# Patient Record
Sex: FEMALE | Race: WHITE | Employment: OTHER | URBAN - NONMETROPOLITAN AREA
[De-identification: names, ages, dates, MRNs, and addresses within clinical notes are randomized per-mention and may not be internally consistent; named-entity substitution may affect disease eponyms.]

---

## 2017-04-18 ENCOUNTER — OFFICE VISIT (OUTPATIENT)
Dept: CARDIOLOGY | Age: 72
End: 2017-04-18
Payer: MEDICARE

## 2017-04-18 VITALS — HEART RATE: 76 BPM | SYSTOLIC BLOOD PRESSURE: 134 MMHG | DIASTOLIC BLOOD PRESSURE: 68 MMHG

## 2017-04-18 DIAGNOSIS — I25.10 CORONARY ARTERY DISEASE INVOLVING NATIVE CORONARY ARTERY OF NATIVE HEART WITHOUT ANGINA PECTORIS: Primary | ICD-10-CM

## 2017-04-18 DIAGNOSIS — Z95.5 H/O RIGHT CORONARY ARTERY STENT PLACEMENT: ICD-10-CM

## 2017-04-18 DIAGNOSIS — E78.2 MIXED HYPERLIPIDEMIA: ICD-10-CM

## 2017-04-18 DIAGNOSIS — I10 ESSENTIAL HYPERTENSION: ICD-10-CM

## 2017-04-18 PROCEDURE — 93000 ELECTROCARDIOGRAM COMPLETE: CPT | Performed by: NURSE PRACTITIONER

## 2017-04-18 PROCEDURE — 1090F PRES/ABSN URINE INCON ASSESS: CPT | Performed by: NURSE PRACTITIONER

## 2017-04-18 PROCEDURE — G8598 ASA/ANTIPLAT THER USED: HCPCS | Performed by: NURSE PRACTITIONER

## 2017-04-18 PROCEDURE — 99213 OFFICE O/P EST LOW 20 MIN: CPT | Performed by: NURSE PRACTITIONER

## 2017-04-18 PROCEDURE — 4040F PNEUMOC VAC/ADMIN/RCVD: CPT | Performed by: NURSE PRACTITIONER

## 2017-04-18 PROCEDURE — G8400 PT W/DXA NO RESULTS DOC: HCPCS | Performed by: NURSE PRACTITIONER

## 2017-04-18 PROCEDURE — 1036F TOBACCO NON-USER: CPT | Performed by: NURSE PRACTITIONER

## 2017-04-18 PROCEDURE — G8427 DOCREV CUR MEDS BY ELIG CLIN: HCPCS | Performed by: NURSE PRACTITIONER

## 2017-04-18 PROCEDURE — 3017F COLORECTAL CA SCREEN DOC REV: CPT | Performed by: NURSE PRACTITIONER

## 2017-04-18 PROCEDURE — G8417 CALC BMI ABV UP PARAM F/U: HCPCS | Performed by: NURSE PRACTITIONER

## 2017-04-18 PROCEDURE — 3014F SCREEN MAMMO DOC REV: CPT | Performed by: NURSE PRACTITIONER

## 2017-04-18 PROCEDURE — 1123F ACP DISCUSS/DSCN MKR DOCD: CPT | Performed by: NURSE PRACTITIONER

## 2017-05-09 ENCOUNTER — OFFICE VISIT (OUTPATIENT)
Dept: VASCULAR SURGERY | Age: 72
End: 2017-05-09
Payer: MEDICARE

## 2017-05-09 VITALS
TEMPERATURE: 98.6 F | OXYGEN SATURATION: 93 % | DIASTOLIC BLOOD PRESSURE: 78 MMHG | SYSTOLIC BLOOD PRESSURE: 132 MMHG | RESPIRATION RATE: 18 BRPM | HEART RATE: 83 BPM

## 2017-05-09 DIAGNOSIS — M79.605 PAIN IN BOTH LOWER EXTREMITIES: Primary | ICD-10-CM

## 2017-05-09 DIAGNOSIS — I87.2 CHRONIC VENOUS INSUFFICIENCY: ICD-10-CM

## 2017-05-09 DIAGNOSIS — M79.604 PAIN IN BOTH LOWER EXTREMITIES: Primary | ICD-10-CM

## 2017-05-09 DIAGNOSIS — M79.89 LEG SWELLING: ICD-10-CM

## 2017-05-09 DIAGNOSIS — I65.23 BILATERAL CAROTID ARTERY STENOSIS: ICD-10-CM

## 2017-05-09 PROCEDURE — G8598 ASA/ANTIPLAT THER USED: HCPCS | Performed by: PHYSICIAN ASSISTANT

## 2017-05-09 PROCEDURE — 3017F COLORECTAL CA SCREEN DOC REV: CPT | Performed by: PHYSICIAN ASSISTANT

## 2017-05-09 PROCEDURE — G8400 PT W/DXA NO RESULTS DOC: HCPCS | Performed by: PHYSICIAN ASSISTANT

## 2017-05-09 PROCEDURE — 1123F ACP DISCUSS/DSCN MKR DOCD: CPT | Performed by: PHYSICIAN ASSISTANT

## 2017-05-09 PROCEDURE — 1036F TOBACCO NON-USER: CPT | Performed by: PHYSICIAN ASSISTANT

## 2017-05-09 PROCEDURE — G8427 DOCREV CUR MEDS BY ELIG CLIN: HCPCS | Performed by: PHYSICIAN ASSISTANT

## 2017-05-09 PROCEDURE — G8421 BMI NOT CALCULATED: HCPCS | Performed by: PHYSICIAN ASSISTANT

## 2017-05-09 PROCEDURE — 4040F PNEUMOC VAC/ADMIN/RCVD: CPT | Performed by: PHYSICIAN ASSISTANT

## 2017-05-09 PROCEDURE — 99213 OFFICE O/P EST LOW 20 MIN: CPT | Performed by: PHYSICIAN ASSISTANT

## 2017-05-09 PROCEDURE — 1090F PRES/ABSN URINE INCON ASSESS: CPT | Performed by: PHYSICIAN ASSISTANT

## 2017-05-09 PROCEDURE — 3014F SCREEN MAMMO DOC REV: CPT | Performed by: PHYSICIAN ASSISTANT

## 2017-05-11 DIAGNOSIS — M79.604 PAIN IN BOTH LOWER EXTREMITIES: Primary | ICD-10-CM

## 2017-05-11 DIAGNOSIS — M79.605 PAIN IN BOTH LOWER EXTREMITIES: Primary | ICD-10-CM

## 2017-05-11 DIAGNOSIS — M79.89 LEG SWELLING: ICD-10-CM

## 2017-05-12 ENCOUNTER — HOSPITAL ENCOUNTER (OUTPATIENT)
Dept: VASCULAR LAB | Age: 72
Discharge: HOME | End: 2017-05-12
Payer: MEDICARE

## 2017-05-12 DIAGNOSIS — M79.605 PAIN IN BOTH LOWER EXTREMITIES: ICD-10-CM

## 2017-05-12 DIAGNOSIS — M79.604 PAIN IN BOTH LOWER EXTREMITIES: ICD-10-CM

## 2017-05-12 DIAGNOSIS — M79.89 LEG SWELLING: ICD-10-CM

## 2017-05-12 PROCEDURE — 93970 EXTREMITY STUDY: CPT

## 2017-05-16 ENCOUNTER — TELEPHONE (OUTPATIENT)
Dept: VASCULAR SURGERY | Age: 72
End: 2017-05-16

## 2017-05-18 ENCOUNTER — TELEPHONE (OUTPATIENT)
Dept: VASCULAR SURGERY | Age: 72
End: 2017-05-18

## 2017-07-24 ENCOUNTER — OFFICE VISIT (OUTPATIENT)
Dept: UROLOGY | Age: 72
End: 2017-07-24
Payer: MEDICARE

## 2017-07-24 VITALS
HEART RATE: 103 BPM | WEIGHT: 194 LBS | SYSTOLIC BLOOD PRESSURE: 130 MMHG | TEMPERATURE: 97.9 F | DIASTOLIC BLOOD PRESSURE: 70 MMHG | HEIGHT: 68 IN | OXYGEN SATURATION: 96 % | BODY MASS INDEX: 29.4 KG/M2

## 2017-07-24 DIAGNOSIS — N32.81 OVERACTIVE BLADDER: Primary | ICD-10-CM

## 2017-07-24 DIAGNOSIS — R35.0 FREQUENCY OF URINATION: ICD-10-CM

## 2017-07-24 LAB
BILIRUBIN, POC: NORMAL
BLOOD URINE, POC: NORMAL
CLARITY, POC: CLEAR
COLOR, POC: YELLOW
GLUCOSE URINE, POC: NORMAL
KETONES, POC: NORMAL
LEUKOCYTE EST, POC: NORMAL
NITRITE, POC: NORMAL
PH, POC: 8
PROTEIN, POC: NORMAL
SPECIFIC GRAVITY, POC: 1.01
UROBILINOGEN, POC: 0.2

## 2017-07-24 PROCEDURE — G8417 CALC BMI ABV UP PARAM F/U: HCPCS | Performed by: UROLOGY

## 2017-07-24 PROCEDURE — 3017F COLORECTAL CA SCREEN DOC REV: CPT | Performed by: UROLOGY

## 2017-07-24 PROCEDURE — 3014F SCREEN MAMMO DOC REV: CPT | Performed by: UROLOGY

## 2017-07-24 PROCEDURE — 81002 URINALYSIS NONAUTO W/O SCOPE: CPT | Performed by: UROLOGY

## 2017-07-24 PROCEDURE — G8598 ASA/ANTIPLAT THER USED: HCPCS | Performed by: UROLOGY

## 2017-07-24 PROCEDURE — 1123F ACP DISCUSS/DSCN MKR DOCD: CPT | Performed by: UROLOGY

## 2017-07-24 PROCEDURE — 1090F PRES/ABSN URINE INCON ASSESS: CPT | Performed by: UROLOGY

## 2017-07-24 PROCEDURE — 4040F PNEUMOC VAC/ADMIN/RCVD: CPT | Performed by: UROLOGY

## 2017-07-24 PROCEDURE — 1036F TOBACCO NON-USER: CPT | Performed by: UROLOGY

## 2017-07-24 PROCEDURE — G8427 DOCREV CUR MEDS BY ELIG CLIN: HCPCS | Performed by: UROLOGY

## 2017-07-24 PROCEDURE — 99214 OFFICE O/P EST MOD 30 MIN: CPT | Performed by: UROLOGY

## 2017-07-24 PROCEDURE — G8400 PT W/DXA NO RESULTS DOC: HCPCS | Performed by: UROLOGY

## 2017-07-24 RX ORDER — OLMESARTAN MEDOXOMIL AND HYDROCHLOROTHIAZIDE 40/25 40; 25 MG/1; MG/1
1 TABLET ORAL
COMMUNITY
End: 2017-07-24 | Stop reason: SDUPTHER

## 2017-07-24 ASSESSMENT — ENCOUNTER SYMPTOMS
DOUBLE VISION: 0
BLURRED VISION: 0
HEARTBURN: 0
NAUSEA: 0
SORE THROAT: 0
SHORTNESS OF BREATH: 0
WHEEZING: 0

## 2017-08-24 ENCOUNTER — OFFICE VISIT (OUTPATIENT)
Dept: UROLOGY | Age: 72
End: 2017-08-24
Payer: MEDICARE

## 2017-08-24 VITALS
WEIGHT: 193 LBS | HEART RATE: 85 BPM | SYSTOLIC BLOOD PRESSURE: 138 MMHG | DIASTOLIC BLOOD PRESSURE: 76 MMHG | BODY MASS INDEX: 30.29 KG/M2 | HEIGHT: 67 IN | TEMPERATURE: 97.6 F | OXYGEN SATURATION: 98 %

## 2017-08-24 DIAGNOSIS — N32.81 OVERACTIVE BLADDER: Primary | ICD-10-CM

## 2017-08-24 LAB
BILIRUBIN, POC: NORMAL
BLOOD URINE, POC: NORMAL
CLARITY, POC: CLEAR
COLOR, POC: YELLOW
GLUCOSE URINE, POC: NORMAL
KETONES, POC: NORMAL
LEUKOCYTE EST, POC: NORMAL
NITRITE, POC: NORMAL
PH, POC: 7
PROTEIN, POC: NORMAL
SPECIFIC GRAVITY, POC: 1.01
UROBILINOGEN, POC: 0.2

## 2017-08-24 PROCEDURE — 1123F ACP DISCUSS/DSCN MKR DOCD: CPT | Performed by: PHYSICIAN ASSISTANT

## 2017-08-24 PROCEDURE — 81002 URINALYSIS NONAUTO W/O SCOPE: CPT | Performed by: PHYSICIAN ASSISTANT

## 2017-08-24 PROCEDURE — 3014F SCREEN MAMMO DOC REV: CPT | Performed by: PHYSICIAN ASSISTANT

## 2017-08-24 PROCEDURE — 51798 US URINE CAPACITY MEASURE: CPT | Performed by: PHYSICIAN ASSISTANT

## 2017-08-24 PROCEDURE — G8427 DOCREV CUR MEDS BY ELIG CLIN: HCPCS | Performed by: PHYSICIAN ASSISTANT

## 2017-08-24 PROCEDURE — G8400 PT W/DXA NO RESULTS DOC: HCPCS | Performed by: PHYSICIAN ASSISTANT

## 2017-08-24 PROCEDURE — 1036F TOBACCO NON-USER: CPT | Performed by: PHYSICIAN ASSISTANT

## 2017-08-24 PROCEDURE — 99213 OFFICE O/P EST LOW 20 MIN: CPT | Performed by: PHYSICIAN ASSISTANT

## 2017-08-24 PROCEDURE — 3017F COLORECTAL CA SCREEN DOC REV: CPT | Performed by: PHYSICIAN ASSISTANT

## 2017-08-24 PROCEDURE — 1090F PRES/ABSN URINE INCON ASSESS: CPT | Performed by: PHYSICIAN ASSISTANT

## 2017-08-24 PROCEDURE — G8598 ASA/ANTIPLAT THER USED: HCPCS | Performed by: PHYSICIAN ASSISTANT

## 2017-08-24 PROCEDURE — 4040F PNEUMOC VAC/ADMIN/RCVD: CPT | Performed by: PHYSICIAN ASSISTANT

## 2017-08-24 PROCEDURE — G8417 CALC BMI ABV UP PARAM F/U: HCPCS | Performed by: PHYSICIAN ASSISTANT

## 2017-08-24 ASSESSMENT — ENCOUNTER SYMPTOMS
SHORTNESS OF BREATH: 0
EYE DISCHARGE: 0
WHEEZING: 0
NAUSEA: 0
EYE REDNESS: 0
HEARTBURN: 0

## 2017-08-31 ENCOUNTER — OFFICE VISIT (OUTPATIENT)
Dept: UROLOGY | Age: 72
End: 2017-08-31
Payer: MEDICARE

## 2017-08-31 DIAGNOSIS — N32.81 OVERACTIVE BLADDER: Primary | ICD-10-CM

## 2017-08-31 PROCEDURE — 1036F TOBACCO NON-USER: CPT | Performed by: PHYSICIAN ASSISTANT

## 2017-08-31 PROCEDURE — 64566 NEUROELTRD STIM POST TIBIAL: CPT | Performed by: PHYSICIAN ASSISTANT

## 2017-08-31 ASSESSMENT — ENCOUNTER SYMPTOMS
WHEEZING: 0
SHORTNESS OF BREATH: 0
EYE REDNESS: 0
HEARTBURN: 0
EYE DISCHARGE: 0
NAUSEA: 0

## 2017-09-07 ENCOUNTER — OFFICE VISIT (OUTPATIENT)
Dept: UROLOGY | Age: 72
End: 2017-09-07
Payer: MEDICARE

## 2017-09-07 DIAGNOSIS — N32.81 OVERACTIVE BLADDER: Primary | ICD-10-CM

## 2017-09-07 PROCEDURE — 64566 NEUROELTRD STIM POST TIBIAL: CPT | Performed by: PHYSICIAN ASSISTANT

## 2017-09-07 PROCEDURE — 1036F TOBACCO NON-USER: CPT | Performed by: PHYSICIAN ASSISTANT

## 2017-09-07 ASSESSMENT — ENCOUNTER SYMPTOMS
EYE DISCHARGE: 0
HEARTBURN: 0
SHORTNESS OF BREATH: 0
NAUSEA: 0
EYE REDNESS: 0
WHEEZING: 0

## 2017-09-14 ENCOUNTER — OFFICE VISIT (OUTPATIENT)
Dept: UROLOGY | Age: 72
End: 2017-09-14
Payer: MEDICARE

## 2017-09-14 DIAGNOSIS — N32.81 OVERACTIVE BLADDER: Primary | ICD-10-CM

## 2017-09-14 PROCEDURE — 1036F TOBACCO NON-USER: CPT | Performed by: PHYSICIAN ASSISTANT

## 2017-09-14 PROCEDURE — 64566 NEUROELTRD STIM POST TIBIAL: CPT | Performed by: PHYSICIAN ASSISTANT

## 2017-09-14 ASSESSMENT — ENCOUNTER SYMPTOMS
EYE REDNESS: 0
SHORTNESS OF BREATH: 0
WHEEZING: 0
NAUSEA: 0
EYE DISCHARGE: 0
HEARTBURN: 0

## 2017-09-21 ENCOUNTER — OFFICE VISIT (OUTPATIENT)
Dept: UROLOGY | Age: 72
End: 2017-09-21
Payer: MEDICARE

## 2017-09-21 DIAGNOSIS — N32.81 OVERACTIVE BLADDER: Primary | ICD-10-CM

## 2017-09-21 PROCEDURE — 1036F TOBACCO NON-USER: CPT | Performed by: PHYSICIAN ASSISTANT

## 2017-09-21 PROCEDURE — 64566 NEUROELTRD STIM POST TIBIAL: CPT | Performed by: PHYSICIAN ASSISTANT

## 2017-09-21 ASSESSMENT — ENCOUNTER SYMPTOMS
SHORTNESS OF BREATH: 0
WHEEZING: 0
HEARTBURN: 0
EYE DISCHARGE: 0
EYE REDNESS: 0
NAUSEA: 0

## 2017-09-29 ENCOUNTER — OFFICE VISIT (OUTPATIENT)
Dept: UROLOGY | Age: 72
End: 2017-09-29
Payer: MEDICARE

## 2017-09-29 DIAGNOSIS — N32.81 OVERACTIVE BLADDER: Primary | ICD-10-CM

## 2017-09-29 PROCEDURE — 64566 NEUROELTRD STIM POST TIBIAL: CPT | Performed by: PHYSICIAN ASSISTANT

## 2017-09-29 PROCEDURE — 1036F TOBACCO NON-USER: CPT | Performed by: PHYSICIAN ASSISTANT

## 2017-09-29 ASSESSMENT — ENCOUNTER SYMPTOMS
NAUSEA: 0
EYE REDNESS: 0
HEARTBURN: 0
EYE DISCHARGE: 0
WHEEZING: 0
SHORTNESS OF BREATH: 0

## 2017-10-04 ENCOUNTER — OFFICE VISIT (OUTPATIENT)
Dept: UROLOGY | Age: 72
End: 2017-10-04
Payer: MEDICARE

## 2017-10-04 DIAGNOSIS — N32.81 OVERACTIVE BLADDER: Primary | ICD-10-CM

## 2017-10-04 PROCEDURE — 64566 NEUROELTRD STIM POST TIBIAL: CPT | Performed by: PHYSICIAN ASSISTANT

## 2017-10-04 PROCEDURE — 1036F TOBACCO NON-USER: CPT | Performed by: PHYSICIAN ASSISTANT

## 2017-10-04 ASSESSMENT — ENCOUNTER SYMPTOMS
WHEEZING: 0
SHORTNESS OF BREATH: 0
EYE REDNESS: 0
HEARTBURN: 0
NAUSEA: 0
EYE DISCHARGE: 0

## 2017-10-04 NOTE — PROGRESS NOTES
SECTION  1970    x 1    CHOLECYSTECTOMY  2012    [place]    COLONOSCOPY  1-    Dr LUJAN    COLONOSCOPY  05/06/2014    Dr. Max Wilburn  2012    Dr Tisha Bailon    partial    KNEE SURGERY  2011    left knee surgery    PTCA      stent    TUBAL LIGATION      UPPER GASTROINTESTINAL ENDOSCOPY  2009?  UPPER GASTROINTESTINAL ENDOSCOPY  1-    Dr LUJAN    UPPER GASTROINTESTINAL ENDOSCOPY  05/06/2014    Dr. Xu Winkler   01- [facility]    Left Leg Ablation    VARICOSE VEIN SURGERY  3-  [facility]    right leg ablation       Current Outpatient Prescriptions   Medication Sig Dispense Refill    Mirabegron ER 50 MG TB24 Take 50 mg by mouth daily 30 tablet 0    metoprolol (TOPROL-XL) 200 MG XL tablet Take 200 mg by mouth daily      ranitidine (ZANTAC 150 MAXIMUM STRENGTH) 150 MG tablet Take 150 mg by mouth nightly      BENICAR HCT 40-25 MG per tablet Take 1 tablet by mouth daily       calcium carbonate 600 MG TABS tablet Take 1 tablet by mouth 2 times daily as needed       Vitamin D (CHOLECALCIFEROL) 1000 UNITS CAPS capsule Take 3,000 Units by mouth daily       clopidogrel (PLAVIX) 75 MG tablet Take 75 mg by mouth daily.  allopurinol (ZYLOPRIM) 300 MG tablet Take 300 mg by mouth daily.  aspirin 81 MG EC tablet Take 81 mg by mouth daily.  loratadine (CLARITIN) 10 MG tablet Take 10 mg by mouth daily.  Multiple Vitamin (MULTIVITAMIN PO) Take 1 tablet by mouth daily        No current facility-administered medications for this visit. Allergies   Allergen Reactions    Reclast [Zoledronic Acid] Other (See Comments)     Patient has a intolerance to this medication .  It makes her feel \"out of it\"     Celexa [Citalopram Hydrobromide] Nausea Only     Nausea and headache    Demerol Nausea Only    Dye [Barium-Containing Compounds] Nausea Only    Hydrocodone-Acetaminophen Other (See Comments)     Shaking, attacks nervous system    Lasix [Furosemide] Nausea Only    Neurontin [Gabapentin] Nausea Only     Dizziness      Nitroglycerin Other (See Comments)     Passed out    Plaquenil [Hydroxychloroquine Sulfate] Other (See Comments)     nervousness    Cozaar [Losartan Potassium] Palpitations    Esomeprazole Magnesium Nausea And Vomiting    Lisinopril Other (See Comments)     Muscle cramps in legs    Prilosec [Omeprazole] Nausea And Vomiting       Social History     Social History    Marital status:      Spouse name: N/A    Number of children: N/A    Years of education: N/A     Social History Main Topics    Smoking status: Never Smoker    Smokeless tobacco: Never Used    Alcohol use No    Drug use: No    Sexual activity: Not Asked     Other Topics Concern    None     Social History Narrative       Family History   Problem Relation Age of Onset    Diabetes Mother     Heart Disease Mother     Heart Disease Father     Diabetes Father     High Blood Pressure Father     Esophageal Cancer Father     Colon Polyps Father     Crohn's Disease Brother     Colon Cancer Neg Hx     Liver Cancer Neg Hx     Liver Disease Neg Hx     Rectal Cancer Neg Hx     Stomach Cancer Neg Hx        REVIEW OF SYSTEMS:  Review of Systems   Constitutional: Negative for chills and fever. HENT: Negative for hearing loss. Eyes: Negative for discharge and redness. Respiratory: Negative for shortness of breath and wheezing. Cardiovascular: Negative for leg swelling and PND. Gastrointestinal: Negative for heartburn and nausea. Genitourinary: Positive for frequency and urgency. Negative for dysuria, flank pain and hematuria. Musculoskeletal: Negative for myalgias and neck pain. Skin: Negative for itching and rash. Neurological: Negative for seizures, loss of consciousness and headaches.    Endo/Heme/Allergies: Negative for environmental allergies and polydipsia. Psychiatric/Behavioral: Negative for depression and suicidal ideas. PHYSICAL EXAM:  There were no vitals taken for this visit. Physical Exam   Constitutional: No distress. HENT:   Mouth/Throat: No oropharyngeal exudate. Eyes: Left eye exhibits no discharge. No scleral icterus. Neck: No JVD present. No tracheal deviation present. No thyromegaly present. Cardiovascular: Exam reveals no gallop and no friction rub. Pulmonary/Chest: No stridor. She has no rales. Abdominal: She exhibits no distension and no mass. There is no rebound and no guarding. Musculoskeletal: She exhibits no edema. Neurological: No cranial nerve deficit. She exhibits normal muscle tone. Coordination normal.   Skin: She is not diaphoretic. No pallor. 1. Patient is seated with the left treatment leg elevated. 2. 34 gauge fine needle electrode is inserted into lower inner aspect of the leg. Slightly cephalad to the medial malleolus. 3. Surface electrode pad is placed over the medial aspect of the calcaneus on the same leg. 4.Needle electrode is connected to the external pulse generator. 5.The pulse generator is turned on and the millivolts used are 17. 6.Patient is treated for 30 minutes. 7.The needle and pad are removed. 1. Overactive bladder    - [place] POST TIBIAL NEUROSTIMULATION,PERC NEEDLE ELECTRODE  Patient states this past week she is getting up less at night she was getting up 5-7 times now 4-5 times at night and her urgency has decreased. Orders Placed This Encounter   Procedures    [place] POST TIBIAL NEUROSTIMULATION,PERC NEEDLE ELECTRODE     Uroplasty # 6     No orders of the defined types were placed in this encounter. Plan:  Follow up in 2 week for next treatment. Patient will follow up in 1 week for next treatment.

## 2017-10-24 ENCOUNTER — OFFICE VISIT (OUTPATIENT)
Dept: CARDIOLOGY | Age: 72
End: 2017-10-24
Payer: MEDICARE

## 2017-10-24 VITALS
SYSTOLIC BLOOD PRESSURE: 102 MMHG | HEIGHT: 67 IN | BODY MASS INDEX: 29.19 KG/M2 | WEIGHT: 186 LBS | HEART RATE: 72 BPM | DIASTOLIC BLOOD PRESSURE: 68 MMHG

## 2017-10-24 DIAGNOSIS — I25.10 CORONARY ARTERY DISEASE INVOLVING NATIVE CORONARY ARTERY OF NATIVE HEART WITHOUT ANGINA PECTORIS: ICD-10-CM

## 2017-10-24 DIAGNOSIS — I10 ESSENTIAL HYPERTENSION: Primary | ICD-10-CM

## 2017-10-24 DIAGNOSIS — E78.2 MIXED HYPERLIPIDEMIA: ICD-10-CM

## 2017-10-24 PROCEDURE — 99213 OFFICE O/P EST LOW 20 MIN: CPT | Performed by: INTERNAL MEDICINE

## 2017-10-24 PROCEDURE — G8484 FLU IMMUNIZE NO ADMIN: HCPCS | Performed by: INTERNAL MEDICINE

## 2017-10-24 PROCEDURE — 3017F COLORECTAL CA SCREEN DOC REV: CPT | Performed by: INTERNAL MEDICINE

## 2017-10-24 PROCEDURE — 3014F SCREEN MAMMO DOC REV: CPT | Performed by: INTERNAL MEDICINE

## 2017-10-24 PROCEDURE — G8417 CALC BMI ABV UP PARAM F/U: HCPCS | Performed by: INTERNAL MEDICINE

## 2017-10-24 PROCEDURE — 1036F TOBACCO NON-USER: CPT | Performed by: INTERNAL MEDICINE

## 2017-10-24 PROCEDURE — G8427 DOCREV CUR MEDS BY ELIG CLIN: HCPCS | Performed by: INTERNAL MEDICINE

## 2017-10-24 PROCEDURE — 1123F ACP DISCUSS/DSCN MKR DOCD: CPT | Performed by: INTERNAL MEDICINE

## 2017-10-24 PROCEDURE — 1090F PRES/ABSN URINE INCON ASSESS: CPT | Performed by: INTERNAL MEDICINE

## 2017-10-24 PROCEDURE — 4040F PNEUMOC VAC/ADMIN/RCVD: CPT | Performed by: INTERNAL MEDICINE

## 2017-10-24 PROCEDURE — G8400 PT W/DXA NO RESULTS DOC: HCPCS | Performed by: INTERNAL MEDICINE

## 2017-10-24 PROCEDURE — G8598 ASA/ANTIPLAT THER USED: HCPCS | Performed by: INTERNAL MEDICINE

## 2017-10-24 NOTE — PROGRESS NOTES
[facility] Patient Office Visit    [facility] [place]  [id number]  Phone: [phone]  Fax: [phone]        10/24/2017    Chief Complaint / Reason for the Visit   Follow up of:  CAD and HTN and Hyperlipidemia      Specialty Problems        Cardiology Problems    CAD (coronary artery disease)        Hypertension        Carotid artery stenosis        Chronic venous insufficiency        Varicose veins        Spider veins        Internal hemorrhoids without complication        Bleeding internal hemorrhoids        Syncope        Internal hemorrhoids        Coronary artery disease involving native coronary artery of native heart without angina pectoris              Current Status Today According to the patient:  \"I\"m doing okay\"    Subjective:  Ms. Merwyn Nageotte is generally feeling stable. Ms. Merwyn Nageotte has the following cardiac complaints / symptoms today:    1. CAD, Is stable on current medications    2. HTN, Is stable on current medications    3.  Hyperlipidemia, .jdts Merwyn Nageotte is a 74 y.o. female with the following history as recorded in [facility]:    Patient Active Problem List    Diagnosis Date Noted    Overactive bladder 07/24/2017    Coronary artery disease involving native coronary artery of native heart without angina pectoris 04/18/2017    Mixed hyperlipidemia 04/18/2017    H/O right coronary artery stent placement 49/07/2543    Periumbilical abdominal pain 07/21/2016    Irritable bowel syndrome with diarrhea 06/10/2016    Internal hemorrhoids 06/10/2016    Syncope 09/09/2015    Precordial pain 09/08/2015    CAMPOVERDE (dyspnea on exertion) 09/08/2015    Palpitations 09/08/2015    Bleeding internal hemorrhoids 07/15/2014    Heartburn 05/20/2014    Internal hemorrhoids without complication 63/98/2750    Spider veins 03/04/2013    Carotid artery stenosis 11/08/2012    Leg pain 11/08/2012    Leg swelling Irregular heart beat     Itching     Muscle cramp     Neck pain     Osteoarthritis     Osteoporosis     Palpitations     RA (rheumatoid arthritis) (HCC)     Rash     Rectal bleeding     SOB (shortness of breath)     Sore throat     Tympanic membrane perforation     Varicose veins 2012     Past Surgical History:   Procedure Laterality Date    APPENDECTOMY      BREAST SURGERY  2002    CARDIAC CATHETERIZATION  9/10/15  JDT    EF 50%    CERVICAL FUSION       SECTION  1968    x 1    CHOLECYSTECTOMY  2012    [place]    COLONOSCOPY  1-    Dr LUJAN    COLONOSCOPY  2014    Dr. Candi Carlisle      Dr Serena Haile    partial    KNEE SURGERY      left knee surgery    PTCA      stent    TUBAL LIGATION      UPPER GASTROINTESTINAL ENDOSCOPY  ?     UPPER GASTROINTESTINAL ENDOSCOPY  1-    Dr LUJAN    UPPER GASTROINTESTINAL ENDOSCOPY  2014    Dr. Anny Smallwood   2013 [facility]    Left Leg Ablation    VARICOSE VEIN SURGERY  3-  [facility]    right leg ablation     Family History   Problem Relation Age of Onset    Diabetes Mother     Heart Disease Mother     Heart Disease Father     Diabetes Father     High Blood Pressure Father     Esophageal Cancer Father     Colon Polyps Father     Crohn's Disease Brother     Colon Cancer Neg Hx     Liver Cancer Neg Hx     Liver Disease Neg Hx     Rectal Cancer Neg Hx     Stomach Cancer Neg Hx      Social History   Substance Use Topics    Smoking status: Never Smoker    Smokeless tobacco: Never Used    Alcohol use No          Review of Systems:    General:      Complaint / Symptom Yes / No / Description if Yes       Fatigue No   Weight gain No   Insomnia No       Respiratory:        Complaint / Symptom Yes / No / Description if Yes       Cough No   Horseness No

## 2017-10-27 ENCOUNTER — OFFICE VISIT (OUTPATIENT)
Dept: UROLOGY | Age: 72
End: 2017-10-27
Payer: MEDICARE

## 2017-10-27 VITALS
TEMPERATURE: 97.8 F | OXYGEN SATURATION: 94 % | WEIGHT: 186 LBS | BODY MASS INDEX: 29.19 KG/M2 | HEART RATE: 68 BPM | HEIGHT: 67 IN

## 2017-10-27 DIAGNOSIS — N32.81 OVERACTIVE BLADDER: Primary | ICD-10-CM

## 2017-10-27 PROCEDURE — 64566 NEUROELTRD STIM POST TIBIAL: CPT | Performed by: PHYSICIAN ASSISTANT

## 2017-10-27 ASSESSMENT — ENCOUNTER SYMPTOMS
WHEEZING: 0
EYE DISCHARGE: 0
SHORTNESS OF BREATH: 0
NAUSEA: 0
HEARTBURN: 0
EYE REDNESS: 0

## 2017-10-27 NOTE — PROGRESS NOTES
1970    x 1    CHOLECYSTECTOMY  2012    [place]    COLONOSCOPY  1-    Dr LUJAN    COLONOSCOPY  05/06/2014    Dr. Huerta Fails  2012    Dr Keo Kirk    partial    KNEE SURGERY  2011    left knee surgery    PTCA      stent    TUBAL LIGATION      UPPER GASTROINTESTINAL ENDOSCOPY  2009?  UPPER GASTROINTESTINAL ENDOSCOPY  1-    Dr LUJAN    UPPER GASTROINTESTINAL ENDOSCOPY  05/06/2014    Dr. Quita Chin   01- [facility]    Left Leg Ablation    VARICOSE VEIN SURGERY  3-  [facility]    right leg ablation       Current Outpatient Prescriptions   Medication Sig Dispense Refill    Mirabegron ER 50 MG TB24 Take 50 mg by mouth daily 30 tablet 0    metoprolol (TOPROL-XL) 200 MG XL tablet Take 200 mg by mouth daily      ranitidine (ZANTAC 150 MAXIMUM STRENGTH) 150 MG tablet Take 150 mg by mouth nightly      BENICAR HCT 40-25 MG per tablet Take 1 tablet by mouth daily       calcium carbonate 600 MG TABS tablet Take 1 tablet by mouth 2 times daily as needed       Vitamin D (CHOLECALCIFEROL) 1000 UNITS CAPS capsule Take 3,000 Units by mouth daily       clopidogrel (PLAVIX) 75 MG tablet Take 75 mg by mouth daily.  allopurinol (ZYLOPRIM) 300 MG tablet Take 300 mg by mouth daily.  aspirin 81 MG EC tablet Take 81 mg by mouth daily.  loratadine (CLARITIN) 10 MG tablet Take 10 mg by mouth daily.  Multiple Vitamin (MULTIVITAMIN PO) Take 1 tablet by mouth daily        No current facility-administered medications for this visit. Allergies   Allergen Reactions    Reclast [Zoledronic Acid] Other (See Comments)     Patient has a intolerance to this medication .  It makes her feel \"out of it\"     Celexa [Citalopram Hydrobromide] Nausea Only     Nausea and headache    Demerol Nausea Only    Dye [Barium-Containing Compounds] Nausea environmental allergies and polydipsia. Psychiatric/Behavioral: Negative for depression and suicidal ideas. PHYSICAL EXAM:  Pulse 68   Temp 97.8 °F (36.6 °C) (Temporal)   Ht 5' 7\" (1.702 m)   Wt 186 lb (84.4 kg)   SpO2 94%   BMI 29.13 kg/m²   Physical Exam   Constitutional: No distress. HENT:   Mouth/Throat: No oropharyngeal exudate. Eyes: Left eye exhibits no discharge. No scleral icterus. Neck: No JVD present. No tracheal deviation present. No thyromegaly present. Cardiovascular: Exam reveals no gallop and no friction rub. Pulmonary/Chest: No stridor. She has no rales. Abdominal: She exhibits no distension and no mass. There is no rebound and no guarding. Musculoskeletal: She exhibits no edema. Neurological: No cranial nerve deficit. She exhibits normal muscle tone. Coordination normal.   Skin: She is not diaphoretic. No pallor. 1. Patient is seated with the left treatment leg elevated. 2. 34 gauge fine needle electrode is inserted into lower inner aspect of the leg. Slightly cephalad to the medial malleolus. 3. Surface electrode pad is placed over the medial aspect of the calcaneus on the same leg. 4.Needle electrode is connected to the external pulse generator. 5.The pulse generator is turned on and the millivolts used are 5. 6.Patient is treated for 30 minutes. 7.The needle and pad are removed. 1. Overactive bladder  Still no appreciable change in symptoms patient still willing to do the complete 12 week course treatment and reevaluate at the end.  - [place] POST TIBIAL [address]  Patient states this past week she is getting up less at night she was getting up 5-7 times now 4-5 times at night and her urgency has decreased. No orders of the defined types were placed in this encounter. No orders of the defined types were placed in this encounter. Plan:    Patient will follow up in 1 week for next treatment.

## 2017-11-03 ENCOUNTER — OFFICE VISIT (OUTPATIENT)
Dept: UROLOGY | Age: 72
End: 2017-11-03
Payer: MEDICARE

## 2017-11-03 DIAGNOSIS — R35.0 FREQUENCY OF URINATION: ICD-10-CM

## 2017-11-03 DIAGNOSIS — N32.81 OVERACTIVE BLADDER: Primary | ICD-10-CM

## 2017-11-03 PROCEDURE — 64566 NEUROELTRD STIM POST TIBIAL: CPT | Performed by: PHYSICIAN ASSISTANT

## 2017-11-03 PROCEDURE — 99999 PR OFFICE/OUTPT VISIT,PROCEDURE ONLY: CPT | Performed by: PHYSICIAN ASSISTANT

## 2017-11-03 ASSESSMENT — ENCOUNTER SYMPTOMS
HEARTBURN: 0
SHORTNESS OF BREATH: 0
EYE DISCHARGE: 0
NAUSEA: 0
EYE REDNESS: 0
WHEEZING: 0

## 2017-11-03 NOTE — PROGRESS NOTES
Eben Johnson is a 74 y.o. female who presents today   Chief Complaint   Patient presents with    Follow-up     I'm here for my uroplasty      [facility]AB here for uroplasty Treatment:  Patient with chief complaint of long-standing 5+ years of worsening frequency urgency and nocturia is here for her 8th neuromodulation treatment she has failed on multiple overactive bladder medications. I explained the process in detail I explained I will be inserting a 34-gauge needle under her lower medial leg adjacent to the posterior tibial nerve. I then explained I attached electrode and attached that to a small power pack I then adjust the millivolts and socially she feels sensation but not limited some comfortable. I say few patients will experience some discomfort and may have some slight bleeding after the needle is removed. After discussion of the procedure she will use me the verbal consent to continue.     Past Medical History:   Diagnosis Date    Acid reflux     Anxiety     Arthritis     Back pain     CAD (coronary artery disease)     CAD (coronary artery disease)     Carotid artery occlusion     Chronic venous insufficiency 2012    Cough     Fibromyalgia     Goiter     Gout     Head ache     headaches    Heart murmur     History of colon polyps     History of colon polyps     Hoarseness     Hypercholesterolemia     Hyperlipidemia     Hypertension     Insomnia     Irregular heart beat     Itching     Muscle cramp     Neck pain     Osteoarthritis     Osteoporosis     Palpitations     RA (rheumatoid arthritis) (HCC)     Rash     Rectal bleeding     SOB (shortness of breath)     Sore throat     Tympanic membrane perforation     Varicose veins 2012       Past Surgical History:   Procedure Laterality Date    APPENDECTOMY      BREAST SURGERY  2002    CARDIAC CATHETERIZATION  9/10/15  JDT    EF 50%    CERVICAL FUSION       SECTION  1968    x 1    CHOLECYSTECTOMY  2012 Psychiatric/Behavioral: Negative for depression and suicidal ideas. PHYSICAL EXAM:  There were no vitals taken for this visit. Physical Exam   Constitutional: No distress. HENT:   Mouth/Throat: No oropharyngeal exudate. Eyes: Left eye exhibits no discharge. No scleral icterus. Neck: No JVD present. No tracheal deviation present. No thyromegaly present. Cardiovascular: Exam reveals no gallop and no friction rub. Pulmonary/Chest: No stridor. She has no rales. Abdominal: She exhibits no distension and no mass. There is no rebound and no guarding. Musculoskeletal: She exhibits no edema. Neurological: No cranial nerve deficit. She exhibits normal muscle tone. Coordination normal.   Skin: She is not diaphoretic. No pallor. 1. Patient is seated with the left treatment leg elevated. 2. 34 gauge fine needle electrode is inserted into lower inner aspect of the leg. Slightly cephalad to the medial malleolus. 3. Surface electrode pad is placed over the medial aspect of the calcaneus on the same leg. 4.Needle electrode is connected to the external pulse generator. 5.The pulse generator is turned on and the millivolts used are 5. 6.Patient is treated for 30 minutes. 7.The needle and pad are removed. 1. Overactive bladder  Has now started to see improvement and urgency and frequency. - [place] POST TIBIAL NEUROSTIMULATION,PERC NEEDLE ELECTRODE  Patient states this past week she is getting up less at night she was getting up 5-7 times now 4-5 times at night and her urgency has decreased. No orders of the defined types were placed in this encounter. No orders of the defined types were placed in this encounter. Plan:    Patient will follow up in 1 week for next treatment.

## 2017-11-10 ENCOUNTER — OFFICE VISIT (OUTPATIENT)
Dept: UROLOGY | Age: 72
End: 2017-11-10
Payer: MEDICARE

## 2017-11-10 DIAGNOSIS — N32.81 OVERACTIVE BLADDER: Primary | ICD-10-CM

## 2017-11-10 DIAGNOSIS — R35.0 FREQUENCY OF URINATION: ICD-10-CM

## 2017-11-10 PROCEDURE — 64566 NEUROELTRD STIM POST TIBIAL: CPT | Performed by: PHYSICIAN ASSISTANT

## 2017-11-10 PROCEDURE — 99999 PR OFFICE/OUTPT VISIT,PROCEDURE ONLY: CPT | Performed by: PHYSICIAN ASSISTANT

## 2017-11-10 ASSESSMENT — ENCOUNTER SYMPTOMS
SHORTNESS OF BREATH: 0
WHEEZING: 0
EYE DISCHARGE: 0
EYE REDNESS: 0
NAUSEA: 0
HEARTBURN: 0

## 2017-11-10 NOTE — PROGRESS NOTES
Monik Espinal is a 74 y.o. female who presents today   Chief Complaint   Patient presents with    Follow-up     im here today for my uroplasty treatment # 9  for overactive bladder. [facility]AB here for uroplasty Treatment:  Patient with chief complaint of long-standing 5+ years of worsening frequency urgency and nocturia is here for her 9th neuromodulation treatment she has failed on multiple overactive bladder medications. I explained the process in detail I explained I will be inserting a 34-gauge needle under her lower medial leg adjacent to the posterior tibial nerve. I then explained I attached electrode and attached that to a small power pack I then adjust the millivolts and socially she feels sensation but not limited some comfortable. I say few patients will experience some discomfort and may have some slight bleeding after the needle is removed. After discussion of the procedure she will use me the verbal consent to continue.     Past Medical History:   Diagnosis Date    Acid reflux     Anxiety     Arthritis     Back pain     CAD (coronary artery disease)     CAD (coronary artery disease)     Carotid artery occlusion     Chronic venous insufficiency 2012    Cough     Fibromyalgia     Goiter     Gout     Head ache     headaches    Heart murmur     History of colon polyps     History of colon polyps     Hoarseness     Hypercholesterolemia     Hyperlipidemia     Hypertension     Insomnia     Irregular heart beat     Itching     Muscle cramp     Neck pain     Osteoarthritis     Osteoporosis     Palpitations     RA (rheumatoid arthritis) (HCC)     Rash     Rectal bleeding     SOB (shortness of breath)     Sore throat     Tympanic membrane perforation     Varicose veins 2012       Past Surgical History:   Procedure Laterality Date    APPENDECTOMY      BREAST SURGERY  2002    CARDIAC CATHETERIZATION  9/10/15  JDT    EF 50%    CERVICAL FUSION       SECTION  1968    x 1    CHOLECYSTECTOMY  2012    [place]    COLONOSCOPY  1-    Dr LUJAN    COLONOSCOPY  05/06/2014    Dr. Celestino Coe  2012    Dr Gonzalo Fenton    partial    KNEE SURGERY  2011    left knee surgery    PTCA      stent    TUBAL LIGATION      UPPER GASTROINTESTINAL ENDOSCOPY  2009?  UPPER GASTROINTESTINAL ENDOSCOPY  1-    Dr LUJAN    UPPER GASTROINTESTINAL ENDOSCOPY  05/06/2014    Dr. Mikey Granado   01- [facility]    Left Leg Ablation    VARICOSE VEIN SURGERY  3-  [facility]    right leg ablation       Current Outpatient Prescriptions   Medication Sig Dispense Refill    Mirabegron ER 50 MG TB24 Take 50 mg by mouth daily 30 tablet 0    metoprolol (TOPROL-XL) 200 MG XL tablet Take 200 mg by mouth daily      ranitidine (ZANTAC 150 MAXIMUM STRENGTH) 150 MG tablet Take 150 mg by mouth nightly      BENICAR HCT 40-25 MG per tablet Take 1 tablet by mouth daily       calcium carbonate 600 MG TABS tablet Take 1 tablet by mouth 2 times daily as needed       Vitamin D (CHOLECALCIFEROL) 1000 UNITS CAPS capsule Take 3,000 Units by mouth daily       clopidogrel (PLAVIX) 75 MG tablet Take 75 mg by mouth daily.  allopurinol (ZYLOPRIM) 300 MG tablet Take 300 mg by mouth daily.  aspirin 81 MG EC tablet Take 81 mg by mouth daily.  loratadine (CLARITIN) 10 MG tablet Take 10 mg by mouth daily.  Multiple Vitamin (MULTIVITAMIN PO) Take 1 tablet by mouth daily        No current facility-administered medications for this visit. Allergies   Allergen Reactions    Reclast [Zoledronic Acid] Other (See Comments)     Patient has a intolerance to this medication .  It makes her feel \"out of it\"     Celexa [Citalopram Hydrobromide] Nausea Only     Nausea and headache    Demerol Nausea Only    Dye [Barium-Containing Compounds] Nausea Only    Hydrocodone-Acetaminophen Other (See Comments)     Shaking, attacks nervous system    Lasix [Furosemide] Nausea Only    Neurontin [Gabapentin] Nausea Only     Dizziness      Nitroglycerin Other (See Comments)     Passed out    Plaquenil [Hydroxychloroquine Sulfate] Other (See Comments)     nervousness    Cozaar [Losartan Potassium] Palpitations    Esomeprazole Magnesium Nausea And Vomiting    Lisinopril Other (See Comments)     Muscle cramps in legs    Prilosec [Omeprazole] Nausea And Vomiting       Social History     Social History    Marital status:      Spouse name: N/A    Number of children: N/A    Years of education: N/A     Social History Main Topics    Smoking status: Never Smoker    Smokeless tobacco: Never Used    Alcohol use No    Drug use: No    Sexual activity: Not Asked     Other Topics Concern    None     Social History Narrative    None       Family History   Problem Relation Age of Onset    Diabetes Mother     Heart Disease Mother     Heart Disease Father     Diabetes Father     High Blood Pressure Father     Esophageal Cancer Father     Colon Polyps Father     Crohn's Disease Brother     Colon Cancer Neg Hx     Liver Cancer Neg Hx     Liver Disease Neg Hx     Rectal Cancer Neg Hx     Stomach Cancer Neg Hx        REVIEW OF SYSTEMS:  Review of Systems   Constitutional: Negative for chills and fever. HENT: Negative for hearing loss. Eyes: Negative for discharge and redness. Respiratory: Negative for shortness of breath and wheezing. Cardiovascular: Negative for leg swelling and PND. Gastrointestinal: Negative for heartburn and nausea. Genitourinary: Positive for frequency and urgency. Negative for dysuria, flank pain and hematuria. Musculoskeletal: Negative for myalgias and neck pain. Skin: Negative for itching and rash. Neurological: Negative for seizures, loss of consciousness and headaches.    Endo/Heme/Allergies: Negative for

## 2017-11-28 ENCOUNTER — OFFICE VISIT (OUTPATIENT)
Dept: UROLOGY | Age: 72
End: 2017-11-28
Payer: MEDICARE

## 2017-11-28 DIAGNOSIS — N32.81 OVERACTIVE BLADDER: Primary | ICD-10-CM

## 2017-11-28 PROCEDURE — 99999 PR POST TIBIAL NEUROSTIMULATION,PERC NEEDLE ELECTRODE: CPT | Performed by: PHYSICIAN ASSISTANT

## 2017-11-28 PROCEDURE — 64566 NEUROELTRD STIM POST TIBIAL: CPT | Performed by: PHYSICIAN ASSISTANT

## 2017-11-28 PROCEDURE — 99999 PR OFFICE/OUTPT VISIT,PROCEDURE ONLY: CPT | Performed by: PHYSICIAN ASSISTANT

## 2017-11-28 ASSESSMENT — ENCOUNTER SYMPTOMS
SHORTNESS OF BREATH: 0
HEARTBURN: 0
EYE DISCHARGE: 0
WHEEZING: 0
EYE REDNESS: 0
NAUSEA: 0

## 2017-11-28 NOTE — PROGRESS NOTES
x 1    CHOLECYSTECTOMY  2012    [place]    COLONOSCOPY  1-    Dr LUJAN    COLONOSCOPY  05/06/2014    Dr. Romero November 2012    Dr Kasey Fonseca    partial    KNEE SURGERY  2011    left knee surgery    PTCA      stent    TUBAL LIGATION      UPPER GASTROINTESTINAL ENDOSCOPY  2009?  UPPER GASTROINTESTINAL ENDOSCOPY  1-    Dr LUJAN    UPPER GASTROINTESTINAL ENDOSCOPY  05/06/2014    Dr. Yu Bishop   01- [facility]    Left Leg Ablation    VARICOSE VEIN SURGERY  3-  [facility]    right leg ablation       Current Outpatient Prescriptions   Medication Sig Dispense Refill    Mirabegron ER 50 MG TB24 Take 50 mg by mouth daily 30 tablet 0    metoprolol (TOPROL-XL) 200 MG XL tablet Take 200 mg by mouth daily      ranitidine (ZANTAC 150 MAXIMUM STRENGTH) 150 MG tablet Take 150 mg by mouth nightly      BENICAR HCT 40-25 MG per tablet Take 1 tablet by mouth daily       calcium carbonate 600 MG TABS tablet Take 1 tablet by mouth 2 times daily as needed       Vitamin D (CHOLECALCIFEROL) 1000 UNITS CAPS capsule Take 3,000 Units by mouth daily       clopidogrel (PLAVIX) 75 MG tablet Take 75 mg by mouth daily.  allopurinol (ZYLOPRIM) 300 MG tablet Take 300 mg by mouth daily.  aspirin 81 MG EC tablet Take 81 mg by mouth daily.  loratadine (CLARITIN) 10 MG tablet Take 10 mg by mouth daily.  Multiple Vitamin (MULTIVITAMIN PO) Take 1 tablet by mouth daily        No current facility-administered medications for this visit. Allergies   Allergen Reactions    Reclast [Zoledronic Acid] Other (See Comments)     Patient has a intolerance to this medication .  It makes her feel \"out of it\"     Celexa [Citalopram Hydrobromide] Nausea Only     Nausea and headache    Demerol Nausea Only    Dye [Barium-Containing Compounds] Nausea Only    Hydrocodone-Acetaminophen Other (See Comments)     Shaking, attacks nervous system    Lasix [Furosemide] Nausea Only    Neurontin [Gabapentin] Nausea Only     Dizziness      Nitroglycerin Other (See Comments)     Passed out    Plaquenil [Hydroxychloroquine Sulfate] Other (See Comments)     nervousness    Cozaar [Losartan Potassium] Palpitations    Esomeprazole Magnesium Nausea And Vomiting    Lisinopril Other (See Comments)     Muscle cramps in legs    Prilosec [Omeprazole] Nausea And Vomiting       Social History     Social History    Marital status:      Spouse name: N/A    Number of children: N/A    Years of education: N/A     Social History Main Topics    Smoking status: Never Smoker    Smokeless tobacco: Never Used    Alcohol use No    Drug use: No    Sexual activity: Not Asked     Other Topics Concern    None     Social History Narrative    None       Family History   Problem Relation Age of Onset    Diabetes Mother     Heart Disease Mother     Heart Disease Father     Diabetes Father     High Blood Pressure Father     Esophageal Cancer Father     Colon Polyps Father     Crohn's Disease Brother     Colon Cancer Neg Hx     Liver Cancer Neg Hx     Liver Disease Neg Hx     Rectal Cancer Neg Hx     Stomach Cancer Neg Hx        REVIEW OF SYSTEMS:  Review of Systems   Constitutional: Negative for chills and fever. HENT: Negative for hearing loss. Eyes: Negative for discharge and redness. Respiratory: Negative for shortness of breath and wheezing. Cardiovascular: Negative for leg swelling and PND. Gastrointestinal: Negative for heartburn and nausea. Genitourinary: Positive for frequency and urgency. Negative for dysuria, flank pain and hematuria. Musculoskeletal: Negative for myalgias and neck pain. Skin: Negative for itching and rash. Neurological: Negative for seizures, loss of consciousness and headaches.    Endo/Heme/Allergies: Negative for environmental allergies and polydipsia. Psychiatric/Behavioral: Negative for depression and suicidal ideas. PHYSICAL EXAM:  There were no vitals taken for this visit. Physical Exam   Constitutional: No distress. HENT:   Mouth/Throat: No oropharyngeal exudate. Eyes: Left eye exhibits no discharge. No scleral icterus. Neck: No JVD present. No tracheal deviation present. No thyromegaly present. Cardiovascular: Exam reveals no gallop and no friction rub. Pulmonary/Chest: No stridor. She has no rales. Abdominal: She exhibits no distension and no mass. There is no rebound and no guarding. Musculoskeletal: She exhibits no edema. Neurological: No cranial nerve deficit. She exhibits normal muscle tone. Coordination normal.   Skin: She is not diaphoretic. No pallor. 1. Patient is seated with the left treatment leg elevated. 2. 34 gauge fine needle electrode is inserted into lower inner aspect of the leg. Slightly cephalad to the medial malleolus. 3. Surface electrode pad is placed over the medial aspect of the calcaneus on the same leg. 4.Needle electrode is connected to the external pulse generator. 5.The pulse generator is turned on and the millivolts used are 6. 6.Patient is treated for 30 minutes. 7.The needle and pad are removed. 1. Overactive bladder  Has now started to see improvement and urgency and frequency. - [place] POST TIBIAL NEUROSTIMULATION,PERC NEEDLE ELECTRODE  Patient states this past week she is getting up less at night she was getting up 5-7 times now 4-5 times at night and her urgency has decreased. Orders Placed This Encounter   Procedures    [place] POST TIBIAL NEUROSTIMULATION,PERC NEEDLE ELECTRODE     # 10     No orders of the defined types were placed in this encounter.     Plan:    Patient will follow up in 1 week for next treatment # 11

## 2017-12-05 ENCOUNTER — OFFICE VISIT (OUTPATIENT)
Dept: UROLOGY | Age: 72
End: 2017-12-05
Payer: MEDICARE

## 2017-12-05 DIAGNOSIS — R35.0 FREQUENCY OF URINATION: ICD-10-CM

## 2017-12-05 DIAGNOSIS — N32.81 OVERACTIVE BLADDER: Primary | ICD-10-CM

## 2017-12-05 PROCEDURE — 64566 NEUROELTRD STIM POST TIBIAL: CPT | Performed by: PHYSICIAN ASSISTANT

## 2017-12-05 PROCEDURE — 99999 PR POST TIBIAL NEUROSTIMULATION,PERC NEEDLE ELECTRODE: CPT | Performed by: PHYSICIAN ASSISTANT

## 2017-12-05 PROCEDURE — 99999 PR OFFICE/OUTPT VISIT,PROCEDURE ONLY: CPT | Performed by: PHYSICIAN ASSISTANT

## 2017-12-05 ASSESSMENT — ENCOUNTER SYMPTOMS
NAUSEA: 0
EYE REDNESS: 0
EYE DISCHARGE: 0
SHORTNESS OF BREATH: 0
WHEEZING: 0
HEARTBURN: 0

## 2017-12-05 NOTE — PROGRESS NOTES
 [place]    COLONOSCOPY  1-    Dr LUJAN    COLONOSCOPY  05/06/2014    Dr. Inés Diaz  2012    Dr Ephraim Gaming    partial    KNEE SURGERY  2011    left knee surgery    PTCA      stent    TUBAL LIGATION      UPPER GASTROINTESTINAL ENDOSCOPY  2009?  UPPER GASTROINTESTINAL ENDOSCOPY  1-    Dr LUJAN    UPPER GASTROINTESTINAL ENDOSCOPY  05/06/2014    Dr. Augie Moncada   01- [facility]    Left Leg Ablation    VARICOSE VEIN SURGERY  3-  [facility]    right leg ablation       Current Outpatient Prescriptions   Medication Sig Dispense Refill    Mirabegron ER 50 MG TB24 Take 50 mg by mouth daily 30 tablet 0    metoprolol (TOPROL-XL) 200 MG XL tablet Take 200 mg by mouth daily      ranitidine (ZANTAC 150 MAXIMUM STRENGTH) 150 MG tablet Take 150 mg by mouth nightly      BENICAR HCT 40-25 MG per tablet Take 1 tablet by mouth daily       calcium carbonate 600 MG TABS tablet Take 1 tablet by mouth 2 times daily as needed       Vitamin D (CHOLECALCIFEROL) 1000 UNITS CAPS capsule Take 3,000 Units by mouth daily       clopidogrel (PLAVIX) 75 MG tablet Take 75 mg by mouth daily.  allopurinol (ZYLOPRIM) 300 MG tablet Take 300 mg by mouth daily.  aspirin 81 MG EC tablet Take 81 mg by mouth daily.  loratadine (CLARITIN) 10 MG tablet Take 10 mg by mouth daily.  Multiple Vitamin (MULTIVITAMIN PO) Take 1 tablet by mouth daily        No current facility-administered medications for this visit. Allergies   Allergen Reactions    Reclast [Zoledronic Acid] Other (See Comments)     Patient has a intolerance to this medication .  It makes her feel \"out of it\"     Celexa [Citalopram Hydrobromide] Nausea Only     Nausea and headache    Demerol Nausea Only    Dye [Barium-Containing Compounds] Nausea Only    Hydrocodone-Acetaminophen Other (See Comments)     Shaking, attacks nervous system    Lasix [Furosemide] Nausea Only    Neurontin [Gabapentin] Nausea Only     Dizziness      Nitroglycerin Other (See Comments)     Passed out    Plaquenil [Hydroxychloroquine Sulfate] Other (See Comments)     nervousness    Cozaar [Losartan Potassium] Palpitations    Esomeprazole Magnesium Nausea And Vomiting    Lisinopril Other (See Comments)     Muscle cramps in legs    Prilosec [Omeprazole] Nausea And Vomiting       Social History     Social History    Marital status:      Spouse name: N/A    Number of children: N/A    Years of education: N/A     Social History Main Topics    Smoking status: Never Smoker    Smokeless tobacco: Never Used    Alcohol use No    Drug use: No    Sexual activity: Not Asked     Other Topics Concern    None     Social History Narrative    None       Family History   Problem Relation Age of Onset    Diabetes Mother     Heart Disease Mother     Heart Disease Father     Diabetes Father     High Blood Pressure Father     Esophageal Cancer Father     Colon Polyps Father     Crohn's Disease Brother     Colon Cancer Neg Hx     Liver Cancer Neg Hx     Liver Disease Neg Hx     Rectal Cancer Neg Hx     Stomach Cancer Neg Hx        REVIEW OF SYSTEMS:  Review of Systems   Constitutional: Negative for chills and fever. HENT: Negative for hearing loss. Eyes: Negative for discharge and redness. Respiratory: Negative for shortness of breath and wheezing. Cardiovascular: Negative for leg swelling and PND. Gastrointestinal: Negative for heartburn and nausea. Genitourinary: Positive for frequency and urgency. Negative for dysuria, flank pain and hematuria. Musculoskeletal: Negative for myalgias and neck pain. Skin: Negative for itching and rash. Neurological: Negative for seizures, loss of consciousness and headaches. Endo/Heme/Allergies: Negative for environmental allergies and polydipsia. Psychiatric/Behavioral: Negative for depression and suicidal ideas. PHYSICAL EXAM:  There were no vitals taken for this visit. Physical Exam   Constitutional: No distress. HENT:   Mouth/Throat: No oropharyngeal exudate. Eyes: Left eye exhibits no discharge. No scleral icterus. Neck: No JVD present. No tracheal deviation present. No thyromegaly present. Cardiovascular: Exam reveals no gallop and no friction rub. Pulmonary/Chest: No stridor. She has no rales. Abdominal: She exhibits no distension and no mass. There is no rebound and no guarding. Musculoskeletal: She exhibits no edema. Neurological: No cranial nerve deficit. She exhibits normal muscle tone. Coordination normal.   Skin: She is not diaphoretic. No pallor. 1. Patient is seated with the left treatment leg elevated. 2. 34 gauge fine needle electrode is inserted into lower inner aspect of the leg. Slightly cephalad to the medial malleolus. 3. Surface electrode pad is placed over the medial aspect of the calcaneus on the same leg. 4.Needle electrode is connected to the external pulse generator. 5.The pulse generator is turned on and the millivolts used are 10. 6.Patient is treated for 30 minutes. 7.The needle and pad are removed. 1. Overactive bladder  Has now started to see improvement and urgency and frequency. - [place] POST TIBIAL NEUROSTIMULATION,PERC NEEDLE ELECTRODE  Patient states this past week she is getting up less at night and has less urgency than previous. No orders of the defined types were placed in this encounter. No orders of the defined types were placed in this encounter.     Plan:    Patient will follow up in 1 week for next treatment # 12

## 2017-12-12 ENCOUNTER — OFFICE VISIT (OUTPATIENT)
Dept: UROLOGY | Age: 72
End: 2017-12-12
Payer: MEDICARE

## 2017-12-12 VITALS — TEMPERATURE: 96.4 F

## 2017-12-12 DIAGNOSIS — N32.81 OVERACTIVE BLADDER: Primary | ICD-10-CM

## 2017-12-12 PROCEDURE — 64566 NEUROELTRD STIM POST TIBIAL: CPT | Performed by: PHYSICIAN ASSISTANT

## 2017-12-12 PROCEDURE — 99999 PR OFFICE/OUTPT VISIT,PROCEDURE ONLY: CPT | Performed by: PHYSICIAN ASSISTANT

## 2017-12-12 RX ORDER — TOBRAMYCIN 3 MG/ML
1 SOLUTION/ DROPS OPHTHALMIC EVERY 4 HOURS
Qty: 1 BOTTLE | Refills: 0 | Status: SHIPPED | OUTPATIENT
Start: 2017-12-12 | End: 2017-12-19

## 2017-12-12 ASSESSMENT — ENCOUNTER SYMPTOMS
EYE DISCHARGE: 0
WHEEZING: 0
SHORTNESS OF BREATH: 0
HEARTBURN: 0
NAUSEA: 0
EYE REDNESS: 0

## 2018-01-04 ENCOUNTER — OFFICE VISIT (OUTPATIENT)
Dept: VASCULAR SURGERY | Age: 73
End: 2018-01-04
Payer: MEDICARE

## 2018-01-04 ENCOUNTER — HOSPITAL ENCOUNTER (OUTPATIENT)
Dept: VASCULAR LAB | Age: 73
Discharge: HOME | End: 2018-01-04
Payer: MEDICARE

## 2018-01-04 VITALS
SYSTOLIC BLOOD PRESSURE: 135 MMHG | RESPIRATION RATE: 18 BRPM | HEART RATE: 86 BPM | DIASTOLIC BLOOD PRESSURE: 80 MMHG | TEMPERATURE: 96.9 F

## 2018-01-04 DIAGNOSIS — I65.23 BILATERAL CAROTID ARTERY STENOSIS: ICD-10-CM

## 2018-01-04 DIAGNOSIS — I65.23 BILATERAL CAROTID ARTERY STENOSIS: Primary | ICD-10-CM

## 2018-01-04 PROCEDURE — 1123F ACP DISCUSS/DSCN MKR DOCD: CPT | Performed by: NURSE PRACTITIONER

## 2018-01-04 PROCEDURE — 3014F SCREEN MAMMO DOC REV: CPT | Performed by: NURSE PRACTITIONER

## 2018-01-04 PROCEDURE — G8598 ASA/ANTIPLAT THER USED: HCPCS | Performed by: NURSE PRACTITIONER

## 2018-01-04 PROCEDURE — 3017F COLORECTAL CA SCREEN DOC REV: CPT | Performed by: NURSE PRACTITIONER

## 2018-01-04 PROCEDURE — 99212 OFFICE O/P EST SF 10 MIN: CPT | Performed by: NURSE PRACTITIONER

## 2018-01-04 PROCEDURE — G8427 DOCREV CUR MEDS BY ELIG CLIN: HCPCS | Performed by: NURSE PRACTITIONER

## 2018-01-04 PROCEDURE — 4040F PNEUMOC VAC/ADMIN/RCVD: CPT | Performed by: NURSE PRACTITIONER

## 2018-01-04 PROCEDURE — G8484 FLU IMMUNIZE NO ADMIN: HCPCS | Performed by: NURSE PRACTITIONER

## 2018-01-04 PROCEDURE — 93880 EXTRACRANIAL BILAT STUDY: CPT

## 2018-01-04 PROCEDURE — 1036F TOBACCO NON-USER: CPT | Performed by: NURSE PRACTITIONER

## 2018-01-04 PROCEDURE — 1090F PRES/ABSN URINE INCON ASSESS: CPT | Performed by: NURSE PRACTITIONER

## 2018-01-04 PROCEDURE — G8400 PT W/DXA NO RESULTS DOC: HCPCS | Performed by: NURSE PRACTITIONER

## 2018-01-04 PROCEDURE — G8417 CALC BMI ABV UP PARAM F/U: HCPCS | Performed by: NURSE PRACTITIONER

## 2018-01-05 NOTE — PROGRESS NOTES
Hydrocodone-acetaminophen; Lasix [furosemide]; Neurontin [gabapentin]; Nitroglycerin; Plaquenil [hydroxychloroquine sulfate]; Cozaar [losartan potassium]; Esomeprazole magnesium; Lisinopril; and Prilosec [omeprazole]  Past Medical History:   Diagnosis Date    Acid reflux     Anxiety     Arthritis     Back pain     CAD (coronary artery disease)     CAD (coronary artery disease)     Carotid artery occlusion     Chronic venous insufficiency 2012    Cough     Fibromyalgia     Goiter     Gout     Head ache     headaches    Heart murmur     History of colon polyps     History of colon polyps     Hoarseness     Hypercholesterolemia     Hyperlipidemia     Hypertension     Insomnia     Irregular heart beat     Itching     Muscle cramp     Neck pain     Osteoarthritis     Osteoporosis     Palpitations     RA (rheumatoid arthritis) (HCC)     Rash     Rectal bleeding     SOB (shortness of breath)     Sore throat     Tympanic membrane perforation     Varicose veins 2012     Past Surgical History:   Procedure Laterality Date    APPENDECTOMY      BREAST SURGERY      CARDIAC CATHETERIZATION  9/10/15  JDT    EF 50%    CERVICAL FUSION  1999     SECTION  1968    x 1    CHOLECYSTECTOMY  2012    [place]    COLONOSCOPY  1-    Dr LUJAN    COLONOSCOPY  2014    Dr. Trini Freeman      Dr Carlos Castillo    [facility]      left knee surgery    PTCA      stent    TUBAL LIGATION      UPPER GASTROINTESTINAL ENDOSCOPY  ?     UPPER GASTROINTESTINAL ENDOSCOPY  1-    Dr LUJAN    UPPER GASTROINTESTINAL ENDOSCOPY  2014    Dr. Baker [place]   2013 [facility]    Left Leg Ablation    VARICOSE VEIN SURGERY  3-  [facility]    right leg ablation     Family History   Problem Relation Age of Onset    normal.  Left external ear canal appears normal.  Septum appears midline. Eyes  conjunctiva normal.  EOMS normal.  No exudate. No icterus. Neck- ROM appears normal, no tracheal deviation. Cardiovascular  Regular rate and rhythm. Heart sounds are normal.  No murmur, rub, or gallop. Carotid pulses are 2+ to palpation bilaterally without bruit. Pulmonary  effort appears normal.  No respiratory distress. Lungs - Breath sounds normal. No wheezes or rales. Extremities - Radial and brachial pulses are 2+ to palpation bilaterally. Neurologic  alert and oriented X 3. Physiologic. Tongue midline. Face symmetric. Skin  warm, dry, and intact. No rash, erythema, or pallor. Psychiatric  mood, affect, and behavior appear normal.  Judgment and thought processes appear normal.    Risk factors for atherosclerosis of all vascular beds have been reviewed with the patient including:  Family history, tobacco abuse in all forms, elevated cholesterol, hyperlipidemia, and diabetes. Doppler results:    Right CCA/ICA <50% stenotic  Left CCA/ICA 50-69% stenotic  Right vertebral artery flow is antegrade  Left vertebral artery flow is antegrade  Individual velocities reviewed: Yes. Test results were reviewed with the patient. Disease process is stable      Options have been discussed with the patient including continued medical management. Patient has opted to proceed with continued medical management. Assessment    1. Bilateral carotid artery stenosis          Plan    Start/Continue ASA EC 81 mg daily  plavix 75 mg po daily  Strongly encourage start/continue statin therapy  Recommend no smoking  Follow up in  6  Month(s) with carotid u/s  Patient instructed to call or proceed to the emergency room with any symptoms of lateralizing weakness, loss of vision in one eye, or episodes slurred speech.

## 2018-01-19 ENCOUNTER — OFFICE VISIT (OUTPATIENT)
Dept: UROLOGY | Age: 73
End: 2018-01-19
Payer: MEDICARE

## 2018-01-19 VITALS — TEMPERATURE: 96.8 F

## 2018-01-19 DIAGNOSIS — N32.81 OVERACTIVE BLADDER: Primary | ICD-10-CM

## 2018-01-19 PROCEDURE — 99999 PR OFFICE/OUTPT VISIT,PROCEDURE ONLY: CPT | Performed by: PHYSICIAN ASSISTANT

## 2018-01-19 PROCEDURE — 64566 NEUROELTRD STIM POST TIBIAL: CPT | Performed by: PHYSICIAN ASSISTANT

## 2018-01-19 RX ORDER — OSELTAMIVIR PHOSPHATE 75 MG/1
CAPSULE ORAL
COMMUNITY
Start: 2017-12-27 | End: 2018-02-05 | Stop reason: ALTCHOICE

## 2018-01-19 RX ORDER — CEFDINIR 300 MG/1
300 CAPSULE ORAL
COMMUNITY
Start: 2018-01-11 | End: 2018-02-26 | Stop reason: ALTCHOICE

## 2018-01-19 ASSESSMENT — ENCOUNTER SYMPTOMS
HEARTBURN: 0
EYE REDNESS: 0
SHORTNESS OF BREATH: 0
EYE DISCHARGE: 0
NAUSEA: 0
WHEEZING: 0

## 2018-01-19 NOTE — PROGRESS NOTES
CHOLECYSTECTOMY  2012    [place]    COLONOSCOPY  1-    Dr LUJAN    COLONOSCOPY  05/06/2014    Dr. Way Heading  2012    Dr Ballard [place]    partial    KNEE SURGERY  2011    left knee surgery    PTCA      stent    TUBAL LIGATION      UPPER GASTROINTESTINAL ENDOSCOPY  2009?  UPPER GASTROINTESTINAL ENDOSCOPY  1-    Dr LUJAN    UPPER GASTROINTESTINAL ENDOSCOPY  05/06/2014    Dr. Teresa Srinivasan   01- [facility]    Left Leg Ablation    VARICOSE VEIN SURGERY  3-  [facility]    right leg ablation       Current Outpatient Prescriptions   Medication Sig Dispense Refill    cefdinir (OMNICEF) 300 MG capsule       oseltamivir (TAMIFLU) 75 MG capsule       Mirabegron ER 50 MG TB24 Take 50 mg by mouth daily 30 tablet 0    metoprolol (TOPROL-XL) 200 MG XL tablet Take 200 mg by mouth daily      ranitidine (ZANTAC 150 MAXIMUM STRENGTH) 150 MG tablet Take 150 mg by mouth nightly      BENICAR HCT 40-25 MG per tablet Take 1 tablet by mouth daily       calcium carbonate 600 MG TABS tablet Take 1 tablet by mouth 2 times daily as needed       Vitamin D (CHOLECALCIFEROL) 1000 UNITS CAPS capsule Take 3,000 Units by mouth daily       clopidogrel (PLAVIX) 75 MG tablet Take 75 mg by mouth daily.  allopurinol (ZYLOPRIM) 300 MG tablet Take 300 mg by mouth daily.  aspirin 81 MG EC tablet Take 81 mg by mouth daily.  loratadine (CLARITIN) 10 MG tablet Take 10 mg by mouth daily.  Multiple Vitamin (MULTIVITAMIN PO) Take 1 tablet by mouth daily        No current facility-administered medications for this visit. Allergies   Allergen Reactions    Reclast [Zoledronic Acid] Other (See Comments)     Patient has a intolerance to this medication .  It makes her feel \"out of it\"     Celexa [Citalopram Hydrobromide] Nausea Only     Nausea and headache   

## 2018-01-29 ENCOUNTER — TRANSCRIBE ORDERS (OUTPATIENT)
Dept: ADMINISTRATIVE | Facility: HOSPITAL | Age: 73
End: 2018-01-29

## 2018-01-29 DIAGNOSIS — Z12.31 ENCOUNTER FOR SCREENING MAMMOGRAM FOR MALIGNANT NEOPLASM OF BREAST: ICD-10-CM

## 2018-01-29 DIAGNOSIS — N95.9 POST MENOPAUSAL PROBLEMS: Primary | ICD-10-CM

## 2018-01-30 ENCOUNTER — HOSPITAL ENCOUNTER (OUTPATIENT)
Dept: MAMMOGRAPHY | Facility: HOSPITAL | Age: 73
Discharge: HOME | End: 2018-01-30
Admitting: PHYSICIAN ASSISTANT

## 2018-01-30 DIAGNOSIS — Z12.31 ENCOUNTER FOR SCREENING MAMMOGRAM FOR MALIGNANT NEOPLASM OF BREAST: ICD-10-CM

## 2018-01-30 PROCEDURE — 77063 BREAST TOMOSYNTHESIS BI: CPT

## 2018-01-30 PROCEDURE — 77067 SCR MAMMO BI INCL CAD: CPT

## 2018-02-05 ENCOUNTER — OFFICE VISIT (OUTPATIENT)
Dept: CARDIOLOGY | Age: 73
End: 2018-02-05
Payer: MEDICARE

## 2018-02-05 VITALS
DIASTOLIC BLOOD PRESSURE: 80 MMHG | HEART RATE: 83 BPM | WEIGHT: 187 LBS | HEIGHT: 67 IN | SYSTOLIC BLOOD PRESSURE: 140 MMHG | BODY MASS INDEX: 29.35 KG/M2

## 2018-02-05 DIAGNOSIS — I25.10 CORONARY ARTERY DISEASE INVOLVING NATIVE CORONARY ARTERY OF NATIVE HEART WITHOUT ANGINA PECTORIS: ICD-10-CM

## 2018-02-05 DIAGNOSIS — R00.2 PALPITATIONS: Primary | ICD-10-CM

## 2018-02-05 PROCEDURE — 99213 OFFICE O/P EST LOW 20 MIN: CPT | Performed by: CLINICAL NURSE SPECIALIST

## 2018-02-05 PROCEDURE — 3014F SCREEN MAMMO DOC REV: CPT | Performed by: CLINICAL NURSE SPECIALIST

## 2018-02-05 PROCEDURE — G8484 FLU IMMUNIZE NO ADMIN: HCPCS | Performed by: CLINICAL NURSE SPECIALIST

## 2018-02-05 PROCEDURE — 93000 ELECTROCARDIOGRAM COMPLETE: CPT | Performed by: CLINICAL NURSE SPECIALIST

## 2018-02-05 PROCEDURE — G8598 ASA/ANTIPLAT THER USED: HCPCS | Performed by: CLINICAL NURSE SPECIALIST

## 2018-02-05 PROCEDURE — G8427 DOCREV CUR MEDS BY ELIG CLIN: HCPCS | Performed by: CLINICAL NURSE SPECIALIST

## 2018-02-05 PROCEDURE — G8417 CALC BMI ABV UP PARAM F/U: HCPCS | Performed by: CLINICAL NURSE SPECIALIST

## 2018-02-05 PROCEDURE — 4040F PNEUMOC VAC/ADMIN/RCVD: CPT | Performed by: CLINICAL NURSE SPECIALIST

## 2018-02-05 PROCEDURE — 1090F PRES/ABSN URINE INCON ASSESS: CPT | Performed by: CLINICAL NURSE SPECIALIST

## 2018-02-05 PROCEDURE — G8400 PT W/DXA NO RESULTS DOC: HCPCS | Performed by: CLINICAL NURSE SPECIALIST

## 2018-02-05 PROCEDURE — 3017F COLORECTAL CA SCREEN DOC REV: CPT | Performed by: CLINICAL NURSE SPECIALIST

## 2018-02-05 PROCEDURE — 1123F ACP DISCUSS/DSCN MKR DOCD: CPT | Performed by: CLINICAL NURSE SPECIALIST

## 2018-02-05 PROCEDURE — 1036F TOBACCO NON-USER: CPT | Performed by: CLINICAL NURSE SPECIALIST

## 2018-02-05 ASSESSMENT — ENCOUNTER SYMPTOMS
SHORTNESS OF BREATH: 1
COUGH: 0
BLURRED VISION: 0
VOMITING: 0
NAUSEA: 0
HEARTBURN: 0
ORTHOPNEA: 0

## 2018-02-05 NOTE — PATIENT INSTRUCTIONS
Followup With ULISES 3 weeks  Zio Patch heart monitor- one week    Call with any questions or concerns  Follow up with Magdi Wall MD for non cardiac problems  Report any new problems  Cardiovascular Fitness-Exercise as tolerated. Strive for 15 minutes of exercise most days of the week. Cardiac / Healthy Diet  Continue current medications as directed  Continue plan of treatment  It is always recommended that you bring your medications bottles with you to each visit - this is for your safety! Patient Education        Palpitations: Care Instructions  Your Care Instructions    Heart palpitations are the uncomfortable sensation that your heart is beating fast or irregularly. You might feel pounding or fluttering in your chest. It might feel like your heart is skipping a beat. Although palpitations may be caused by a heart problem, they also occur because of stress, fatigue, or use of alcohol, caffeine, or nicotine. Many medicines, including diet pills, antihistamines, decongestants, and some herbal products, can cause heart palpitations. Nearly everyone has palpitations from time to time. Depending on your symptoms, your doctor may need to do more tests to try to find the cause of your palpitations. Follow-up care is a key part of your treatment and safety. Be sure to make and go to all appointments, and call your doctor if you are having problems. It's also a good idea to know your test results and keep a list of the medicines you take. How can you care for yourself at home? · Avoid caffeine, nicotine, and excess alcohol. · Do not take illegal drugs, such as methamphetamines and cocaine. · Do not take weight loss or diet medicines unless you talk with your doctor first.  · Get plenty of sleep. · Do not overeat. · If you have palpitations again, take deep breaths and try to relax. This may slow a racing heart.   · If you start to feel lightheaded, lie down to avoid injuries that might result if you continue to have heart palpitations. Where can you learn more? Go to [email or web address] and sign in to your [employer] account. Enter R508 in the [employer] box to learn more about \"Palpitations: Care Instructions. \"     If you do not have an account, please click on the \"Sign Up Now\" link. Current as of: September 21, 2016  Content Version: 11.5  © 4067-4497 [employer]. Care instructions adapted under license by [facility] [place] ([facility]). If you have questions about a medical condition or this instruction, always ask your healthcare professional. [address] any warranty or liability for your use of this information.

## 2018-02-05 NOTE — PROGRESS NOTES
[facility], [address], [address]  [address]  Phone: [phone]  Fax: [phone]    OFFICE VISIT:  2018    Randall Melo - : 1945    Reason For Visit:  Karthik Garcia is a 74 y.o. female who is here for complaints of palpitations    HPI  Patient is here today with complaints of palpitations that began on Friday. They're intermittent and can last up to 15 minutes. She denies any syncope. She does feel weak and lightheaded when these occur associated chest discomfort and dyspnea. She currently takes metoprolol 200 mg daily. She closely monitors her caffeine intake     Neo Eagle MD is PCP.   Randall Melo has the following history as recorded in [facility]:    Patient Active Problem List    Diagnosis Date Noted    Overactive bladder 2017    Coronary artery disease involving native coronary artery of native heart without angina pectoris 2017    Mixed hyperlipidemia 2017    H/O right coronary artery stent placement     Periumbilical abdominal pain 2016    Irritable bowel syndrome with diarrhea 06/10/2016    Internal hemorrhoids 06/10/2016    Syncope 2015    Precordial pain 2015    CAMPOVERDE (dyspnea on exertion) 2015    Palpitations 2015    Bleeding internal hemorrhoids 07/15/2014    Heartburn 2014    Internal hemorrhoids without complication     Spider veins 2013    Carotid artery stenosis 2012    Leg pain 2012    Leg swelling 2012    Chronic venous insufficiency 2012    Varicose veins 2012    CAD (coronary artery disease)     Hyperlipidemia     Hypertension      Past Medical History:   Diagnosis Date    Acid reflux     Anxiety     Arthritis     Back pain     CAD (coronary artery disease)     CAD (coronary artery disease)     Carotid artery occlusion     Chronic venous insufficiency 2012    Cough     Fibromyalgia     depression. The patient is not nervous/anxious. Objective  Vital Signs - BP (!) 140/80 (Site: Right Arm, Position: Sitting, Cuff Size: Large Adult)   Pulse 83   Ht 5' 7\" (1.702 m)   Wt 187 lb (84.8 kg)   BMI 29.29 kg/m²   Physical Exam   Constitutional: She is oriented to person, place, and time. She appears well-developed and well-nourished. No distress. HENT:   Head: Normocephalic and atraumatic. Eyes: Pupils are equal, round, and reactive to light. Right eye exhibits no discharge. Left eye exhibits no discharge. Neck: No JVD present. No tracheal deviation present. Cardiovascular: Normal rate, regular rhythm, normal heart sounds and intact distal pulses. Exam reveals no gallop and no friction rub. No murmur heard. No carotid bruit   Pulmonary/Chest: Effort normal and breath sounds normal. No respiratory distress. She has no wheezes. She has no rales. Abdominal: Soft. There is no tenderness. Musculoskeletal: She exhibits no edema. Normal gait and station   Neurological: She is alert and oriented to person, place, and time. No cranial nerve deficit. Skin: Skin is warm and dry. No rash noted. Psychiatric: She has a normal mood and affect. Her behavior is normal. Judgment normal.   Nursing note and vitals reviewed. Assessment:    1. Palpitations  Cardiac event monitor   2. Coronary artery disease involving native coronary artery of native heart without angina pectoris  EKG 12 lead     Patient is taking medications as prescribed  EKG shows normal sinus rhythm at rate 83  Cardiac cath 2015mild nonocclusive disease    Palpitationspatient is having more frequent palpitations beginning on Friday associated chest discomfort, dizziness, and lightheadedness. There has been no syncope. She limits her caffeine intake. She is currently on metoprolol 200 mg daily. Plan will be a one-week Zio patch heart monitor    CADstable.     Plan    Followup With APRN 3 weeks  Zio Patch heart monitor-

## 2018-02-19 ENCOUNTER — OFFICE VISIT (OUTPATIENT)
Dept: UROLOGY | Age: 73
End: 2018-02-19
Payer: MEDICARE

## 2018-02-19 VITALS — TEMPERATURE: 96.3 F

## 2018-02-19 DIAGNOSIS — N32.81 OVERACTIVE BLADDER: Primary | ICD-10-CM

## 2018-02-19 PROCEDURE — 99999 PR OFFICE/OUTPT VISIT,PROCEDURE ONLY: CPT | Performed by: PHYSICIAN ASSISTANT

## 2018-02-19 PROCEDURE — 64566 NEUROELTRD STIM POST TIBIAL: CPT | Performed by: PHYSICIAN ASSISTANT

## 2018-02-19 ASSESSMENT — ENCOUNTER SYMPTOMS
EYE DISCHARGE: 0
EYE REDNESS: 0
HEARTBURN: 0
SHORTNESS OF BREATH: 0
NAUSEA: 0
WHEEZING: 0

## 2018-02-19 NOTE — PROGRESS NOTES
José Pereira is a 74 y.o. female who presents today   Chief Complaint   Patient presents with    Follow-up     im here today for my uroplasty  overactive bladder      OAB here for uroplasty Treatment:  Patient with chief complaint of long-standing 5+ years of worsening frequency urgency and nocturia is here for her Monthly maintenance neuromodulation treatment she has failed on multiple overactive bladder medications. I explained the process in detail I explained I will be inserting a 34-gauge needle under her lower medial leg adjacent to the posterior tibial nerve. I then explained I attached electrode and attached that to a [employer] power pack I then adjust the millivolts and socially she feels sensation but not limited some comfortable. I say few patients will experience some discomfort and may have some slight bleeding after the needle is removed. After discussion of the procedure she will use me the verbal consent to continue.     Past Medical History:   Diagnosis Date    Acid reflux     Anxiety     Arthritis     Back pain     CAD (coronary artery disease)     CAD (coronary artery disease)     Carotid artery occlusion     Chronic venous insufficiency 2012    Cough     Fibromyalgia     Goiter     Gout     Head ache     headaches    Heart murmur     History of colon polyps     History of colon polyps     Hoarseness     Hypercholesterolemia     Hyperlipidemia     Hypertension     Insomnia     Irregular heart beat     Itching     Muscle cramp     Neck pain     Osteoarthritis     Osteoporosis     Palpitations     RA (rheumatoid arthritis) (HCC)     Rash     Rectal bleeding     SOB (shortness of breath)     Sore throat     Tympanic membrane perforation     Varicose veins 2012       Past Surgical History:   Procedure Laterality Date    APPENDECTOMY      BREAST SURGERY  2002    CARDIAC CATHETERIZATION  9/10/15  JDT    EF 50%    CERVICAL FUSION       SECTION 1968    x 1    CHOLECYSTECTOMY  2012    [place]    COLONOSCOPY  1-    Dr LUJAN    COLONOSCOPY  05/06/2014    Dr. Ilene Ferreira  2012    Dr Tanja Teague    partial    KNEE SURGERY  2011    left knee surgery    PTCA      stent    TUBAL LIGATION      UPPER GASTROINTESTINAL ENDOSCOPY  2009?  UPPER GASTROINTESTINAL ENDOSCOPY  1-    Dr LUJAN    UPPER GASTROINTESTINAL ENDOSCOPY  05/06/2014    Dr. Elisha Fuentes   01- [facility]    Left Leg Ablation    VARICOSE VEIN SURGERY  3-  [facility]    right leg ablation       Current Outpatient Prescriptions   Medication Sig Dispense Refill    cefdinir (OMNICEF) 300 MG capsule       metoprolol (TOPROL-XL) 200 MG XL tablet Take 200 mg by mouth daily      BENICAR HCT 40-25 MG per tablet Take 1 tablet by mouth daily       calcium carbonate 600 MG TABS tablet Take 1 tablet by mouth 2 times daily as needed       Vitamin D (CHOLECALCIFEROL) 1000 UNITS CAPS capsule Take 3,000 Units by mouth daily       clopidogrel (PLAVIX) 75 MG tablet Take 75 mg by mouth daily.  allopurinol (ZYLOPRIM) 300 MG tablet Take 300 mg by mouth daily.  aspirin 81 MG EC tablet Take 81 mg by mouth daily.  loratadine (CLARITIN) 10 MG tablet Take 10 mg by mouth daily.  Multiple Vitamin (MULTIVITAMIN PO) Take 1 tablet by mouth daily        No current facility-administered medications for this visit. Allergies   Allergen Reactions    Reclast [Zoledronic Acid] Other (See Comments)     Patient has a intolerance to this medication .  It makes her feel \"out of it\"     Celexa [Citalopram Hydrobromide] Nausea Only     Nausea and headache    Demerol Nausea Only    Dye [Barium-Containing Compounds] Nausea Only    Hydrocodone-Acetaminophen Other (See Comments)     Shaking, attacks nervous system    Lasix [Furosemide] Nausea Only    Neurontin [Gabapentin] Nausea Only     Dizziness      Nitroglycerin Other (See Comments)     Passed out    Plaquenil [Hydroxychloroquine Sulfate] Other (See Comments)     nervousness    Cozaar [Losartan Potassium] Palpitations    Esomeprazole Magnesium Nausea And Vomiting    Lisinopril Other (See Comments)     Muscle cramps in legs    Prilosec [Omeprazole] Nausea And Vomiting       Social History     Social History    Marital status:      Spouse name: N/A    Number of children: N/A    Years of education: N/A     Social History Main Topics    Smoking status: Never Smoker    Smokeless tobacco: Never Used    Alcohol use No    Drug use: No    Sexual activity: Not Asked     Other Topics Concern    None     Social History Narrative    None       Family History   Problem Relation Age of Onset    Diabetes Mother     Heart Disease Mother     Heart Disease Father     Diabetes Father     High Blood Pressure Father     Esophageal Cancer Father     Colon Polyps Father     Crohn's Disease Brother     Colon Cancer Neg Hx     Liver Cancer Neg Hx     Liver Disease Neg Hx     Rectal Cancer Neg Hx     Stomach Cancer Neg Hx        REVIEW OF SYSTEMS:  Review of Systems   Constitutional: Negative for chills and fever. HENT: Negative for hearing loss. Eyes: Negative for discharge and redness. Respiratory: Negative for shortness of breath and wheezing. Cardiovascular: Negative for leg swelling and PND. Gastrointestinal: Negative for heartburn and nausea. Genitourinary: Positive for frequency and urgency. Negative for dysuria, flank pain and hematuria. Musculoskeletal: Negative for myalgias and neck pain. Skin: Negative for itching and rash. Neurological: Negative for seizures, loss of consciousness and headaches. Endo/Heme/Allergies: Negative for environmental allergies and polydipsia. Psychiatric/Behavioral: Negative for depression and suicidal ideas.

## 2018-02-21 DIAGNOSIS — R00.2 PALPITATIONS: ICD-10-CM

## 2018-02-26 ENCOUNTER — OFFICE VISIT (OUTPATIENT)
Dept: CARDIOLOGY | Age: 73
End: 2018-02-26
Payer: MEDICARE

## 2018-02-26 VITALS
WEIGHT: 189 LBS | SYSTOLIC BLOOD PRESSURE: 148 MMHG | BODY MASS INDEX: 29.66 KG/M2 | HEART RATE: 88 BPM | HEIGHT: 67 IN | DIASTOLIC BLOOD PRESSURE: 68 MMHG

## 2018-02-26 DIAGNOSIS — I25.10 CORONARY ARTERY DISEASE INVOLVING NATIVE CORONARY ARTERY OF NATIVE HEART WITHOUT ANGINA PECTORIS: ICD-10-CM

## 2018-02-26 DIAGNOSIS — I10 ESSENTIAL HYPERTENSION: ICD-10-CM

## 2018-02-26 DIAGNOSIS — R00.2 PALPITATIONS: Primary | ICD-10-CM

## 2018-02-26 PROCEDURE — 99213 OFFICE O/P EST LOW 20 MIN: CPT | Performed by: CLINICAL NURSE SPECIALIST

## 2018-02-26 PROCEDURE — G8598 ASA/ANTIPLAT THER USED: HCPCS | Performed by: CLINICAL NURSE SPECIALIST

## 2018-02-26 PROCEDURE — 1090F PRES/ABSN URINE INCON ASSESS: CPT | Performed by: CLINICAL NURSE SPECIALIST

## 2018-02-26 PROCEDURE — 3017F COLORECTAL CA SCREEN DOC REV: CPT | Performed by: CLINICAL NURSE SPECIALIST

## 2018-02-26 PROCEDURE — G8484 FLU IMMUNIZE NO ADMIN: HCPCS | Performed by: CLINICAL NURSE SPECIALIST

## 2018-02-26 PROCEDURE — 4040F PNEUMOC VAC/ADMIN/RCVD: CPT | Performed by: CLINICAL NURSE SPECIALIST

## 2018-02-26 PROCEDURE — G8400 PT W/DXA NO RESULTS DOC: HCPCS | Performed by: CLINICAL NURSE SPECIALIST

## 2018-02-26 PROCEDURE — G8427 DOCREV CUR MEDS BY ELIG CLIN: HCPCS | Performed by: CLINICAL NURSE SPECIALIST

## 2018-02-26 PROCEDURE — 3014F SCREEN MAMMO DOC REV: CPT | Performed by: CLINICAL NURSE SPECIALIST

## 2018-02-26 PROCEDURE — G8417 CALC BMI ABV UP PARAM F/U: HCPCS | Performed by: CLINICAL NURSE SPECIALIST

## 2018-02-26 PROCEDURE — 1036F TOBACCO NON-USER: CPT | Performed by: CLINICAL NURSE SPECIALIST

## 2018-02-26 PROCEDURE — 1123F ACP DISCUSS/DSCN MKR DOCD: CPT | Performed by: CLINICAL NURSE SPECIALIST

## 2018-02-26 ASSESSMENT — ENCOUNTER SYMPTOMS
VOMITING: 0
HEARTBURN: 1
SHORTNESS OF BREATH: 0
ORTHOPNEA: 0
COUGH: 0
BLURRED VISION: 0
NAUSEA: 0

## 2018-02-26 NOTE — PROGRESS NOTES
[facility], [address] [place], [place] [id number]  Phone: [phone]  Fax: [phone]    OFFICE VISIT:  2018    Colette Mathew - : 1945    Reason For Visit:  Alonzo is a 74 y.o. female who is here for follow-up for palpitations    HPI   Patient is here for follow-up for palpitations. She wore a heart monitor for one week and is here to discuss results. Patient states at the time I saw her she was having some severe issues with GERD which have now subsided. She is feeling better and she is no longer experiencing any symptoms    Marybeth Yen MD is PCP.   Colette Mathew has the following history as recorded in [facility]:    Patient Active Problem List    Diagnosis Date Noted    Overactive bladder 2017    Mixed hyperlipidemia 2017    H/O right coronary artery stent placement     Periumbilical abdominal pain 2016    Irritable bowel syndrome with diarrhea 06/10/2016    Internal hemorrhoids 06/10/2016    Syncope 2015    Precordial pain 2015    CAMPOVERDE (dyspnea on exertion) 2015    Palpitations 2015    Bleeding internal hemorrhoids 07/15/2014    Heartburn 2014    Internal hemorrhoids without complication     Spider veins 2013    Carotid artery stenosis 2012    Leg pain 2012    Leg swelling 2012    Chronic venous insufficiency 2012    Varicose veins 2012    CAD (coronary artery disease)     Hyperlipidemia     Hypertension      Past Medical History:   Diagnosis Date    Acid reflux     Anxiety     Arthritis     Back pain     CAD (coronary artery disease)     CAD (coronary artery disease)     Carotid artery occlusion     Chronic venous insufficiency 2012    Cough     Fibromyalgia     Goiter     Gout     Head ache     headaches    Heart murmur     History of colon polyps     History of colon polyps     Hoarseness     well-nourished. HENT:   Head: Normocephalic and atraumatic. Eyes: Pupils are equal, round, and reactive to light. Right eye exhibits no discharge. Left eye exhibits no discharge. Neck: No JVD present. No tracheal deviation present. Cardiovascular: Normal rate, regular rhythm, normal heart sounds and intact distal pulses. Exam reveals no gallop and no friction rub. No murmur heard. No carotid bruit   Pulmonary/Chest: Effort normal and breath sounds normal. No respiratory distress. She has no wheezes. She has no rales. Abdominal: Soft. There is no tenderness. Musculoskeletal: She exhibits no edema. Normal gait and station   Neurological: She is alert and oriented to person, place, and time. No cranial nerve deficit. Skin: Skin is warm and dry. No rash noted. Psychiatric: She has a normal mood and affect. Her behavior is normal. Judgment normal.   Nursing note and vitals reviewed. Assessment:    1. Palpitations     2. Coronary artery disease involving native coronary artery of native heart without angina pectoris     3. Essential hypertension       Patient is taking medications as prescribed    Reviewed Zio Patch heart monitor results with patient which shows predominantly normal sinus rhythm rate . She had 4 brief runs of SVT, longest lasting 8 beats. Palpitations seem to have resolved now that her GERD has improved. Continue present treatment    Stable cardiovascular status. No evidence of overt heart failure, angina or dysrhythmia. Plan    Followup With Dr. Ruffin Matters in Oct  Call with any questions or concerns  Follow up with Jenni Waite MD for non cardiac problems  Report any new problems  Cardiovascular Fitness-Exercise as tolerated. Strive for 15 minutes of exercise most days of the week.     Cardiac / Healthy Diet  Continue current medications as directed  Continue plan of treatment  It is always recommended that you bring your medications bottles with you to each visit -

## 2018-07-09 ENCOUNTER — TRANSCRIBE ORDERS (OUTPATIENT)
Dept: LAB | Facility: HOSPITAL | Age: 73
End: 2018-07-09

## 2018-07-09 ENCOUNTER — HOSPITAL ENCOUNTER (OUTPATIENT)
Dept: VASCULAR LAB | Age: 73
Discharge: HOME | End: 2018-07-09
Payer: MEDICARE

## 2018-07-09 ENCOUNTER — HOSPITAL ENCOUNTER (OUTPATIENT)
Dept: ULTRASOUND IMAGING | Facility: HOSPITAL | Age: 73
Discharge: HOME | End: 2018-07-09
Admitting: PHYSICIAN ASSISTANT

## 2018-07-09 ENCOUNTER — OFFICE VISIT (OUTPATIENT)
Dept: VASCULAR SURGERY | Age: 73
End: 2018-07-09
Payer: MEDICARE

## 2018-07-09 VITALS
OXYGEN SATURATION: 96 % | RESPIRATION RATE: 18 BRPM | SYSTOLIC BLOOD PRESSURE: 130 MMHG | DIASTOLIC BLOOD PRESSURE: 62 MMHG | HEART RATE: 70 BPM

## 2018-07-09 DIAGNOSIS — E04.9 NON-TOXIC GOITER: Primary | ICD-10-CM

## 2018-07-09 DIAGNOSIS — I65.23 BILATERAL CAROTID ARTERY STENOSIS: Primary | ICD-10-CM

## 2018-07-09 DIAGNOSIS — I65.23 BILATERAL CAROTID ARTERY STENOSIS: ICD-10-CM

## 2018-07-09 DIAGNOSIS — E04.9 NON-TOXIC GOITER: ICD-10-CM

## 2018-07-09 PROCEDURE — G8598 ASA/ANTIPLAT THER USED: HCPCS | Performed by: NURSE PRACTITIONER

## 2018-07-09 PROCEDURE — 93880 EXTRACRANIAL BILAT STUDY: CPT

## 2018-07-09 PROCEDURE — G8427 DOCREV CUR MEDS BY ELIG CLIN: HCPCS | Performed by: NURSE PRACTITIONER

## 2018-07-09 PROCEDURE — G8417 CALC BMI ABV UP PARAM F/U: HCPCS | Performed by: NURSE PRACTITIONER

## 2018-07-09 PROCEDURE — 1036F TOBACCO NON-USER: CPT | Performed by: NURSE PRACTITIONER

## 2018-07-09 PROCEDURE — 3017F COLORECTAL CA SCREEN DOC REV: CPT | Performed by: NURSE PRACTITIONER

## 2018-07-09 PROCEDURE — 76536 US EXAM OF HEAD AND NECK: CPT

## 2018-07-09 PROCEDURE — 4040F PNEUMOC VAC/ADMIN/RCVD: CPT | Performed by: NURSE PRACTITIONER

## 2018-07-09 PROCEDURE — 99212 OFFICE O/P EST SF 10 MIN: CPT | Performed by: NURSE PRACTITIONER

## 2018-07-09 PROCEDURE — 1123F ACP DISCUSS/DSCN MKR DOCD: CPT | Performed by: NURSE PRACTITIONER

## 2018-07-09 PROCEDURE — 1090F PRES/ABSN URINE INCON ASSESS: CPT | Performed by: NURSE PRACTITIONER

## 2018-07-09 PROCEDURE — G8400 PT W/DXA NO RESULTS DOC: HCPCS | Performed by: NURSE PRACTITIONER

## 2018-07-09 NOTE — PROGRESS NOTES
Patient Care Team:  Evelin Vargas MD as PCP - General (Internal Medicine)  Evelin Vargas MD as PCP - S Attributed Provider  Cedric Vazquez MD as Consulting Physician (Cardiology)  ULISES Cespedes (Family Nurse Practitioner)  Obed Thompson DO as Consulting Physician (Gastroenterology)      Subjective    She presents for follow-up of carotid artery stenosis. She has prior history of carotid artery stenosis for 1 - 5 years. Her current treatment includes ASA EC daily. She has no history of CVA. She reports no TIA's, episodes of amaurosis fugax and episodes of lateralizing weakness.     Travon Rocha is a 74 y.o. female with the [facility]:  Patient Active Problem List    Diagnosis Date Noted    Overactive bladder 07/24/2017    Mixed hyperlipidemia 04/18/2017    H/O right coronary artery stent placement 04/18/2017     Stent placed in 2009 by Dr. Maria Guadalupe Lane at [facility] abdominal pain 07/21/2016    Irritable bowel syndrome with diarrhea 06/10/2016    Internal hemorrhoids 06/10/2016    Syncope 09/09/2015    Precordial pain 09/08/2015    CAMPOVERDE (dyspnea on exertion) 09/08/2015    Palpitations 09/08/2015    Bleeding internal hemorrhoids 07/15/2014    Heartburn 05/20/2014    Internal hemorrhoids without complication 43/66/1201    Spider veins 03/04/2013    Carotid artery stenosis 11/08/2012    Leg pain 11/08/2012    Leg swelling 11/08/2012    Chronic venous insufficiency 11/08/2012    Varicose veins 11/08/2012    CAD (coronary artery disease)      2012  PTCA /stent  Progressive angina; AUC indication 54, AUC score 7, [facility] 2012;[id number]  9/10/15  Cath  Mild CAD, normal LVFX        Hyperlipidemia     Hypertension      Current Outpatient Prescriptions   Medication Sig Dispense Refill    vitamin D (CHOLECALCIFEROL) 1000 UNIT TABS tablet Take 1,000 Units by mouth Take 3 tablets daily      metoprolol (TOPROL-XL) 200 MG XL tablet Take 200 mg by mouth daily      BENICAR HCT 40-25 MG per tablet Take 1 tablet by mouth daily       calcium carbonate 600 MG TABS tablet Take 1 tablet by mouth 2 times daily as needed       clopidogrel (PLAVIX) 75 MG tablet Take 75 mg by mouth daily.  allopurinol (ZYLOPRIM) 300 MG tablet Take 300 mg by mouth daily.  aspirin 81 MG EC tablet Take 81 mg by mouth daily.  loratadine (CLARITIN) 10 MG tablet Take 10 mg by mouth daily.  Multiple Vitamin (MULTIVITAMIN PO) Take 1 tablet by mouth daily        No current facility-administered medications for this visit. Current Outpatient Prescriptions on File Prior to Visit   Medication Sig Dispense Refill    vitamin D (CHOLECALCIFEROL) 1000 UNIT TABS tablet Take 1,000 Units by mouth Take 3 tablets daily      metoprolol (TOPROL-XL) 200 MG XL tablet Take 200 mg by mouth daily      BENICAR HCT 40-25 MG per tablet Take 1 tablet by mouth daily       calcium carbonate 600 MG TABS tablet Take 1 tablet by mouth 2 times daily as needed       clopidogrel (PLAVIX) 75 MG tablet Take 75 mg by mouth daily.  allopurinol (ZYLOPRIM) 300 MG tablet Take 300 mg by mouth daily.  aspirin 81 MG EC tablet Take 81 mg by mouth daily.  loratadine (CLARITIN) 10 MG tablet Take 10 mg by mouth daily.  Multiple Vitamin (MULTIVITAMIN PO) Take 1 tablet by mouth daily        No current facility-administered medications on file prior to visit. Allergies: Reclast [zoledronic acid]; Celexa [citalopram hydrobromide]; Demerol; Dye [barium-containing compounds]; Hydrocodone-acetaminophen; Lasix [furosemide]; Neurontin [gabapentin]; Nitroglycerin; Plaquenil [hydroxychloroquine sulfate]; Cozaar [losartan potassium]; Esomeprazole magnesium;  Lisinopril; and Prilosec [omeprazole]  Past Medical History:   Diagnosis Date    Acid reflux     Anxiety     Arthritis     Back pain     CAD (coronary artery disease)     CAD (coronary artery disease)     Carotid artery

## 2018-07-12 ENCOUNTER — TRANSCRIBE ORDERS (OUTPATIENT)
Dept: ADMINISTRATIVE | Facility: HOSPITAL | Age: 73
End: 2018-07-12

## 2018-07-12 DIAGNOSIS — I65.23 BILATERAL CAROTID ARTERY OCCLUSION: ICD-10-CM

## 2018-07-12 DIAGNOSIS — R51.9 NONINTRACTABLE HEADACHE, UNSPECIFIED CHRONICITY PATTERN, UNSPECIFIED HEADACHE TYPE: ICD-10-CM

## 2018-07-12 DIAGNOSIS — E07.9 THYROID MASS: ICD-10-CM

## 2018-07-12 DIAGNOSIS — R27.0 ATAXIA: Primary | ICD-10-CM

## 2018-07-16 ENCOUNTER — HOSPITAL ENCOUNTER (OUTPATIENT)
Dept: CT IMAGING | Facility: HOSPITAL | Age: 73
Discharge: HOME | End: 2018-07-16

## 2018-07-16 ENCOUNTER — HOSPITAL ENCOUNTER (OUTPATIENT)
Dept: MRI IMAGING | Facility: HOSPITAL | Age: 73
Discharge: HOME | End: 2018-07-16

## 2018-07-16 ENCOUNTER — HOSPITAL ENCOUNTER (OUTPATIENT)
Dept: CT IMAGING | Facility: HOSPITAL | Age: 73
Discharge: HOME | End: 2018-07-16
Admitting: PHYSICIAN ASSISTANT

## 2018-07-16 DIAGNOSIS — R51.9 NONINTRACTABLE HEADACHE, UNSPECIFIED CHRONICITY PATTERN, UNSPECIFIED HEADACHE TYPE: ICD-10-CM

## 2018-07-16 DIAGNOSIS — R27.0 ATAXIA: ICD-10-CM

## 2018-07-16 DIAGNOSIS — I65.23 BILATERAL CAROTID ARTERY OCCLUSION: ICD-10-CM

## 2018-07-16 LAB — CREAT BLDA-MCNC: 0.9 MG/DL (ref 0.6–1.3)

## 2018-07-16 PROCEDURE — 0 GADOBENATE DIMEGLUMINE 529 MG/ML SOLUTION: Performed by: PHYSICIAN ASSISTANT

## 2018-07-16 PROCEDURE — 70498 CT ANGIOGRAPHY NECK: CPT

## 2018-07-16 PROCEDURE — 70553 MRI BRAIN STEM W/O & W/DYE: CPT

## 2018-07-16 PROCEDURE — 82565 ASSAY OF CREATININE: CPT

## 2018-07-16 PROCEDURE — 70496 CT ANGIOGRAPHY HEAD: CPT

## 2018-07-16 PROCEDURE — A9577 INJ MULTIHANCE: HCPCS | Performed by: PHYSICIAN ASSISTANT

## 2018-07-16 PROCEDURE — 0 IOPAMIDOL PER 1 ML: Performed by: NURSE PRACTITIONER

## 2018-07-16 RX ADMIN — IOPAMIDOL 150 ML: 755 INJECTION, SOLUTION INTRAVENOUS at 12:15

## 2018-07-16 RX ADMIN — GADOBENATE DIMEGLUMINE 10 ML: 529 INJECTION, SOLUTION INTRAVENOUS at 13:15

## 2018-07-17 ENCOUNTER — HOSPITAL ENCOUNTER (OUTPATIENT)
Dept: ULTRASOUND IMAGING | Facility: HOSPITAL | Age: 73
Discharge: HOME | End: 2018-07-17
Admitting: PHYSICIAN ASSISTANT

## 2018-07-17 VITALS — OXYGEN SATURATION: 100 % | SYSTOLIC BLOOD PRESSURE: 112 MMHG | DIASTOLIC BLOOD PRESSURE: 47 MMHG | HEART RATE: 64 BPM

## 2018-07-17 DIAGNOSIS — E07.9 THYROID MASS: ICD-10-CM

## 2018-07-17 PROCEDURE — 88334 PATH CONSLTJ SURG CYTO XM EA: CPT | Performed by: PHYSICIAN ASSISTANT

## 2018-07-17 PROCEDURE — 88162 CYTOPATH SMEAR OTHER SOURCE: CPT | Performed by: PHYSICIAN ASSISTANT

## 2018-07-17 PROCEDURE — 88177 CYTP FNA EVAL EA ADDL: CPT | Performed by: PHYSICIAN ASSISTANT

## 2018-07-17 PROCEDURE — 76942 ECHO GUIDE FOR BIOPSY: CPT

## 2018-07-17 PROCEDURE — 88173 CYTOPATH EVAL FNA REPORT: CPT | Performed by: PHYSICIAN ASSISTANT

## 2018-07-17 PROCEDURE — 88172 CYTP DX EVAL FNA 1ST EA SITE: CPT | Performed by: PHYSICIAN ASSISTANT

## 2018-07-18 LAB
CYTO UR: NORMAL
LAB AP CASE REPORT: NORMAL
Lab: NORMAL
PATH REPORT.FINAL DX SPEC: NORMAL
PATH REPORT.GROSS SPEC: NORMAL

## 2018-07-23 ENCOUNTER — APPOINTMENT (OUTPATIENT)
Dept: GENERAL RADIOLOGY | Age: 73
DRG: 313 | End: 2018-07-23
Payer: MEDICARE

## 2018-07-23 ENCOUNTER — HOSPITAL ENCOUNTER (INPATIENT)
Age: 73
LOS: 1 days | Discharge: HOME | DRG: 313 | End: 2018-07-24
Attending: EMERGENCY MEDICINE | Admitting: INTERNAL MEDICINE
Payer: MEDICARE

## 2018-07-23 DIAGNOSIS — R07.9 ACUTE CHEST PAIN: ICD-10-CM

## 2018-07-23 DIAGNOSIS — R55 SYNCOPE AND COLLAPSE: Primary | ICD-10-CM

## 2018-07-23 LAB
ALBUMIN SERPL-MCNC: 4.4 G/DL (ref 3.5–5.2)
ALP BLD-CCNC: 65 U/L (ref 35–104)
ALT SERPL-CCNC: 21 U/L (ref 5–33)
ANION GAP SERPL CALCULATED.3IONS-SCNC: 14 MMOL/L (ref 7–19)
APTT: 25.9 SEC (ref 26–36.2)
AST SERPL-CCNC: 19 U/L (ref 5–32)
BASOPHILS ABSOLUTE: 0 K/UL (ref 0–0.2)
BASOPHILS RELATIVE PERCENT: 0.5 % (ref 0–1)
BILIRUB SERPL-MCNC: <0.2 MG/DL (ref 0.2–1.2)
BUN BLDV-MCNC: 18 MG/DL (ref 8–23)
CALCIUM SERPL-MCNC: 9.8 MG/DL (ref 8.8–10.2)
CHLORIDE BLD-SCNC: 92 MMOL/L (ref 98–111)
CO2: 27 MMOL/L (ref 22–29)
CREAT SERPL-MCNC: 0.6 MG/DL (ref 0.5–0.9)
EOSINOPHILS ABSOLUTE: 0.2 K/UL (ref 0–0.6)
EOSINOPHILS RELATIVE PERCENT: 2.2 % (ref 0–5)
GFR NON-AFRICAN AMERICAN: >60
GLUCOSE BLD-MCNC: 102 MG/DL (ref 74–109)
HCT VFR BLD CALC: 40.1 % (ref 37–47)
HEMOGLOBIN: 13.7 G/DL (ref 12–16)
INR BLD: 0.9 (ref 0.88–1.18)
LYMPHOCYTES ABSOLUTE: 3.3 K/UL (ref 1.1–4.5)
LYMPHOCYTES RELATIVE PERCENT: 39.3 % (ref 20–40)
MCH RBC QN AUTO: 31.9 PG (ref 27–31)
MCHC RBC AUTO-ENTMCNC: 34.2 G/DL (ref 33–37)
MCV RBC AUTO: 93.5 FL (ref 81–99)
MONOCYTES ABSOLUTE: 0.6 K/UL (ref 0–0.9)
MONOCYTES RELATIVE PERCENT: 6.9 % (ref 0–10)
NEUTROPHILS ABSOLUTE: 4.2 K/UL (ref 1.5–7.5)
NEUTROPHILS RELATIVE PERCENT: 50.4 % (ref 50–65)
PDW BLD-RTO: 13.2 % (ref 11.5–14.5)
PLATELET # BLD: 235 K/UL (ref 130–400)
PMV BLD AUTO: 8.1 FL (ref 9.4–12.3)
POTASSIUM SERPL-SCNC: 4.1 MMOL/L (ref 3.5–5)
PROTHROMBIN TIME: 12.1 SEC (ref 12–14.6)
RBC # BLD: 4.29 M/UL (ref 4.2–5.4)
SODIUM BLD-SCNC: 133 MMOL/L (ref 136–145)
TOTAL PROTEIN: 6.9 G/DL (ref 6.6–8.7)
TROPONIN: <0.01 NG/ML (ref 0–0.03)
TSH SERPL DL<=0.05 MIU/L-ACNC: 1.62 UIU/ML (ref 0.27–4.2)
WBC # BLD: 8.4 K/UL (ref 4.8–10.8)

## 2018-07-23 PROCEDURE — 93005 ELECTROCARDIOGRAM TRACING: CPT

## 2018-07-23 PROCEDURE — G0378 HOSPITAL OBSERVATION PER HR: HCPCS

## 2018-07-23 PROCEDURE — 99285 EMERGENCY DEPT VISIT HI MDM: CPT | Performed by: EMERGENCY MEDICINE

## 2018-07-23 PROCEDURE — 84443 ASSAY THYROID STIM HORMONE: CPT

## 2018-07-23 PROCEDURE — 36415 COLL VENOUS BLD VENIPUNCTURE: CPT

## 2018-07-23 PROCEDURE — 99223 1ST HOSP IP/OBS HIGH 75: CPT | Performed by: INTERNAL MEDICINE

## 2018-07-23 PROCEDURE — 84484 ASSAY OF TROPONIN QUANT: CPT

## 2018-07-23 PROCEDURE — 80053 COMPREHEN METABOLIC PANEL: CPT

## 2018-07-23 PROCEDURE — 85730 THROMBOPLASTIN TIME PARTIAL: CPT

## 2018-07-23 PROCEDURE — 85025 COMPLETE CBC W/AUTO DIFF WBC: CPT

## 2018-07-23 PROCEDURE — 85610 PROTHROMBIN TIME: CPT

## 2018-07-23 PROCEDURE — 71045 X-RAY EXAM CHEST 1 VIEW: CPT

## 2018-07-23 PROCEDURE — 99285 EMERGENCY DEPT VISIT HI MDM: CPT

## 2018-07-23 RX ORDER — MULTIVITAMIN WITH FOLIC ACID 400 MCG
1 TABLET ORAL DAILY
Status: DISCONTINUED | OUTPATIENT
Start: 2018-07-24 | End: 2018-07-24 | Stop reason: HOSPADM

## 2018-07-23 RX ORDER — CLOPIDOGREL BISULFATE 75 MG/1
75 TABLET ORAL DAILY
Status: DISCONTINUED | OUTPATIENT
Start: 2018-07-24 | End: 2018-07-24 | Stop reason: HOSPADM

## 2018-07-23 RX ORDER — ASPIRIN 81 MG/1
81 TABLET, CHEWABLE ORAL DAILY
Status: DISCONTINUED | OUTPATIENT
Start: 2018-07-24 | End: 2018-07-23 | Stop reason: SDUPTHER

## 2018-07-23 RX ORDER — SODIUM CHLORIDE 0.9 % (FLUSH) 0.9 %
10 SYRINGE (ML) INJECTION EVERY 12 HOURS SCHEDULED
Status: DISCONTINUED | OUTPATIENT
Start: 2018-07-23 | End: 2018-07-24 | Stop reason: HOSPADM

## 2018-07-23 RX ORDER — METOPROLOL SUCCINATE 50 MG/1
200 TABLET, EXTENDED RELEASE ORAL DAILY
Status: DISCONTINUED | OUTPATIENT
Start: 2018-07-24 | End: 2018-07-24

## 2018-07-23 RX ORDER — SODIUM CHLORIDE 0.9 % (FLUSH) 0.9 %
10 SYRINGE (ML) INJECTION PRN
Status: DISCONTINUED | OUTPATIENT
Start: 2018-07-23 | End: 2018-07-24 | Stop reason: HOSPADM

## 2018-07-23 RX ORDER — CALCIUM CARBONATE 200(500)MG
600 TABLET,CHEWABLE ORAL 2 TIMES DAILY PRN
Status: DISCONTINUED | OUTPATIENT
Start: 2018-07-23 | End: 2018-07-24 | Stop reason: HOSPADM

## 2018-07-23 RX ORDER — ALLOPURINOL 300 MG/1
300 TABLET ORAL DAILY
Status: DISCONTINUED | OUTPATIENT
Start: 2018-07-24 | End: 2018-07-24 | Stop reason: HOSPADM

## 2018-07-23 RX ORDER — ONDANSETRON 2 MG/ML
4 INJECTION INTRAMUSCULAR; INTRAVENOUS EVERY 6 HOURS PRN
Status: DISCONTINUED | OUTPATIENT
Start: 2018-07-23 | End: 2018-07-24 | Stop reason: HOSPADM

## 2018-07-23 RX ORDER — LORATADINE 10 MG/1
10 TABLET ORAL DAILY
Status: DISCONTINUED | OUTPATIENT
Start: 2018-07-24 | End: 2018-07-24 | Stop reason: CLARIF

## 2018-07-23 RX ORDER — OLMESARTAN MEDOXOMIL AND HYDROCHLOROTHIAZIDE 40/25 40; 25 MG/1; MG/1
0.5 TABLET ORAL 2 TIMES DAILY
Status: DISCONTINUED | OUTPATIENT
Start: 2018-07-24 | End: 2018-07-24 | Stop reason: HOSPADM

## 2018-07-23 RX ORDER — SODIUM CHLORIDE 9 MG/ML
INJECTION, SOLUTION INTRAVENOUS CONTINUOUS
Status: DISCONTINUED | OUTPATIENT
Start: 2018-07-23 | End: 2018-07-24 | Stop reason: HOSPADM

## 2018-07-23 RX ORDER — ASPIRIN 81 MG/1
81 TABLET ORAL DAILY
Status: DISCONTINUED | OUTPATIENT
Start: 2018-07-24 | End: 2018-07-24 | Stop reason: HOSPADM

## 2018-07-23 ASSESSMENT — PAIN SCALES - GENERAL
PAINLEVEL_OUTOF10: 1
PAINLEVEL_OUTOF10: 7

## 2018-07-23 ASSESSMENT — ENCOUNTER SYMPTOMS
NAUSEA: 0
DIARRHEA: 0
BACK PAIN: 0
RHINORRHEA: 0
SHORTNESS OF BREATH: 1
COUGH: 0
VOMITING: 0
SORE THROAT: 0
ABDOMINAL PAIN: 0

## 2018-07-23 NOTE — ED PROVIDER NOTES
[facility]  eMERGENCY dEPARTMENT eNCOUnter      Pt Name: Nunu Ramirez  MRN: [id number]  [place] 1945  Date of evaluation: 7/23/2018  Provider: Adam Frey MD    [address] [place] [address]       Chief Complaint   Patient presents with    Chest Pain     Pt states chest pain intermittently for 3 months. She states she blacks out at random. Dr. Danuta Clifton cardiologist.   [facility] Loss of Consciousness     Patient has 50-60% occlusion to carotid artery, recent test done. HISTORY OF PRESENT ILLNESS   (Location/Symptom, Timing/Onset, Context/Setting, Quality, Duration, Modifying Factors, Severity)  Note limiting factors. Nunu Ramirez is a 74 y.o. female who presents to the emergency department For syncopal event and chest discomfort. The patient states that she has felt somewhat fatigued. Patient also reports for the last 2 weeks she had a biopsy of her thyroid due to having a nodule on left lobe. Patient states this past Saturday she had been out watering her plants whenever she felt a tingling sensation starting in her legs and she got very lightheaded and diaphoretic. She came inside that her blood pressure was 205 over 80s and a heart rate in the 105. Today she was sitting in the car became ill and had a syncopal event that lasted less than 1 minute at approximately 1 PM.  She does describe some chest discomfort after these episodes. Her  reports this evening she came walking into the room and was complaining of feeling somewhat lightheaded she said that the chair and then had a another syncopal event lasting less than 1 minute. She reports a history of prior atrial fibrillation. She has seen Dr. Danuta Clifton in the past she has a prior cardiac stent placed by Dr. Denice Jefferson. Upon further questioning she does admit to some exertional dyspnea as well as chest discomfort. She is still somewhat with syncopal events for many years dating back continue in the [de-identified] best I can tell.   She is followed by  Colon Polyps Father     Crohn's Disease Brother     Colon Cancer Neg Hx     Liver Cancer Neg Hx     Liver Disease Neg Hx     Rectal Cancer Neg Hx     Stomach Cancer Neg Hx           SOCIAL HISTORY       Social History     Social History    Marital status:      Spouse name: N/A    Number of children: N/A    Years of education: N/A     Social History Main Topics    Smoking status: Never Smoker    Smokeless tobacco: Never Used    Alcohol use No    Drug use: No    Sexual activity: Not on file     Other Topics Concern    Not on file     Social History Narrative    No narrative on file       SCREENINGS             PHYSICAL EXAM    (up to 7 for level 4, 8 or more for level 5)     ED Triage Vitals   BP Temp Temp src Pulse Resp SpO2 Height Weight   -- -- -- 07/23/18 1829 07/23/18 1829 07/23/18 1829 07/23/18 1822 07/23/18 1822      100 16 97 % 5' 7\" (1.702 m) 185 lb (83.9 kg)       Physical Exam   Constitutional: She is oriented to person, place, and time. She appears well-developed and well-nourished. No distress. HENT:   Head: Normocephalic and atraumatic. Right Ear: External ear normal.   Left Ear: External ear normal.   Eyes: Conjunctivae and EOM are normal. Pupils are equal, round, and reactive to light. Neck: Normal range of motion. No tracheal deviation present. Cardiovascular: Normal rate, regular rhythm, normal heart sounds and intact distal pulses. No murmur heard. Pulmonary/Chest: Breath sounds normal. No respiratory distress. She has no wheezes. She has no rales. Abdominal: Soft. There is no tenderness. Musculoskeletal: Normal range of motion. She exhibits no edema. Neurological: She is alert and oriented to person, place, and time. No cranial nerve deficit or sensory deficit. She exhibits normal muscle tone. Coordination normal. GCS eye subscore is 4. GCS verbal subscore is 5. GCS motor subscore is 6. Speech clear, no aphasia   Skin: Skin is warm and dry.    Vitals

## 2018-07-24 VITALS
BODY MASS INDEX: 29.98 KG/M2 | HEIGHT: 67 IN | OXYGEN SATURATION: 97 % | HEART RATE: 76 BPM | SYSTOLIC BLOOD PRESSURE: 131 MMHG | RESPIRATION RATE: 18 BRPM | TEMPERATURE: 97.8 F | WEIGHT: 191 LBS | DIASTOLIC BLOOD PRESSURE: 66 MMHG

## 2018-07-24 PROBLEM — R55 SYNCOPE AND COLLAPSE: Status: ACTIVE | Noted: 2018-07-24

## 2018-07-24 LAB
BASOPHILS ABSOLUTE: 0 K/UL (ref 0–0.2)
BASOPHILS RELATIVE PERCENT: 0.4 % (ref 0–1)
CHOLESTEROL, TOTAL: 294 MG/DL (ref 160–199)
CHOLESTEROL, TOTAL: 296 MG/DL (ref 160–199)
EOSINOPHILS ABSOLUTE: 0.1 K/UL (ref 0–0.6)
EOSINOPHILS RELATIVE PERCENT: 1.8 % (ref 0–5)
HCT VFR BLD CALC: 39.5 % (ref 37–47)
HDLC SERPL-MCNC: 46 MG/DL (ref 65–121)
HDLC SERPL-MCNC: 49 MG/DL (ref 65–121)
HEMOGLOBIN: 13 G/DL (ref 12–16)
INR BLD: 1.01 (ref 0.88–1.18)
LDL CHOLESTEROL CALCULATED: 189 MG/DL
LDL CHOLESTEROL CALCULATED: 196 MG/DL
LV EF: 63 %
LVEF MODALITY: NORMAL
LYMPHOCYTES ABSOLUTE: 3.3 K/UL (ref 1.1–4.5)
LYMPHOCYTES RELATIVE PERCENT: 47.7 % (ref 20–40)
MCH RBC QN AUTO: 31.1 PG (ref 27–31)
MCHC RBC AUTO-ENTMCNC: 32.9 G/DL (ref 33–37)
MCV RBC AUTO: 94.5 FL (ref 81–99)
MONOCYTES ABSOLUTE: 0.5 K/UL (ref 0–0.9)
MONOCYTES RELATIVE PERCENT: 7.4 % (ref 0–10)
NEUTROPHILS ABSOLUTE: 2.9 K/UL (ref 1.5–7.5)
NEUTROPHILS RELATIVE PERCENT: 42 % (ref 50–65)
PDW BLD-RTO: 13.2 % (ref 11.5–14.5)
PLATELET # BLD: 222 K/UL (ref 130–400)
PMV BLD AUTO: 8.2 FL (ref 9.4–12.3)
PROTHROMBIN TIME: 13.2 SEC (ref 12–14.6)
RBC # BLD: 4.18 M/UL (ref 4.2–5.4)
TRIGL SERPL-MCNC: 259 MG/DL (ref 0–149)
TRIGL SERPL-MCNC: 291 MG/DL (ref 0–149)
TROPONIN: <0.01 NG/ML (ref 0–0.03)
WBC # BLD: 6.9 K/UL (ref 4.8–10.8)

## 2018-07-24 PROCEDURE — 2580000003 HC RX 258: Performed by: INTERNAL MEDICINE

## 2018-07-24 PROCEDURE — 6370000000 HC RX 637 (ALT 250 FOR IP): Performed by: INTERNAL MEDICINE

## 2018-07-24 PROCEDURE — 36415 COLL VENOUS BLD VENIPUNCTURE: CPT

## 2018-07-24 PROCEDURE — 2140000000 HC CCU INTERMEDIATE R&B

## 2018-07-24 PROCEDURE — 85610 PROTHROMBIN TIME: CPT

## 2018-07-24 PROCEDURE — 80061 LIPID PANEL: CPT

## 2018-07-24 PROCEDURE — 84484 ASSAY OF TROPONIN QUANT: CPT

## 2018-07-24 PROCEDURE — 85025 COMPLETE CBC W/AUTO DIFF WBC: CPT

## 2018-07-24 PROCEDURE — 93306 TTE W/DOPPLER COMPLETE: CPT

## 2018-07-24 PROCEDURE — 99238 HOSP IP/OBS DSCHRG MGMT 30/<: CPT | Performed by: INTERNAL MEDICINE

## 2018-07-24 RX ORDER — IBUPROFEN 400 MG/1
400 TABLET ORAL EVERY 6 HOURS PRN
Status: DISCONTINUED | OUTPATIENT
Start: 2018-07-24 | End: 2018-07-24 | Stop reason: HOSPADM

## 2018-07-24 RX ORDER — CETIRIZINE HYDROCHLORIDE 10 MG/1
10 TABLET ORAL DAILY
Status: DISCONTINUED | OUTPATIENT
Start: 2018-07-24 | End: 2018-07-24 | Stop reason: HOSPADM

## 2018-07-24 RX ORDER — METOPROLOL SUCCINATE 50 MG/1
50 TABLET, EXTENDED RELEASE ORAL 2 TIMES DAILY
Qty: 30 TABLET | Refills: 3 | Status: SHIPPED | OUTPATIENT
Start: 2018-07-24 | End: 2018-08-03 | Stop reason: SDUPTHER

## 2018-07-24 RX ORDER — PHENOL 1.4 %
1 AEROSOL, SPRAY (ML) MUCOUS MEMBRANE 2 TIMES DAILY
COMMUNITY
End: 2021-01-27

## 2018-07-24 RX ORDER — ATORVASTATIN CALCIUM 80 MG/1
80 TABLET, FILM COATED ORAL NIGHTLY
Status: DISCONTINUED | OUTPATIENT
Start: 2018-07-24 | End: 2018-07-24 | Stop reason: HOSPADM

## 2018-07-24 RX ORDER — METOPROLOL SUCCINATE 50 MG/1
100 TABLET, EXTENDED RELEASE ORAL 2 TIMES DAILY
Status: DISCONTINUED | OUTPATIENT
Start: 2018-07-24 | End: 2018-07-24

## 2018-07-24 RX ORDER — METOPROLOL SUCCINATE 50 MG/1
50 TABLET, EXTENDED RELEASE ORAL 2 TIMES DAILY
Status: DISCONTINUED | OUTPATIENT
Start: 2018-07-24 | End: 2018-07-24 | Stop reason: HOSPADM

## 2018-07-24 RX ADMIN — Medication 10 ML: at 00:31

## 2018-07-24 RX ADMIN — Medication 10 ML: at 09:13

## 2018-07-24 RX ADMIN — THERA TABS 1 TABLET: TAB at 09:12

## 2018-07-24 RX ADMIN — CETIRIZINE HYDROCHLORIDE 10 MG: 10 TABLET ORAL at 09:20

## 2018-07-24 RX ADMIN — ASPIRIN 81 MG: 81 TABLET, COATED ORAL at 09:12

## 2018-07-24 RX ADMIN — SODIUM CHLORIDE: 9 INJECTION, SOLUTION INTRAVENOUS at 09:12

## 2018-07-24 RX ADMIN — OLMESARTAN MEDOXOMIL AND HYDROCHLOROTHIAZIDE 40/25 0.5 TABLET: 40; 25 TABLET ORAL at 09:12

## 2018-07-24 RX ADMIN — IBUPROFEN 400 MG: 400 TABLET ORAL at 17:02

## 2018-07-24 RX ADMIN — METOPROLOL SUCCINATE 100 MG: 50 TABLET, EXTENDED RELEASE ORAL at 09:12

## 2018-07-24 RX ADMIN — METOPROLOL SUCCINATE 100 MG: 50 TABLET, EXTENDED RELEASE ORAL at 00:45

## 2018-07-24 RX ADMIN — VITAMIN D, TAB 1000IU (100/BT) 1000 UNITS: 25 TAB at 09:13

## 2018-07-24 ASSESSMENT — PAIN SCALES - GENERAL
PAINLEVEL_OUTOF10: 8
PAINLEVEL_OUTOF10: 0

## 2018-07-24 NOTE — PLAN OF CARE
Problem: Falls - Risk of:  Goal: Will remain free from falls  Will remain free from falls   Outcome: Ongoing      Problem: Cardiac:  Goal: Ability to maintain vital signs within normal range will improve  Ability to maintain vital signs within normal range will improve  Outcome: Ongoing    Goal: Cardiovascular alteration will improve  Cardiovascular alteration will improve  Outcome: Ongoing      Problem: Pain:  Goal: Pain level will decrease  Pain level will decrease  Outcome: Ongoing

## 2018-07-24 NOTE — DISCHARGE SUMMARY
or shortness of air at rest.  With these symptoms and signs, Cardiology was asked to assist in treatment  When being examined this evening, she has had no symptoms of exertional chest discomfort, unusual or change in shortness of air, presyncope or syncope      Hospital Course:     After admission, there was no electrocardiographic or enzymatic evidence of myocardial necrosis     An echocardiogram was obtained which showed:       1. Mildly dilated left atrium   2. Normal LV systolic function (estimated EF, 60-65%)   3. Grade 1 diastolic dysfunction (impaired relaxation)   4. Mild tricuspid regurgitation; estimated RVSP of at least 22 mmHg    Telemetry monitoring showed no arrhythmia. She had no further chest discomfort. The rest of the patient's hospitalization was uneventful. She was discharged home on medical therapy with outpatient follow up. Consults:     None      Significant Diagnostic Studies:    1. echocardiogram, 07/24/18     Significant Therapeutic Endeavors:      1. none      Activity: activity as directed per discharge instructions. Diet:  Cardiac diet    Labs:  For convenience and continuity at follow-up the following most recent labs are provided:    CBC:    Lab Results   Component Value Date    WBC 6.9 07/24/2018    HGB 13.0 07/24/2018    HCT 39.5 07/24/2018     07/24/2018       Renal:    Lab Results   Component Value Date     07/23/2018    K 4.1 07/23/2018    CL 92 07/23/2018    CO2 27 07/23/2018    BUN 18 07/23/2018    CREATININE 0.6 07/23/2018    CALCIUM 9.8 07/23/2018         Discharge Medications:     Current Discharge Medication List           Details   metoprolol succinate (TOPROL XL) 50 MG extended release tablet Take 1 tablet by mouth 2 times daily  Qty: 30 tablet, Refills: 3              Details   calcium carbonate 600 MG TABS tablet Take 1 tablet by mouth 2 times daily      Evolocumab (REPATHA) 140 MG/ML SOSY Inject 140 mg into the skin every 14 days Patient

## 2018-07-24 NOTE — H&P
Carotid artery stenosis        Chronic venous insufficiency        Varicose veins        Spider veins        Internal hemorrhoids without complication        Bleeding internal hemorrhoids        Syncope        Internal hemorrhoids        Syncope and collapse                PRIOR CARDIAC PROBLEM LIST  (IF APPLICABLE):    1374  PTCA /stent  Progressive angina; AUC indication 54, AUC score 7, [facility] ;[id number]  9/10/15  Cath  Mild CAD, normal LVFX          Past Medical History:    Past Medical History:   Diagnosis Date    Acid reflux     Anxiety     Arthritis     Back pain     CAD (coronary artery disease)     CAD (coronary artery disease)     Carotid artery occlusion     Chronic venous insufficiency 2012    Cough     Fibromyalgia     Goiter     Gout     Head ache     headaches    Heart murmur     History of colon polyps     History of colon polyps     Hoarseness     Hypercholesterolemia     Hyperlipidemia     Hypertension     Insomnia     Irregular heart beat     Itching     Muscle cramp     Neck pain     Osteoarthritis     Osteoporosis     Palpitations     RA (rheumatoid arthritis) (HCC)     Rash     Rectal bleeding     SOB (shortness of breath)     Sore throat     Tympanic membrane perforation     Varicose veins 2012         Past Surgical History:    Past Surgical History:   Procedure Laterality Date    APPENDECTOMY      BREAST SURGERY  2002    CARDIAC CATHETERIZATION  9/10/15  JDT    EF 50%    CERVICAL FUSION  1999     SECTION  1968    x 1    CHOLECYSTECTOMY  2012    [place]    COLONOSCOPY  1-    Dr LUJAN    COLONOSCOPY  2014    Dr. Jordon Ortiz      Dr Pardo Angry    partial   [place]      left knee surgery    PTCA      stent    TUBAL LIGATION      UPPER GASTROINTESTINAL ENDOSCOPY  ?    

## 2018-07-26 LAB
EKG P AXIS: 53 DEGREES
EKG P-R INTERVAL: 186 MS
EKG Q-T INTERVAL: 320 MS
EKG QRS DURATION: 88 MS
EKG QTC CALCULATION (BAZETT): 391 MS
EKG T AXIS: 57 DEGREES

## 2018-08-03 RX ORDER — METOPROLOL SUCCINATE 50 MG/1
50 TABLET, EXTENDED RELEASE ORAL 2 TIMES DAILY
Qty: 180 TABLET | Refills: 3 | Status: SHIPPED | OUTPATIENT
Start: 2018-08-03 | End: 2019-07-10 | Stop reason: SDUPTHER

## 2018-08-07 ENCOUNTER — HOSPITAL ENCOUNTER (OUTPATIENT)
Dept: NON INVASIVE DIAGNOSTICS | Age: 73
Discharge: HOME | End: 2018-08-07
Payer: MEDICARE

## 2018-08-07 PROCEDURE — 93229 REMOTE 30 DAY ECG TECH SUPP: CPT

## 2018-08-24 ENCOUNTER — HOSPITAL ENCOUNTER (OUTPATIENT)
Dept: BONE DENSITY | Facility: HOSPITAL | Age: 73
Discharge: HOME | End: 2018-08-24
Admitting: PHYSICIAN ASSISTANT

## 2018-08-24 DIAGNOSIS — N95.9 POST MENOPAUSAL PROBLEMS: ICD-10-CM

## 2018-08-24 PROCEDURE — 77080 DXA BONE DENSITY AXIAL: CPT

## 2018-10-29 ENCOUNTER — OFFICE VISIT (OUTPATIENT)
Dept: CARDIOLOGY | Age: 73
End: 2018-10-29
Payer: MEDICARE

## 2018-10-29 VITALS
HEART RATE: 78 BPM | SYSTOLIC BLOOD PRESSURE: 130 MMHG | HEIGHT: 67 IN | BODY MASS INDEX: 30.76 KG/M2 | DIASTOLIC BLOOD PRESSURE: 78 MMHG | WEIGHT: 196 LBS

## 2018-10-29 DIAGNOSIS — I10 ESSENTIAL HYPERTENSION: ICD-10-CM

## 2018-10-29 DIAGNOSIS — I25.10 CORONARY ARTERY DISEASE INVOLVING NATIVE CORONARY ARTERY OF NATIVE HEART WITHOUT ANGINA PECTORIS: Primary | ICD-10-CM

## 2018-10-29 DIAGNOSIS — R00.2 PALPITATIONS: ICD-10-CM

## 2018-10-29 DIAGNOSIS — E78.2 MIXED HYPERLIPIDEMIA: ICD-10-CM

## 2018-10-29 PROCEDURE — 1123F ACP DISCUSS/DSCN MKR DOCD: CPT | Performed by: CLINICAL NURSE SPECIALIST

## 2018-10-29 PROCEDURE — G8400 PT W/DXA NO RESULTS DOC: HCPCS | Performed by: CLINICAL NURSE SPECIALIST

## 2018-10-29 PROCEDURE — 1036F TOBACCO NON-USER: CPT | Performed by: CLINICAL NURSE SPECIALIST

## 2018-10-29 PROCEDURE — G8427 DOCREV CUR MEDS BY ELIG CLIN: HCPCS | Performed by: CLINICAL NURSE SPECIALIST

## 2018-10-29 PROCEDURE — 3017F COLORECTAL CA SCREEN DOC REV: CPT | Performed by: CLINICAL NURSE SPECIALIST

## 2018-10-29 PROCEDURE — G8598 ASA/ANTIPLAT THER USED: HCPCS | Performed by: CLINICAL NURSE SPECIALIST

## 2018-10-29 PROCEDURE — 1090F PRES/ABSN URINE INCON ASSESS: CPT | Performed by: CLINICAL NURSE SPECIALIST

## 2018-10-29 PROCEDURE — 1101F PT FALLS ASSESS-DOCD LE1/YR: CPT | Performed by: CLINICAL NURSE SPECIALIST

## 2018-10-29 PROCEDURE — 99213 OFFICE O/P EST LOW 20 MIN: CPT | Performed by: CLINICAL NURSE SPECIALIST

## 2018-10-29 PROCEDURE — G8484 FLU IMMUNIZE NO ADMIN: HCPCS | Performed by: CLINICAL NURSE SPECIALIST

## 2018-10-29 PROCEDURE — G8417 CALC BMI ABV UP PARAM F/U: HCPCS | Performed by: CLINICAL NURSE SPECIALIST

## 2018-10-29 PROCEDURE — 4040F PNEUMOC VAC/ADMIN/RCVD: CPT | Performed by: CLINICAL NURSE SPECIALIST

## 2018-10-29 ASSESSMENT — ENCOUNTER SYMPTOMS
HEARTBURN: 0
VOMITING: 0
NAUSEA: 0
SHORTNESS OF BREATH: 0
COUGH: 0

## 2018-10-29 NOTE — PROGRESS NOTES
[facility], [address] [address]  Phone: [phone]  Fax: [phone]    OFFICE VISIT:  10/29/2018    Jayesh Le - : 1945    Reason For Visit:  Jose Rafael Littlejohn is a 74 y.o. female who is here for follow-up for CAD    HPI   Patient is here for follow-up for CAD. A heart cath in  showed mild, nonocclusive CAD and she previously had angioplasty and stent in . She was hospitalized for chest pain and palpitations in July. She states her metoprolol was adjusted and she has felt better ever since. She states she was having some \"black out\" episodes that happened while she was sitting not necessarily associated with palpitations. She states her PCP ordered another Zio patch heart montior in Aug, but she never received results. Again she states her symptoms have resolved since the change and metoprolol. She denies any chest pain or unusual dyspnea. Heidi Plascencia MD is PCP.   Jayesh Le [facility] the following [facility]:    Patient Active Problem List    Diagnosis Date Noted    Syncope and collapse 2018    Acute chest pain 2018    Chest pain 2018    Overactive bladder 2017    Mixed hyperlipidemia 2017    H/O right coronary artery stent placement     Periumbilical abdominal pain 2016    Irritable bowel syndrome with diarrhea 06/10/2016    Internal hemorrhoids 06/10/2016    Syncope 2015    Precordial pain 2015    CAMPOVERDE (dyspnea on exertion) 2015    Palpitations 2015    Bleeding internal hemorrhoids 07/15/2014    Heartburn 2014    Internal hemorrhoids without complication     Spider veins 2013    Carotid artery stenosis 2012    Leg pain 2012    Leg swelling 2012    Chronic venous insufficiency 2012    Varicose veins 2012    CAD (coronary artery disease)     Hyperlipidemia    

## 2018-10-29 NOTE — PATIENT INSTRUCTIONS
Followup With Dr. Keith Members regular exercise    Call with any questions or concerns  Follow up with Roni Dallas MD for non cardiac problems  Report any new problems  Cardiovascular Fitness-Exercise as tolerated. Strive for 15 minutes of exercise most days of the week. Cardiac / Healthy Diet  Continue current medications as directed  Continue plan of treatment  It is always recommended that you bring your medications bottles with you to each visit - this is for your safety! Patient Education        Learning About Coronary Artery Disease (CAD)  What is coronary artery disease? Coronary artery disease (CAD) occurs when plaque builds up in the arteries that bring oxygen-rich blood to your heart. Plaque is a fatty substance made of cholesterol, calcium, and other substances in the blood. This process is called hardening of the arteries, or atherosclerosis. What happens when you have coronary artery disease? · Plaque may narrow the coronary arteries. Narrowed arteries cause poor blood flow. This can lead to angina symptoms such as chest pain or discomfort. If blood flow is completely blocked, you could have a heart attack. · You can slow CAD and reduce the risk of future problems by making changes in your lifestyle. These include quitting smoking and eating heart-healthy foods. · Treatments for CAD, along with changes in your lifestyle, can help you live a longer and healthier life. How can you prevent coronary artery disease? · Do not smoke. It may be the best thing you can do to prevent heart disease. If you need help quitting, talk to your doctor about stop-smoking programs and medicines. These can increase your chances of quitting for good. · Be active. Get at least 30 minutes of exercise on most days of the week. Walking is a good choice. You also may want to do other activities, such as running, swimming, cycling, or playing tennis or team sports. · Eat heart-healthy foods.  Eat more

## 2019-02-18 ENCOUNTER — APPOINTMENT (OUTPATIENT)
Dept: GENERAL RADIOLOGY | Age: 74
End: 2019-02-18
Payer: MEDICARE

## 2019-02-18 ENCOUNTER — HOSPITAL ENCOUNTER (EMERGENCY)
Age: 74
Discharge: HOME | End: 2019-02-18
Attending: FAMILY MEDICINE
Payer: MEDICARE

## 2019-02-18 ENCOUNTER — TRANSCRIBE ORDERS (OUTPATIENT)
Dept: ADMINISTRATIVE | Facility: HOSPITAL | Age: 74
End: 2019-02-18

## 2019-02-18 VITALS
WEIGHT: 180 LBS | HEART RATE: 80 BPM | DIASTOLIC BLOOD PRESSURE: 58 MMHG | RESPIRATION RATE: 16 BRPM | OXYGEN SATURATION: 95 % | SYSTOLIC BLOOD PRESSURE: 116 MMHG | BODY MASS INDEX: 28.25 KG/M2 | TEMPERATURE: 97.9 F | HEIGHT: 67 IN

## 2019-02-18 DIAGNOSIS — Z12.39 BREAST SCREENING: Primary | ICD-10-CM

## 2019-02-18 DIAGNOSIS — K21.9 GASTROESOPHAGEAL REFLUX DISEASE WITHOUT ESOPHAGITIS: Primary | ICD-10-CM

## 2019-02-18 LAB
ALBUMIN SERPL-MCNC: 4.3 G/DL (ref 3.5–5.2)
ALP BLD-CCNC: 56 U/L (ref 35–104)
ALT SERPL-CCNC: 18 U/L (ref 5–33)
ANION GAP SERPL CALCULATED.3IONS-SCNC: 11 MMOL/L (ref 7–19)
AST SERPL-CCNC: 14 U/L (ref 5–32)
BASOPHILS ABSOLUTE: 0 K/UL (ref 0–0.2)
BASOPHILS RELATIVE PERCENT: 0.3 % (ref 0–1)
BILIRUB SERPL-MCNC: <0.2 MG/DL (ref 0.2–1.2)
BUN BLDV-MCNC: 16 MG/DL (ref 8–23)
CALCIUM SERPL-MCNC: 9.8 MG/DL (ref 8.8–10.2)
CHLORIDE BLD-SCNC: 96 MMOL/L (ref 98–111)
CO2: 27 MMOL/L (ref 22–29)
CREAT SERPL-MCNC: 0.7 MG/DL (ref 0.5–0.9)
EKG P AXIS: 64 DEGREES
EKG P-R INTERVAL: 190 MS
EKG Q-T INTERVAL: 348 MS
EKG QRS DURATION: 90 MS
EKG QTC CALCULATION (BAZETT): 397 MS
EKG T AXIS: 61 DEGREES
EOSINOPHILS ABSOLUTE: 0.1 K/UL (ref 0–0.6)
EOSINOPHILS RELATIVE PERCENT: 2 % (ref 0–5)
GFR NON-AFRICAN AMERICAN: >60
GLUCOSE BLD-MCNC: 114 MG/DL (ref 74–109)
HCT VFR BLD CALC: 39.3 % (ref 37–47)
HEMOGLOBIN: 13.5 G/DL (ref 12–16)
LYMPHOCYTES ABSOLUTE: 1.5 K/UL (ref 1.1–4.5)
LYMPHOCYTES RELATIVE PERCENT: 21.9 % (ref 20–40)
MCH RBC QN AUTO: 31.5 PG (ref 27–31)
MCHC RBC AUTO-ENTMCNC: 34.4 G/DL (ref 33–37)
MCV RBC AUTO: 91.8 FL (ref 81–99)
MONOCYTES ABSOLUTE: 0.6 K/UL (ref 0–0.9)
MONOCYTES RELATIVE PERCENT: 8.6 % (ref 0–10)
NEUTROPHILS ABSOLUTE: 4.4 K/UL (ref 1.5–7.5)
NEUTROPHILS RELATIVE PERCENT: 66.9 % (ref 50–65)
PDW BLD-RTO: 13.6 % (ref 11.5–14.5)
PLATELET # BLD: 213 K/UL (ref 130–400)
PMV BLD AUTO: 8.3 FL (ref 9.4–12.3)
POTASSIUM SERPL-SCNC: 3.6 MMOL/L (ref 3.5–5)
RBC # BLD: 4.28 M/UL (ref 4.2–5.4)
SODIUM BLD-SCNC: 134 MMOL/L (ref 136–145)
TOTAL PROTEIN: 6.7 G/DL (ref 6.6–8.7)
TROPONIN: <0.01 NG/ML (ref 0–0.03)
WBC # BLD: 6.6 K/UL (ref 4.8–10.8)

## 2019-02-18 PROCEDURE — 99284 EMERGENCY DEPT VISIT MOD MDM: CPT | Performed by: FAMILY MEDICINE

## 2019-02-18 PROCEDURE — 99285 EMERGENCY DEPT VISIT HI MDM: CPT

## 2019-02-18 PROCEDURE — 93005 ELECTROCARDIOGRAM TRACING: CPT

## 2019-02-18 PROCEDURE — 6370000000 HC RX 637 (ALT 250 FOR IP): Performed by: FAMILY MEDICINE

## 2019-02-18 PROCEDURE — 74022 RADEX COMPL AQT ABD SERIES: CPT

## 2019-02-18 PROCEDURE — 84484 ASSAY OF TROPONIN QUANT: CPT

## 2019-02-18 PROCEDURE — 85025 COMPLETE CBC W/AUTO DIFF WBC: CPT

## 2019-02-18 PROCEDURE — 36415 COLL VENOUS BLD VENIPUNCTURE: CPT

## 2019-02-18 PROCEDURE — 80053 COMPREHEN METABOLIC PANEL: CPT

## 2019-02-18 RX ORDER — SUCRALFATE 1 G/1
1 TABLET ORAL 4 TIMES DAILY
Qty: 120 TABLET | Refills: 3 | Status: SHIPPED | OUTPATIENT
Start: 2019-02-18 | End: 2019-08-25 | Stop reason: ALTCHOICE

## 2019-02-18 RX ORDER — SODIUM CHLORIDE 9 MG/ML
INJECTION, SOLUTION INTRAVENOUS CONTINUOUS
Status: DISCONTINUED | OUTPATIENT
Start: 2019-02-18 | End: 2019-02-18 | Stop reason: HOSPADM

## 2019-02-18 RX ORDER — ONDANSETRON 4 MG/1
4 TABLET, ORALLY DISINTEGRATING ORAL EVERY 8 HOURS PRN
Qty: 15 TABLET | Refills: 0 | Status: SHIPPED | OUTPATIENT
Start: 2019-02-18 | End: 2021-03-19

## 2019-02-18 RX ORDER — ONDANSETRON 4 MG/1
4 TABLET, ORALLY DISINTEGRATING ORAL ONCE
Status: DISCONTINUED | OUTPATIENT
Start: 2019-02-18 | End: 2019-02-18 | Stop reason: HOSPADM

## 2019-02-18 RX ADMIN — LIDOCAINE HYDROCHLORIDE: 20 SOLUTION ORAL; TOPICAL at 11:10

## 2019-02-18 ASSESSMENT — ENCOUNTER SYMPTOMS
WHEEZING: 0
ABDOMINAL PAIN: 1
COLOR CHANGE: 0
RHINORRHEA: 0
NAUSEA: 0
CONSTIPATION: 0
COUGH: 0
VOMITING: 0
SINUS PAIN: 0
BACK PAIN: 0
DIARRHEA: 0
SHORTNESS OF BREATH: 0
CHEST TIGHTNESS: 0

## 2019-02-25 ENCOUNTER — HOSPITAL ENCOUNTER (OUTPATIENT)
Dept: MAMMOGRAPHY | Facility: HOSPITAL | Age: 74
Discharge: HOME | End: 2019-02-25
Admitting: PHYSICIAN ASSISTANT

## 2019-02-25 DIAGNOSIS — Z12.39 BREAST SCREENING: ICD-10-CM

## 2019-02-25 PROCEDURE — 77067 SCR MAMMO BI INCL CAD: CPT

## 2019-02-25 PROCEDURE — 77063 BREAST TOMOSYNTHESIS BI: CPT

## 2019-03-07 ENCOUNTER — TRANSCRIBE ORDERS (OUTPATIENT)
Dept: ADMINISTRATIVE | Facility: HOSPITAL | Age: 74
End: 2019-03-07

## 2019-03-07 ENCOUNTER — APPOINTMENT (OUTPATIENT)
Dept: LAB | Facility: HOSPITAL | Age: 74
End: 2019-03-07

## 2019-03-07 DIAGNOSIS — K21.9 GASTROESOPHAGEAL REFLUX DISEASE, ESOPHAGITIS PRESENCE NOT SPECIFIED: Primary | ICD-10-CM

## 2019-03-07 LAB
CLOSURE TME COLL+EPINEP BLD: NORMAL SECONDS (ref 84–176)
Lab: NORMAL
PLT THERAPY DRUG: NORMAL

## 2019-03-07 PROCEDURE — 85576 BLOOD PLATELET AGGREGATION: CPT | Performed by: INTERNAL MEDICINE

## 2019-03-07 PROCEDURE — 36415 COLL VENOUS BLD VENIPUNCTURE: CPT | Performed by: INTERNAL MEDICINE

## 2019-03-27 ENCOUNTER — APPOINTMENT (OUTPATIENT)
Dept: LAB | Facility: HOSPITAL | Age: 74
End: 2019-03-27

## 2019-03-27 ENCOUNTER — TRANSCRIBE ORDERS (OUTPATIENT)
Dept: ADMINISTRATIVE | Facility: HOSPITAL | Age: 74
End: 2019-03-27

## 2019-03-27 DIAGNOSIS — K21.9 GASTRO-ESOPHAGEAL REFLUX DISEASE WITHOUT ESOPHAGITIS: Primary | ICD-10-CM

## 2019-03-27 LAB
HCT VFR BLD AUTO: 39.1 % (ref 37–47)
PA ADP PRP-ACNC: 174 PRU
PLATELET # BLD AUTO: 220 10*3/MM3 (ref 130–400)

## 2019-03-27 PROCEDURE — 36415 COLL VENOUS BLD VENIPUNCTURE: CPT | Performed by: INTERNAL MEDICINE

## 2019-03-27 PROCEDURE — 85576 BLOOD PLATELET AGGREGATION: CPT | Performed by: INTERNAL MEDICINE

## 2019-04-14 ENCOUNTER — APPOINTMENT (OUTPATIENT)
Dept: CT IMAGING | Age: 74
End: 2019-04-14
Payer: MEDICARE

## 2019-04-14 ENCOUNTER — HOSPITAL ENCOUNTER (EMERGENCY)
Age: 74
Discharge: HOME | End: 2019-04-14
Payer: MEDICARE

## 2019-04-14 VITALS
SYSTOLIC BLOOD PRESSURE: 139 MMHG | OXYGEN SATURATION: 98 % | BODY MASS INDEX: 28.25 KG/M2 | TEMPERATURE: 97.8 F | DIASTOLIC BLOOD PRESSURE: 71 MMHG | HEIGHT: 67 IN | HEART RATE: 84 BPM | RESPIRATION RATE: 14 BRPM | WEIGHT: 180 LBS

## 2019-04-14 DIAGNOSIS — N30.91 CYSTITIS WITH HEMATURIA: Primary | ICD-10-CM

## 2019-04-14 LAB
ALBUMIN SERPL-MCNC: 4.2 G/DL (ref 3.5–5.2)
ALP BLD-CCNC: 61 U/L (ref 35–104)
ALT SERPL-CCNC: 22 U/L (ref 5–33)
ANION GAP SERPL CALCULATED.3IONS-SCNC: 12 MMOL/L (ref 7–19)
AST SERPL-CCNC: 18 U/L (ref 5–32)
BACTERIA: ABNORMAL /HPF
BASOPHILS ABSOLUTE: 0 K/UL (ref 0–0.2)
BASOPHILS RELATIVE PERCENT: 0.4 % (ref 0–1)
BILIRUB SERPL-MCNC: <0.2 MG/DL (ref 0.2–1.2)
BILIRUBIN URINE: NEGATIVE
BLOOD, URINE: ABNORMAL
BUN BLDV-MCNC: 24 MG/DL (ref 8–23)
CALCIUM SERPL-MCNC: 9.9 MG/DL (ref 8.8–10.2)
CHLORIDE BLD-SCNC: 101 MMOL/L (ref 98–111)
CLARITY: ABNORMAL
CO2: 28 MMOL/L (ref 22–29)
COLOR: ABNORMAL
CREAT SERPL-MCNC: 0.8 MG/DL (ref 0.5–0.9)
EOSINOPHILS ABSOLUTE: 0.2 K/UL (ref 0–0.6)
EOSINOPHILS RELATIVE PERCENT: 2.1 % (ref 0–5)
GFR NON-AFRICAN AMERICAN: >60
GLUCOSE BLD-MCNC: 112 MG/DL (ref 74–109)
GLUCOSE URINE: NEGATIVE MG/DL
HCT VFR BLD CALC: 41.5 % (ref 37–47)
HEMOGLOBIN: 13.6 G/DL (ref 12–16)
KETONES, URINE: NEGATIVE MG/DL
LEUKOCYTE ESTERASE, URINE: ABNORMAL
LYMPHOCYTES ABSOLUTE: 2.3 K/UL (ref 1.1–4.5)
LYMPHOCYTES RELATIVE PERCENT: 21.1 % (ref 20–40)
MCH RBC QN AUTO: 32.5 PG (ref 27–31)
MCHC RBC AUTO-ENTMCNC: 32.8 G/DL (ref 33–37)
MCV RBC AUTO: 99.3 FL (ref 81–99)
MONOCYTES ABSOLUTE: 0.7 K/UL (ref 0–0.9)
MONOCYTES RELATIVE PERCENT: 6.1 % (ref 0–10)
NEUTROPHILS ABSOLUTE: 7.5 K/UL (ref 1.5–7.5)
NEUTROPHILS RELATIVE PERCENT: 70 % (ref 50–65)
NITRITE, URINE: POSITIVE
PDW BLD-RTO: 13.8 % (ref 11.5–14.5)
PH UA: 6 (ref 5–8)
PLATELET # BLD: 210 K/UL (ref 130–400)
PMV BLD AUTO: 9 FL (ref 9.4–12.3)
POTASSIUM SERPL-SCNC: 3.6 MMOL/L (ref 3.5–5)
PROTEIN UA: 100 MG/DL
RBC # BLD: 4.18 M/UL (ref 4.2–5.4)
RBC UA: ABNORMAL /HPF (ref 0–2)
SODIUM BLD-SCNC: 141 MMOL/L (ref 136–145)
SPECIFIC GRAVITY UA: 1.02 (ref 1–1.03)
TOTAL PROTEIN: 7 G/DL (ref 6.6–8.7)
URINE REFLEX TO CULTURE: YES
UROBILINOGEN, URINE: 0.2 E.U./DL
WBC # BLD: 10.7 K/UL (ref 4.8–10.8)
WBC UA: ABNORMAL /HPF (ref 0–5)

## 2019-04-14 PROCEDURE — 2580000003 HC RX 258: Performed by: PHYSICIAN ASSISTANT

## 2019-04-14 PROCEDURE — 81001 URINALYSIS AUTO W/SCOPE: CPT

## 2019-04-14 PROCEDURE — 87186 SC STD MICRODIL/AGAR DIL: CPT

## 2019-04-14 PROCEDURE — 99284 EMERGENCY DEPT VISIT MOD MDM: CPT | Performed by: PHYSICIAN ASSISTANT

## 2019-04-14 PROCEDURE — 87086 URINE CULTURE/COLONY COUNT: CPT

## 2019-04-14 PROCEDURE — 80053 COMPREHEN METABOLIC PANEL: CPT

## 2019-04-14 PROCEDURE — 74176 CT ABD & PELVIS W/O CONTRAST: CPT

## 2019-04-14 PROCEDURE — 99283 EMERGENCY DEPT VISIT LOW MDM: CPT

## 2019-04-14 PROCEDURE — 85025 COMPLETE CBC W/AUTO DIFF WBC: CPT

## 2019-04-14 PROCEDURE — 87077 CULTURE AEROBIC IDENTIFY: CPT

## 2019-04-14 PROCEDURE — 36415 COLL VENOUS BLD VENIPUNCTURE: CPT

## 2019-04-14 RX ORDER — PANTOPRAZOLE SODIUM 40 MG/1
40 TABLET, DELAYED RELEASE ORAL PRN
COMMUNITY
End: 2021-01-27

## 2019-04-14 RX ORDER — 0.9 % SODIUM CHLORIDE 0.9 %
500 INTRAVENOUS SOLUTION INTRAVENOUS ONCE
Status: COMPLETED | OUTPATIENT
Start: 2019-04-14 | End: 2019-04-14

## 2019-04-14 RX ORDER — CEPHALEXIN 500 MG/1
500 CAPSULE ORAL 2 TIMES DAILY
Qty: 14 CAPSULE | Refills: 0 | Status: SHIPPED | OUTPATIENT
Start: 2019-04-14 | End: 2019-04-21

## 2019-04-14 RX ADMIN — SODIUM CHLORIDE 500 ML: 9 INJECTION, SOLUTION INTRAVENOUS at 07:14

## 2019-04-14 ASSESSMENT — ENCOUNTER SYMPTOMS
EYE DISCHARGE: 0
ABDOMINAL PAIN: 0
ABDOMINAL DISTENTION: 0
STRIDOR: 0
SHORTNESS OF BREATH: 0
SORE THROAT: 0
APNEA: 0
COLOR CHANGE: 0
NAUSEA: 0
RHINORRHEA: 0
COUGH: 0
EYE PAIN: 0
WHEEZING: 0
PHOTOPHOBIA: 0
BACK PAIN: 0

## 2019-04-14 ASSESSMENT — PAIN DESCRIPTION - LOCATION: LOCATION: ABDOMEN

## 2019-04-14 ASSESSMENT — PAIN DESCRIPTION - ORIENTATION: ORIENTATION: LOWER

## 2019-04-14 ASSESSMENT — PAIN SCALES - GENERAL: PAINLEVEL_OUTOF10: 6

## 2019-04-14 ASSESSMENT — PAIN DESCRIPTION - DESCRIPTORS: DESCRIPTORS: PRESSURE

## 2019-04-14 NOTE — ED NOTES
At bedside with [facility] for vaginal exam     Ren Mercedes RN  04/14/19 [id number]

## 2019-04-14 NOTE — ED PROVIDER NOTES
[facility] [place] - [facility] EMERGENCY DEPT  eMERGENCYdEPARTMENT eNCOUnter      Pt Name: Radha Marie  MRN: [id number]  [place] 1945  Date of evaluation: 4/14/2019  Provider:CORINA Burton    CHIEF COMPLAINT       Chief Complaint   Patient presents with    Hematuria         HISTORY OF PRESENT ILLNESS  (Location/Symptom, Timing/Onset, Context/Setting, Quality, Duration, Modifying Factors, Severity.)   Radha Marie is a 74 y.o. female who presents to the emergency department with complaints of acute onset of hematuria that is gross. She denies burning at first but it has started she states this is common symptom with UTI she has had in past. Denies abdominal pain. No jury and acute onset. Followed by Phyllis Marcus had stint placed in 2015 normal sinus rhythm today. She is still on plavix. Denies abnormal stool activity. She has scheduled egd and colonoscopy. [facility]    Nursing Notes were reviewed and I agree. REVIEW OF SYSTEMS    (2-9 systems for level 4, 10 or more for level 5)     Review of Systems   Constitutional: Negative for activity change, appetite change, chills and fever. HENT: Negative for congestion, postnasal drip, rhinorrhea and sore throat. Eyes: Negative for photophobia, pain, discharge and visual disturbance. Respiratory: Negative for apnea, cough, shortness of breath, wheezing and stridor. Cardiovascular: Negative for chest pain, palpitations and leg swelling. Gastrointestinal: Negative for abdominal distention, abdominal pain and nausea. Genitourinary: Positive for dysuria and hematuria. Negative for vaginal bleeding and vaginal discharge. Musculoskeletal: Negative for arthralgias, back pain, joint swelling, neck pain and neck stiffness. Skin: Negative for color change and rash. Neurological: Negative for dizziness, syncope, facial asymmetry and headaches. Hematological: Negative for adenopathy. Does not bruise/bleed easily.    Psychiatric/Behavioral: Negative for agitation, behavioral problems and confusion. Except as noted above the remainder of the review of systems was reviewed and negative. PAST MEDICAL HISTORY     Past Medical History:   Diagnosis Date    Acid reflux     Anxiety     Arthritis     Back pain     CAD (coronary artery disease)     CAD (coronary artery disease)     Carotid artery occlusion     Chronic venous insufficiency 2012    Cough     Fibromyalgia     Goiter     Gout     Head ache     headaches    Heart murmur     History of colon polyps     History of colon polyps     Hoarseness     Hypercholesterolemia     Hyperlipidemia     Hypertension     Insomnia     Irregular heart beat     Itching     Muscle cramp     Neck pain     Osteoarthritis     Osteoporosis     Palpitations     RA (rheumatoid arthritis) (HCC)     Rash     Rectal bleeding     SOB (shortness of breath)     Sore throat     Tympanic membrane perforation     Varicose veins 2012         SURGICAL HISTORY       Past Surgical History:   Procedure Laterality Date    APPENDECTOMY      BREAST SURGERY      CARDIAC CATHETERIZATION  9/10/15  JDT    EF 50%    CERVICAL FUSION       SECTION  1968    x 1    CHOLECYSTECTOMY  2012    [place]    COLONOSCOPY  1-    Dr LUJAN    COLONOSCOPY  2014    Dr. Maty Sims      Dr Lauren Andrews    [facility]  2011    left knee surgery    PTCA      stent    TUBAL LIGATION      UPPER GASTROINTESTINAL ENDOSCOPY  ?     UPPER GASTROINTESTINAL ENDOSCOPY  1-    Dr LUJAN    UPPER GASTROINTESTINAL ENDOSCOPY  2014    Dr. Dmitriy Molina   2013 [facility]    Left Leg Ablation    VARICOSE VEIN SURGERY  3-  [facility]    right leg ablation         CURRENT MEDICATIONS       Discharge Medication List as of 2019  9:19 AM      CONTINUE these medications which have NOT CHANGED    Details   Fexofenadine HCl (ALLEGRA ALLERGY PO) Take by mouthHistorical Med      pantoprazole (PROTONIX) 40 MG tablet Take 40 mg by mouth dailyHistorical Med      metoprolol succinate (TOPROL XL) 50 MG extended release tablet Take 1 tablet by mouth 2 times daily, Disp-180 tablet, R-3Normal      calcium carbonate 600 MG TABS tablet Take 1 tablet by mouth 2 times dailyHistorical Med      vitamin D (CHOLECALCIFEROL) 1000 UNIT TABS tablet Take 1,000 Units by mouth Take 3 tablets dailyHistorical Med      BENICAR HCT 40-25 MG per tablet Take 0.5 tablets by mouth 2 times daily Historical Med      clopidogrel (PLAVIX) 75 MG tablet Take 75 mg by mouth daily. allopurinol (ZYLOPRIM) 300 MG tablet Take 300 mg by mouth daily. aspirin 81 MG EC tablet Take 81 mg by mouth daily. Multiple Vitamin (MULTIVITAMIN PO) Take 1 tablet by mouth daily       ondansetron (ZOFRAN ODT) 4 MG disintegrating tablet Take 1 tablet by mouth every 8 hours as needed for Nausea or Vomiting, Disp-15 tablet, R-0Print      sucralfate (CARAFATE) 1 GM tablet Take 1 tablet by mouth 4 times daily, Disp-120 tablet, R-3Print      Evolocumab (REPATHA) 140 MG/ML SOSY Inject 140 mg into the skin every 14 days Patient has not startedHistorical Med      loratadine (CLARITIN) 10 MG tablet Take 10 mg by mouth daily. ALLERGIES     Reclast [zoledronic acid]; Celexa [citalopram hydrobromide]; Demerol; Dye [barium-containing compounds]; Dye [iodides]; Hydrocodone-acetaminophen; Lasix [furosemide]; Neurontin [gabapentin]; Nitroglycerin; Plaquenil [hydroxychloroquine sulfate]; Cozaar [losartan potassium]; Esomeprazole magnesium;  Lisinopril; and Prilosec [omeprazole]    FAMILY HISTORY       Family History   Problem Relation Age of Onset    Diabetes Mother     Heart Disease Mother     Heart Disease Father     Diabetes Father     High Blood Pressure Father     Esophageal Cancer Father     Colon Polyps Father     Crohn's Disease Brother     Colon Cancer Neg Hx     Liver Cancer Neg Hx     Liver Disease Neg Hx     Rectal Cancer Neg Hx     Stomach Cancer Neg Hx           SOCIAL HISTORY       Social History     Socioeconomic History    Marital status:      Spouse name: None    Number of children: None    Years of education: None    Highest education level: None   Occupational History    None   Social Needs    Financial resource strain: None    Food insecurity:     Worry: None     Inability: None    Transportation needs:     Medical: None     Non-medical: None   Tobacco Use    Smoking status: Never Smoker    Smokeless tobacco: Never Used   Substance and Sexual Activity    Alcohol use: No    Drug use: No    Sexual activity: None   Lifestyle    Physical activity:     Days per week: None     Minutes per session: None    Stress: None   Relationships    Social connections:     Talks on phone: None     Gets together: None     Attends Mandaen service: None     Active member of club or organization: None     Attends meetings of clubs or organizations: None     Relationship status: None    Intimate partner violence:     Fear of current or ex partner: None     Emotionally abused: None     Physically abused: None     Forced sexual activity: None   Other Topics Concern    None   Social History Narrative    None       SCREENINGS    [place] Coma Scale  Eye Opening: Spontaneous  Best Verbal Response: Oriented  Best Motor Response: Obeys commands  [place] Coma Scale Score: 15      PHYSICAL EXAM    (up to 7 forlevel 4, 8 or more for level 5)     ED Triage Vitals   BP Temp Temp Source Pulse Resp SpO2 Height Weight   04/14/19 0631 04/14/19 0631 04/14/19 0925 04/14/19 0631 04/14/19 0631 04/14/19 0631 04/14/19 0631 04/14/19 0631   (!) 156/74 97.6 °F (36.4 °C) Oral 86 20 95 % 5' 7\" (1.702 m) 180 lb (81.6 kg)       Physical Exam   Constitutional: She is oriented to person, place, and time.  She appears well-developed and well-nourished. No distress. HENT:   Head: Normocephalic and atraumatic. Right Ear: External ear normal.   Left Ear: External ear normal.   Nose: Nose normal.   Mouth/Throat: Oropharynx is clear and moist. No oropharyngeal exudate. Eyes: Pupils are equal, round, and reactive to light. Conjunctivae and EOM are normal. Right eye exhibits no discharge. Left eye exhibits no discharge. Neck: Normal range of motion. Neck supple. No thyromegaly present. Cardiovascular: Normal rate, regular rhythm, normal heart sounds and intact distal pulses. Exam reveals no friction rub. No murmur heard. Pulmonary/Chest: Effort normal and breath sounds normal. No stridor. No respiratory distress. She has no wheezes. Abdominal: Soft. Bowel sounds are normal. She exhibits no distension. There is no tenderness. Genitourinary: Vagina normal. No vaginal discharge found. Musculoskeletal: Normal range of motion. She exhibits no edema. Neurological: She is alert and oriented to person, place, and time. She displays normal reflexes. No cranial nerve deficit or sensory deficit. She exhibits normal muscle tone. Coordination normal.   Skin: Skin is warm and dry. Capillary refill takes less than 2 seconds. No rash noted. She is not diaphoretic. Psychiatric: She has a normal mood and affect. Her behavior is normal. Judgment and thought content normal.   Nursing note and vitals reviewed. DIAGNOSTIC RESULTS     RADIOLOGY:   Non-plain film images such as CT, Ultrasound and MRI are read by the radiologist. Plain radiographic images are visualized and preliminarilyinterpreted by No att. providers found with the below findings:    Interpretation per the Radiologist below, if available at the time of this note:    CT ABDOMEN PELVIS WO CONTRAST Additional Contrast? None   Final Result   1. Moderate wall thickening of the bladder with associated fat   stranding is most concerning for cystitis.    2.  Otherwise, no acute abdominal or pelvic abnormalities. Signed by Dr Maik Zavala on 4/14/2019 8:06 AM          LABS:  Labs Reviewed   URINE RT REFLEX TO CULTURE - Abnormal; Notable for the following components:       Result Value    Color, UA RED (*)     Clarity, UA TURBID (*)     Blood, Urine LARGE (*)     Protein,  (*)     Nitrite, Urine POSITIVE (*)     Leukocyte Esterase, Urine LARGE (*)     All other components within normal limits   MICROSCOPIC URINALYSIS - Abnormal; Notable for the following components:    WBC, UA TNTC (*)     RBC, UA TNTC (*)     All other components within normal limits   CBC WITH AUTO DIFFERENTIAL - Abnormal; Notable for the following components:    RBC 4.18 (*)     MCV 99.3 (*)     MCH 32.5 (*)     MCHC 32.8 (*)     MPV 9.0 (*)     Neutrophils % 70.0 (*)     All other components within normal limits   COMPREHENSIVE METABOLIC PANEL - Abnormal; Notable for the following components:    Glucose 112 (*)     BUN 24 (*)     All other components within normal limits   URINE CULTURE       All other labs were within normal range or notreturned as of this dictation. RE-ASSESSMENT        EMERGENCY DEPARTMENT COURSE and DIFFERENTIAL DIAGNOSIS/MDM:   Vitals:    Vitals:    04/14/19 0631 04/14/19 0732 04/14/19 0925   BP: (!) 156/74 (!) 157/70 139/71   Pulse: 86  84   Resp: 20  14   Temp: 97.6 °F (36.4 °C)  97.8 °F (36.6 °C)   TempSrc:   Oral   SpO2: 95%  98%   Weight: 180 lb (81.6 kg)     Height: 5' 7\" (1.702 m)         MDM  Number of Diagnoses or Management Options  Cystitis with hematuria:   Diagnosis management comments: Plan to hold the Plavix it's been far out now on Dr. Xiomy Reyes agrees she doesn't need to take it any longer. I spoke with him on the phone. We will cover her UTI and she will follow up. Plan for discharge. PROCEDURES:    Procedures      FINAL IMPRESSION      1.  Cystitis with hematuria          DISPOSITION/PLAN   DISPOSITION Decision To Discharge 04/14/2019 09:18:44

## 2019-04-16 LAB
ORGANISM: ABNORMAL
URINE CULTURE, ROUTINE: ABNORMAL
URINE CULTURE, ROUTINE: ABNORMAL

## 2019-04-17 ENCOUNTER — TELEPHONE (OUTPATIENT)
Dept: CARDIOLOGY | Age: 74
End: 2019-04-17

## 2019-04-17 NOTE — TELEPHONE ENCOUNTER
left msg on pt phone in regards to provider being out of office. If pt calls back pt can be scheduled with a nurse practitioner for a different day.

## 2019-04-18 ENCOUNTER — TELEPHONE (OUTPATIENT)
Dept: CARDIOLOGY | Age: 74
End: 2019-04-18

## 2019-04-18 NOTE — TELEPHONE ENCOUNTER
Pt  called back stating he remembered who stopped the plavix. Pt  states when they were in the ER  Ashlee Fee told them he had spoke with Dr. Alexandra Wynn and Dr. Alexandra Wynn stated pt could stop plavix.  is worried now that pt can't ASA that she should be on something.

## 2019-04-18 NOTE — TELEPHONE ENCOUNTER
I think we need to give her UTI/hematuria time to resolve and we can possibly restart Plavix in the future since she is allergic to aspirin. Have  her make an appointment in about a month to discuss and we can see how she is doing at that time.

## 2019-04-18 NOTE — TELEPHONE ENCOUNTER
Pt  called stating someone from here called and stopped pt Plavix and he wants to know why. I don't see where anyone here stopped the Plavix. Pt did just have sx and wondering if pt was to stop Plavix due to that and the restart after surgery pt can't find anything on any of this. Pt had UTI and was ER 4-14-19 Pls Advise. Would you like pt to be taking Plavix or should pt hold it. Pt has been found to be allergic to ASA within last week. Just KATE.

## 2019-04-22 NOTE — TELEPHONE ENCOUNTER
Spoke with patients  and advised him that Izaiah Maria would like for pt to follow up in one month. He voiced understanding and appt was scheduled.

## 2019-05-07 ENCOUNTER — HOSPITAL ENCOUNTER (OUTPATIENT)
Dept: GENERAL RADIOLOGY | Facility: HOSPITAL | Age: 74
Discharge: HOME | End: 2019-05-07
Admitting: ALLERGY & IMMUNOLOGY

## 2019-05-07 ENCOUNTER — TRANSCRIBE ORDERS (OUTPATIENT)
Dept: ADMINISTRATIVE | Facility: HOSPITAL | Age: 74
End: 2019-05-07

## 2019-05-07 DIAGNOSIS — R94.2 ABNORMAL FINDING ON PULMONARY FUNCTION TESTING: ICD-10-CM

## 2019-05-07 DIAGNOSIS — R05.9 COUGH: Primary | ICD-10-CM

## 2019-05-07 DIAGNOSIS — R05.9 COUGH: ICD-10-CM

## 2019-05-07 PROCEDURE — 71046 X-RAY EXAM CHEST 2 VIEWS: CPT

## 2019-05-11 ENCOUNTER — OFFICE VISIT (OUTPATIENT)
Dept: URGENT CARE | Age: 74
End: 2019-05-11
Payer: MEDICARE

## 2019-05-11 VITALS
DIASTOLIC BLOOD PRESSURE: 70 MMHG | SYSTOLIC BLOOD PRESSURE: 118 MMHG | BODY MASS INDEX: 29.6 KG/M2 | OXYGEN SATURATION: 98 % | RESPIRATION RATE: 18 BRPM | WEIGHT: 189 LBS | HEART RATE: 80 BPM | TEMPERATURE: 98.6 F

## 2019-05-11 DIAGNOSIS — R35.0 URINARY FREQUENCY: Primary | ICD-10-CM

## 2019-05-11 DIAGNOSIS — N30.01 ACUTE CYSTITIS WITH HEMATURIA: ICD-10-CM

## 2019-05-11 LAB
APPEARANCE FLUID: ABNORMAL
BILIRUBIN, POC: NEGATIVE
BLOOD URINE, POC: ABNORMAL
CLARITY, POC: ABNORMAL
COLOR, POC: ABNORMAL
GLUCOSE URINE, POC: NEGATIVE
KETONES, POC: NEGATIVE
LEUKOCYTE EST, POC: ABNORMAL
NITRITE, POC: NEGATIVE
PH, POC: 7
PROTEIN, POC: ABNORMAL
SPECIFIC GRAVITY, POC: 1.01
UROBILINOGEN, POC: ABNORMAL

## 2019-05-11 PROCEDURE — G8598 ASA/ANTIPLAT THER USED: HCPCS | Performed by: NURSE PRACTITIONER

## 2019-05-11 PROCEDURE — 1036F TOBACCO NON-USER: CPT | Performed by: NURSE PRACTITIONER

## 2019-05-11 PROCEDURE — G8400 PT W/DXA NO RESULTS DOC: HCPCS | Performed by: NURSE PRACTITIONER

## 2019-05-11 PROCEDURE — 1123F ACP DISCUSS/DSCN MKR DOCD: CPT | Performed by: NURSE PRACTITIONER

## 2019-05-11 PROCEDURE — 3017F COLORECTAL CA SCREEN DOC REV: CPT | Performed by: NURSE PRACTITIONER

## 2019-05-11 PROCEDURE — 81002 URINALYSIS NONAUTO W/O SCOPE: CPT | Performed by: NURSE PRACTITIONER

## 2019-05-11 PROCEDURE — 99213 OFFICE O/P EST LOW 20 MIN: CPT | Performed by: NURSE PRACTITIONER

## 2019-05-11 PROCEDURE — G8417 CALC BMI ABV UP PARAM F/U: HCPCS | Performed by: NURSE PRACTITIONER

## 2019-05-11 PROCEDURE — 4040F PNEUMOC VAC/ADMIN/RCVD: CPT | Performed by: NURSE PRACTITIONER

## 2019-05-11 PROCEDURE — 1090F PRES/ABSN URINE INCON ASSESS: CPT | Performed by: NURSE PRACTITIONER

## 2019-05-11 PROCEDURE — G8428 CUR MEDS NOT DOCUMENT: HCPCS | Performed by: NURSE PRACTITIONER

## 2019-05-11 RX ORDER — FLUOROMETHOLONE 0.1 %
1 SUSPENSION, DROPS(FINAL DOSAGE FORM)(ML) OPHTHALMIC (EYE) 2 TIMES DAILY
Status: ON HOLD | COMMUNITY
End: 2020-09-10 | Stop reason: ALTCHOICE

## 2019-05-11 RX ORDER — CEPHALEXIN 500 MG/1
500 CAPSULE ORAL 4 TIMES DAILY
Qty: 40 CAPSULE | Refills: 0 | Status: SHIPPED | OUTPATIENT
Start: 2019-05-11 | End: 2019-06-25

## 2019-05-11 RX ORDER — AZELASTINE 1 MG/ML
1 SPRAY, METERED NASAL 2 TIMES DAILY
Status: ON HOLD | COMMUNITY
End: 2020-09-10 | Stop reason: ALTCHOICE

## 2019-05-11 ASSESSMENT — ENCOUNTER SYMPTOMS
COLOR CHANGE: 0
COUGH: 0
NAUSEA: 0
VOMITING: 0
RHINORRHEA: 0
PHOTOPHOBIA: 0
VOICE CHANGE: 0
SHORTNESS OF BREATH: 0
BACK PAIN: 0

## 2019-05-11 NOTE — PROGRESS NOTES
[address] [facility] E CARE  [address] [facility] [place] [address]  Dept: [phone]  Loc: [phone]    Malena Siddiqui is a 74 y.o. female who presents today for her medical conditions/complaints as noted below. Malena Siddiqui is c/o of Urinary Frequency and Hematuria        HPI:     HPI   Chief Complaint   Patient presents with    Urinary Frequency    Hematuria     Patient presents today for evaluation of urinary frequency and hematuria. She states her symptoms began at 3:30 this morning. She had a UTI 1 month ago and was seen in ED; she states symptoms resolved but have now returned. She denies fever. She has bilateral lower back pain. No history of nephrolithiasis.     Past Medical History:   Diagnosis Date    Acid reflux     Anxiety     Arthritis     Back pain     CAD (coronary artery disease)     CAD (coronary artery disease)     Carotid artery occlusion     Chronic venous insufficiency 2012    Cough     Fibromyalgia     Goiter     Gout     Head ache     headaches    Heart murmur     History of colon polyps     History of colon polyps     Hoarseness     Hypercholesterolemia     Hyperlipidemia     Hypertension     Insomnia     Irregular heart beat     Itching     Muscle cramp     Neck pain     Osteoarthritis     Osteoporosis     Palpitations     RA (rheumatoid arthritis) (HCC)     Rash     Rectal bleeding     SOB (shortness of breath)     Sore throat     Tympanic membrane perforation     Varicose veins 2012      Past Surgical History:   Procedure Laterality Date    APPENDECTOMY      BREAST SURGERY  2002    CARDIAC CATHETERIZATION  9/10/15  JDT    EF 50%    CERVICAL FUSION       SECTION  1968    x 1    CHOLECYSTECTOMY  2012    COLONOSCOPY      [place]    COLONOSCOPY  1-    Dr LUJAN    COLONOSCOPY  2014    Dr. Dawn Simon      Dr Samson Ashford   [facility] REMOVAL      DILATION AND CURETTAGE OF UTERUS      HYSTERECTOMY  1976    partial    KNEE SURGERY  2011    left knee surgery    PTCA      stent    TUBAL LIGATION      UPPER GASTROINTESTINAL ENDOSCOPY  2009?     UPPER GASTROINTESTINAL ENDOSCOPY  1-    Dr LUJAN    UPPER GASTROINTESTINAL ENDOSCOPY  05/06/2014    Dr. Florence Zendejas   01- [facility]    Left Leg Ablation    VARICOSE VEIN SURGERY  3-  [facility]    right leg ablation       Vitals 5/11/2019 4/14/2019 4/14/2019 4/14/2019 4/14/2019 3/59/2421   SYSTOLIC 335 516 357 - 319 219   DIASTOLIC 70 71 70 - 74 58   Pulse 80 84 - - 86 80   Temp 98.6 97.8 - - 97.6 -   Resp 18 14 - - 20 16   SpO2 98 98 - - 95 95   Weight 189 lb - - - 180 lb -   Height - - - - 5' 7\" -   BMI (wt*703/ht~2) - - - - 28.19 kg/m2 -   Pain Level - - - 6 - -   Some recent data might be hidden       Family History   Problem Relation Age of Onset    Diabetes Mother     Heart Disease Mother     Heart Disease Father     Diabetes Father     High Blood Pressure Father     Esophageal Cancer Father     Colon Polyps Father     Crohn's Disease Brother     Colon Cancer Neg Hx     Liver Cancer Neg Hx     Liver Disease Neg Hx     Rectal Cancer Neg Hx     Stomach Cancer Neg Hx        Social History     Tobacco Use    Smoking status: Never Smoker    Smokeless tobacco: Never Used   Substance Use Topics    Alcohol use: No      Current Outpatient Medications   Medication Sig Dispense Refill    azelastine (ASTELIN) 0.1 % nasal spray 1 spray by Nasal route 2 times daily Use in each nostril as directed      fluorometholone (FML) 0.1 % ophthalmic suspension 1 drop 2 times daily      cephALEXin (KEFLEX) 500 MG capsule Take 1 capsule by mouth 4 times daily 40 capsule 0    Fexofenadine HCl (ALLEGRA ALLERGY PO) Take by mouth      pantoprazole (PROTONIX) 40 MG tablet Take 40 mg by mouth daily      ondansetron (ZOFRAN ODT) 4 MG disintegrating tablet Take 1 tablet by mouth Health Maintenance   Topic Date Due    DTaP/Tdap/Td vaccine (1 - Tdap) 05/25/1962    DEXA (modify frequency per FRAX score)  05/25/2008    Shingles Vaccine (1 of 2) 03/16/2012    Pneumococcal 65+ years Vaccine (2 of 2 - PPSV23) 04/10/2017    Colon cancer screen colonoscopy  05/06/2019    Flu vaccine (Season Ended) 09/01/2019    Potassium monitoring  04/14/2020    Creatinine monitoring  04/14/2020    Lipid screen  07/24/2023       Subjective:      Review of Systems   Constitutional: Negative for chills and fever. HENT: Negative for ear pain, hearing loss, rhinorrhea and voice change. Eyes: Negative for photophobia and visual disturbance. Respiratory: Negative for cough and shortness of breath. Cardiovascular: Negative for chest pain and palpitations. Gastrointestinal: Negative for nausea and vomiting. Endocrine: Negative. Negative for cold intolerance and heat intolerance. Genitourinary: Positive for frequency and hematuria. Negative for difficulty urinating and flank pain. Musculoskeletal: Negative for back pain and neck pain. Skin: Negative for color change and rash. Allergic/Immunologic: Negative for environmental allergies and food allergies. Neurological: Negative for dizziness, speech difficulty and headaches. Hematological: Does not bruise/bleed easily. Psychiatric/Behavioral: Negative for sleep disturbance and suicidal ideas. Objective:     Physical Exam   Constitutional: She is oriented to person, place, and time. She appears well-developed and well-nourished. HENT:   Head: Atraumatic. Right Ear: External ear normal.   Left Ear: External ear normal.   Nose: Nose normal.   Mouth/Throat: Oropharynx is clear and moist.   Eyes: Pupils are equal, round, and reactive to light. Conjunctivae are normal.   Neck: Normal range of motion. Neck supple. Cardiovascular: Normal rate, regular rhythm, S1 normal, S2 normal and normal heart sounds.    Pulmonary/Chest: Effort normal and breath sounds normal.   Abdominal: Soft. Bowel sounds are normal.   Musculoskeletal: Normal range of motion. Neurological: She is alert and oriented to person, place, and time. Skin: Skin is warm and dry. Psychiatric: She has a normal mood and affect. Her behavior is normal.   Nursing note and vitals reviewed. /70   Pulse 80   Temp 98.6 °F (37 °C)   Resp 18   Wt 189 lb (85.7 kg)   SpO2 98%   BMI 29.60 kg/m²     Assessment:       Diagnosis Orders   1. Urinary frequency  Urine Culture    POCT Urinalysis no Micro   2. Acute cystitis with hematuria       Results for orders placed or performed in visit on 05/11/19   POCT Urinalysis no Micro   Result Value Ref Range    Color, UA red     Clarity, UA turbid     Glucose, UA POC negative     Bilirubin, UA negative     Ketones, UA negative     Spec Grav, UA 1.015     Blood, UA POC large (A)     pH, UA 7.0     Protein, UA  mg/dl (A)     Urobilinogen, UA 0.2 e.u./dl     Leukocytes, UA moderate (A)     Nitrite, UA negative     Appearance, Fluid  Clear, Slightly Cloudy       Plan:     1. Take antibiotics as directed. 2. Follow-up with urology. 3. Return if symptoms worsen or fail to improve. Patient given educational materials -see patient instructions. Discussed use, benefit, and side effects of prescribed medications. All patient questions answered. Pt voiced understanding. Reviewed health maintenance. Instructed to continue currentmedications, diet and exercise. Patient agreed with treatment plan. Follow up as directed. MEDICATIONS:  Orders Placed This Encounter   Medications    cephALEXin (KEFLEX) 500 MG capsule     Sig: Take 1 capsule by mouth 4 times daily     Dispense:  40 capsule     Refill:  0         ORDERS:  Orders Placed This Encounter   Procedures    Urine Culture    POCT Urinalysis no Micro       Follow-up:  No follow-ups on file. PATIENT INSTRUCTIONS:  Patient Instructions     1.  Take antibiotics as directed. 2. Follow-up with urology. 3. Return if symptoms worsen or fail to improve. Patient Education        Urinary Tract Infection in Women: Care Instructions  Your Care Instructions    A urinary tract infection, or UTI, is a general term for an infection anywhere between the kidneys and the urethra (where urine comes out). Most UTIs are bladder infections. They often cause pain or burning when you urinate. UTIs are caused by bacteria and can be cured with antibiotics. Be sure to complete your treatment so that the infection goes away. Follow-up care is a key part of your treatment and safety. Be sure to make and go to all appointments, and call your doctor if you are having problems. It's also a good idea to know your test results and keep a list of the medicines you take. How can you care for yourself at home? · Take your antibiotics as directed. Do not stop taking them just because you feel better. You need to take the full course of antibiotics. · Drink extra water and other fluids for the next day or two. This may help wash out the bacteria that are causing the infection. (If you have kidney, heart, or liver disease and have to limit fluids, talk with your doctor before you increase your fluid intake.)  · Avoid drinks that are carbonated or have caffeine. They can irritate the bladder. · Urinate often. Try to empty your bladder each time. · To relieve pain, take a hot bath or lay a heating pad set on low over your lower belly or genital area. Never go to sleep with a heating pad in place. To prevent UTIs  · Drink plenty of water each day. This helps you urinate often, which clears bacteria from your system. (If you have kidney, heart, or liver disease and have to limit fluids, talk with your doctor before you increase your fluid intake.)  · Urinate when you need to. · Urinate right after you have sex. · Change sanitary pads often.   · Avoid douches, bubble baths, feminine hygiene sprays, and

## 2019-05-11 NOTE — PATIENT INSTRUCTIONS
1. Take antibiotics as directed. 2. Follow-up with urology. 3. Return if symptoms worsen or fail to improve. Patient Education        Urinary Tract Infection in Women: Care Instructions  Your Care Instructions    A urinary tract infection, or UTI, is a general term for an infection anywhere between the kidneys and the urethra (where urine comes out). Most UTIs are bladder infections. They often cause pain or burning when you urinate. UTIs are caused by bacteria and can be cured with antibiotics. Be sure to complete your treatment so that the infection goes away. Follow-up care is a key part of your treatment and safety. Be sure to make and go to all appointments, and call your doctor if you are having problems. It's also a good idea to know your test results and keep a list of the medicines you take. How can you care for yourself at home? · Take your antibiotics as directed. Do not stop taking them just because you feel better. You need to take the full course of antibiotics. · Drink extra water and other fluids for the next day or two. This may help wash out the bacteria that are causing the infection. (If you have kidney, heart, or liver disease and have to limit fluids, talk with your doctor before you increase your fluid intake.)  · Avoid drinks that are carbonated or have caffeine. They can irritate the bladder. · Urinate often. Try to empty your bladder each time. · To relieve pain, take a hot bath or lay a heating pad set on low over your lower belly or genital area. Never go to sleep with a heating pad in place. To prevent UTIs  · Drink plenty of water each day. This helps you urinate often, which clears bacteria from your system. (If you have kidney, heart, or liver disease and have to limit fluids, talk with your doctor before you increase your fluid intake.)  · Urinate when you need to. · Urinate right after you have sex. · Change sanitary pads often.   · Avoid douches, bubble baths, feminine hygiene sprays, and other feminine hygiene products that have deodorants. · After going to the bathroom, wipe from front to back. When should you call for help? Call your doctor now or seek immediate medical care if:    · Symptoms such as fever, chills, nausea, or vomiting get worse or appear for the first time.     · You have new pain in your back just below your rib cage. This is called flank pain.     · There is new blood or pus in your urine.     · You have any problems with your antibiotic medicine.    Watch closely for changes in your health, and be sure to contact your doctor if:    · You are not getting better after taking an antibiotic for 2 days.     · Your symptoms go away but then come back. Where can you learn more? Go to https://[email or web address] LabspeAmity.[email or web address]. org and sign in to your [employer] account. Enter X738 in the Peak 10 box to learn more about \"Urinary Tract Infection in Women: Care Instructions. \"     If you do not have an account, please click on the \"Sign Up Now\" link. Current as of: December 19, 2018  Content Version: 12.0  © 3751-8163 [employer], Inc[employer]. Care instructions adapted under license by [facility] ([facility]). If you have questions about a medical condition or this instruction, always ask your healthcare professional. [address] any warranty or liability for your use of this information.

## 2019-05-13 LAB — URINE CULTURE, ROUTINE: NORMAL

## 2019-05-16 ENCOUNTER — OFFICE VISIT (OUTPATIENT)
Dept: CARDIOLOGY | Age: 74
End: 2019-05-16
Payer: MEDICARE

## 2019-05-16 VITALS
HEIGHT: 67 IN | BODY MASS INDEX: 29.66 KG/M2 | SYSTOLIC BLOOD PRESSURE: 116 MMHG | DIASTOLIC BLOOD PRESSURE: 70 MMHG | WEIGHT: 189 LBS | HEART RATE: 64 BPM

## 2019-05-16 DIAGNOSIS — I25.10 CORONARY ARTERY DISEASE INVOLVING NATIVE CORONARY ARTERY OF NATIVE HEART WITHOUT ANGINA PECTORIS: Primary | ICD-10-CM

## 2019-05-16 DIAGNOSIS — E78.2 MIXED HYPERLIPIDEMIA: ICD-10-CM

## 2019-05-16 DIAGNOSIS — I10 ESSENTIAL HYPERTENSION: ICD-10-CM

## 2019-05-16 DIAGNOSIS — N30.01 ACUTE CYSTITIS WITH HEMATURIA: ICD-10-CM

## 2019-05-16 PROBLEM — R31.9 URINARY TRACT INFECTION WITH HEMATURIA: Status: ACTIVE | Noted: 2019-05-16

## 2019-05-16 PROBLEM — N39.0 URINARY TRACT INFECTION WITH HEMATURIA: Status: ACTIVE | Noted: 2019-05-16

## 2019-05-16 PROCEDURE — G8400 PT W/DXA NO RESULTS DOC: HCPCS | Performed by: CLINICAL NURSE SPECIALIST

## 2019-05-16 PROCEDURE — G8427 DOCREV CUR MEDS BY ELIG CLIN: HCPCS | Performed by: CLINICAL NURSE SPECIALIST

## 2019-05-16 PROCEDURE — 99213 OFFICE O/P EST LOW 20 MIN: CPT | Performed by: CLINICAL NURSE SPECIALIST

## 2019-05-16 PROCEDURE — 4040F PNEUMOC VAC/ADMIN/RCVD: CPT | Performed by: CLINICAL NURSE SPECIALIST

## 2019-05-16 PROCEDURE — 1036F TOBACCO NON-USER: CPT | Performed by: CLINICAL NURSE SPECIALIST

## 2019-05-16 PROCEDURE — 1090F PRES/ABSN URINE INCON ASSESS: CPT | Performed by: CLINICAL NURSE SPECIALIST

## 2019-05-16 PROCEDURE — 3017F COLORECTAL CA SCREEN DOC REV: CPT | Performed by: CLINICAL NURSE SPECIALIST

## 2019-05-16 PROCEDURE — G8417 CALC BMI ABV UP PARAM F/U: HCPCS | Performed by: CLINICAL NURSE SPECIALIST

## 2019-05-16 PROCEDURE — 1123F ACP DISCUSS/DSCN MKR DOCD: CPT | Performed by: CLINICAL NURSE SPECIALIST

## 2019-05-16 PROCEDURE — G8598 ASA/ANTIPLAT THER USED: HCPCS | Performed by: CLINICAL NURSE SPECIALIST

## 2019-05-16 ASSESSMENT — ENCOUNTER SYMPTOMS
NAUSEA: 0
HEARTBURN: 0
SHORTNESS OF BREATH: 0
BLURRED VISION: 0
ORTHOPNEA: 0
COUGH: 0
VOMITING: 0

## 2019-05-16 NOTE — PATIENT INSTRUCTIONS
Followup With Dr. Chelsea Benson Aspirin 81mg daily- enteric coated  May stop Plavix  Restart your Repatha    Call with any questions or concerns  Follow up with Rochelle De Anda MD for non cardiac problems  Report any new problems  Cardiovascular Fitness-Exercise as tolerated. Strive for 15 minutes of exercise most days of the week. Cardiac / Healthy Diet  Continue current medications as directed  Continue plan of treatment  It is always recommended that you bring your medications bottles with you to each visit - this is for your safety!

## 2019-05-16 NOTE — PROGRESS NOTES
[facility], [address] [place] [address], [phone]  Phone: [phone]  Fax: [phone]    OFFICE VISIT:  2019    Emma Ortiz - : 1945    Reason For Visit:  Pam Black is a 74 y.o. female who is here for follow-up for CAD    HPI   Patient is here for follow-up for CAD. A heart cath in  showed mild, nonocclusive CAD and she previously had angioplasty and stent in . She has recently had problems with UTI and hematuria and was in the ER last month. She was told to stop her Plavix temporarily. She also has issues with significant GERD and was told to stop her aspirin temporarily. Her gastroenterologist has told her she may restart the aspirin, but it must be enteric-coated and she should take it with food. . She denies any chest pain, unusual dyspnea, orthopnea, PND, palpitations. She has mild lower extremity edema which is chronic and unchanged      Tyrell Pinzon MD is PCP.   Emma Ortiz has the following history as recorded in [facility]:    Patient Active Problem List    Diagnosis Date Noted    Urinary tract infection with hematuria 2019    Syncope and collapse 2018    Acute chest pain 2018    Chest pain 2018    Overactive bladder 2017    Mixed hyperlipidemia 2017    H/O right coronary artery stent placement     Periumbilical abdominal pain 2016    Irritable bowel syndrome with diarrhea 06/10/2016    Internal hemorrhoids 06/10/2016    Precordial pain 2015    CAMPOVERDE (dyspnea on exertion) 2015    Palpitations 2015    Bleeding internal hemorrhoids 07/15/2014    Heartburn 2014    Internal hemorrhoids without complication     Spider veins 2013    Carotid artery stenosis 2012    Leg pain 2012    Leg swelling 2012    Chronic venous insufficiency 2012    Varicose veins 2012    CAD (coronary artery disease)     Hyperlipidemia     Hypertension      Past Medical History:   Diagnosis Date    Acid reflux     Anxiety     Arthritis     Back pain     CAD (coronary artery disease)     CAD (coronary artery disease)     Carotid artery occlusion     Chronic venous insufficiency 2012    Cough     Fibromyalgia     Goiter     Gout     Head ache     headaches    Heart murmur     History of colon polyps     History of colon polyps     Hoarseness     Hypercholesterolemia     Hyperlipidemia     Hypertension     Insomnia     Irregular heart beat     Itching     Muscle cramp     Neck pain     Osteoarthritis     Osteoporosis     Palpitations     RA (rheumatoid arthritis) (HCC)     Rash     Rectal bleeding     SOB (shortness of breath)     Sore throat     Tympanic membrane perforation     Varicose veins 2012     Past Surgical History:   Procedure Laterality Date    APPENDECTOMY      BREAST SURGERY  2002    CARDIAC CATHETERIZATION  9/10/15  JDT    EF 50%    CERVICAL FUSION       SECTION  1968    x 1    CHOLECYSTECTOMY  2012    [place]    COLONOSCOPY  1-    Dr LUJAN    COLONOSCOPY  2014    Dr. Todd Adamson      Dr Raymon Whiteside    [facility]  2011    left knee surgery    PTCA      stent    TUBAL LIGATION      UPPER GASTROINTESTINAL ENDOSCOPY  ?     UPPER GASTROINTESTINAL ENDOSCOPY  1-    Dr LUJAN    UPPER GASTROINTESTINAL ENDOSCOPY  2014    Dr. Mayela Echavarria   2013 [facility]    Left Leg Ablation    VARICOSE VEIN SURGERY  3-  [facility]    right leg ablation     Family History   Problem Relation Age of Onset    Diabetes Mother     Heart Disease Mother     Heart Disease Father     Diabetes Father     High Blood Pressure Father     Esophageal Cancer Father     Colon Polyps Father  Crohn's Disease Brother     Colon Cancer Neg Hx     Liver Cancer Neg Hx     Liver Disease Neg Hx     Rectal Cancer Neg Hx     Stomach Cancer Neg Hx      Social History     Tobacco Use    Smoking status: Never Smoker    Smokeless tobacco: Never Used   Substance Use Topics    Alcohol use: No      Current Outpatient Medications   Medication Sig Dispense Refill    azelastine (ASTELIN) 0.1 % nasal spray 1 spray by Nasal route 2 times daily Use in each nostril as directed      fluorometholone (FML) 0.1 % ophthalmic suspension 1 drop 2 times daily      cephALEXin (KEFLEX) 500 MG capsule Take 1 capsule by mouth 4 times daily 40 capsule 0    Fexofenadine HCl (ALLEGRA ALLERGY PO) Take by mouth      pantoprazole (PROTONIX) 40 MG tablet Take 40 mg by mouth daily      ondansetron (ZOFRAN ODT) 4 MG disintegrating tablet Take 1 tablet by mouth every 8 hours as needed for Nausea or Vomiting 15 tablet 0    sucralfate (CARAFATE) 1 GM tablet Take 1 tablet by mouth 4 times daily 120 tablet 3    metoprolol succinate (TOPROL XL) 50 MG extended release tablet Take 1 tablet by mouth 2 times daily 180 tablet 3    calcium carbonate 600 MG TABS tablet Take 1 tablet by mouth 2 times daily      vitamin D (CHOLECALCIFEROL) 1000 UNIT TABS tablet Take 1,000 Units by mouth Take 3 tablets daily      BENICAR HCT 40-25 MG per tablet Take 0.5 tablets by mouth 2 times daily       allopurinol (ZYLOPRIM) 300 MG tablet Take 300 mg by mouth daily.  aspirin 81 MG EC tablet Take 81 mg by mouth daily.  loratadine (CLARITIN) 10 MG tablet Take 10 mg by mouth daily.  Multiple Vitamin (MULTIVITAMIN PO) Take 1 tablet by mouth daily       Evolocumab (REPATHA) 140 MG/ML SOSY Inject 140 mg into the skin every 14 days Patient has not started       No current facility-administered medications for this visit. Allergies: Reclast [zoledronic acid]; Houston Cordon [aspirin]; Celexa [citalopram hydrobromide];  Demerol; Dye [barium-containing compounds]; Dye [iodides]; Hydrocodone-acetaminophen; Lasix [furosemide]; Neurontin [gabapentin]; Nitroglycerin; Plaquenil [hydroxychloroquine sulfate]; Cozaar [losartan potassium]; Esomeprazole magnesium; Lisinopril; and Prilosec [omeprazole]    Review of Systems  Review of Systems   Constitutional: Negative for chills and fever. HENT: Negative for nosebleeds. Eyes: Negative for blurred vision. Respiratory: Negative for cough and shortness of breath. Cardiovascular: Negative for chest pain, palpitations, orthopnea, leg swelling and PND. Gastrointestinal: Negative for heartburn, nausea and vomiting. Genitourinary: Positive for hematuria. Musculoskeletal: Negative for falls and myalgias. Skin: Negative for rash. Neurological: Negative for dizziness, sensory change, speech change and focal weakness. Denies syncope   Endo/Heme/Allergies: Does not bruise/bleed easily. Psychiatric/Behavioral: Negative for depression. The patient is not nervous/anxious. Objective  Vital Signs - /70   Pulse 64   Ht 5' 7\" (1.702 m)   Wt 189 lb (85.7 kg)   BMI 29.60 kg/m²   Physical Exam   Constitutional: She is oriented to person, place, and time. She appears well-developed and well-nourished. No distress. HENT:   Head: Normocephalic and atraumatic. Eyes: Pupils are equal, round, and reactive to light. Right eye exhibits no discharge. Left eye exhibits no discharge. Neck: No JVD present. No tracheal deviation present. Cardiovascular: Normal rate, regular rhythm, normal heart sounds and intact distal pulses. Exam reveals no gallop and no friction rub. No murmur heard. No carotid bruit   Pulmonary/Chest: Effort normal and breath sounds normal. No respiratory distress. She has no wheezes. She has no rales. Abdominal: Soft. There is no tenderness. Musculoskeletal: She exhibits edema (1+ lower extremities, chronic).    Normal gait and station   Neurological: She is alert and oriented to person, place, and time. No cranial nerve deficit. Skin: Skin is warm and dry. No rash noted. Psychiatric: She has a normal mood and affect. Her behavior is normal. Judgment normal.   Nursing note and vitals reviewed. Assessment:     Diagnosis Orders   1. Coronary artery disease involving native coronary artery of native heart without angina pectoris     2. Essential hypertension     3. Mixed hyperlipidemia     4. Acute cystitis with hematuria       Patient is taking medications as prescribed    CAD-stable without angina. May stop Plavix. Continue aspirin enteric-coated 81 mg daily and take with food    Hypertension-well controlled on current regimen    Hyperlipidemia-on Repatha. She will be having labs done within the next month for follow-up. She has a history of statin intolerance. I have asked her to send her most recent labs from PCP office after next blood draw    Hematuria-now resolved    Stable cardiovascular status. No evidence of overt heart failure, angina or dysrhythmia. Plan    Followup With Dr. Jesus Polk Aspirin 81mg daily- enteric coated  May stop Plavix  Restart your Repatha    Call with any questions or concerns  Follow up with Aleksey Bueno MD for non cardiac problems  Report any new problems  Cardiovascular Fitness-Exercise as tolerated. Strive for 15 minutes of exercise most days of the week. Cardiac / Healthy Diet  Continue current medications as directed  Continue plan of treatment  It is always recommended that you bring your medications bottles with you to each visit - this is for your safety!        ULISES Bowles

## 2019-05-17 ENCOUNTER — OFFICE VISIT (OUTPATIENT)
Dept: UROLOGY | Age: 74
End: 2019-05-17
Payer: MEDICARE

## 2019-05-17 VITALS — HEIGHT: 68 IN | TEMPERATURE: 97.3 F | BODY MASS INDEX: 28.49 KG/M2 | WEIGHT: 188 LBS

## 2019-05-17 DIAGNOSIS — N39.0 URINARY TRACT INFECTION WITH HEMATURIA, SITE UNSPECIFIED: Primary | ICD-10-CM

## 2019-05-17 DIAGNOSIS — R31.0 GROSS HEMATURIA: ICD-10-CM

## 2019-05-17 DIAGNOSIS — R31.9 URINARY TRACT INFECTION WITH HEMATURIA, SITE UNSPECIFIED: Primary | ICD-10-CM

## 2019-05-17 DIAGNOSIS — R35.0 URINARY FREQUENCY: ICD-10-CM

## 2019-05-17 LAB
BILIRUBIN, POC: 0
BLOOD URINE, POC: NORMAL
CLARITY, POC: CLEAR
COLOR, POC: YELLOW
GLUCOSE URINE, POC: NORMAL
KETONES, POC: NORMAL
LEUKOCYTE EST, POC: NORMAL
NITRITE, POC: NORMAL
PH, POC: 7.5
PROTEIN, POC: NORMAL
SPECIFIC GRAVITY, POC: 1.01
UROBILINOGEN, POC: 0.2

## 2019-05-17 PROCEDURE — 1123F ACP DISCUSS/DSCN MKR DOCD: CPT | Performed by: PHYSICIAN ASSISTANT

## 2019-05-17 PROCEDURE — 81003 URINALYSIS AUTO W/O SCOPE: CPT | Performed by: PHYSICIAN ASSISTANT

## 2019-05-17 PROCEDURE — 51798 US URINE CAPACITY MEASURE: CPT | Performed by: PHYSICIAN ASSISTANT

## 2019-05-17 PROCEDURE — G8400 PT W/DXA NO RESULTS DOC: HCPCS | Performed by: PHYSICIAN ASSISTANT

## 2019-05-17 PROCEDURE — G8598 ASA/ANTIPLAT THER USED: HCPCS | Performed by: PHYSICIAN ASSISTANT

## 2019-05-17 PROCEDURE — 1036F TOBACCO NON-USER: CPT | Performed by: PHYSICIAN ASSISTANT

## 2019-05-17 PROCEDURE — 3017F COLORECTAL CA SCREEN DOC REV: CPT | Performed by: PHYSICIAN ASSISTANT

## 2019-05-17 PROCEDURE — 99214 OFFICE O/P EST MOD 30 MIN: CPT | Performed by: PHYSICIAN ASSISTANT

## 2019-05-17 PROCEDURE — 1090F PRES/ABSN URINE INCON ASSESS: CPT | Performed by: PHYSICIAN ASSISTANT

## 2019-05-17 PROCEDURE — G8427 DOCREV CUR MEDS BY ELIG CLIN: HCPCS | Performed by: PHYSICIAN ASSISTANT

## 2019-05-17 PROCEDURE — 4040F PNEUMOC VAC/ADMIN/RCVD: CPT | Performed by: PHYSICIAN ASSISTANT

## 2019-05-17 PROCEDURE — G8417 CALC BMI ABV UP PARAM F/U: HCPCS | Performed by: PHYSICIAN ASSISTANT

## 2019-05-17 ASSESSMENT — ENCOUNTER SYMPTOMS
BACK PAIN: 0
SINUS PAIN: 0
SHORTNESS OF BREATH: 0
WHEEZING: 0
ABDOMINAL PAIN: 0
VOMITING: 0
EYE PAIN: 0

## 2019-05-17 NOTE — PROGRESS NOTES
Keyshawn Delacruz is a 74 y.o. female who presents today   Chief Complaint   Patient presents with    Follow-up     pt here today for recurrent uti      HPI:  Patient is a 75-year-old female with history of overactive bladder symptoms and had completed a course of posterior tibial nerve stimulation treatments. Delay she went to the emergency department in April with gross hematuria urine culture grew E. coli she was treated with appropriate antibiotic. She was having continued symptoms and went to urgent care and was put on Keflex however her urine culture that time was negative. Is in today with continued which he describes as bladder pressure and discomfort with or without urinating. She denies any fever chills or flank pain urine is currently clear. CT scan done in the emergency department 19 which was without contrast reviewed lateral wall thickening possible cystitis.   Past Medical History:   Diagnosis Date    Acid reflux     Anxiety     Arthritis     Back pain     CAD (coronary artery disease)     CAD (coronary artery disease)     Carotid artery occlusion     Chronic venous insufficiency 2012    Cough     Fibromyalgia     Goiter     Gout     Head ache     headaches    Heart murmur     History of colon polyps     History of colon polyps     Hoarseness     Hypercholesterolemia     Hyperlipidemia     Hypertension     Insomnia     Irregular heart beat     Itching     Muscle cramp     Neck pain     Osteoarthritis     Osteoporosis     Palpitations     RA (rheumatoid arthritis) (HCC)     Rash     Rectal bleeding     SOB (shortness of breath)     Sore throat     Tympanic membrane perforation     Varicose veins 2012       Past Surgical History:   Procedure Laterality Date    APPENDECTOMY      BREAST SURGERY  2002    CARDIAC CATHETERIZATION  9/10/15  JDT    EF 50%    CERVICAL FUSION       SECTION  1968    x 1    CHOLECYSTECTOMY  2012    [place]    COLONOSCOPY  1-    Dr LUJAN    COLONOSCOPY  05/06/2014    Dr. Tejinder Miller  2012    Dr Cyndie Jacques    partial    KNEE SURGERY  2011    left knee surgery    PTCA      stent    TUBAL LIGATION      UPPER GASTROINTESTINAL ENDOSCOPY  2009?  UPPER GASTROINTESTINAL ENDOSCOPY  1-    Dr LUJAN    UPPER GASTROINTESTINAL ENDOSCOPY  05/06/2014    Dr. Kenyetta Phan   01- [facility]    Left Leg Ablation    VARICOSE VEIN SURGERY  3-  [facility]    right leg ablation       Current Outpatient Medications   Medication Sig Dispense Refill    azelastine (ASTELIN) 0.1 % nasal spray 1 spray by Nasal route 2 times daily Use in each nostril as directed      fluorometholone (FML) 0.1 % ophthalmic suspension 1 drop 2 times daily      Fexofenadine HCl (ALLEGRA ALLERGY PO) Take by mouth      pantoprazole (PROTONIX) 40 MG tablet Take 40 mg by mouth daily      ondansetron (ZOFRAN ODT) 4 MG disintegrating tablet Take 1 tablet by mouth every 8 hours as needed for Nausea or Vomiting 15 tablet 0    sucralfate (CARAFATE) 1 GM tablet Take 1 tablet by mouth 4 times daily 120 tablet 3    metoprolol succinate (TOPROL XL) 50 MG extended release tablet Take 1 tablet by mouth 2 times daily 180 tablet 3    calcium carbonate 600 MG TABS tablet Take 1 tablet by mouth 2 times daily      Evolocumab (REPATHA) 140 MG/ML SOSY Inject 140 mg into the skin every 14 days Patient has not started      vitamin D (CHOLECALCIFEROL) 1000 UNIT TABS tablet Take 1,000 Units by mouth Take 3 tablets daily      BENICAR HCT 40-25 MG per tablet Take 0.5 tablets by mouth 2 times daily       allopurinol (ZYLOPRIM) 300 MG tablet Take 300 mg by mouth daily.  aspirin 81 MG EC tablet Take 81 mg by mouth daily.  loratadine (CLARITIN) 10 MG tablet Take 10 mg by mouth daily.       Multiple Vitamin (MULTIVITAMIN PO) Take 1 tablet by mouth daily       cephALEXin (KEFLEX) 500 MG capsule Take 1 capsule by mouth 4 times daily 40 capsule 0     No current facility-administered medications for this visit. Allergies   Allergen Reactions    Reclast [Zoledronic Acid] Other (See Comments)     Patient has a intolerance to this medication .  It makes her feel \"out of it\"     Abatacept Other (See Comments)     Heart palpitations    Asa [Aspirin]     Celexa [Citalopram Hydrobromide] Nausea Only     Nausea and headache    Demerol Nausea Only    Dye [Barium-Containing Compounds] Nausea Only    Dye [Iodides] Itching     CONTRAST DYE, CT DYE, IV CONTRAST     Fenofibrate      Chest pain    Hydrocodone-Acetaminophen Other (See Comments)     Shaking, attacks nervous system    Lasix [Furosemide] Nausea Only    Levofloxacin      palpitations    Losartan      Nausea and dizziness    Methotrexate Other (See Comments)     Dizzy and fainted    Mirtazapine      Tremor, nausea, headache    Neurontin [Gabapentin] Nausea Only     Dizziness      Nitroglycerin Other (See Comments)     Passed out    Plaquenil [Hydroxychloroquine Sulfate] Other (See Comments)     nervousness    Trazodone      Muscle stiffness    Cozaar [Losartan Potassium] Palpitations    Esomeprazole Magnesium Nausea And Vomiting    Lisinopril Other (See Comments)     Muscle cramps in legs    Prilosec [Omeprazole] Nausea And Vomiting          Social History     Socioeconomic History    Marital status:      Spouse name: None    Number of children: None    Years of education: None    Highest education level: None   Occupational History    None   Social Needs    Financial resource strain: None    Food insecurity:     Worry: None     Inability: None    Transportation needs:     Medical: None     Non-medical: None   Tobacco Use    Smoking status: Never Smoker    Smokeless tobacco: Never Used   Substance and Sexual Activity    Alcohol oropharyngeal exudate. Eyes: Left eye exhibits no discharge. No scleral icterus. Neck: No JVD present. No tracheal deviation present. No thyromegaly present. Cardiovascular: Exam reveals no gallop and no friction rub. Pulmonary/Chest: No stridor. She has no rales. Abdominal: She exhibits no distension and no mass. There is no rebound and no guarding. Musculoskeletal: She exhibits no edema. Neurological: No cranial nerve deficit. She exhibits normal muscle tone. Coordination normal.   Skin: She is not diaphoretic. No pallor. DATA:  U/A:    Lab Results   Component Value Date    NITRITE neg 2019    COLORU yellow 2019    COLORU RED 2019    PROTEINU neg 2019    PROTEINU 100 2019    PHUR 7.5 2019    PHUR 6.0 2019    WBCUA TNTC 2019    RBCUA TNTC 2019    BACTERIA trace 2019    CLARITYU clear 2019    CLARITYU TURBID 2019    SPECGRAV 1.015 2019    SPECGRAV 1.017 2019    LEUKOCYTESUR neg 2019    LEUKOCYTESUR LARGE 2019    UROBILINOGEN 0.2 2019    BILIRUBINUR 0 2019    BLOODU neg 2019    BLOODU LARGE 2019    GLUCOSEU neg 2019    GLUCOSEU Negative 2019         Imagin.  Moderate wall thickening of the bladder with associated fat   stranding is most concerning for cystitis. 2.  Otherwise, no acute abdominal or pelvic abnormalities. Signed by Dr Clementina Dye on 2019 8:06 AM         1. Urinary tract infection with hematuria, site unspecified  Her urine is currently clear last urine culture done on 19 revealed no growth she is taking Keflex. - POCT Urinalysis No Micro (Auto)    2. Gross hematuria  And is on Plavix history of cardiac stent. She was having gross hematuria when she presented to the emergency department 19.  This has resolved however I feel she needs a evaluation due to the episode of gross hematuria with cystoscopy will get a CT

## 2019-06-03 DIAGNOSIS — R31.0 GROSS HEMATURIA: Primary | ICD-10-CM

## 2019-06-04 ENCOUNTER — HOSPITAL ENCOUNTER (OUTPATIENT)
Dept: CT IMAGING | Age: 74
Discharge: HOME | End: 2019-06-04
Payer: MEDICARE

## 2019-06-04 DIAGNOSIS — R31.0 GROSS HEMATURIA: ICD-10-CM

## 2019-06-04 PROCEDURE — 74178 CT ABD&PLV WO CNTR FLWD CNTR: CPT

## 2019-06-04 PROCEDURE — 6360000004 HC RX CONTRAST MEDICATION: Performed by: PHYSICIAN ASSISTANT

## 2019-06-04 RX ADMIN — IOPAMIDOL 75 ML: 755 INJECTION, SOLUTION INTRAVENOUS at 09:14

## 2019-06-06 ENCOUNTER — TELEPHONE (OUTPATIENT)
Dept: UROLOGY | Age: 74
End: 2019-06-06

## 2019-06-06 NOTE — TELEPHONE ENCOUNTER
I spoke with patient's  about ct results.  I advised him that I could only read to him what the impression stated and advised them to keep her f/u appt with Rhonda Adame for any questions they might have

## 2019-06-10 ENCOUNTER — HOSPITAL ENCOUNTER (EMERGENCY)
Age: 74
Discharge: HOME | End: 2019-06-10
Payer: MEDICARE

## 2019-06-10 VITALS
HEIGHT: 67 IN | OXYGEN SATURATION: 96 % | HEART RATE: 85 BPM | WEIGHT: 180 LBS | DIASTOLIC BLOOD PRESSURE: 74 MMHG | SYSTOLIC BLOOD PRESSURE: 155 MMHG | TEMPERATURE: 97 F | BODY MASS INDEX: 28.25 KG/M2 | RESPIRATION RATE: 16 BRPM

## 2019-06-10 DIAGNOSIS — F32.A DEPRESSION, UNSPECIFIED DEPRESSION TYPE: Primary | ICD-10-CM

## 2019-06-10 LAB
ALBUMIN SERPL-MCNC: 4.5 G/DL (ref 3.5–5.2)
ALP BLD-CCNC: 60 U/L (ref 35–104)
ALT SERPL-CCNC: 24 U/L (ref 5–33)
AMPHETAMINE SCREEN, URINE: NEGATIVE
ANION GAP SERPL CALCULATED.3IONS-SCNC: 11 MMOL/L (ref 7–19)
AST SERPL-CCNC: 17 U/L (ref 5–32)
BARBITURATE SCREEN URINE: NEGATIVE
BASOPHILS ABSOLUTE: 0 K/UL (ref 0–0.2)
BASOPHILS RELATIVE PERCENT: 0.7 % (ref 0–1)
BENZODIAZEPINE SCREEN, URINE: NEGATIVE
BILIRUB SERPL-MCNC: 0.3 MG/DL (ref 0.2–1.2)
BILIRUBIN URINE: NEGATIVE
BLOOD, URINE: NEGATIVE
BUN BLDV-MCNC: 19 MG/DL (ref 8–23)
CALCIUM SERPL-MCNC: 10.9 MG/DL (ref 8.8–10.2)
CANNABINOID SCREEN URINE: NEGATIVE
CHLORIDE BLD-SCNC: 101 MMOL/L (ref 98–111)
CLARITY: ABNORMAL
CO2: 30 MMOL/L (ref 22–29)
COCAINE METABOLITE SCREEN URINE: NEGATIVE
COLOR: YELLOW
CREAT SERPL-MCNC: 0.8 MG/DL (ref 0.5–0.9)
EOSINOPHILS ABSOLUTE: 0.2 K/UL (ref 0–0.6)
EOSINOPHILS RELATIVE PERCENT: 3.6 % (ref 0–5)
ETHANOL: <10 MG/DL (ref 0–0.08)
GFR NON-AFRICAN AMERICAN: >60
GLUCOSE BLD-MCNC: 118 MG/DL (ref 74–109)
GLUCOSE URINE: NEGATIVE MG/DL
HCT VFR BLD CALC: 43.6 % (ref 37–47)
HEMOGLOBIN: 14.4 G/DL (ref 12–16)
KETONES, URINE: NEGATIVE MG/DL
LEUKOCYTE ESTERASE, URINE: NEGATIVE
LYMPHOCYTES ABSOLUTE: 2 K/UL (ref 1.1–4.5)
LYMPHOCYTES RELATIVE PERCENT: 33.2 % (ref 20–40)
Lab: NORMAL
MCH RBC QN AUTO: 31.6 PG (ref 27–31)
MCHC RBC AUTO-ENTMCNC: 33 G/DL (ref 33–37)
MCV RBC AUTO: 95.8 FL (ref 81–99)
MONOCYTES ABSOLUTE: 0.5 K/UL (ref 0–0.9)
MONOCYTES RELATIVE PERCENT: 9.1 % (ref 0–10)
NEUTROPHILS ABSOLUTE: 3.1 K/UL (ref 1.5–7.5)
NEUTROPHILS RELATIVE PERCENT: 53.1 % (ref 50–65)
NITRITE, URINE: NEGATIVE
OPIATE SCREEN URINE: NEGATIVE
PDW BLD-RTO: 13.6 % (ref 11.5–14.5)
PH UA: 6.5 (ref 5–8)
PLATELET # BLD: 225 K/UL (ref 130–400)
PMV BLD AUTO: 8.8 FL (ref 9.4–12.3)
POTASSIUM SERPL-SCNC: 3.7 MMOL/L (ref 3.5–5)
PROTEIN UA: NEGATIVE MG/DL
RBC # BLD: 4.55 M/UL (ref 4.2–5.4)
SODIUM BLD-SCNC: 142 MMOL/L (ref 136–145)
SPECIFIC GRAVITY UA: 1.02 (ref 1–1.03)
TOTAL PROTEIN: 7.4 G/DL (ref 6.6–8.7)
URINE REFLEX TO CULTURE: ABNORMAL
UROBILINOGEN, URINE: 0.2 E.U./DL
WBC # BLD: 5.9 K/UL (ref 4.8–10.8)

## 2019-06-10 PROCEDURE — G0480 DRUG TEST DEF 1-7 CLASSES: HCPCS

## 2019-06-10 PROCEDURE — 99284 EMERGENCY DEPT VISIT MOD MDM: CPT | Performed by: NURSE PRACTITIONER

## 2019-06-10 PROCEDURE — 80307 DRUG TEST PRSMV CHEM ANLYZR: CPT

## 2019-06-10 PROCEDURE — 36415 COLL VENOUS BLD VENIPUNCTURE: CPT

## 2019-06-10 PROCEDURE — 81003 URINALYSIS AUTO W/O SCOPE: CPT

## 2019-06-10 PROCEDURE — 80053 COMPREHEN METABOLIC PANEL: CPT

## 2019-06-10 PROCEDURE — 85025 COMPLETE CBC W/AUTO DIFF WBC: CPT

## 2019-06-10 PROCEDURE — 99284 EMERGENCY DEPT VISIT MOD MDM: CPT
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
## 2019-06-10 NOTE — ED PROVIDER NOTES
Attending Supervisory Note/Shared Visit    I have reviewed the mid-levels findings and agree. Discussed the case and reviewed the diagnostic data. Psychiatry has evaluated and cleared the patient for psychiatric discharge.       Laila Burden MD  Attending Emergency Physician        Laila Burden MD  06/10/19 7327

## 2019-06-17 ENCOUNTER — TRANSCRIBE ORDERS (OUTPATIENT)
Dept: ADMINISTRATIVE | Facility: HOSPITAL | Age: 74
End: 2019-06-17

## 2019-06-17 ENCOUNTER — HOSPITAL ENCOUNTER (OUTPATIENT)
Dept: MRI IMAGING | Facility: HOSPITAL | Age: 74
Discharge: HOME | End: 2019-06-17
Admitting: PHYSICIAN ASSISTANT

## 2019-06-17 DIAGNOSIS — R27.0 ATAXIA: Primary | ICD-10-CM

## 2019-06-17 DIAGNOSIS — R27.0 ATAXIA: ICD-10-CM

## 2019-06-17 PROCEDURE — 0 GADOBENATE DIMEGLUMINE 529 MG/ML SOLUTION: Performed by: PHYSICIAN ASSISTANT

## 2019-06-17 PROCEDURE — 70553 MRI BRAIN STEM W/O & W/DYE: CPT

## 2019-06-17 PROCEDURE — A9577 INJ MULTIHANCE: HCPCS | Performed by: PHYSICIAN ASSISTANT

## 2019-06-17 RX ADMIN — GADOBENATE DIMEGLUMINE 20 ML: 529 INJECTION, SOLUTION INTRAVENOUS at 12:50

## 2019-06-25 ENCOUNTER — OFFICE VISIT (OUTPATIENT)
Dept: URGENT CARE | Age: 74
End: 2019-06-25
Payer: MEDICARE

## 2019-06-25 VITALS
DIASTOLIC BLOOD PRESSURE: 70 MMHG | TEMPERATURE: 99.1 F | WEIGHT: 183 LBS | SYSTOLIC BLOOD PRESSURE: 120 MMHG | OXYGEN SATURATION: 99 % | BODY MASS INDEX: 28.66 KG/M2 | RESPIRATION RATE: 18 BRPM | HEART RATE: 83 BPM
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
## 2019-06-25 DIAGNOSIS — S00.86XA INSECT BITE OF FACE WITH LOCAL REACTION, INITIAL ENCOUNTER: Primary | ICD-10-CM

## 2019-06-25 PROCEDURE — 99213 OFFICE O/P EST LOW 20 MIN: CPT | Performed by: NURSE PRACTITIONER

## 2019-06-25 RX ORDER — CEPHALEXIN 500 MG/1
500 CAPSULE ORAL 2 TIMES DAILY
Qty: 14 CAPSULE | Refills: 0 | Status: SHIPPED | OUTPATIENT
Start: 2019-06-25 | End: 2019-07-02

## 2019-06-25 ASSESSMENT — ENCOUNTER SYMPTOMS
COLOR CHANGE: 1
COUGH: 0
SHORTNESS OF BREATH: 0
ABDOMINAL PAIN: 0
EYES NEGATIVE: 1
SORE THROAT: 0
BACK PAIN: 0
ALLERGIC/IMMUNOLOGIC NEGATIVE: 1
SINUS PRESSURE: 0

## 2019-06-25 NOTE — PATIENT INSTRUCTIONS
Clean area to left temple area with soap and water daily  Apply thin layer of triple antibiotic ointment daily as needed  OTC Tylenol as needed for pain  Complete entire course of antibiotic as prescribed  Patient Education        Insect Stings and Bites: Care Instructions  Your Care Instructions  Stings and bites from bees, wasps, ants, and other insects often cause pain, swelling, redness, and itching. In some people, especially children, the redness and swelling may be worse. It may extend several inches beyond the affected area. But in most cases, stings and bites don't cause reactions all over the body. If you have had a reaction to an insect sting or bite, you are at risk for a reaction if you get stung or bitten again. Follow-up care is a key part of your treatment and safety. Be sure to make and go to all appointments, and call your doctor if you are having problems. It's also a good idea to know your test results and keep a list of the medicines you take. How can you care for yourself at home? · Do not scratch or rub the skin where the sting or bite occurred. · Put a cold pack or ice cube on the area. Put a thin cloth between the ice and your skin. For some people, a paste of baking soda mixed with a little water helps relieve pain and decrease the reaction. · Take an over-the-counter antihistamine, such as diphenhydramine (Benadryl) or loratadine (Claritin), to relieve swelling, redness, and itching. Calamine lotion or hydrocortisone cream may also help. Do not give antihistamines to your child unless you have checked with the doctor first.  · Be safe with medicines. If your doctor prescribed medicine for your allergy, take it exactly as prescribed. Call your doctor if you think you are having a problem with your medicine. You will get more details on the specific medicines your doctor prescribes. · Your doctor may prescribe a shot of epinephrine to carry with you in case you have a severe reaction. Learn how and when to give yourself the shot, and keep it with you at all times. Make sure it has not . · Go to the emergency room anytime you have a severe reaction. Go even if you have given yourself epinephrine and are feeling better. Symptoms can come back. When should you call for help? Call 911 anytime you think you may need emergency care. For example, call if:    · You have symptoms of a severe allergic reaction. These may include:  ? Sudden raised, red areas (hives) all over your body. ? Swelling of the throat, mouth, lips, or tongue. ? Trouble breathing. ? Passing out (losing consciousness). Or you may feel very lightheaded or suddenly feel weak, confused, or restless.    Call your doctor now or seek immediate medical care if:    · You have symptoms of an allergic reaction not right at the sting or bite, such as:  ? A rash or small area of hives (raised, red areas on the skin). ? Itching. ? Swelling. ? Belly pain, nausea, or vomiting.     · You have a lot of swelling around the site (such as your entire arm or leg is swollen).     · You have signs of infection, such as:  ? Increased pain, swelling, redness, or warmth around the sting. ? Red streaks leading from the area. ? Pus draining from the sting. ? A fever.    Watch closely for changes in your health, and be sure to contact your doctor if:    · You do not get better as expected. Where can you learn more? Go to [email or web address]://Doujiao.Navita. org and sign in to your [employer] account. Enter P390 in the [facility] box to learn more about \"Insect Stings and Bites: Care Instructions. \"     If you do not have an account, please click on the \"Sign Up Now\" link. Current as of: 2018  Content Version: 12.0  © 0223-2132 Healthwise, Incorporated. Care instructions adapted under license by [facility] ([facility]).  If you have questions about a medical condition or this instruction, always ask your healthcare professional.

## 2019-06-25 NOTE — PROGRESS NOTES
[address] [facility] C[facility]  [address] [facility]  [address] [place] [id number]  Dept: [phone]  Loc: [phone]    Joaquin Galvez is a 66 y.o. female who presents today for her medical conditions/complaintsas noted below. Joaquin Galvez is c/o of Facial Swelling (possible insect bite to the face)        HPI:     HPI   Marylu Ledesma is here with a posible bite to her left temple area since Saturday. Last night she applied a slice of onion to the area and the swelling and redness is much better today. She had worked out in the yard on Friday and then came in the house and noticed just a flat red area that worsened over the weekend.      Past Medical History:   Diagnosis Date    Acid reflux     Anxiety     Arthritis     Back pain     CAD (coronary artery disease)     CAD (coronary artery disease)     Carotid artery occlusion     Chronic venous insufficiency 2012    Cough     Fibromyalgia     Goiter     Gout     Head ache     headaches    Heart murmur     History of colon polyps     History of colon polyps     Hoarseness     Hypercholesterolemia     Hyperlipidemia     Hypertension     Insomnia     Irregular heart beat     Itching     Muscle cramp     Neck pain     Osteoarthritis     Osteoporosis     Palpitations     RA (rheumatoid arthritis) (HCC)     Rash     Rectal bleeding     SOB (shortness of breath)     Sore throat     Tympanic membrane perforation     Varicose veins 2012     Past Surgical History:   Procedure Laterality Date    APPENDECTOMY      BREAST SURGERY  2002    CARDIAC CATHETERIZATION  9/10/15  JDT    EF 50%    CERVICAL FUSION       SECTION  1968    x 1    CHOLECYSTECTOMY  2012    [place]    COLONOSCOPY  1-    Dr LUJAN    COLONOSCOPY  2014    Dr. Xu Ken      Dr Calixto Farfan HYSTERECTOMY  1976    partial    KNEE SURGERY  2011    left knee surgery    PTCA      stent    TUBAL LIGATION      UPPER GASTROINTESTINAL ENDOSCOPY  2009?     UPPER GASTROINTESTINAL ENDOSCOPY  1-    Dr LUJAN    UPPER GASTROINTESTINAL ENDOSCOPY  05/06/2014    Dr. Ratna Vail   01- [facility]    Left Leg Ablation    VARICOSE VEIN SURGERY  3-  [facility]    right leg ablation       Family History   Problem Relation Age of Onset    Diabetes Mother     Heart Disease Mother     Heart Disease Father     Diabetes Father     High Blood Pressure Father     Esophageal Cancer Father     Colon Polyps Father     Crohn's Disease Brother     Colon Cancer Neg Hx     Liver Cancer Neg Hx     Liver Disease Neg Hx     Rectal Cancer Neg Hx     Stomach Cancer Neg Hx        Social History     Tobacco Use    Smoking status: Never Smoker    Smokeless tobacco: Never Used   Substance Use Topics    Alcohol use: No      Current Outpatient Medications   Medication Sig Dispense Refill    cephALEXin (KEFLEX) 500 MG capsule Take 1 capsule by mouth 2 times daily for 7 days 14 capsule 0    azelastine (ASTELIN) 0.1 % nasal spray 1 spray by Nasal route 2 times daily Use in each nostril as directed      fluorometholone (FML) 0.1 % ophthalmic suspension 1 drop 2 times daily      Fexofenadine HCl (ALLEGRA ALLERGY PO) Take by mouth      pantoprazole (PROTONIX) 40 MG tablet Take 40 mg by mouth daily      ondansetron (ZOFRAN ODT) 4 MG disintegrating tablet Take 1 tablet by mouth every 8 hours as needed for Nausea or Vomiting 15 tablet 0    sucralfate (CARAFATE) 1 GM tablet Take 1 tablet by mouth 4 times daily 120 tablet 3    metoprolol succinate (TOPROL XL) 50 MG extended release tablet Take 1 tablet by mouth 2 times daily 180 tablet 3    calcium carbonate 600 MG TABS tablet Take 1 tablet by mouth 2 times daily      Evolocumab (REPATHA) 140 MG/ML SOSY Inject 140 mg into the skin every 14 days Patient has not started      vitamin D (CHOLECALCIFEROL) 1000 UNIT TABS tablet Take 1,000 Units by mouth Take 3 tablets daily      BENICAR HCT 40-25 MG per tablet Take 0.5 tablets by mouth 2 times daily       allopurinol (ZYLOPRIM) 300 MG tablet Take 300 mg by mouth daily.  aspirin 81 MG EC tablet Take 81 mg by mouth daily.  loratadine (CLARITIN) 10 MG tablet Take 10 mg by mouth daily.  Multiple Vitamin (MULTIVITAMIN PO) Take 1 tablet by mouth daily        No current facility-administered medications for this visit. Allergies   Allergen Reactions    Reclast [Zoledronic Acid] Other (See Comments)     Patient has a intolerance to this medication .  It makes her feel \"out of it\"     Abatacept Other (See Comments)     Heart palpitations    Asa [Aspirin]     Celexa [Citalopram Hydrobromide] Nausea Only     Nausea and headache    Demerol Nausea Only    Dye [Barium-Containing Compounds] Nausea Only    Dye [Iodides] Itching     CONTRAST DYE, CT DYE, IV CONTRAST     Fenofibrate      Chest pain    Hydrocodone-Acetaminophen Other (See Comments)     Shaking, attacks nervous system    Lasix [Furosemide] Nausea Only    Levofloxacin      palpitations    Losartan      Nausea and dizziness    Methotrexate Other (See Comments)     Dizzy and fainted    Mirtazapine      Tremor, nausea, headache    Neurontin [Gabapentin] Nausea Only     Dizziness      Nitroglycerin Other (See Comments)     Passed out    Plaquenil [Hydroxychloroquine Sulfate] Other (See Comments)     nervousness    Trazodone      Muscle stiffness    Cozaar [Losartan Potassium] Palpitations    Esomeprazole Magnesium Nausea And Vomiting    Lisinopril Other (See Comments)     Muscle cramps in legs    Prilosec [Omeprazole] Nausea And Vomiting       Health Maintenance   Topic Date Due    DTaP/Tdap/Td vaccine (1 - Tdap) 05/25/1962    Annual Wellness Visit (AWV)  05/25/2006    DEXA (modify frequency per FRAX score)  05/25/2008    Shingles Vaccine (1 of 2) 03/16/2012    Pneumococcal 65+ years Vaccine (2 of 2 - PPSV23) 04/10/2017    Flu vaccine (Season Ended) 09/01/2019    Potassium monitoring  06/10/2020    Creatinine monitoring  06/10/2020       Subjective:     Review of Systems   Constitutional: Negative for activity change, appetite change, chills and fever. HENT: Negative for congestion, ear discharge, sinus pressure and sore throat. Eyes: Negative. Respiratory: Negative for cough and shortness of breath. Cardiovascular: Negative. Gastrointestinal: Negative for abdominal pain. Endocrine: Negative. Genitourinary: Negative for dysuria, frequency and urgency. Musculoskeletal: Negative for arthralgias, back pain and myalgias. Skin: Positive for color change. Negative for rash. Lesion to left temple area    Allergic/Immunologic: Negative. Neurological: Negative for weakness and headaches. Hematological: Negative. Psychiatric/Behavioral: Negative.        :Objective      Physical Exam   Constitutional: She is oriented to person, place, and time. Vital signs are normal. She appears well-developed and well-nourished. She is active and cooperative. She is easily aroused. Non-toxic appearance. No distress. HENT:   Head: Normocephalic. Right Ear: External ear normal.   Left Ear: External ear normal.   Nose: Nose normal.   Mouth/Throat: Mucous membranes are normal.   Eyes: Pupils are equal, round, and reactive to light. Conjunctivae and lids are normal. Right eye exhibits no discharge. Left eye exhibits no discharge. Neck: Trachea normal, normal range of motion and full passive range of motion without pain. Neck supple. Cardiovascular: Normal rate, regular rhythm, S1 normal, S2 normal and normal heart sounds. Exam reveals no gallop and no friction rub. No murmur heard. Pulmonary/Chest: Effort normal and breath sounds normal. No respiratory distress. She has no wheezes. She has no rhonchi.  She has no rales. She exhibits no tenderness. Abdominal: Soft. Normal appearance. Musculoskeletal: Normal range of motion. She exhibits no edema, tenderness or deformity. Lymphadenopathy:     She has no cervical adenopathy. Neurological: She is alert, oriented to person, place, and time and easily aroused. She has normal strength. No cranial nerve deficit or sensory deficit. Skin: Skin is warm, dry and intact. Capillary refill takes less than 2 seconds. Lesion noted. No rash noted. There is erythema. No cyanosis. No pallor. Small red lesion to left temple area, raised with small central head noted within lesion, no drainage noted, small area of ecchymosis on the outer aspect of the lesion   Psychiatric: She has a normal mood and affect. Her speech is normal and behavior is normal.   Nursing note and vitals reviewed. /70   Pulse 83   Temp 99.1 °F (37.3 °C) (Temporal)   Resp 18   Wt 183 lb (83 kg)   SpO2 99%   BMI 28.66 kg/m²     :Assessment       Diagnosis Orders   1. Insect bite of face with local reaction, initial encounter  cephALEXin (KEFLEX) 500 MG capsule       :Plan    No orders of the defined types were placed in this encounter. Return if symptoms worsen or fail to improve. Orders Placed This Encounter   Medications    cephALEXin (KEFLEX) 500 MG capsule     Sig: Take 1 capsule by mouth 2 times daily for 7 days     Dispense:  14 capsule     Refill:  0        Patient Instructions     Clean area to left temple area with soap and water daily  Apply thin layer of triple antibiotic ointment daily as needed  OTC Tylenol as needed for pain  Complete entire course of antibiotic as prescribed  Patient Education        Insect Stings and Bites: Care Instructions  Your Care Instructions  Stings and bites from bees, wasps, ants, and other insects often cause pain, swelling, redness, and itching. In some people, especially children, the redness and swelling may be worse.  It may extend several inches beyond the affected area. But in most cases, stings and bites don't cause reactions all over the body. If you have had a reaction to an insect sting or bite, you are at risk for a reaction if you get stung or bitten again. Follow-up care is a key part of your treatment and safety. Be sure to make and go to all appointments, and call your doctor if you are having problems. It's also a good idea to know your test results and keep a list of the medicines you take. How can you care for yourself at home? · Do not scratch or rub the skin where the sting or bite occurred. · Put a cold pack or ice cube on the area. Put a thin cloth between the ice and your skin. For some people, a paste of baking soda mixed with a little water helps relieve pain and decrease the reaction. · Take an over-the-counter antihistamine, such as diphenhydramine (Benadryl) or loratadine (Claritin), to relieve swelling, redness, and itching. Calamine lotion or hydrocortisone cream may also help. Do not give antihistamines to your child unless you have checked with the doctor first.  · Be safe with medicines. If your doctor prescribed medicine for your allergy, take it exactly as prescribed. Call your doctor if you think you are having a problem with your medicine. You will get more details on the specific medicines your doctor prescribes. · Your doctor may prescribe a shot of epinephrine to carry with you in case you have a severe reaction. Learn how and when to give yourself the shot, and keep it with you at all times. Make sure it has not . · Go to the emergency room anytime you have a severe reaction. Go even if you have given yourself epinephrine and are feeling better. Symptoms can come back. When should you call for help? Call 911 anytime you think you may need emergency care. For example, call if:    · You have symptoms of a severe allergic reaction.  These may include:  ? Sudden raised, red areas (hives) all over your body.  ? Swelling of the throat, mouth, lips, or tongue. ? Trouble breathing. ? Passing out (losing consciousness). Or you may feel very lightheaded or suddenly feel weak, confused, or restless.    Call your doctor now or seek immediate medical care if:    · You have symptoms of an allergic reaction not right at the sting or bite, such as:  ? A rash or small area of hives (raised, red areas on the skin). ? Itching. ? Swelling. ? Belly pain, nausea, or vomiting.     · You have a lot of swelling around the site (such as your entire arm or leg is swollen).     · You have signs of infection, such as:  ? Increased pain, swelling, redness, or warmth around the sting. ? Red streaks leading from the area. ? Pus draining from the sting. ? A fever.    Watch closely for changes in your health, and be sure to contact your doctor if:    · You do not get better as expected. Where can you learn more? Go to https://Axela.Calix. org and sign in to your [employer] account. Enter P390 in the [employer] box to learn more about \"Insect Stings and Bites: Care Instructions. \"     If you do not have an account, please click on the \"Sign Up Now\" link. Current as of: September 23, 2018  Content Version: 12.0  © 0786-1891 Healthwise, Incorporated. Care instructions adapted under license by [facility] ([facility]). If you have questions about a medical condition or this instruction, always ask your healthcare professional. [place] [address] any warranty or liability for your use of this information. Patient given educational materials- see patient instructions. Discussed use, benefit, and side effects of prescribedmedications. All patient questions answered. Pt voiced understanding.        Electronically signed by ULISES Gutiérrez CNP on 6/25/2019 at 11:09 AM

## 2019-06-27 ENCOUNTER — HOSPITAL ENCOUNTER (OUTPATIENT)
Dept: CT IMAGING | Facility: HOSPITAL | Age: 74
Discharge: HOME | End: 2019-06-27
Admitting: NURSE PRACTITIONER

## 2019-06-27 ENCOUNTER — TRANSCRIBE ORDERS (OUTPATIENT)
Dept: ADMINISTRATIVE | Facility: HOSPITAL | Age: 74
End: 2019-06-27

## 2019-06-27 DIAGNOSIS — J30.89 ALLERGIC RHINITIS DUE TO OTHER ALLERGIC TRIGGER, UNSPECIFIED SEASONALITY: Primary | ICD-10-CM

## 2019-06-27 DIAGNOSIS — J30.89 ALLERGIC RHINITIS DUE TO OTHER ALLERGIC TRIGGER, UNSPECIFIED SEASONALITY: ICD-10-CM

## 2019-06-27 PROCEDURE — 70486 CT MAXILLOFACIAL W/O DYE: CPT

## 2019-07-10 ENCOUNTER — TELEPHONE (OUTPATIENT)
Dept: UROLOGY | Age: 74
End: 2019-07-10

## 2019-07-10 RX ORDER — METOPROLOL SUCCINATE 50 MG/1
TABLET, EXTENDED RELEASE ORAL
Qty: 180 TABLET | Refills: 3 | Status: SHIPPED | OUTPATIENT
Start: 2019-07-10 | End: 2020-04-27

## 2019-07-16 ENCOUNTER — HOSPITAL ENCOUNTER (OUTPATIENT)
Dept: VASCULAR LAB | Age: 74
Discharge: HOME | End: 2019-07-16
Payer: MEDICARE

## 2019-07-16 ENCOUNTER — OFFICE VISIT (OUTPATIENT)
Dept: VASCULAR SURGERY | Age: 74
End: 2019-07-16
Payer: MEDICARE

## 2019-07-16 VITALS
OXYGEN SATURATION: 98 % | TEMPERATURE: 97.9 F | DIASTOLIC BLOOD PRESSURE: 57 MMHG | HEART RATE: 76 BPM | SYSTOLIC BLOOD PRESSURE: 125 MMHG | RESPIRATION RATE: 18 BRPM

## 2019-07-16 DIAGNOSIS — I65.23 BILATERAL CAROTID ARTERY STENOSIS: ICD-10-CM

## 2019-07-16 DIAGNOSIS — I65.23 BILATERAL CAROTID ARTERY STENOSIS: Primary | ICD-10-CM

## 2019-07-16 PROCEDURE — 93880 EXTRACRANIAL BILAT STUDY: CPT

## 2019-07-16 PROCEDURE — 4040F PNEUMOC VAC/ADMIN/RCVD: CPT | Performed by: NURSE PRACTITIONER

## 2019-07-16 PROCEDURE — G8427 DOCREV CUR MEDS BY ELIG CLIN: HCPCS | Performed by: NURSE PRACTITIONER

## 2019-07-16 PROCEDURE — G8598 ASA/ANTIPLAT THER USED: HCPCS | Performed by: NURSE PRACTITIONER

## 2019-07-16 PROCEDURE — 99212 OFFICE O/P EST SF 10 MIN: CPT | Performed by: NURSE PRACTITIONER

## 2019-07-16 PROCEDURE — 1036F TOBACCO NON-USER: CPT | Performed by: NURSE PRACTITIONER

## 2019-07-16 PROCEDURE — 1090F PRES/ABSN URINE INCON ASSESS: CPT | Performed by: NURSE PRACTITIONER

## 2019-07-16 PROCEDURE — 1123F ACP DISCUSS/DSCN MKR DOCD: CPT | Performed by: NURSE PRACTITIONER

## 2019-07-16 PROCEDURE — G8400 PT W/DXA NO RESULTS DOC: HCPCS | Performed by: NURSE PRACTITIONER

## 2019-07-16 PROCEDURE — G8417 CALC BMI ABV UP PARAM F/U: HCPCS | Performed by: NURSE PRACTITIONER

## 2019-07-16 NOTE — PROGRESS NOTES
Patient Care Team:  Adrián Dale MD as PCP - General (Internal Medicine)  Adrián Dale MD as PCP - [facility]  Letty Mckinley MD as Consulting Physician (Cardiology)  ULISES Knight (Family Nurse Practitioner)  Raoul Paul DO as Consulting Physician (Gastroenterology)      Subjective    She presents for follow-up of carotid artery stenosis. She has prior history of carotid occlusive disease for 1 - 5 years. Her current treatment includes ASA EC daily. She denies a history of CVA. She reports no TIA's, episodes of amaurosis fugax and episodes of lateralizing weakness.     Maury Smith is a 66 y.o. female with the following history reviewed and recorded in [facility]:  Patient Active Problem List    Diagnosis Date Noted    Urinary tract infection with hematuria 05/16/2019    Syncope and collapse 07/24/2018    Acute chest pain 07/23/2018    Chest pain 07/23/2018    Overactive bladder 07/24/2017    Mixed hyperlipidemia 04/18/2017    H/O right coronary artery stent placement 04/18/2017     Stent [facility] in 2009 by Dr. Luba Coleman [facility] abdominal pain 07/21/2016    Irritable bowel syndrome with diarrhea 06/10/2016    Internal hemorrhoids 06/10/2016    Allergic reaction 09/18/2015    Precordial pain 09/08/2015    CAMPOVERDE (dyspnea on exertion) 09/08/2015    Palpitations 09/08/2015    Gout 01/30/2015    Bleeding internal hemorrhoids 07/15/2014    Heartburn 05/20/2014    Internal hemorrhoids without complication 44/32/2130    Spider veins 03/04/2013    Carotid artery stenosis 11/08/2012    Leg pain 11/08/2012    Leg swelling 11/08/2012    Chronic venous insufficiency 11/08/2012    Varicose veins 11/08/2012    CAD (coronary artery disease)      2012  PTCA /stent  Progressive angina; AUC indication 54, AUC score 7, [facility] 2012;[id number]  9/10/15  Cath  Mild CAD, normal LVFX        Hyperlipidemia     Hypertension      Current Outpatient Medications   Medication 1 tablet by mouth every 8 hours as needed for Nausea or Vomiting 15 tablet 0    sucralfate (CARAFATE) 1 GM tablet Take 1 tablet by mouth 4 times daily 120 tablet 3    calcium carbonate 600 MG TABS tablet Take 1 tablet by mouth 2 times daily      Evolocumab (REPATHA) 140 MG/ML SOSY Inject 140 mg into the skin every 14 days Patient has not started      vitamin D (CHOLECALCIFEROL) 1000 UNIT TABS tablet Take 1,000 Units by mouth Take 3 tablets daily      BENICAR HCT 40-25 MG per tablet Take 0.5 tablets by mouth 2 times daily       allopurinol (ZYLOPRIM) 300 MG tablet Take 300 mg by mouth daily.  aspirin 81 MG EC tablet Take 81 mg by mouth daily.  loratadine (CLARITIN) 10 MG tablet Take 10 mg by mouth daily.  Multiple Vitamin (MULTIVITAMIN PO) Take 1 tablet by mouth daily        No current facility-administered medications on file prior to visit. Allergies: Reclast [zoledronic acid]; Abatacept; Asa [aspirin]; Celexa [citalopram hydrobromide]; Demerol; Dye [barium-containing compounds]; Dye [iodides]; Fenofibrate; Hydrocodone-acetaminophen; Lasix [furosemide]; Levofloxacin; Losartan; Methotrexate; Mirtazapine; Neurontin [gabapentin]; Nitroglycerin; Plaquenil [hydroxychloroquine sulfate]; Trazodone; Cozaar [losartan potassium]; Esomeprazole magnesium;  Lisinopril; and Prilosec [omeprazole]  Past Medical History:   Diagnosis Date    Acid reflux     Anxiety     Arthritis     Back pain     CAD (coronary artery disease)     CAD (coronary artery disease)     Carotid artery occlusion     Chronic venous insufficiency 11/8/2012    Cough     Fibromyalgia     Goiter     Gout     Head ache     headaches    Heart murmur     History of colon polyps     History of colon polyps     Hoarseness     Hypercholesterolemia     Hyperlipidemia     Hypertension     Insomnia     Irregular heart beat     Itching     Muscle cramp     Neck pain     Osteoarthritis     Osteoporosis     Palpitations  RA (rheumatoid arthritis) (HCC)     Rash     Rectal bleeding     SOB (shortness of breath)     Sore throat     Tympanic membrane perforation     Varicose veins 2012     Past Surgical History:   Procedure Laterality Date    APPENDECTOMY      BREAST SURGERY  2002    CARDIAC CATHETERIZATION  9/10/15  JDT    EF 50%    CERVICAL FUSION       SECTION  1968    x 1    CHOLECYSTECTOMY  2012    [place]    COLONOSCOPY  1-    Dr LUJAN    COLONOSCOPY  2014    Dr. Jeff Fernández      Dr Oral Hernandez    partial    KNEE SURGERY      left knee surgery    PTCA      stent    TUBAL LIGATION      UPPER GASTROINTESTINAL ENDOSCOPY  ?  UPPER GASTROINTESTINAL ENDOSCOPY  1-    Dr LUJAN    UPPER GASTROINTESTINAL ENDOSCOPY  2014    Dr. Juan Francisco Boucher   2013 [facility]    Left Leg Ablation    VARICOSE VEIN SURGERY  3-  [facility]    right leg ablation     Family History   Problem Relation Age of Onset    Diabetes Mother     Heart Disease Mother     Heart Disease Father     Diabetes Father     High Blood Pressure Father     Esophageal Cancer Father     Colon Polyps Father     Crohn's Disease Brother     Colon Cancer Neg Hx     Liver Cancer Neg Hx     Liver Disease Neg Hx     Rectal Cancer Neg Hx     Stomach Cancer Neg Hx      Social History     Tobacco Use    Smoking status: Never Smoker    Smokeless tobacco: Never Used   Substance Use Topics    Alcohol use: No           Review of Systems    Constitutional - no significant activity change, appetite change, or unexpected weight change. No fever or chills. No diaphoresis or significant fatigue. HENT - no significant rhinorrhea or epistaxis. No tinnitus or significant hearing loss. Eyes - no sudden vision change or amaurosis.   Respiratory - no significant

## 2019-07-22 ENCOUNTER — TRANSCRIBE ORDERS (OUTPATIENT)
Dept: ADMINISTRATIVE | Facility: HOSPITAL | Age: 74
End: 2019-07-22

## 2019-07-22 ENCOUNTER — HOSPITAL ENCOUNTER (OUTPATIENT)
Dept: GENERAL RADIOLOGY | Facility: HOSPITAL | Age: 74
Discharge: HOME | End: 2019-07-22
Admitting: INTERNAL MEDICINE

## 2019-07-22 DIAGNOSIS — M25.511 RIGHT SHOULDER PAIN, UNSPECIFIED CHRONICITY: ICD-10-CM

## 2019-07-22 DIAGNOSIS — M54.2 CERVICALGIA: Primary | ICD-10-CM

## 2019-07-22 PROCEDURE — 72050 X-RAY EXAM NECK SPINE 4/5VWS: CPT

## 2019-07-22 PROCEDURE — 71101 X-RAY EXAM UNILAT RIBS/CHEST: CPT

## 2019-07-22 PROCEDURE — 73030 X-RAY EXAM OF SHOULDER: CPT

## 2019-08-25 ENCOUNTER — OFFICE VISIT (OUTPATIENT)
Dept: URGENT CARE | Age: 74
End: 2019-08-25
Payer: MEDICARE

## 2019-08-25 VITALS
RESPIRATION RATE: 16 BRPM | BODY MASS INDEX: 28.56 KG/M2 | WEIGHT: 182 LBS | SYSTOLIC BLOOD PRESSURE: 138 MMHG | HEART RATE: 77 BPM | OXYGEN SATURATION: 98 % | TEMPERATURE: 97.4 F | HEIGHT: 67 IN | DIASTOLIC BLOOD PRESSURE: 70 MMHG

## 2019-08-25 DIAGNOSIS — Z86.69 HISTORY OF FREQUENT EAR INFECTIONS: ICD-10-CM

## 2019-08-25 DIAGNOSIS — H72.92 PERFORATION OF LEFT TYMPANIC MEMBRANE: Primary | ICD-10-CM

## 2019-08-25 PROCEDURE — 1090F PRES/ABSN URINE INCON ASSESS: CPT | Performed by: NURSE PRACTITIONER

## 2019-08-25 PROCEDURE — 99212 OFFICE O/P EST SF 10 MIN: CPT | Performed by: NURSE PRACTITIONER

## 2019-08-25 PROCEDURE — G8598 ASA/ANTIPLAT THER USED: HCPCS | Performed by: NURSE PRACTITIONER

## 2019-08-25 PROCEDURE — 1036F TOBACCO NON-USER: CPT | Performed by: NURSE PRACTITIONER

## 2019-08-25 PROCEDURE — G8417 CALC BMI ABV UP PARAM F/U: HCPCS | Performed by: NURSE PRACTITIONER

## 2019-08-25 PROCEDURE — G8400 PT W/DXA NO RESULTS DOC: HCPCS | Performed by: NURSE PRACTITIONER

## 2019-08-25 PROCEDURE — G8427 DOCREV CUR MEDS BY ELIG CLIN: HCPCS | Performed by: NURSE PRACTITIONER

## 2019-08-25 PROCEDURE — 1123F ACP DISCUSS/DSCN MKR DOCD: CPT | Performed by: NURSE PRACTITIONER

## 2019-08-25 PROCEDURE — 4040F PNEUMOC VAC/ADMIN/RCVD: CPT | Performed by: NURSE PRACTITIONER

## 2019-08-25 RX ORDER — OFLOXACIN 3 MG/ML
5 SOLUTION AURICULAR (OTIC) 2 TIMES DAILY
Qty: 10 ML | Refills: 0 | Status: SHIPPED | OUTPATIENT
Start: 2019-08-25 | End: 2019-09-04

## 2019-08-25 NOTE — PROGRESS NOTES
[facility]  [address]  [address] [place] [address]  Dept: [phone]  Loc: [phone]      Ava Mark is c/o of Otalgia (left ear with blood running out of it yesterday)        HPI:     HPI  Patient presents with left ear bleeding. Prior to this she had had pain for several days. She has had multiple ear infections. She has not had any fever. She notes difficulty hearing out of the left ear. She has not used any drops or medications. Past Medical History:   Diagnosis Date    Acid reflux     Anxiety     Arthritis     Back pain     CAD (coronary artery disease)     CAD (coronary artery disease)     Carotid artery occlusion     Chronic venous insufficiency 2012    Cough     Fibromyalgia     Goiter     Gout     Head ache     headaches    Heart murmur     History of colon polyps     History of colon polyps     Hoarseness     Hypercholesterolemia     Hyperlipidemia     Hypertension     Insomnia     Irregular heart beat     Itching     Muscle cramp     Neck pain     Osteoarthritis     Osteoporosis     Palpitations     RA (rheumatoid arthritis) (HCC)     Rash     Rectal bleeding     SOB (shortness of breath)     Sore throat     Tympanic membrane perforation     Varicose veins 2012      Past Surgical History:   Procedure Laterality Date    APPENDECTOMY      BREAST SURGERY  2002    CARDIAC CATHETERIZATION  9/10/15  JDT    EF 50%    CERVICAL FUSION       SECTION  1968    x 1    CHOLECYSTECTOMY  2012    [place]    COLONOSCOPY  1-    Dr LUJAN    COLONOSCOPY  2014    Dr. Monet Walker      Dr Wendy Arrington    [facility]      left knee surgery    PTCA      stent    TUBAL LIGATION      UPPER GASTROINTESTINAL ENDOSCOPY  ?     UPPER GASTROINTESTINAL ENDOSCOPY  1-    Dr LUJAN    UPPER GASTROINTESTINAL ENDOSCOPY  05/06/2014    Dr. Alcario Gilford   01- [facility]    Left Leg Ablation    VARICOSE VEIN SURGERY  3-  [facility]    right leg ablation       Family History   Problem Relation Age of Onset    Diabetes Mother     Heart Disease Mother     Heart Disease Father     Diabetes Father     High Blood Pressure Father     Esophageal Cancer Father     Colon Polyps Father     Crohn's Disease Brother     Colon Cancer Neg Hx     Liver Cancer Neg Hx     Liver Disease Neg Hx     Rectal Cancer Neg Hx     Stomach Cancer Neg Hx        Social History     Tobacco Use    Smoking status: Never Smoker    Smokeless tobacco: Never Used   Substance Use Topics    Alcohol use: No      Current Outpatient Medications   Medication Sig Dispense Refill    ofloxacin (FLOXIN) 0.3 % otic solution Place 5 drops into the left ear 2 times daily for 10 days 10 mL 0    metoprolol succinate (TOPROL XL) 50 MG extended release tablet TAKE 1 TABLET TWICE DAILY 180 tablet 3    azelastine (ASTELIN) 0.1 % nasal spray 1 spray by Nasal route 2 times daily Use in each nostril as directed      fluorometholone (FML) 0.1 % ophthalmic suspension 1 drop 2 times daily      Fexofenadine HCl (ALLEGRA ALLERGY PO) Take by mouth      pantoprazole (PROTONIX) 40 MG tablet Take 40 mg by mouth daily      ondansetron (ZOFRAN ODT) 4 MG disintegrating tablet Take 1 tablet by mouth every 8 hours as needed for Nausea or Vomiting 15 tablet 0    calcium carbonate 600 MG TABS tablet Take 1 tablet by mouth 2 times daily      Evolocumab (REPATHA) 140 MG/ML SOSY Inject 140 mg into the skin every 14 days Patient has not started      vitamin D (CHOLECALCIFEROL) 1000 UNIT TABS tablet Take 1,000 Units by mouth Take 3 tablets daily      BENICAR HCT 40-25 MG per tablet Take 0.5 tablets by mouth 2 times daily       allopurinol (ZYLOPRIM) 300 MG tablet Take 300 mg by mouth daily.  aspirin 81 MG EC tablet Take 81 mg by mouth daily.  loratadine (CLARITIN) 10 MG tablet Take 10 mg by mouth daily.  Multiple Vitamin (MULTIVITAMIN PO) Take 1 tablet by mouth daily        No current facility-administered medications for this visit. Allergies   Allergen Reactions    Reclast [Zoledronic Acid] Other (See Comments)     Patient has a intolerance to this medication .  It makes her feel \"out of it\"     Abatacept Other (See Comments)     Heart palpitations    Asa [Aspirin]     Celexa [Citalopram Hydrobromide] Nausea Only     Nausea and headache    Demerol Nausea Only    Dye [Barium-Containing Compounds] Nausea Only    Dye [Iodides] Itching     CONTRAST DYE, CT DYE, IV CONTRAST     Fenofibrate      Chest pain    Hydrocodone-Acetaminophen Other (See Comments)     Shaking, attacks nervous system    Lasix [Furosemide] Nausea Only    Levofloxacin      palpitations    Losartan      Nausea and dizziness    Methotrexate Other (See Comments)     Dizzy and fainted    Mirtazapine      Tremor, nausea, headache    Neurontin [Gabapentin] Nausea Only     Dizziness      Nitroglycerin Other (See Comments)     Passed out    Plaquenil [Hydroxychloroquine Sulfate] Other (See Comments)     nervousness    Trazodone      Muscle stiffness    Cozaar [Losartan Potassium] Palpitations    Esomeprazole Magnesium Nausea And Vomiting    Lisinopril Other (See Comments)     Muscle cramps in legs    Prilosec [Omeprazole] Nausea And Vomiting       Health Maintenance   Topic Date Due    DTaP/Tdap/Td vaccine (1 - Tdap) 05/25/1962    Annual Wellness Visit (AWV)  05/25/2006    DEXA (modify frequency per FRAX score)  05/25/2008    Shingles Vaccine (1 of 2) 03/16/2012    Pneumococcal 65+ years Vaccine (2 of 2 - PPSV23) 04/10/2017    Flu vaccine (1) 09/01/2019    Potassium monitoring  06/10/2020    Creatinine monitoring  06/10/2020       Subjective:      Review of Systems Constitutional: Negative for chills and fever. HENT: Positive for ear discharge and ear pain. All other systems reviewed and are negative. Objective:     Physical Exam   Constitutional: She is oriented to person, place, and time. She appears well-developed and well-nourished. No distress. HENT:   Head: Normocephalic and atraumatic. Right Ear: Tympanic membrane is scarred. Left Ear: Tympanic membrane is scarred, perforated and erythematous. Ears:    Eyes: Pupils are equal, round, and reactive to light. Conjunctivae are normal.   Neck: Normal range of motion. Neck supple. Cardiovascular: Normal rate. Pulmonary/Chest: Effort normal. No respiratory distress. Abdominal: Soft. Musculoskeletal: Normal range of motion. Neurological: She is alert and oriented to person, place, and time. Skin: Skin is warm and dry. Psychiatric: She has a normal mood and affect. Her behavior is normal. Judgment and thought content normal.   Vitals reviewed. /70   Pulse 77   Temp 97.4 °F (36.3 °C)   Resp 16   Ht 5' 7\" (1.702 m)   Wt 182 lb (82.6 kg)   SpO2 98%   BMI 28.51 kg/m²   No results found for this visit on 08/25/19. Assessment/ Plan       Diagnosis Orders   1. Perforation of left tympanic membrane  Nelly Pringle MD, Otolaryngology, [facility]) 0.3 % otic solution   2. History of frequent ear infections  Nelly Pringle MD, Otolaryngology, [place]   Patient did request follow up with ENT, I have made referral.        Patient given educational materials - see patient instructions. Discussed use, benefit, and side effects of prescribed medications. All patient questions answered. Pt voiced understanding. Patient agreedwith treatment plan. Follow up as needed    Patient Instructions   1. Tylenol for pian  2. Use drops as directed  3.  Follow up with Dr. Ciera Quintanilla        Electronically signed by ULISES Vidales on 8/25/2019 at 2:31 PM

## 2019-08-26 ENCOUNTER — PROCEDURE VISIT (OUTPATIENT)
Dept: UROLOGY | Age: 74
End: 2019-08-26
Payer: MEDICARE

## 2019-08-26 VITALS — HEIGHT: 65 IN | WEIGHT: 180 LBS | BODY MASS INDEX: 29.99 KG/M2 | TEMPERATURE: 97.3 F

## 2019-08-26 DIAGNOSIS — R31.0 GROSS HEMATURIA: Primary | ICD-10-CM

## 2019-08-26 LAB
APPEARANCE FLUID: NORMAL
BILIRUBIN, POC: NORMAL
BLOOD URINE, POC: NORMAL
CLARITY, POC: CLEAR
COLOR, POC: YELLOW
GLUCOSE URINE, POC: NORMAL
KETONES, POC: NORMAL
LEUKOCYTE EST, POC: NORMAL
NITRITE, POC: NORMAL
PH, POC: 6.5
PROTEIN, POC: NORMAL
SPECIFIC GRAVITY, POC: 1.01
UROBILINOGEN, POC: 0.2

## 2019-08-26 PROCEDURE — 52000 CYSTOURETHROSCOPY: CPT | Performed by: UROLOGY

## 2019-08-26 PROCEDURE — 81002 URINALYSIS NONAUTO W/O SCOPE: CPT | Performed by: UROLOGY

## 2019-08-26 NOTE — PROGRESS NOTES
.  Ureteral orifice(s) was/were seen bilateral. Ureteral orifice(s) both were in the normal location and both ureteral orifices were effluxing clear urine. Unremarkable cystoscopy                            Complications:  None; patient tolerated the procedure well. Disposition: To home after observation. Condition: stable        DATA:  CMP:    Lab Results   Component Value Date     06/10/2019    K 3.7 06/10/2019     06/10/2019    CO2 30 06/10/2019    BUN 19 06/10/2019    CREATININE 0.8 06/10/2019    LABGLOM >60 06/10/2019    GLUCOSE 118 06/10/2019    PROT 7.4 06/10/2019    LABALBU 4.5 06/10/2019    CALCIUM 10.9 06/10/2019    BILITOT 0.3 06/10/2019    ALKPHOS 60 06/10/2019    AST 17 06/10/2019    ALT 24 06/10/2019     Results for orders placed or performed in visit on 08/26/19   POCT Urinalysis no Micro   Result Value Ref Range    Color, UA yellow     Clarity, UA clear     Glucose, UA POC neg     Bilirubin, UA neg     Ketones, UA neg     Spec Grav, UA 1.010     Blood, UA POC neg     pH, UA 6.5     Protein, UA POC neg     Urobilinogen, UA 0.2     Leukocytes, UA neg     Nitrite, UA neg     Appearance, Fluid  Clear, Slightly Cloudy               IMAGING:  CT urogram reviewed done 6/4/2019 shows normal kidneys bilaterally except for small benign renal cortical cyst otherwise normal-appearing kidneys with no enhancing solid masses no stones no hydronephrosis renal collecting system is normal.    1. Gross hematuria  Cystoscopy negative CT urogram negative her urinalysis is negative. We will presume the hematuria is related to cystitis. We will have her follow-up in 6 months for recheck and then probably annually thereafter  - Cystoscopy  - POCT Urinalysis no Micro      Orders Placed This Encounter   Procedures    POCT Urinalysis no Micro    Cystoscopy        Return in about 6 months (around 2/26/2020) for Follow-up with Manuela Shelley next visit.

## 2019-09-12 ENCOUNTER — OFFICE VISIT (OUTPATIENT)
Dept: OTOLARYNGOLOGY | Facility: CLINIC | Age: 74
End: 2019-09-12

## 2019-09-12 VITALS
WEIGHT: 177 LBS | BODY MASS INDEX: 27.78 KG/M2 | HEIGHT: 67 IN | HEART RATE: 72 BPM | OXYGEN SATURATION: 97 % | SYSTOLIC BLOOD PRESSURE: 146 MMHG | DIASTOLIC BLOOD PRESSURE: 68 MMHG | TEMPERATURE: 97.7 F

## 2019-09-12 DIAGNOSIS — J31.0 CHRONIC RHINITIS: ICD-10-CM

## 2019-09-12 DIAGNOSIS — H92.02 OTALGIA, LEFT: ICD-10-CM

## 2019-09-12 DIAGNOSIS — J30.89 NON-SEASONAL ALLERGIC RHINITIS, UNSPECIFIED TRIGGER: ICD-10-CM

## 2019-09-12 DIAGNOSIS — H93.13 TINNITUS, BILATERAL: ICD-10-CM

## 2019-09-12 DIAGNOSIS — H90.6 HEARING LOSS, MIXED, BILATERAL: ICD-10-CM

## 2019-09-12 DIAGNOSIS — H73.92 DISORDER OF TYMPANIC MEMBRANE OF LEFT EAR: Primary | ICD-10-CM

## 2019-09-12 PROCEDURE — 92504 EAR MICROSCOPY EXAMINATION: CPT | Performed by: OTOLARYNGOLOGY

## 2019-09-12 PROCEDURE — 99204 OFFICE O/P NEW MOD 45 MIN: CPT | Performed by: OTOLARYNGOLOGY

## 2019-09-12 RX ORDER — IPRATROPIUM BROMIDE 42 UG/1
2 SPRAY, METERED NASAL 4 TIMES DAILY PRN
Qty: 45 ML | Refills: 3 | Status: SHIPPED | OUTPATIENT
Start: 2019-09-12 | End: 2019-09-25 | Stop reason: SINTOL

## 2019-09-12 NOTE — PROGRESS NOTES
John Choudhury Jr, MD     Chief Complaint   Patient presents with   • Allergic Rhinitis        HISTORY OF PRESENT ILLNESS:   Accompanied by:     Nely Hernandez is a  74 y.o. female with L ear rupture.  She was treated by PCP and referred.  She is concerned about ear drum rupture.  Hearing is almost zero. Had hearing aids for a months but not using.  Patient is not using hearing aids.  R ear had surgery 4-5 yrs ago. Done [place]. Patched a hole. No further ENT evaluation. Last seen 4-5 years ago. Hearing barely returned in R ear.  She had bleeding L tympanic membrane, like needle in ear.  says lot of blood.  No ENT evaluation.   Smoke- none  Drink- none  She had AS decreased hearing prior to this.  She had sudden onset of the ear symptoms. She had pain.  She has allergies and is seeing Dr Moon now.  She is not on shots.  She feels this is allergy.  She was dizzy and shaky after ear bled.  Ringing- L>R, sounds highpitched, intermittent.    Review of Systems  Reviewed per patient intake note and confirmed with patient    Past History:  History reviewed. No pertinent past medical history.  Past Surgical History:   Procedure Laterality Date   • BREAST BIOPSY     • HYSTERECTOMY     • US GUIDED FINE NEEDLE ASPIRATION  7/17/2018     Family History   Problem Relation Age of Onset   • No Known Problems Mother    • No Known Problems Father    • No Known Problems Sister    • No Known Problems Brother    • No Known Problems Daughter    • No Known Problems Son    • No Known Problems Maternal Grandmother    • No Known Problems Paternal Grandmother    • No Known Problems Maternal Aunt    • No Known Problems Paternal Aunt    • BRCA 1/2 Neg Hx    • Breast cancer Neg Hx    • Colon cancer Neg Hx    • Endometrial cancer Neg Hx    • Ovarian cancer Neg Hx      Social History     Tobacco Use   • Smoking status: Never Smoker   • Smokeless tobacco: Never Used   Substance Use Topics   • Alcohol use: No     Frequency:  Never   • Drug use: No     No outpatient medications have been marked as taking for the 9/12/19 encounter (Office Visit) with John Choudhury Jr., MD.     Allergies:  Boniva [ibandronic acid]; Buspar [buspirone]; Celexa [citalopram hydrobromide]; Colcrys [colchicine]; Contrast dye; Levaquin [levofloxacin]; Lisinopril; Lortab [hydrocodone-acetaminophen]; Nexium [esomeprazole magnesium]; Nitroglycerin; Plaquenil [hydroxychloroquine sulfate]; Prilosec [omeprazole]; and Repatha [evolocumab]        Vital Signs:   Temp:  [97.7 °F (36.5 °C)] 97.7 °F (36.5 °C)  Heart Rate:  [72] 72  BP: (146)/(68) 146/68    EXAMINATION:   CONSTITUTIONAL:    well nourished, well-developed, alert, oriented, in no acute distress  with [place]ian accent    BODY HABITUS:    Normal body habitus    COMMUNICATION:    able to communicate normally, normal voice quality    HEAD:    Normocephalic, without obvious abnormality, atraumatic    FACE:    structure normal, no tenderness present, no lesions/masses, no evidence of trauma    SALIVARY GLANDS:    parotid glands with no tenderness, no swelling, no masses, submandibular glands with normal size, nontender     EYE:    ocular motility normal, eyelids normal, orbits normal, no proptosis, conjunctiva clear, sclera non-icteric, pupils equal, round, reactive to light and accomodation  Color:   blue    HEARING:      response to conversational voice decreased  Bilateral    EARS:    Otoscopic exam      Bilateral  Microscopic exam- See procedure note     Left- see procedure note  Right  normal pinna with no lesions, Canals normal size and shape, Tympanic membranes normal, Ossicular chain intact, Middle ear clear     NOSE EXTERNAL:    APPEARANCE: normal, straight, with good projection, no tenderness, no lesions, no tenderness, good nasal support, patent nares    NOSE INTERNAL:    Anterior rhinoscopy   NASALMUCOSA:    abnormal:        Bilateral- Bluish and boggy    NASAL PASSAGES:     abnormal   Bilateral-  Very narrowed    NASAL VALVE:     intact, good support and no nasal obstruction   SEPTUM:     mucosa abnormal   Bilateral- Bluish and boggy     midline     wide All levels   INFERIOR TURBINATES:     abnormal  Bilateral- Bluish and boggy    SECRETIONS:     normal amount, clear    ORAL CAVITY:    Normal lips with no lesions, dentition normal for age, FOM intact without lesions and normal salivary flow, Mucosa intact without lesions, Hard and soft palate normal without lesions    OROPHARYNX:    Direct examination  oropharyngeal mucosa normal, tonsil with normal appearance   Bilateral    NECK:    normal appearance, no masses, no lesions, larynx normal mobility, trachea midline  incision well healed- R mid neck    LYMPH NODES :    no adenopathy    THYROID:    no overt thyromegaly, no tenderness, nodules or mass present on palpation, position midline    CHEST/RESPIRATORY:    respiratory effort normal, no rales, rubs or wheezing, no stridor, normal appearance to chest    CARDIOVASCULAR:    regular rate and rhythm, no murmurs, gallups, no peripheral edema    NEURO/PSYCHIATRIC :    oriented appropriately for age, mood normal, affect appropriate, cranial nerves intact grossly (unless specifically described), gait normal for age    RESULTS REVIEW:    I have personally reviewed the patient's ct of the sinus images.  I have personally reviewed the patient's ct scan of the sinuses without contrast report.     Assessment      Problem List Items Addressed This Visit     None      Visit Diagnoses     Disorder of tympanic membrane of left ear    -  Primary    Cast of blood on TM and EAC casuing ear sxs    Relevant Orders    Comprehensive Hearing Test    Hearing loss, mixed, bilateral        Relevant Orders    Comprehensive Hearing Test    Otalgia, left        Relevant Orders    Comprehensive Hearing Test    Non-seasonal allergic rhinitis, unspecified trigger        Chronic rhinitis        Tinnitus, bilateral             Diagnosis  Plan   1. Disorder of tympanic membrane of left ear  Comprehensive Hearing Test    Cast of blood on TM and EAC casuing ear sxs   2. Hearing loss, mixed, bilateral  Comprehensive Hearing Test   3. Otalgia, left  Comprehensive Hearing Test   4. Non-seasonal allergic rhinitis, unspecified trigger     5. Chronic rhinitis     6. Tinnitus, bilateral         Plan       Conservative treatment for both problems   Patient has 2 separate issues.  She has allergic rhinitis with vasomotor rhinitis.  I plan to treat this with nasal topical medication.  Patient will need audiometric evaluation for her hearing.  She has the remaining blood removed from the external auditory canal.  I see no evidence of perforation.  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  The middle ear is intact at this point time  Ear care with oil  Atrovent for nasal drainage  Audiometric examination ordered  Patient, Spouse understand(s) and agree(s) with the treatment plan as described.  New Medications Ordered This Visit   Medications   • ipratropium (ATROVENT) 0.06 % nasal spray     Si sprays into the nostril(s) as directed by provider 4 (Four) Times a Day As Needed for Rhinitis. For drainage     Dispense:  45 mL     Refill:  3     Orders Placed This Encounter   Procedures   • Comprehensive Hearing Test          Return after audio, for Recheck Ears and hearing, Atrovent effect.    John Choudhury Jr, MD  19  12:00 PM

## 2019-09-12 NOTE — PATIENT INSTRUCTIONS
EAR CARE: :using oil  Use a hair dryer on low heat and blow into the ear canals 2 times daily to keep ears dry.  DO Not use Q tips or Camryn pins, ever!!  Do not use alcohol in the ear canal (this will cause dryness and itching)  NO peroxide or alcohol in ears  Oil: Use Sweet oil, Olive oil, or Mineral oil, purchased over the counter, 2 to 3 times a week, Do not use Q tips, You may use a hair dryer on low heat. Blow in ears for 10-15 seconds 2 times daily to dry ear canals or if ear canals are itching.    Wear hearing aids    Ipratromium Bromide (Atrovent) spray use 1-2 puffs each nostril 3-4 times daily AS needed for drainage    NASAL SALINE:  Use 2 puffs each nostril 4-6 times daily and more frequently if possible.  You can buy saline spray or you can make your own and use an old spray bottle to administer  Use a humidifier at bedside  Recipe for saline:d  Water                                 1 quart  Salt (table)                        1 tablespoon  Gylcerin (or Aida Syrup)    1 teaspoon  Sodium bicarbonate           1 teaspoon  Sprays or [place] pots are recommended    Nasal steroid use:  Using nasal steroids:  You will be prescribed one of the following nasal steroids: Flonase, Nasacort, Nasonex, Rhinocort, Qnasl, Zetonna  2 puffs each nostril 2 times daily  Start as soon as possible  If you are using Afrin for 3 days with the nasal steroid,  Use Afrin first and wait 10 minutes to allow the nose to open. Then administer nasal steroids.    Hearing testing ordered

## 2019-09-12 NOTE — PROGRESS NOTES
Procedure Note    Anesthesia: none    Procedure:  Ear microscopy left    Procedure Details:    The patient was placed supine on the procedure table. Using a speculum, the ear was examined with the microscope. Using micro-instrumentation, the ear canal was oiled and then blood cast of EAC and tympanic membrane removed  The  Middle ear was visualized.    Findings:   Ear Canal:     LEFT: Cast of old blood mixed with earwax and dead skin of the external auditory canal and the tympanic membrane  Tympanic Membrane:     LEFT: Coated onto the tympanic membrane, old blood, no perforation seen  Ossicular Chain:     LEFT: Intact after cleaning  Middle ear space:     LEFT: No fluid, normal middle ear mucosa    Condition:  Stable.  Patient tolerated procedure well.    Complications:  None    Post-procedure instructions reviewed with Patient, Spouse  Ear instructions

## 2019-09-13 ENCOUNTER — TELEPHONE (OUTPATIENT)
Dept: OTOLARYNGOLOGY | Facility: CLINIC | Age: 74
End: 2019-09-13

## 2019-09-13 NOTE — TELEPHONE ENCOUNTER
Pt's  called stating that the nasal spray that was prescribed last night made her heart race all night. She is really allergic so he said she will be going back to Flonase.

## 2019-09-25 ENCOUNTER — PROCEDURE VISIT (OUTPATIENT)
Dept: OTOLARYNGOLOGY | Facility: CLINIC | Age: 74
End: 2019-09-25

## 2019-09-25 ENCOUNTER — OFFICE VISIT (OUTPATIENT)
Dept: OTOLARYNGOLOGY | Facility: CLINIC | Age: 74
End: 2019-09-25

## 2019-09-25 VITALS
OXYGEN SATURATION: 96 % | SYSTOLIC BLOOD PRESSURE: 110 MMHG | DIASTOLIC BLOOD PRESSURE: 74 MMHG | WEIGHT: 177 LBS | HEART RATE: 71 BPM | TEMPERATURE: 98.7 F | HEIGHT: 67 IN | RESPIRATION RATE: 17 BRPM | BODY MASS INDEX: 27.78 KG/M2

## 2019-09-25 DIAGNOSIS — J30.89 NON-SEASONAL ALLERGIC RHINITIS, UNSPECIFIED TRIGGER: ICD-10-CM

## 2019-09-25 DIAGNOSIS — H73.92 DISORDER OF TYMPANIC MEMBRANE OF LEFT EAR: ICD-10-CM

## 2019-09-25 DIAGNOSIS — H90.3 SENSORINEURAL HEARING LOSS (SNHL) OF BOTH EARS: Primary | ICD-10-CM

## 2019-09-25 DIAGNOSIS — H92.02 OTALGIA, LEFT: ICD-10-CM

## 2019-09-25 DIAGNOSIS — J31.0 CHRONIC RHINITIS: ICD-10-CM

## 2019-09-25 DIAGNOSIS — H93.13 TINNITUS, BILATERAL: ICD-10-CM

## 2019-09-25 PROCEDURE — 99213 OFFICE O/P EST LOW 20 MIN: CPT | Performed by: OTOLARYNGOLOGY

## 2019-09-25 RX ORDER — PHENOL 1.4 %
1 AEROSOL, SPRAY (ML) MUCOUS MEMBRANE 2 TIMES DAILY
COMMUNITY

## 2019-09-25 RX ORDER — RANITIDINE 150 MG/1
1 TABLET ORAL DAILY
COMMUNITY
Start: 2019-08-02 | End: 2020-05-31

## 2019-09-25 RX ORDER — ALLOPURINOL 300 MG/1
300 TABLET ORAL DAILY
COMMUNITY
Start: 2019-08-02

## 2019-09-25 RX ORDER — OLMESARTAN MEDOXOMIL AND HYDROCHLOROTHIAZIDE 40/25 40; 25 MG/1; MG/1
1 TABLET ORAL DAILY
COMMUNITY
Start: 2019-08-02

## 2019-09-25 RX ORDER — CYCLOSPORINE 0.5 MG/ML
1 EMULSION OPHTHALMIC 2 TIMES DAILY
Refills: 3 | COMMUNITY
Start: 2019-06-19

## 2019-09-25 RX ORDER — LORATADINE 10 MG/1
1 TABLET ORAL DAILY
COMMUNITY
Start: 2019-05-28

## 2019-09-25 RX ORDER — ASPIRIN 81 MG/1
81 TABLET ORAL DAILY
COMMUNITY

## 2019-09-25 NOTE — PATIENT INSTRUCTIONS
Wear hearing aids    NASAL SALINE:  Use 2 puffs each nostril 4-6 times daily and more frequently if possible.  You can buy saline spray or you can make your own and use an old spray bottle to administer  Use a humidifier at bedside  Recipe for saline:d  Water                                 1 quart  Salt (table)                        1 tablespoon  Gylcerin (or Aida Syrup)    1 teaspoon  Sodium bicarbonate           1 teaspoon  Sprays or [place] pots are recommended    Nasal steroid use:  Using nasal steroids:  You will be prescribed one of the following nasal steroids: Flonase, Nasacort, Nasonex, Rhinocort, Qnasl, Zetonna  2 puffs each nostril 2 times daily  Start as soon as possible  If you are using Afrin for 3 days with the nasal steroid,  Use Afrin first and wait 10 minutes to allow the nose to open. Then administer nasal steroids.    No Atrovent.    EAR CARE: :using oil  Use a hair dryer on low heat and blow into the ear canals 2 times daily to keep ears dry.  DO Not use Q tips or Camryn pins, ever!!  Do not use alcohol in the ear canal (this will cause dryness and itching)  NO peroxide or alcohol in ears  Oil: Use Sweet oil, Olive oil, or Mineral oil, purchased over the counter, once a week, Do not use Q tips, You may use a hair dryer on low heat. Blow in ears for 10-15 seconds 2 times daily to dry ear canals or if ear canals are itching.

## 2019-09-25 NOTE — PROGRESS NOTES
Trinity Mccoy MA   Patient Intake Note    Review of Systems  Review of Systems   Constitutional: Negative for chills, fatigue and fever.   HENT:        See HPI   Eyes: Positive for pain, discharge, redness and itching.   Respiratory: Negative for cough, choking and wheezing.    Gastrointestinal: Positive for diarrhea. Negative for nausea and vomiting.   Musculoskeletal: Positive for neck pain and neck stiffness.   Allergic/Immunologic: Positive for environmental allergies. Negative for food allergies.   Neurological: Positive for dizziness, light-headedness and headaches. Negative for weakness.   Psychiatric/Behavioral: Positive for sleep disturbance.   All other systems reviewed and are negative.      QUALITY MEASURES    Tobacco Use: Screening and Cessation Intervention  Social History    Tobacco Use      Smoking status: Never Smoker      Smokeless tobacco: Never Used        Trinity Mccoy MA  9/25/2019  11:10 AM

## 2019-09-25 NOTE — PROGRESS NOTES
John Choudhury Jr, MD     FOLLOW UP NOTE     Chief Complaint   Patient presents with   • Ear Problem     Disorder of tympanic membrane of left ear.         HISTORY OF PRESENT ILLNESS:   Accompanied by:    Nely Hernandez is a  74 y.o. female who is here for follow up.   Patient says ear pain is much better.  She has improved nasal symptoms. She is more open.  She is not wearing hearing aids. She will forget to wear.  She apprently does not tolerate Atrovent. Makes heart race.    Review of Systems  Reviewed per patient intake note and confirmed by me    Past History:  Past medical and surgical history, family history and social history reviewed and updated when appropriate.  Current medications and allergies reviewed and updated when appropriate.  Allergies:  Aspirin; Barium-containing compounds; Boniva [ibandronic acid]; Buspirone; Citalopram hydrobromide; Colcrys [colchicine]; Contrast dye; Evolocumab; Fenofibrate; Furosemide; Gabapentin; Levaquin [levofloxacin]; Lisinopril; Lortab [hydrocodone-acetaminophen]; Losartan; Mirtazapine; Nitroglycerin; Plaquenil [hydroxychloroquine sulfate]; Trazodone; Esomeprazole magnesium; and Omeprazole        Vital Signs:   Temp:  [98.7 °F (37.1 °C)] 98.7 °F (37.1 °C)  Heart Rate:  [71] 71  Resp:  [17] 17  BP: (110)/(74) 110/74    EXAMINATION:   CONSTITUTIONAL:    well nourished, well-developed, alert, oriented, in no acute distress  with U[place] accent     BODY HABITUS:    Normal body habitus     COMMUNICATION:    able to communicate normally, normal voice quality     HEAD:    Normocephalic, without obvious abnormality, atraumatic     FACE:    structure normal, no tenderness present, no lesions/masses, no evidence of trauma     SALIVARY GLANDS:    parotid glands with no tenderness, no swelling, no masses, submandibular glands with normal size, nontender      EYE:    ocular motility normal, eyelids normal, orbits normal, no proptosis, conjunctiva clear, sclera non-icteric,  pupils equal, round, reactive to light and accomodation  Color:   blue     HEARING:      response to conversational voice decreased  Bilateral  Hearing aids- not wearing     EAR:    Otoscopic exam    EXTERNAL AUDITORY CANALS:     abnormal:        RIGHT: too clean  LEFT: too clean with min blood present on superior TM and lateral EAC, no active bleeding or lesion   TYMPANIC MEMBRANES:     Abnormal tympanic membrane:        RIGHT: thick, white  LEFT: thick, white, old blood sup ant TM, no granulation or lesion   MIDDLE EAR:     Abnormal:       BILATERAL  Not visualized:    MASTOID:     Non-surgical, Non-tender    PINNA:     normal, non-tender, skin intact without lesions      NOSE EXTERNAL:    APPEARANCE: normal, straight, with good projection, no tenderness, no lesions, no tenderness, good nasal support, patent nares     NOSE INTERNAL:    Anterior rhinoscopy   NASALMUCOSA:    abnormal:        Bilateral- Bluish and boggy    NASAL PASSAGES:     abnormal   Bilateral- Very narrowed    NASAL VALVE:     intact, good support and no nasal obstruction   SEPTUM:     mucosa abnormal   Bilateral- Bluish and boggy     midline     wide All levels   INFERIOR TURBINATES:     abnormal  Bilateral- Bluish and boggy    SECRETIONS:     normal amount, clear     ORAL CAVITY:    Normal lips with no lesions, FOM intact without lesions and normal salivary flow, Mucosa intact without lesions, Hard and soft palate normal without lesions  Edentulous- Upper and lower, dentures present     OROPHARYNX:    Direct examination  oropharyngeal mucosa normal, tonsil with normal appearance   Bilateral     NECK:    normal appearance, no masses, no lesions, larynx normal mobility, trachea midline  incision well healed- R mid neck     LYMPH NODES :    no adenopathy     THYROID:    no overt thyromegaly, no tenderness, nodules or mass present on palpation, position midline     CHEST/RESPIRATORY:    respiratory effort normal, no rales, rubs or wheezing, no  stridor, normal appearance to chest     CARDIOVASCULAR:    regular rate and rhythm, no murmurs, gallups, no peripheral edema     NEURO/PSYCHIATRIC :    oriented appropriately for age, mood normal, affect appropriate, cranial nerves intact grossly (unless specifically described), gait normal for age    RESULTS REVIEW:    Audiologic testing reviewed.  Tympanogram testing showed a bilateral Type A result.      Assessment    Diagnosis Plan   1. Sensorineural hearing loss (SNHL) of both ears      sloping mild to mod   2. Disorder of tympanic membrane of left ear      Thick AU, non visualized MEs   3. Otalgia, left      Improved   4. Non-seasonal allergic rhinitis, unspecified trigger      Thick mucosa  Better on meds  Not tolerate Atrovent- heart racing   5. Chronic rhinitis      Better on meds   6. Tinnitus, bilateral      Improved  Treat with hearing aids       Plan    Continue current management plan.   Patient is improved on nasal steroids. Ear pain is improved. She will resume HA use and see fitter for reprogamming.  Nasal saline  Nasal steroids BID  Wear hearing aids  Continue oil to ears   stop Atrovent    Patient, Spouse understand(s) and agree(s) with the treatment plan as described.    Return in about 3 months (around 12/25/2019) for Recheck nose and ears.    John Choudhury Jr, MD  09/25/19  11:37 AM

## 2019-10-21 ENCOUNTER — HOSPITAL ENCOUNTER (OUTPATIENT)
Dept: NON INVASIVE DIAGNOSTICS | Age: 74
Discharge: HOME | End: 2019-10-21
Payer: MEDICARE

## 2019-10-21 ENCOUNTER — OFFICE VISIT (OUTPATIENT)
Dept: VASCULAR SURGERY | Age: 74
End: 2019-10-21
Payer: MEDICARE

## 2019-10-21 ENCOUNTER — OFFICE VISIT (OUTPATIENT)
Dept: UROLOGY | Age: 74
End: 2019-10-21
Payer: MEDICARE

## 2019-10-21 VITALS — TEMPERATURE: 97.8 F | BODY MASS INDEX: 29.03 KG/M2 | HEIGHT: 67 IN | WEIGHT: 185 LBS

## 2019-10-21 VITALS
DIASTOLIC BLOOD PRESSURE: 79 MMHG | SYSTOLIC BLOOD PRESSURE: 136 MMHG | HEIGHT: 67 IN | HEART RATE: 79 BPM | BODY MASS INDEX: 28.25 KG/M2 | TEMPERATURE: 98.8 F | WEIGHT: 180 LBS | RESPIRATION RATE: 18 BRPM

## 2019-10-21 DIAGNOSIS — I73.9 CLAUDICATION (HCC): Primary | ICD-10-CM

## 2019-10-21 DIAGNOSIS — R09.89 DIMINISHED PULSES IN LOWER EXTREMITY: ICD-10-CM

## 2019-10-21 DIAGNOSIS — I73.9 CLAUDICATION (HCC): ICD-10-CM

## 2019-10-21 DIAGNOSIS — M54.6 ACUTE BILATERAL THORACIC BACK PAIN: Primary | ICD-10-CM

## 2019-10-21 DIAGNOSIS — Z09 FOLLOW UP: ICD-10-CM

## 2019-10-21 DIAGNOSIS — R39.11 URINARY HESITANCY: ICD-10-CM

## 2019-10-21 DIAGNOSIS — I83.819 VARICOSE VEINS WITH PAIN: Primary | ICD-10-CM

## 2019-10-21 LAB
APPEARANCE FLUID: NORMAL
BILIRUBIN, POC: NORMAL
BLOOD URINE, POC: NORMAL
CLARITY, POC: CLEAR
COLOR, POC: YELLOW
GLUCOSE URINE, POC: NORMAL
KETONES, POC: NORMAL
LEUKOCYTE EST, POC: NORMAL
NITRITE, POC: NORMAL
PH, POC: 6
PROTEIN, POC: NORMAL
SPECIFIC GRAVITY, POC: 1.01
UROBILINOGEN, POC: 0.2

## 2019-10-21 PROCEDURE — G8427 DOCREV CUR MEDS BY ELIG CLIN: HCPCS | Performed by: PHYSICIAN ASSISTANT

## 2019-10-21 PROCEDURE — G8598 ASA/ANTIPLAT THER USED: HCPCS | Performed by: PHYSICIAN ASSISTANT

## 2019-10-21 PROCEDURE — 1123F ACP DISCUSS/DSCN MKR DOCD: CPT | Performed by: PHYSICIAN ASSISTANT

## 2019-10-21 PROCEDURE — G8400 PT W/DXA NO RESULTS DOC: HCPCS | Performed by: PHYSICIAN ASSISTANT

## 2019-10-21 PROCEDURE — G8417 CALC BMI ABV UP PARAM F/U: HCPCS | Performed by: PHYSICIAN ASSISTANT

## 2019-10-21 PROCEDURE — 4040F PNEUMOC VAC/ADMIN/RCVD: CPT | Performed by: PHYSICIAN ASSISTANT

## 2019-10-21 PROCEDURE — 51798 US URINE CAPACITY MEASURE: CPT | Performed by: PHYSICIAN ASSISTANT

## 2019-10-21 PROCEDURE — G8484 FLU IMMUNIZE NO ADMIN: HCPCS | Performed by: PHYSICIAN ASSISTANT

## 2019-10-21 PROCEDURE — 1090F PRES/ABSN URINE INCON ASSESS: CPT | Performed by: PHYSICIAN ASSISTANT

## 2019-10-21 PROCEDURE — 1036F TOBACCO NON-USER: CPT | Performed by: PHYSICIAN ASSISTANT

## 2019-10-21 PROCEDURE — 93923 UPR/LXTR ART STDY 3+ LVLS: CPT

## 2019-10-21 PROCEDURE — 99214 OFFICE O/P EST MOD 30 MIN: CPT | Performed by: PHYSICIAN ASSISTANT

## 2019-10-21 PROCEDURE — 99213 OFFICE O/P EST LOW 20 MIN: CPT | Performed by: PHYSICIAN ASSISTANT

## 2019-10-21 PROCEDURE — 81002 URINALYSIS NONAUTO W/O SCOPE: CPT | Performed by: PHYSICIAN ASSISTANT

## 2019-10-21 RX ORDER — RANITIDINE 150 MG/1
TABLET ORAL
COMMUNITY
Start: 2019-10-17 | End: 2020-03-11 | Stop reason: SINTOL

## 2019-10-21 ASSESSMENT — ENCOUNTER SYMPTOMS
SHORTNESS OF BREATH: 0
BACK PAIN: 0
WHEEZING: 0
EYE PAIN: 0
ABDOMINAL PAIN: 0
SINUS PAIN: 0
VOMITING: 0

## 2019-10-22 ENCOUNTER — TELEPHONE (OUTPATIENT)
Dept: VASCULAR SURGERY | Age: 74
End: 2019-10-22

## 2019-10-28 DIAGNOSIS — M79.605 LEG PAIN, BILATERAL: Primary | ICD-10-CM

## 2019-10-28 DIAGNOSIS — I83.893 VARICOSE VEINS OF LEG WITH SWELLING, BILATERAL: ICD-10-CM

## 2019-10-28 DIAGNOSIS — M79.604 LEG PAIN, BILATERAL: Primary | ICD-10-CM

## 2019-10-29 ENCOUNTER — HOSPITAL ENCOUNTER (OUTPATIENT)
Dept: VASCULAR LAB | Age: 74
Discharge: HOME | End: 2019-10-29
Payer: MEDICARE

## 2019-10-29 PROCEDURE — 93970 EXTREMITY STUDY: CPT

## 2019-11-07 ENCOUNTER — OFFICE VISIT (OUTPATIENT)
Dept: CARDIOLOGY | Age: 74
End: 2019-11-07
Payer: MEDICARE

## 2019-11-07 VITALS
WEIGHT: 184 LBS | DIASTOLIC BLOOD PRESSURE: 62 MMHG | BODY MASS INDEX: 28.88 KG/M2 | HEIGHT: 67 IN | HEART RATE: 80 BPM | SYSTOLIC BLOOD PRESSURE: 124 MMHG

## 2019-11-07 DIAGNOSIS — E78.2 MIXED HYPERLIPIDEMIA: ICD-10-CM

## 2019-11-07 DIAGNOSIS — T46.6X5A MYALGIA DUE TO STATIN: ICD-10-CM

## 2019-11-07 DIAGNOSIS — M79.10 MYALGIA DUE TO STATIN: ICD-10-CM

## 2019-11-07 DIAGNOSIS — I10 ESSENTIAL HYPERTENSION: ICD-10-CM

## 2019-11-07 DIAGNOSIS — I25.10 CORONARY ARTERY DISEASE INVOLVING NATIVE CORONARY ARTERY OF NATIVE HEART WITHOUT ANGINA PECTORIS: Primary | ICD-10-CM

## 2019-11-07 PROCEDURE — G8427 DOCREV CUR MEDS BY ELIG CLIN: HCPCS | Performed by: CLINICAL NURSE SPECIALIST

## 2019-11-07 PROCEDURE — G8417 CALC BMI ABV UP PARAM F/U: HCPCS | Performed by: CLINICAL NURSE SPECIALIST

## 2019-11-07 PROCEDURE — 99213 OFFICE O/P EST LOW 20 MIN: CPT | Performed by: CLINICAL NURSE SPECIALIST

## 2019-11-07 PROCEDURE — G8598 ASA/ANTIPLAT THER USED: HCPCS | Performed by: CLINICAL NURSE SPECIALIST

## 2019-11-07 PROCEDURE — G8484 FLU IMMUNIZE NO ADMIN: HCPCS | Performed by: CLINICAL NURSE SPECIALIST

## 2019-11-07 PROCEDURE — 1090F PRES/ABSN URINE INCON ASSESS: CPT | Performed by: CLINICAL NURSE SPECIALIST

## 2019-11-07 PROCEDURE — 4040F PNEUMOC VAC/ADMIN/RCVD: CPT | Performed by: CLINICAL NURSE SPECIALIST

## 2019-11-07 PROCEDURE — 1123F ACP DISCUSS/DSCN MKR DOCD: CPT | Performed by: CLINICAL NURSE SPECIALIST

## 2019-11-07 PROCEDURE — G8400 PT W/DXA NO RESULTS DOC: HCPCS | Performed by: CLINICAL NURSE SPECIALIST

## 2019-11-07 PROCEDURE — 1036F TOBACCO NON-USER: CPT | Performed by: CLINICAL NURSE SPECIALIST

## 2019-11-07 ASSESSMENT — ENCOUNTER SYMPTOMS
BLURRED VISION: 0
COUGH: 0
ORTHOPNEA: 0
HEARTBURN: 0
SHORTNESS OF BREATH: 1

## 2019-12-05 ENCOUNTER — TRANSCRIBE ORDERS (OUTPATIENT)
Dept: ADMINISTRATIVE | Facility: HOSPITAL | Age: 74
End: 2019-12-05

## 2019-12-05 DIAGNOSIS — R27.0 ATAXIA: ICD-10-CM

## 2019-12-05 DIAGNOSIS — E04.9 NON-TOXIC GOITER: ICD-10-CM

## 2019-12-05 DIAGNOSIS — R46.89 OTHER SYMPTOMS AND SIGNS INVOLVING APPEARANCE AND BEHAVIOR: ICD-10-CM

## 2019-12-05 DIAGNOSIS — D32.9 BENIGN NEOPLASM OF MENINGES, UNSPECIFIED (HCC): Primary | ICD-10-CM

## 2019-12-09 ENCOUNTER — TELEPHONE (OUTPATIENT)
Dept: VASCULAR SURGERY | Age: 74
End: 2019-12-09

## 2019-12-11 ENCOUNTER — HOSPITAL ENCOUNTER (OUTPATIENT)
Dept: MRI IMAGING | Facility: HOSPITAL | Age: 74
Discharge: HOME | End: 2019-12-11
Admitting: PHYSICIAN ASSISTANT

## 2019-12-11 ENCOUNTER — HOSPITAL ENCOUNTER (OUTPATIENT)
Dept: ULTRASOUND IMAGING | Facility: HOSPITAL | Age: 74
Discharge: HOME | End: 2019-12-11

## 2019-12-11 DIAGNOSIS — E04.9 NON-TOXIC GOITER: ICD-10-CM

## 2019-12-11 DIAGNOSIS — D32.9 BENIGN NEOPLASM OF MENINGES, UNSPECIFIED (HCC): ICD-10-CM

## 2019-12-11 DIAGNOSIS — R27.0 ATAXIA: ICD-10-CM

## 2019-12-11 DIAGNOSIS — R46.89 OTHER SYMPTOMS AND SIGNS INVOLVING APPEARANCE AND BEHAVIOR: ICD-10-CM

## 2019-12-11 PROCEDURE — 70551 MRI BRAIN STEM W/O DYE: CPT

## 2019-12-11 PROCEDURE — 76536 US EXAM OF HEAD AND NECK: CPT

## 2019-12-16 ENCOUNTER — TRANSCRIBE ORDERS (OUTPATIENT)
Dept: ADMINISTRATIVE | Facility: HOSPITAL | Age: 74
End: 2019-12-16

## 2019-12-16 DIAGNOSIS — R27.0 ATAXIA, UNSPECIFIED: ICD-10-CM

## 2019-12-16 DIAGNOSIS — R46.89 OTHER SYMPTOMS AND SIGNS INVOLVING APPEARANCE AND BEHAVIOR: ICD-10-CM

## 2019-12-16 DIAGNOSIS — D32.9 BENIGN NEOPLASM OF MENINGES, UNSPECIFIED (HCC): Primary | ICD-10-CM

## 2019-12-17 ENCOUNTER — HOSPITAL ENCOUNTER (OUTPATIENT)
Dept: MRI IMAGING | Facility: HOSPITAL | Age: 74
Discharge: HOME | End: 2019-12-17

## 2019-12-17 DIAGNOSIS — R46.89 OTHER SYMPTOMS AND SIGNS INVOLVING APPEARANCE AND BEHAVIOR: ICD-10-CM

## 2019-12-17 DIAGNOSIS — D32.9 BENIGN NEOPLASM OF MENINGES, UNSPECIFIED (HCC): ICD-10-CM

## 2019-12-17 DIAGNOSIS — R27.0 ATAXIA, UNSPECIFIED: ICD-10-CM

## 2019-12-18 ENCOUNTER — OFFICE VISIT (OUTPATIENT)
Dept: OTOLARYNGOLOGY | Facility: CLINIC | Age: 74
End: 2019-12-18

## 2019-12-18 ENCOUNTER — TRANSCRIBE ORDERS (OUTPATIENT)
Dept: ADMINISTRATIVE | Facility: HOSPITAL | Age: 74
End: 2019-12-18

## 2019-12-18 VITALS
HEART RATE: 89 BPM | BODY MASS INDEX: 29.29 KG/M2 | DIASTOLIC BLOOD PRESSURE: 50 MMHG | HEIGHT: 67 IN | WEIGHT: 186.6 LBS | TEMPERATURE: 97.3 F | SYSTOLIC BLOOD PRESSURE: 90 MMHG

## 2019-12-18 DIAGNOSIS — J30.89 NON-SEASONAL ALLERGIC RHINITIS, UNSPECIFIED TRIGGER: ICD-10-CM

## 2019-12-18 DIAGNOSIS — H90.3 SENSORINEURAL HEARING LOSS (SNHL) OF BOTH EARS: Primary | ICD-10-CM

## 2019-12-18 DIAGNOSIS — J31.0 CHRONIC RHINITIS: ICD-10-CM

## 2019-12-18 DIAGNOSIS — Z97.4 PRESENCE OF EXTERNAL HEARING AID: ICD-10-CM

## 2019-12-18 DIAGNOSIS — H93.13 TINNITUS, BILATERAL: ICD-10-CM

## 2019-12-18 DIAGNOSIS — M26.621 ARTHRALGIA OF RIGHT TEMPOROMANDIBULAR JOINT: ICD-10-CM

## 2019-12-18 DIAGNOSIS — H73.91: ICD-10-CM

## 2019-12-18 DIAGNOSIS — H90.6 HEARING LOSS, MIXED, BILATERAL: ICD-10-CM

## 2019-12-18 DIAGNOSIS — H92.01 OTALGIA, RIGHT: ICD-10-CM

## 2019-12-18 DIAGNOSIS — M79.18 MYOFASCIAL PAIN ON RIGHT SIDE: ICD-10-CM

## 2019-12-18 PROCEDURE — 99213 OFFICE O/P EST LOW 20 MIN: CPT | Performed by: OTOLARYNGOLOGY

## 2019-12-18 NOTE — PROGRESS NOTES
Rehana Vazquez LPN   Patient Intake Note    Review of Systems  Review of Systems   Constitutional: Negative for chills, fatigue and fever.   HENT:        See hPI   Respiratory: Negative for cough, choking and shortness of breath.    Gastrointestinal: Negative for nausea and vomiting.   Neurological: Negative for dizziness, light-headedness and headaches.   Psychiatric/Behavioral: Negative for sleep disturbance.       Tobacco Use: Screening and Cessation Intervention  Social History    Tobacco Use      Smoking status: Never Smoker      Smokeless tobacco: Never Used    }    Rehana Vazquez LPN  12/18/2019  1:58 PM

## 2019-12-18 NOTE — PATIENT INSTRUCTIONS
NASAL SALINE:  Use 2 puffs each nostril 4-6 times daily and more frequently if possible.  You can buy saline spray or you can make your own and use an old spray bottle to administer  Use a humidifier at bedside  Recipe for saline:d  Water                                 1 quart  Salt (table)                        1 tablespoon  Gylcerin (or Aida Syrup)    1 teaspoon  Sodium bicarbonate           1 teaspoon  Sprays or Elva pots are recommended    Use Flonase as needed    TEMPOROMANDIBULAR JOINT EXERCISES     The temporomandibular joint, or the TMJ, is the jaw joint. This joint can frequently be a source of pain in the head and neck region. Typically because of its location, pain is felt either in area just in front of the ear, or in the ear itself. However, pain in the TMJ can be referred to any part of the head and neck.     An examination by the physician frequently reveals tenderness around the joint. Oftentimes, a crack or pop can also be felt. This may be an indication that the pain is in all actuality coming from the joint. An exhaustive search for a cause is carried out, in an effort to determine the etiology of the pain. Pain can be caused by grinding of teeth at night (bruxism), overuse (gum chewing, ice cracking, over opening mouth), poor dentition and malocclusion (bad bite). In an attempt to be thorough, your physician may involve others who have experience in this field, such as oral surgeons, dentists and pain experts.     Should you be diagnosed with TMJ pain, a course of conservative treatment is indicated, as this works in an overwhelming majority of cases. Because this pain originates from a joint, the treatment is similar to treatment for pain in other joints.  Treatment     Rest:  This is indicated to relieve the pressure on the joint. No chewing gum, tough meat, hard bread, cracking ice in the teeth, or any other activity that places undue stress on the joint. Simply, if it causes it to hurt,  stop doing it. This may be required for an unspecified period of time, usually 1 to 2 weeks.     Cold to the area:  This will reduce swelling and pain early in the course.     Heat to the area:  This improves blood flow to the area and speeds healing.     Pain Relievers:  Anti-inflammatory medications, such as aspirin, Motrin, Advil, Nuprin, Aleve or any other products in this category are satisfactory to use in the face of joint pain. Rarely are narcotics required, except possibly in the acute phase to gain some initial relief.  Dr. Choudhury may administer pain blocks to relieve severe pain and spasm. Nerve blocks may also be used.    Recommendations:  ?       Reduce/eliminate caffeinated drinks  ?       Reduce high sugar drinks and foods  ?       Get plenty of rest  ?       Practice relaxation techniques; Yoga, Biofeedback, Gerald Chi, etc.  ?       Exercise for overall fitness  ?       Eat healthy- High fiber, low fat/cholesterol eating, and change eating habits. We recommend Sugar Busters as a lifestyle change for eating.  ?       See your dentist regularly for checkups and bite checks.  Rehabilitation:  Once the acute pain has been controlled, you should begin exercises to strengthen and rehabilitate the TMJ.     Exercises: These should be performed for five minutes at least five times each day     Slowly open the mouth to its fullest extent, even using the fingers to exert gentle pressure.     Open and close the mouth as widely and rapidly as possible.     Open and close the mouth against resistance by placing the open palm underneath the chin, or the fingers on top of the chin.     Move the lower jaw side to side without resistance. Later, add resistance by placing both hands on either side of the jaw.     Push (protrude) the lower jaw without resistance. Later add resistance.     Should the above regimen fail to lead to improvement, your physician will discuss with you other forms of therapy. Since almost all  types of TMJ pain respond to conservative therapy, surgery is indicated only after exhausting the rehabilitation. Dr. Choudhury does not perform rehabilitative surgery on the temporomandibular joint, but refers patients to an oral surgery who specialize in this type of surgery.    EAR CARE: :using oil  Use a hair dryer on low heat and blow into the ear canals 2 times daily to keep ears dry.  DO Not use Q tips or Camryn pins, ever!!  Do not use alcohol in the ear canal (this will cause dryness and itching)  NO peroxide or alcohol in ears  Oil: Use Sweet oil, Olive oil, or Mineral oil, purchased over the counter, once a week, Do not use Q tips, You may use a hair dryer on low heat. Blow in ears for 10-15 seconds 2 times daily to dry ear canals or if ear canals are itching.    Thank you for enrolling in [facility]. Please follow the instructions below to securely access your online medical record. [facility] allows you to send messages to your doctor, view your test results, renew your prescriptions, schedule appointments, and more.    How Do I Sign Up?  1. In your Internet browser, go to [facility]  2. Click on the Sign Up Now link in the New User? box.   3. Enter your [facility] Activation Code exactly as it appears below. You will not need to use this code after you have completed the sign-up process. If you do not sign up before the expiration date, you must request a new code.  [facility] Activation Code: [email or web address]  Expires: 1/17/2020  2:18 PM    4. Enter the last four digits of your Social Security Number and your Date of Birth as indicated and click Next. You will be taken to the next sign-up page.  5. Create a [facility] username. Think of one that is secure and easy to remember.  6. Create a [facility] password. You can change your password at any time.  7. Choose a security question, enter your answer, and click Next. This can be used to access [facility] if you forget your password.   8. Select your  communication preference. Enter a valid e-mail address if you would like to receive e-mail notifications when new information is available in [facility].  9. Click Sign In. You can now view your medical record.     Additional Information  If you have questions, you can e-mail [email or web address], or call [phone] to talk to our [facility] staff. Remember, [facility] is NOT to be used for urgent needs. For medical emergencies, dial 911.

## 2019-12-18 NOTE — PROGRESS NOTES
John Choudhury Jr, MD     FOLLOW UP NOTE     Chief Complaint   Patient presents with   • Follow-up        HISTORY OF PRESENT ILLNESS:   Accompanied by:    Nely Hernandez is a  74 y.o. female who is here for follow up. She has had hearing aids reprogrammed.  R Ear will occasionally hurt.  She has sore R nose for 3 days. She has had recent viral illness. She thought was a blister.  Denies fever. She has had no history cold sores.    Review of Systems  Reviewed per patient intake note and confirmed by me    Past History:  Past medical and surgical history, family history and social history reviewed and updated when appropriate.  Current medications and allergies reviewed and updated when appropriate.  Allergies:  Atrovent hfa [ipratropium bromide hfa]; Aspirin; Barium-containing compounds; Boniva [ibandronic acid]; Buspirone; Citalopram hydrobromide; Colcrys [colchicine]; Contrast dye; Evolocumab; Fenofibrate; Furosemide; Gabapentin; Levaquin [levofloxacin]; Lisinopril; Lortab [hydrocodone-acetaminophen]; Losartan; Mirtazapine; Nitroglycerin; Plaquenil [hydroxychloroquine sulfate]; Trazodone; Esomeprazole magnesium; and Omeprazole        Vital Signs:   Temp:  [97.3 °F (36.3 °C)] 97.3 °F (36.3 °C)  Heart Rate:  [89] 89  BP: (90)/(50) 90/50    EXAMINATION:   CONSTITUTIONAL:    well nourished, well-developed, alert, oriented, in no acute distress  with Samoan accent     BODY HABITUS:    Normal body habitus     COMMUNICATION:    able to communicate normally, normal voice quality     HEAD:    Normocephalic, without obvious abnormality, atraumatic     FACE:    structure normal, no tenderness present, no lesions/masses, no evidence of trauma     SALIVARY GLANDS:    parotid glands with no tenderness, no swelling, no masses, submandibular glands with normal size, nontender      EYE:    ocular motility normal, eyelids normal, orbits normal, no proptosis, conjunctiva clear, sclera non-icteric, pupils equal, round,  reactive to light and accomodation  Color:   blue     HEARING:      response to conversational voice decreased  Bilateral  Hearing aids-AU wearing     EAR:    Otoscopic exam    EXTERNAL AUDITORY CANALS:     abnormal:        RIGHT: mild fungal elements  AS- normal  LEFT: improved TYMPANIC MEMBRANES:     Abnormal tympanic membrane:        RIGHT: thick, white, non-inflammed, mild TS  LEFT: thick, white, old blood sup ant TM, no granulation or lesion   MIDDLE EAR:     Abnormal:       BILATERAL  Not visualized:    MASTOID:     Non-surgical, Non-tender    PINNA:     normal, non-tender, skin intact without lesions      NOSE EXTERNAL:    APPEARANCE: normal, straight, with good projection, no tenderness, no lesions, no tenderness, good nasal support, patent nares     NOSE INTERNAL:    Anterior rhinoscopy   NASALMUCOSA:    abnormal:        Bilateral- Bluish and boggy    NASAL PASSAGES:     abnormal   Bilateral- Very narrowed    NASAL VALVE:     intact, good support and no nasal obstruction   SEPTUM:     mucosa abnormal   Bilateral- Bluish and boggy     midline     wide All levels   INFERIOR TURBINATES:     abnormal  Bilateral- Bluish and boggy    SECRETIONS:     normal amount, clear     ORAL CAVITY:    Normal lips with no lesions, FOM intact without lesions and normal salivary flow, Mucosa intact without lesions, Hard and soft palate normal without lesions  Edentulous- Upper and lower, dentures present     OROPHARYNX:    Direct examination  oropharyngeal mucosa normal, tonsil with normal appearance   Bilateral     NECK:    normal appearance, no masses, no lesions, larynx normal mobility, trachea midline  incision well healed- R mid neck     LYMPH NODES :    no adenopathy     THYROID:    no overt thyromegaly, no tenderness, nodules or mass present on palpation, position midline     CHEST/RESPIRATORY:    respiratory effort normal, no rales, rubs or wheezing, no stridor, normal appearance to chest     CARDIOVASCULAR:    regular  rate and rhythm, no murmurs, gallups, no peripheral edema     NEURO/PSYCHIATRIC :    oriented appropriately for age, mood normal, affect appropriate, cranial nerves intact grossly (unless specifically described), gait normal for age    TMJ EXAM:  Tenderness:     Right- mild  Opening:     Deviation none  Clicking/Popping:     Bilateral  Pain radiation:     none  OCCLUSION: dentures  RESULTS REVIEW:    No reports available for review          ASSESSMENT:      Diagnosis Plan   1. Sensorineural hearing loss (SNHL) of both ears      Using hearing aids   2. Disorder of tympanic membrane, right      Thick but not infected   3. Non-seasonal allergic rhinitis, unspecified trigger     4. Chronic rhinitis     5. Tinnitus, bilateral      no change   6. Hearing loss, mixed, bilateral     7. Otalgia, right      related to TMJ   8. Myofascial pain on right side      with otalgia   9. Arthralgia of right temporomandibular joint      mild   10. Presence of external hearing aid      AU           PLAN:   Conservative management.  Patient is improving. She has AD thickened tympanic membrane without infection..  She has R TMJ.  TMJ recommendations  Nasal care to blister  Nasal saline  Flonase PRN  Ear care with oil  MY CHART:  Patient is Encouraged to enroll in My Chart  Encouraged to review data and findings in My Chart          Patient understand(s) and agree(s) with the treatment plan as described.    Return in about 6 months (around 6/18/2020) for Recheck ears, TMJ, Ear pain, rhinitis.     John Choudhury Jr, MD  12/18/19  2:18 PM

## 2019-12-27 ENCOUNTER — HOSPITAL ENCOUNTER (OUTPATIENT)
Dept: MRI IMAGING | Facility: HOSPITAL | Age: 74
Discharge: HOME | End: 2019-12-27
Admitting: PHYSICIAN ASSISTANT

## 2019-12-27 LAB — CREAT BLDA-MCNC: 0.9 MG/DL (ref 0.6–1.3)

## 2019-12-27 PROCEDURE — 82565 ASSAY OF CREATININE: CPT

## 2019-12-27 PROCEDURE — A9577 INJ MULTIHANCE: HCPCS | Performed by: PHYSICIAN ASSISTANT

## 2019-12-27 PROCEDURE — 70552 MRI BRAIN STEM W/DYE: CPT

## 2019-12-27 PROCEDURE — 0 GADOBENATE DIMEGLUMINE 529 MG/ML SOLUTION: Performed by: PHYSICIAN ASSISTANT

## 2019-12-27 RX ADMIN — GADOBENATE DIMEGLUMINE 16 ML: 529 INJECTION, SOLUTION INTRAVENOUS at 15:32

## 2020-02-13 ENCOUNTER — TRANSCRIBE ORDERS (OUTPATIENT)
Dept: ADMINISTRATIVE | Facility: HOSPITAL | Age: 75
End: 2020-02-13

## 2020-02-13 DIAGNOSIS — Z78.0 POSTMENOPAUSAL STATUS: ICD-10-CM

## 2020-02-13 DIAGNOSIS — Z12.31 ENCOUNTER FOR SCREENING MAMMOGRAM FOR MALIGNANT NEOPLASM OF BREAST: Primary | ICD-10-CM

## 2020-02-25 ENCOUNTER — NURSE TRIAGE (OUTPATIENT)
Dept: OTHER | Facility: CLINIC | Age: 75
End: 2020-02-25

## 2020-02-25 ENCOUNTER — LAB (OUTPATIENT)
Dept: LAB | Facility: HOSPITAL | Age: 75
End: 2020-02-25

## 2020-02-25 ENCOUNTER — HOSPITAL ENCOUNTER (OUTPATIENT)
Dept: ULTRASOUND IMAGING | Facility: HOSPITAL | Age: 75
Discharge: HOME | End: 2020-02-25
Admitting: NURSE PRACTITIONER

## 2020-02-25 ENCOUNTER — TRANSCRIBE ORDERS (OUTPATIENT)
Dept: LAB | Facility: HOSPITAL | Age: 75
End: 2020-02-25

## 2020-02-25 DIAGNOSIS — I82.402 ACUTE EMBOLISM AND THROMBOSIS OF DEEP VEIN OF LEFT LOWER EXTREMITY (HCC): ICD-10-CM

## 2020-02-25 DIAGNOSIS — M25.472 EFFUSION OF LEFT ANKLE: Primary | ICD-10-CM

## 2020-02-25 DIAGNOSIS — M79.662 PAIN IN LEFT LOWER LEG: ICD-10-CM

## 2020-02-25 DIAGNOSIS — M79.672 PAIN IN LEFT FOOT: ICD-10-CM

## 2020-02-25 DIAGNOSIS — M25.472 LEFT ANKLE EFFUSION: ICD-10-CM

## 2020-02-25 DIAGNOSIS — M25.472 EFFUSION OF LEFT ANKLE: ICD-10-CM

## 2020-02-25 LAB
AUTO MIXED CELLS #: 0.5 10*3/MM3 (ref 0.1–2.6)
AUTO MIXED CELLS %: 7.1 % (ref 0.1–24)
ERYTHROCYTE [DISTWIDTH] IN BLOOD BY AUTOMATED COUNT: 14 % (ref 12.3–15.4)
ERYTHROCYTE [SEDIMENTATION RATE] IN BLOOD: >130 MM/HR (ref 0–20)
HCT VFR BLD AUTO: 39.1 % (ref 34–46.6)
HGB BLD-MCNC: 13.2 G/DL (ref 12–15.9)
LYMPHOCYTES # BLD AUTO: 2.5 10*3/MM3 (ref 0.7–3.1)
LYMPHOCYTES NFR BLD AUTO: 35.5 % (ref 19.6–45.3)
MCH RBC QN AUTO: 31.3 PG (ref 26.6–33)
MCHC RBC AUTO-ENTMCNC: 33.8 G/DL (ref 31.5–35.7)
MCV RBC AUTO: 92.7 FL (ref 79–97)
NEUTROPHILS # BLD AUTO: 4.1 10*3/MM3 (ref 1.7–7)
NEUTROPHILS NFR BLD AUTO: 57.4 % (ref 42.7–76)
PLATELET # BLD AUTO: 223 10*3/MM3 (ref 140–450)
PMV BLD AUTO: 8.3 FL (ref 6–12)
RBC # BLD AUTO: 4.22 10*6/MM3 (ref 3.77–5.28)
URATE SERPL-MCNC: 3.7 MG/DL (ref 2.7–7.5)
WBC NRBC COR # BLD: 7.1 10*3/MM3 (ref 3.4–10.8)

## 2020-02-25 PROCEDURE — 86140 C-REACTIVE PROTEIN: CPT | Performed by: NURSE PRACTITIONER

## 2020-02-25 PROCEDURE — 84550 ASSAY OF BLOOD/URIC ACID: CPT

## 2020-02-25 PROCEDURE — 85652 RBC SED RATE AUTOMATED: CPT

## 2020-02-25 PROCEDURE — 93971 EXTREMITY STUDY: CPT

## 2020-02-25 PROCEDURE — 36415 COLL VENOUS BLD VENIPUNCTURE: CPT

## 2020-02-25 PROCEDURE — 85025 COMPLETE CBC W/AUTO DIFF WBC: CPT

## 2020-02-25 PROCEDURE — 86431 RHEUMATOID FACTOR QUANT: CPT | Performed by: NURSE PRACTITIONER

## 2020-02-25 NOTE — TELEPHONE ENCOUNTER
Reason for Disposition   Information only question and nurse able to answer    Protocols used: INFORMATION ONLY CALL - NO TRIAGE-ADULT-[place]    Patient's  called [facility]) to schedule appointment, with red flag complaint, transferred to RN access for triage.  states has an appointment scheduled with orthopedic doctor and asking if orthopedic doctor refers patient to vascular doctor will he be able to make an appointment. Education provided to allow orthopedic doctor assess patient and provide recommendations for further steps. Verbalized understanding and denied any further questions/concerns. Please do not respond to the triage nurse through this encounter. Any subsequent communication should be directly with the patient.

## 2020-02-26 ENCOUNTER — APPOINTMENT (OUTPATIENT)
Dept: ULTRASOUND IMAGING | Facility: HOSPITAL | Age: 75
End: 2020-02-26

## 2020-02-26 LAB
CHROMATIN AB SERPL-ACNC: 10.5 IU/ML (ref 0–14)
CRP SERPL-MCNC: 0.26 MG/DL (ref 0–0.5)

## 2020-03-03 ENCOUNTER — OFFICE VISIT (OUTPATIENT)
Dept: UROLOGY | Age: 75
End: 2020-03-03
Payer: MEDICARE

## 2020-03-03 VITALS
HEART RATE: 85 BPM | WEIGHT: 183 LBS | BODY MASS INDEX: 28.72 KG/M2 | DIASTOLIC BLOOD PRESSURE: 74 MMHG | TEMPERATURE: 97.6 F | HEIGHT: 67 IN | SYSTOLIC BLOOD PRESSURE: 157 MMHG

## 2020-03-03 LAB
BACTERIA URINE, POC: ABNORMAL
BILIRUBIN URINE: 0 MG/DL
BLOOD, URINE: NEGATIVE
CASTS URINE, POC: ABNORMAL
CLARITY: CLEAR
COLOR: YELLOW
CRYSTALS URINE, POC: ABNORMAL
EPI CELLS URINE, POC: ABNORMAL
GLUCOSE URINE: ABNORMAL
KETONES, URINE: NEGATIVE
LEUKOCYTE EST, POC: ABNORMAL
NITRITE, URINE: NEGATIVE
PH UA: 7 (ref 4.5–8)
PROTEIN UA: NEGATIVE
RBC URINE, POC: ABNORMAL
SPECIFIC GRAVITY UA: 1.02 (ref 1–1.03)
UROBILINOGEN, URINE: NORMAL
WBC URINE, POC: ABNORMAL
YEAST URINE, POC: ABNORMAL

## 2020-03-03 PROCEDURE — 4040F PNEUMOC VAC/ADMIN/RCVD: CPT | Performed by: PHYSICIAN ASSISTANT

## 2020-03-03 PROCEDURE — 81001 URINALYSIS AUTO W/SCOPE: CPT | Performed by: PHYSICIAN ASSISTANT

## 2020-03-03 PROCEDURE — 99213 OFFICE O/P EST LOW 20 MIN: CPT | Performed by: PHYSICIAN ASSISTANT

## 2020-03-03 PROCEDURE — G8427 DOCREV CUR MEDS BY ELIG CLIN: HCPCS | Performed by: PHYSICIAN ASSISTANT

## 2020-03-03 PROCEDURE — G8400 PT W/DXA NO RESULTS DOC: HCPCS | Performed by: PHYSICIAN ASSISTANT

## 2020-03-03 PROCEDURE — 1036F TOBACCO NON-USER: CPT | Performed by: PHYSICIAN ASSISTANT

## 2020-03-03 PROCEDURE — G8484 FLU IMMUNIZE NO ADMIN: HCPCS | Performed by: PHYSICIAN ASSISTANT

## 2020-03-03 PROCEDURE — 1090F PRES/ABSN URINE INCON ASSESS: CPT | Performed by: PHYSICIAN ASSISTANT

## 2020-03-03 PROCEDURE — 1123F ACP DISCUSS/DSCN MKR DOCD: CPT | Performed by: PHYSICIAN ASSISTANT

## 2020-03-03 PROCEDURE — G8417 CALC BMI ABV UP PARAM F/U: HCPCS | Performed by: PHYSICIAN ASSISTANT

## 2020-03-03 RX ORDER — GABAPENTIN 100 MG/1
CAPSULE ORAL
Refills: 1 | Status: ON HOLD | COMMUNITY
Start: 2019-12-04 | End: 2020-09-10 | Stop reason: SINTOL

## 2020-03-03 ASSESSMENT — ENCOUNTER SYMPTOMS
ABDOMINAL PAIN: 0
WHEEZING: 0
COUGH: 0
EYE DISCHARGE: 0
DIARRHEA: 0
EYE REDNESS: 0
SHORTNESS OF BREATH: 0
CONSTIPATION: 0
BACK PAIN: 0

## 2020-03-03 NOTE — PROGRESS NOTES
Bunny Gu is a 66 y.o. female who presents today   Chief Complaint   Patient presents with    Follow-up     I am here today for my 6 month follow up for a urine recheck        HPI:  Patient is a 53-year-old female here for 6-month follow-up. Originally when she was seen she was having overactive bladder symptoms and she had failed on medication and posterior tibial nerve stimulation treatment. Her symptoms have improved over time she had episode of gross hematuria and had cystoscopy and CT urogram which were negative. More recent she had had some hesitancy but she states this has improved over time without any medication. Her urine is clear. He has back pain she had been having as a result of arthritis. Overall no worsening or bothersome urinary complaints.     Past Medical History:   Diagnosis Date    Acid reflux     Anxiety     Arthritis     Back pain     CAD (coronary artery disease)     CAD (coronary artery disease)     Carotid artery occlusion     Chronic venous insufficiency 2012    Cough     Fibromyalgia     Goiter     Gout     Head ache     headaches    Heart murmur     History of colon polyps     History of colon polyps     Hoarseness     Hypercholesterolemia     Hyperlipidemia     Hypertension     Insomnia     Irregular heart beat     Itching     Muscle cramp     Neck pain     Osteoarthritis     Osteoporosis     Palpitations     RA (rheumatoid arthritis) (HCC)     Rash     Rectal bleeding     SOB (shortness of breath)     Sore throat     Tympanic membrane perforation     Varicose veins 2012       Past Surgical History:   Procedure Laterality Date    APPENDECTOMY      BREAST SURGERY  2002    CARDIAC CATHETERIZATION  9/10/15  JDT    EF 50%    CERVICAL FUSION       SECTION  1968    x 1    CHOLECYSTECTOMY  2012    [place]    COLONOSCOPY  1-    Dr LUJAN    COLONOSCOPY  2014    Dr. Pieter Barthel ANGIOPLASTY WITH STENT PLACEMENT  2012    Dr Travis Carrasco OF UTERUS      HYSTERECTOMY  1976    partial    KNEE SURGERY  2011    left knee surgery    PTCA      stent    TUBAL LIGATION      UPPER GASTROINTESTINAL ENDOSCOPY  2009?  UPPER GASTROINTESTINAL ENDOSCOPY  1-    Dr LUJAN    UPPER GASTROINTESTINAL ENDOSCOPY  05/06/2014    Dr. Emmanuel Orourke   01- [facility]    Left Leg Ablation    VARICOSE VEIN SURGERY  3-  [facility]    right leg ablation       Current Outpatient Medications   Medication Sig Dispense Refill    gabapentin (NEURONTIN) 100 MG capsule TAKE 1 CAPSULE BY MOUTH AT BEDTIME  1    Omega-3 Fatty Acids (FISH OIL) 1360 MG CAPS Take 1 capsule by mouth 2 times daily       metoprolol succinate (TOPROL XL) 50 MG extended release tablet TAKE 1 TABLET TWICE DAILY 180 tablet 3    azelastine (ASTELIN) 0.1 % nasal spray 1 spray by Nasal route 2 times daily Use in each nostril as directed      fluorometholone (FML) 0.1 % ophthalmic suspension 1 drop 2 times daily      Fexofenadine HCl (ALLEGRA ALLERGY PO) Take 1 tablet by mouth 2 times daily       ondansetron (ZOFRAN ODT) 4 MG disintegrating tablet Take 1 tablet by mouth every 8 hours as needed for Nausea or Vomiting 15 tablet 0    calcium carbonate 600 MG TABS tablet Take 1 tablet by mouth 2 times daily      Evolocumab (REPATHA) 140 MG/ML SOSY Inject 140 mg into the skin every 14 days Patient has not started      vitamin D (CHOLECALCIFEROL) 1000 UNIT TABS tablet Take 3 tablets daily       BENICAR HCT 40-25 MG per tablet Take 0.5 tablets by mouth 2 times daily       allopurinol (ZYLOPRIM) 300 MG tablet Take 300 mg by mouth daily.  aspirin 81 MG EC tablet Take 81 mg by mouth daily.       Multiple Vitamin (MULTIVITAMIN PO) Take 1 tablet by mouth daily       ranitidine (ZANTAC) 150 MG tablet       pantoprazole (PROTONIX) 40 MG tablet Take 40 mg by mouth daily      loratadine Never Used   Substance and Sexual Activity    Alcohol use: No    Drug use: No    Sexual activity: None   Lifestyle    Physical activity:     Days per week: None     Minutes per session: None    Stress: None   Relationships    Social connections:     Talks on phone: None     Gets together: None     Attends Che[employer] service: None     Active member of club or organization: None     Attends meetings of clubs or organizations: None     Relationship status: None    Intimate partner violence:     Fear of current or ex partner: None     Emotionally abused: None     Physically abused: None     Forced sexual activity: None   Other Topics Concern    None   Social History Narrative    None       Family History   Problem Relation Age of Onset    Diabetes Mother     Heart Disease Mother     Heart Disease Father     Diabetes Father     High Blood Pressure Father     Esophageal Cancer Father     Colon Polyps Father     Crohn's Disease Brother     Colon Cancer Neg Hx     Liver Cancer Neg Hx     Liver Disease Neg Hx     Rectal Cancer Neg Hx     Stomach Cancer Neg Hx        REVIEW OF SYSTEMS:  Review of Systems   Constitutional: Negative for chills and fever. HENT: Negative for congestion and hearing loss. Eyes: Negative for discharge and redness. Respiratory: Negative for cough, shortness of breath and wheezing. Cardiovascular: Negative for leg swelling. Gastrointestinal: Negative for abdominal pain, constipation and diarrhea. Endocrine: Negative for cold intolerance and heat intolerance. Genitourinary: Negative for decreased urine volume, difficulty urinating, dysuria, enuresis, flank pain, frequency, genital sores, hematuria and urgency. Musculoskeletal: Negative for back pain and gait problem. Skin: Negative for rash. Allergic/Immunologic: Negative for environmental allergies. Neurological: Negative for dizziness, weakness and headaches. Hematological: Does not bruise/bleed easily. Psychiatric/Behavioral: Negative for behavioral problems, confusion and sleep disturbance. I reviewed and agree with the documentation provided by the medical assistant on this patient for today's visit. PHYSICAL EXAM:  BP (!) 157/74   Pulse 85   Temp 97.6 °F (36.4 °C) (Temporal)   Ht 5' 7\" (1.702 m)   Wt 183 lb (83 kg)   BMI 28.66 kg/m²   Physical Exam  Vitals signs reviewed. Constitutional:       General: She is not in acute distress. Appearance: Normal appearance. She is not ill-appearing. HENT:      Head: Normocephalic. Nose: Nose normal.   Eyes:      Conjunctiva/sclera: Conjunctivae normal.   Neck:      Musculoskeletal: Normal range of motion. Cardiovascular:      Rate and Rhythm: Normal rate. Pulmonary:      Effort: Pulmonary effort is normal.   Skin:     Coloration: Skin is not pale. Neurological:      Mental Status: She is alert and oriented to person, place, and time. Psychiatric:         Behavior: Behavior normal.             DATA:  U/A:    Lab Results   Component Value Date    NITRITE neg 10/21/2019    COLORU Yellow 03/03/2020    PROTEINU Negative 03/03/2020    PHUR 7.0 03/03/2020    WBCUA TNTC 04/14/2019    RBCUA TNTC 04/14/2019    BACTERIA trace 04/14/2019    CLARITYU Clear 03/03/2020    SPECGRAV 1.020 03/03/2020    LEUKOCYTESUR small 03/03/2020    LEUKOCYTESUR Negative 06/10/2019    UROBILINOGEN Normal 03/03/2020    BILIRUBINUR 0 03/03/2020    BILIRUBINUR neg 10/21/2019    BLOODU Negative 03/03/2020    GLUCOSEU neg 03/03/2020     I personally reviewed her urinalysis results. 1. History of gross hematuria  Patient had one episode she had negative cystoscopy and CT urogram.  Had no further hematuria since. - POCT Urinalysis Dipstick w/ Micro (Auto)    2. History of urinary hesitancy   She has some mild occasional hesitancy this has improved over time she is on no urologic medications. Patient will follow-up PRN.       Orders Placed This Encounter   Procedures    POCT Urinalysis Dipstick w/ Micro (Auto)     No orders of the defined types were placed in this encounter. Plan:  Follow-up PRN.

## 2020-03-11 ENCOUNTER — OFFICE VISIT (OUTPATIENT)
Dept: GASTROENTEROLOGY | Age: 75
End: 2020-03-11
Payer: MEDICARE

## 2020-03-11 VITALS
HEART RATE: 82 BPM | WEIGHT: 186.6 LBS | OXYGEN SATURATION: 98 % | HEIGHT: 67 IN | SYSTOLIC BLOOD PRESSURE: 116 MMHG | DIASTOLIC BLOOD PRESSURE: 68 MMHG | BODY MASS INDEX: 29.29 KG/M2

## 2020-03-11 PROBLEM — R14.0 BLOATING: Status: ACTIVE | Noted: 2020-03-11

## 2020-03-11 PROCEDURE — G8427 DOCREV CUR MEDS BY ELIG CLIN: HCPCS | Performed by: NURSE PRACTITIONER

## 2020-03-11 PROCEDURE — 4040F PNEUMOC VAC/ADMIN/RCVD: CPT | Performed by: NURSE PRACTITIONER

## 2020-03-11 PROCEDURE — G8417 CALC BMI ABV UP PARAM F/U: HCPCS | Performed by: NURSE PRACTITIONER

## 2020-03-11 PROCEDURE — 1036F TOBACCO NON-USER: CPT | Performed by: NURSE PRACTITIONER

## 2020-03-11 PROCEDURE — G8400 PT W/DXA NO RESULTS DOC: HCPCS | Performed by: NURSE PRACTITIONER

## 2020-03-11 PROCEDURE — 99214 OFFICE O/P EST MOD 30 MIN: CPT | Performed by: NURSE PRACTITIONER

## 2020-03-11 PROCEDURE — 1123F ACP DISCUSS/DSCN MKR DOCD: CPT | Performed by: NURSE PRACTITIONER

## 2020-03-11 PROCEDURE — 1090F PRES/ABSN URINE INCON ASSESS: CPT | Performed by: NURSE PRACTITIONER

## 2020-03-11 PROCEDURE — G8484 FLU IMMUNIZE NO ADMIN: HCPCS | Performed by: NURSE PRACTITIONER

## 2020-03-11 RX ORDER — FAMOTIDINE 40 MG/1
40 TABLET, FILM COATED ORAL DAILY
Qty: 30 TABLET | Refills: 11 | Status: SHIPPED | OUTPATIENT
Start: 2020-03-11 | End: 2021-01-27

## 2020-03-11 ASSESSMENT — ENCOUNTER SYMPTOMS
RECTAL PAIN: 0
VOICE CHANGE: 0
DIARRHEA: 0
BACK PAIN: 1
TROUBLE SWALLOWING: 0
SORE THROAT: 0
COUGH: 0
ABDOMINAL PAIN: 0
BLOOD IN STOOL: 0
CONSTIPATION: 0
ANAL BLEEDING: 0
NAUSEA: 0
SHORTNESS OF BREATH: 0
ABDOMINAL DISTENTION: 0
VOMITING: 0

## 2020-03-11 NOTE — PROGRESS NOTES
Subjective:      Jacquelyn Castellanos is a70 y.o. female  Chief Complaint   Patient presents with    Heartburn       HPI  PCP: Alicja aCin MD  Pt is here today with c/o heartburn, JULIO burning, and bloating. Used to be on zantac and this worked great. But stopped it due to potential recall/risks. Used to be on protonix in the past, and it didn't work well. Zantac worked great. Needs something to take the place of zantac. No further GI complaints today. GI scope reports in history per MA per OV policy, I reviewed this. Family HX:  Father had colon polyps, brother has Crohns  Pt denies family hx of colon CA,  gastric CA and esophageal CA. Past Medical History:   Diagnosis Date    Acid reflux     Anxiety     Arthritis     Back pain     CAD (coronary artery disease)     CAD (coronary artery disease)     Carotid artery occlusion     Chronic venous insufficiency 2012    Cough     Fibromyalgia     Goiter     Gout     Head ache     headaches    Heart murmur     History of colon polyps     History of colon polyps     Hoarseness     Hypercholesterolemia     Hyperlipidemia     Hypertension     Insomnia     Irregular heart beat     Itching     Muscle cramp     Neck pain     Osteoarthritis     Osteoporosis     Palpitations     RA (rheumatoid arthritis) (HCC)     Rash     Rectal bleeding     SOB (shortness of breath)     Sore throat     Tympanic membrane perforation     Varicose veins 2012          Past Surgical History:   Procedure Laterality Date    APPENDECTOMY      BREAST SURGERY  2002    CARDIAC CATHETERIZATION  9/10/15  JDT    EF 50%    CERVICAL FUSION       SECTION  1968    x 1    CHOLECYSTECTOMY  2012    [place]    COLONOSCOPY  1-    Dr LUJAN    COLONOSCOPY  2014    Dr. De La Cruz [place]ts      Dr. Misty Michelle in [place]- Polyps-benign,internal Hemorrhoids, per patient.     CORONARY ANGIOPLASTY WITH STENT Palpitations    Abatacept Other (See Comments)     Heart palpitations    Asa [Aspirin]     Buspirone     Celexa [Citalopram Hydrobromide] Nausea Only     Nausea and headache    Colchicine     Demerol Nausea Only    Dye [Barium-Containing Compounds] Nausea Only    Dye [Iodides] Itching     CONTRAST DYE, CT DYE, IV CONTRAST     Evolocumab      Other reaction(s): Irritability  [employer]    Fenofibrate      Chest pain    Hydrocodone-Acetaminophen Other (See Comments)     Shaking, attacks nervous system    Lasix [Furosemide] Nausea Only    Levofloxacin      palpitations    Losartan      Nausea and dizziness    Methotrexate Other (See Comments)     Dizzy and fainted    Mirtazapine      Tremor, nausea, headache    Neurontin [Gabapentin] Nausea Only     Dizziness      Nitroglycerin Other (See Comments)     Passed out    Plaquenil [Hydroxychloroquine Sulfate] Other (See Comments)     nervousness    Trazodone      Muscle stiffness    Cozaar [Losartan Potassium] Palpitations    Esomeprazole Magnesium Nausea And Vomiting    Lisinopril Other (See Comments)     Muscle cramps in legs    Prilosec [Omeprazole] Nausea And Vomiting       Current Outpatient Medications   Medication Sig Dispense Refill    famotidine (PEPCID) 40 MG tablet Take 1 tablet by mouth daily 30 tablet 11    gabapentin (NEURONTIN) 100 MG capsule TAKE 1 CAPSULE BY MOUTH AT BEDTIME  1    Omega-3 Fatty Acids (FISH OIL) 1360 MG CAPS Take 1 capsule by mouth 2 times daily       metoprolol succinate (TOPROL XL) 50 MG extended release tablet TAKE 1 TABLET TWICE DAILY 180 tablet 3    azelastine (ASTELIN) 0.1 % nasal spray 1 spray by Nasal route 2 times daily Use in each nostril as directed      fluorometholone (FML) 0.1 % ophthalmic suspension 1 drop 2 times daily      Fexofenadine HCl (ALLEGRA ALLERGY PO) Take 1 tablet by mouth 2 times daily       pantoprazole (PROTONIX) 40 MG tablet Take 40 mg by mouth daily      ondansetron (ZOFRAN ODT) 4 MG disintegrating tablet Take 1 tablet by mouth every 8 hours as needed for Nausea or Vomiting 15 tablet 0    calcium carbonate 600 MG TABS tablet Take 1 tablet by mouth 2 times daily      Evolocumab (REPATHA) 140 MG/ML SOSY Inject 140 mg into the skin every 14 days Patient has not started      vitamin D (CHOLECALCIFEROL) 1000 UNIT TABS tablet Take 3 tablets daily       BENICAR HCT 40-25 MG per tablet Take 0.5 tablets by mouth 2 times daily       allopurinol (ZYLOPRIM) 300 MG tablet Take 300 mg by mouth daily.  aspirin 81 MG EC tablet Take 81 mg by mouth daily.  loratadine (CLARITIN) 10 MG tablet Take 10 mg by mouth daily.  Multiple Vitamin (MULTIVITAMIN PO) Take 1 tablet by mouth daily        No current facility-administered medications for this visit. Review of Systems   Constitutional: Positive for fatigue. Negative for appetite change, fever and unexpected weight change. HENT: Negative for sore throat, trouble swallowing and voice change. Respiratory: Negative for cough and shortness of breath. Cardiovascular: Negative for chest pain, palpitations and leg swelling. Gastrointestinal: Negative for abdominal distention, abdominal pain, anal bleeding, blood in stool, constipation, diarrhea, nausea, rectal pain and vomiting. Heartburn, epigastric burning   Genitourinary: Negative for hematuria. Musculoskeletal: Positive for arthralgias, back pain and neck pain. Neurological: Negative for dizziness, weakness, light-headedness and headaches. Psychiatric/Behavioral: Negative for dysphoric mood and sleep disturbance. The patient is nervous/anxious. All other systems reviewed and are negative. Objective:     Physical Exam  Vitals signs and nursing note reviewed. Constitutional:       Appearance: She is well-developed.       Comments: /68   Pulse 82   Ht 5' 7\" (1.702 m)   Wt 186 lb 9.6 oz (84.6 kg)   SpO2 98%   BMI 29.23 kg/m²    Eyes: General: No scleral icterus. Conjunctiva/sclera: Conjunctivae normal.      Pupils: Pupils are equal, round, and reactive to light. Neck:      Musculoskeletal: Normal range of motion and neck supple. Thyroid: No thyromegaly. Cardiovascular:      Rate and Rhythm: Normal rate and regular rhythm. Heart sounds: Normal heart sounds. No murmur. No friction rub. No gallop. Pulmonary:      Effort: Pulmonary effort is normal. No respiratory distress. Breath sounds: Normal breath sounds. Abdominal:      General: Bowel sounds are normal. There is no distension. Palpations: Abdomen is soft. Tenderness: There is no abdominal tenderness. There is no rebound. Musculoskeletal: Normal range of motion. General: No deformity. Neurological:      Mental Status: She is alert and oriented to person, place, and time. Cranial Nerves: No cranial nerve deficit. Psychiatric:         Judgment: Judgment normal.           Assessment:       Diagnosis Orders   1. Heartburn     2. Bloating           Plan:      1. Will try pepcid 4mg daily. This was escribed. F/u in 3-4 weeks if not effective.

## 2020-03-11 NOTE — PATIENT INSTRUCTIONS
medication as directed. Call your doctor if the condition you are treating with famotidine does not improve, or if it gets worse while using famotidine. Famotidine may be only part of a complete program of treatment that also includes changes in diet or lifestyle habits. Follow all instructions of your doctor or dietitian. Store at room temperature away from moisture, heat, and light. Do not allow the liquid  medicine to freeze. Throw away any unused famotidine liquid that is older than 30 days. What happens if I miss a dose? Take the medicine as soon as you can, but skip the missed dose if it is almost time for your next dose. Do not take two doses at one time. What happens if I overdose? Seek emergency medical attention or call the Poison Help line at 1-330.729.9494. What should I avoid while taking famotidine? Avoid drinking alcohol. It can increase the risk of damage to your stomach. Avoid taking other stomach acid reducers unless your doctor has told you to. However, you may take an antacid (such as Maalox, Mylanta, Gaviscon, Milk of Magnesia, Rolaids, or Tums) with famotidine. What are the possible side effects of famotidine? Get emergency medical help if you have signs of an allergic reaction: hives; difficult breathing; swelling of your face, lips, tongue, or throat. Stop using famotidine and call your doctor at once if you have:  · confusion, hallucinations, agitation, lack of energy;  · a seizure;  · fast or pounding heartbeats, sudden dizziness (like you might pass out); or  · unexplained muscle pain, tenderness, or weakness especially if you also have fever, unusual tiredness, and dark colored urine. Some side effects may be more likely in older adults and in people who have severe kidney disease. Common side effects may include:  · headache;  · dizziness; or  · constipation or diarrhea. This is not a complete list of side effects and others may occur.  Call your doctor for medical advice about side effects. You may report side effects to FDA at 3-722-FDA-2298. What other drugs will affect famotidine? Famotidine can make it harder for your body to absorb other medicines you take by mouth. Tell your doctor if you are taking:  · cefditoren;  · dasatinib;  · delavirdine;  · fosamprenavir; or  · tizanidine (if you are taking famotidine liquid). This list is not complete. Other drugs may affect famotidine, including prescription and over-the-counter medicines, vitamins, and herbal products. Not all possible drug interactions are listed here. Where can I get more information? Your pharmacist can provide more information about famotidine. Remember, keep this and all other medicines out of the reach of children, never share your medicines with others, and use this medication only for the indication prescribed. Every effort has been made to ensure that the information provided by [address] is accurate, up-to-date, and complete, but no guarantee is made to that effect. Drug information contained herein may be time sensitive. [employer] information has been compiled for use by healthcare practitioners and consumers in the [place] and therefore [employer] does not warrant that uses outside of the [place] are appropriate, unless specifically indicated otherwise. [facility]'s drug information does not endorse drugs, diagnose patients or recommend therapy. [employer] drug information is an informational resource designed to assist licensed healthcare practitioners in caring for their patients and/or to serve consumers viewing this service as a supplement to, and not a substitute for, the expertise, skill, knowledge and judgment of healthcare practitioners. The absence of a warning for a given drug or drug combination in no way should be construed to indicate that the drug or drug combination is safe, effective or appropriate for any given patient.  [employer] does not assume any responsibility for any aspect of healthcare administered with the aid of information [employer] provides. The information contained herein is not intended to cover all possible uses, directions, precautions, warnings, drug interactions, allergic reactions, or adverse effects. If you have questions about the drugs you are taking, check with your doctor, nurse or pharmacist.  Copyright 6979-9848 [address]. Version: 16.01. Revision date: 8/22/2019. Care instructions adapted under license by [facility] ([facility]). If you have questions about a medical condition or this instruction, always ask your healthcare professional. [place] [address] any warranty or liability for your use of this information.

## 2020-04-27 ENCOUNTER — TELEPHONE (OUTPATIENT)
Dept: GASTROENTEROLOGY | Age: 75
End: 2020-04-27

## 2020-04-27 RX ORDER — METOPROLOL SUCCINATE 50 MG/1
TABLET, EXTENDED RELEASE ORAL
Qty: 180 TABLET | Refills: 3 | Status: SHIPPED | OUTPATIENT
Start: 2020-04-27 | End: 2021-02-08

## 2020-04-27 NOTE — TELEPHONE ENCOUNTER
Pt's  called and left vm stating that they received a letter from Ned Barrow that the current medication she is taking, Pepcid, needed to be replaced. I called patient's  back and he said they read it wrong.

## 2020-05-19 ENCOUNTER — HOSPITAL ENCOUNTER (OUTPATIENT)
Dept: BONE DENSITY | Facility: HOSPITAL | Age: 75
Discharge: HOME | End: 2020-05-19

## 2020-05-19 ENCOUNTER — HOSPITAL ENCOUNTER (OUTPATIENT)
Dept: MAMMOGRAPHY | Facility: HOSPITAL | Age: 75
Discharge: HOME | End: 2020-05-19
Admitting: PHYSICIAN ASSISTANT

## 2020-05-19 DIAGNOSIS — Z78.0 POSTMENOPAUSAL STATUS: ICD-10-CM

## 2020-05-19 DIAGNOSIS — Z12.31 ENCOUNTER FOR SCREENING MAMMOGRAM FOR MALIGNANT NEOPLASM OF BREAST: ICD-10-CM

## 2020-05-19 PROCEDURE — 77080 DXA BONE DENSITY AXIAL: CPT

## 2020-05-19 PROCEDURE — 77063 BREAST TOMOSYNTHESIS BI: CPT

## 2020-05-19 PROCEDURE — 77067 SCR MAMMO BI INCL CAD: CPT

## 2020-05-31 PROCEDURE — 87086 URINE CULTURE/COLONY COUNT: CPT | Performed by: FAMILY MEDICINE

## 2020-06-01 ENCOUNTER — HOSPITAL ENCOUNTER (OUTPATIENT)
Dept: GENERAL RADIOLOGY | Facility: HOSPITAL | Age: 75
Discharge: HOME | End: 2020-06-01
Admitting: INTERNAL MEDICINE

## 2020-06-01 ENCOUNTER — TRANSCRIBE ORDERS (OUTPATIENT)
Dept: ADMINISTRATIVE | Facility: HOSPITAL | Age: 75
End: 2020-06-01

## 2020-06-01 DIAGNOSIS — M79.7 FIBROMYALGIA: Primary | ICD-10-CM

## 2020-06-01 DIAGNOSIS — M79.7 FIBROMYALGIA: ICD-10-CM

## 2020-06-01 PROCEDURE — 73130 X-RAY EXAM OF HAND: CPT

## 2020-06-08 ENCOUNTER — OFFICE VISIT (OUTPATIENT)
Dept: UROLOGY | Age: 75
End: 2020-06-08
Payer: MEDICARE

## 2020-06-08 VITALS — TEMPERATURE: 97.8 F | WEIGHT: 192 LBS | BODY MASS INDEX: 30.13 KG/M2 | HEIGHT: 67 IN

## 2020-06-08 PROCEDURE — 1123F ACP DISCUSS/DSCN MKR DOCD: CPT | Performed by: PHYSICIAN ASSISTANT

## 2020-06-08 PROCEDURE — 99213 OFFICE O/P EST LOW 20 MIN: CPT | Performed by: PHYSICIAN ASSISTANT

## 2020-06-08 PROCEDURE — G8417 CALC BMI ABV UP PARAM F/U: HCPCS | Performed by: PHYSICIAN ASSISTANT

## 2020-06-08 PROCEDURE — P9612 CATHETERIZE FOR URINE SPEC: HCPCS | Performed by: PHYSICIAN ASSISTANT

## 2020-06-08 PROCEDURE — G8427 DOCREV CUR MEDS BY ELIG CLIN: HCPCS | Performed by: PHYSICIAN ASSISTANT

## 2020-06-08 PROCEDURE — 1036F TOBACCO NON-USER: CPT | Performed by: PHYSICIAN ASSISTANT

## 2020-06-08 PROCEDURE — 1090F PRES/ABSN URINE INCON ASSESS: CPT | Performed by: PHYSICIAN ASSISTANT

## 2020-06-08 PROCEDURE — G8400 PT W/DXA NO RESULTS DOC: HCPCS | Performed by: PHYSICIAN ASSISTANT

## 2020-06-08 PROCEDURE — 4040F PNEUMOC VAC/ADMIN/RCVD: CPT | Performed by: PHYSICIAN ASSISTANT

## 2020-06-08 RX ORDER — CEFDINIR 300 MG/1
300 CAPSULE ORAL 2 TIMES DAILY
Qty: 28 CAPSULE | Refills: 0 | Status: SHIPPED | OUTPATIENT
Start: 2020-06-08 | End: 2020-06-22

## 2020-06-08 ASSESSMENT — ENCOUNTER SYMPTOMS
EYES NEGATIVE: 1
GASTROINTESTINAL NEGATIVE: 1
ALLERGIC/IMMUNOLOGIC NEGATIVE: 1
RESPIRATORY NEGATIVE: 1

## 2020-06-08 NOTE — PROGRESS NOTES
UTERUS      HYSTERECTOMY  1976    partial    KNEE SURGERY  2011    left knee surgery    PTCA      stent    TUBAL LIGATION      UPPER GASTROINTESTINAL ENDOSCOPY  2009?  UPPER GASTROINTESTINAL ENDOSCOPY  1-    Dr LUJAN    UPPER GASTROINTESTINAL ENDOSCOPY  05/06/2014    Dr. Jese Bernal  2018    Dr. Alfonso Bryant in [facility]-Gastritis per patient. [id number] SURGERY   01- [facility]    Left Leg Ablation    VARICOSE VEIN SURGERY  3-  [facility]    right leg ablation       Current Outpatient Medications   Medication Sig Dispense Refill    cefdinir (OMNICEF) 300 MG capsule Take 1 capsule by mouth 2 times daily for 14 days 28 capsule 0    metoprolol succinate (TOPROL XL) 50 MG extended release tablet TAKE 1 TABLET TWICE DAILY 180 tablet 3    famotidine (PEPCID) 40 MG tablet Take 1 tablet by mouth daily 30 tablet 11    gabapentin (NEURONTIN) 100 MG capsule TAKE 1 CAPSULE BY MOUTH AT BEDTIME  1    Omega-3 Fatty Acids (FISH OIL) 1360 MG CAPS Take 1 capsule by mouth 2 times daily       azelastine (ASTELIN) 0.1 % nasal spray 1 spray by Nasal route 2 times daily Use in each nostril as directed      fluorometholone (FML) 0.1 % ophthalmic suspension 1 drop 2 times daily      Fexofenadine HCl (ALLEGRA ALLERGY PO) Take 1 tablet by mouth 2 times daily       pantoprazole (PROTONIX) 40 MG tablet Take 40 mg by mouth daily      ondansetron (ZOFRAN ODT) 4 MG disintegrating tablet Take 1 tablet by mouth every 8 hours as needed for Nausea or Vomiting 15 tablet 0    calcium carbonate 600 MG TABS tablet Take 1 tablet by mouth 2 times daily      Evolocumab (REPATHA) 140 MG/ML SOSY Inject 140 mg into the skin every 14 days Patient has not started      vitamin D (CHOLECALCIFEROL) 1000 UNIT TABS tablet Take 3 tablets daily       BENICAR HCT 40-25 MG per tablet Take 0.5 tablets by mouth 2 times daily       allopurinol (ZYLOPRIM) 300 MG tablet Take 300 mg by mouth daily.       aspirin 81 MG EC tablet Take 81 mg by mouth daily.  loratadine (CLARITIN) 10 MG tablet Take 10 mg by mouth daily.  Multiple Vitamin (MULTIVITAMIN PO) Take 1 tablet by mouth daily        No current facility-administered medications for this visit. Allergies   Allergen Reactions    Reclast [Zoledronic Acid] Other (See Comments)     Patient has a intolerance to this medication .  It makes her feel \"out of it\"     Ipratropium Bromide Hfa Palpitations    Abatacept Other (See Comments)     Heart palpitations    Asa [Aspirin]     Buspirone     Celexa [Citalopram Hydrobromide] Nausea Only     Nausea and headache    Colchicine     Demerol Nausea Only    Dye [Barium-Containing Compounds] Nausea Only    Dye [Iodides] Itching     CONTRAST DYE, CT DYE, IV CONTRAST     Evolocumab      Other reaction(s): Irritability  Repatha SureClick    Fenofibrate      Chest pain    Hydrocodone-Acetaminophen Other (See Comments)     Shaking, attacks nervous system    Lasix [Furosemide] Nausea Only    Levofloxacin      palpitations    Losartan      Nausea and dizziness    Methotrexate Other (See Comments)     Dizzy and fainted    Mirtazapine      Tremor, nausea, headache    Neurontin [Gabapentin] Nausea Only     Dizziness      Nitroglycerin Other (See Comments)     Passed out    Plaquenil [Hydroxychloroquine Sulfate] Other (See Comments)     nervousness    Trazodone      Muscle stiffness    Cozaar [Losartan Potassium] Palpitations    Esomeprazole Magnesium Nausea And Vomiting    Lisinopril Other (See Comments)     Muscle cramps in legs    Prilosec [Omeprazole] Nausea And Vomiting       Social History     Socioeconomic History    Marital status:      Spouse name: None    Number of children: None    Years of education: None    Highest education level: None   Occupational History    None   Social Needs    Financial resource strain: None    Food insecurity     Worry: None     Inability: None    Transportation needs     Medical: None     Non-medical: None   Tobacco Use    Smoking status: Never Smoker    Smokeless tobacco: Never Used   Substance and Sexual Activity    Alcohol use: No    Drug use: No    Sexual activity: None   Lifestyle    Physical activity     Days per week: None     Minutes per session: None    Stress: None   Relationships    Social connections     Talks on phone: None     Gets together: None     Attends Anabaptism service: None     Active member of club or organization: None     Attends meetings of clubs or organizations: None     Relationship status: None    Intimate partner violence     Fear of current or ex partner: None     Emotionally abused: None     Physically abused: None     Forced sexual activity: None   Other Topics Concern    None   Social History Narrative    None       Family History   Problem Relation Age of Onset    Diabetes Mother     Heart Disease Mother     Heart Disease Father     Diabetes Father     High Blood Pressure Father     Esophageal Cancer Father     Colon Polyps Father     Crohn's Disease Brother     Colon Cancer Neg Hx     Liver Cancer Neg Hx     Liver Disease Neg Hx     Rectal Cancer Neg Hx     Stomach Cancer Neg Hx        REVIEW OF SYSTEMS:  Review of Systems   Constitutional: Negative. HENT: Negative. Eyes: Negative. Respiratory: Negative. Cardiovascular: Negative. Gastrointestinal: Negative. Endocrine: Negative. Genitourinary: Positive for dysuria, frequency and pelvic pain. Urinary pressure, incontinence   Musculoskeletal: Negative. Skin: Negative. Allergic/Immunologic: Negative. Neurological: Negative. Hematological: Negative. Psychiatric/Behavioral: Negative. I Have reviewed and agree with the documentation provided by the medical assistant on this patient for today's visit.     PHYSICAL EXAM:  Temp 97.8 °F (36.6 °C) (Temporal)   Ht 5' 7\" (1.702 m)   Wt 192 lb (87.1 kg)   BMI 30.07 kg/m²   Physical Exam  Vitals signs and nursing note reviewed. Constitutional:       Appearance: Normal appearance. HENT:      Head: Atraumatic. Right Ear: External ear normal.      Left Ear: External ear normal.   Eyes:      General: No scleral icterus. Right eye: No discharge. Left eye: No discharge. Conjunctiva/sclera: Conjunctivae normal.   Neck:      Musculoskeletal: Normal range of motion. Comments: No obvious neck mass observed  Pulmonary:      Effort: Pulmonary effort is normal.   Abdominal:      General: There is no distension. Palpations: Abdomen is soft. There is no mass. Tenderness: There is abdominal tenderness. There is no right CVA tenderness or left CVA tenderness. Musculoskeletal:      Comments: Patient ambulates without difficulty. Skin:     General: Skin is warm and dry. Coloration: Skin is not pale. Findings: No rash. Neurological:      General: No focal deficit present. Mental Status: She is alert and oriented to person, place, and time. Psychiatric:         Mood and Affect: Mood normal.         Behavior: Behavior normal.             DATA:  U/A:    I personally reviewed her urinalysis results which was positive for leukocytes I also performed a microscopic evaluation. 1. Acute cystitis without hematuria  Patient treated at [facility] with Bactrim still with symptoms catheterized urine drained 100 mL from her bladder we will send this for urine culture and sensitivity treat her empirically on Omnicef follow-up in 2 weeks. - DE CATHETERIZE FOR URINE SPEC  - Culture, Urine      Orders Placed This Encounter   Procedures    Culture, Urine     Order Specific Question:   Specify (ex-cath, midstream, cysto, etc)?      Answer:   straight cath urine    DE CATHETERIZE FOR URINE SPEC     Orders Placed This Encounter   Medications    cefdinir (OMNICEF) 300 MG capsule     Sig: Take 1 capsule by mouth 2 times daily for 14 days

## 2020-06-10 ENCOUNTER — TELEPHONE (OUTPATIENT)
Dept: UROLOGY | Age: 75
End: 2020-06-10

## 2020-06-10 LAB — URINE CULTURE, ROUTINE: NORMAL

## 2020-06-23 ENCOUNTER — OFFICE VISIT (OUTPATIENT)
Dept: UROLOGY | Age: 75
End: 2020-06-23
Payer: MEDICARE

## 2020-06-23 VITALS — TEMPERATURE: 98 F | HEIGHT: 67 IN | BODY MASS INDEX: 30.45 KG/M2 | WEIGHT: 194 LBS

## 2020-06-23 LAB
APPEARANCE FLUID: CLEAR
BILIRUBIN, POC: NORMAL
BLOOD URINE, POC: NORMAL
CLARITY, POC: CLEAR
COLOR, POC: YELLOW
GLUCOSE URINE, POC: NORMAL
KETONES, POC: NORMAL
LEUKOCYTE EST, POC: NORMAL
NITRITE, POC: NORMAL
PH, POC: 6.5
PROTEIN, POC: NORMAL
SPECIFIC GRAVITY, POC: 1.01
UROBILINOGEN, POC: 0.2

## 2020-06-23 PROCEDURE — 4040F PNEUMOC VAC/ADMIN/RCVD: CPT | Performed by: NURSE PRACTITIONER

## 2020-06-23 PROCEDURE — 1036F TOBACCO NON-USER: CPT | Performed by: NURSE PRACTITIONER

## 2020-06-23 PROCEDURE — 1123F ACP DISCUSS/DSCN MKR DOCD: CPT | Performed by: NURSE PRACTITIONER

## 2020-06-23 PROCEDURE — G8417 CALC BMI ABV UP PARAM F/U: HCPCS | Performed by: NURSE PRACTITIONER

## 2020-06-23 PROCEDURE — G8400 PT W/DXA NO RESULTS DOC: HCPCS | Performed by: NURSE PRACTITIONER

## 2020-06-23 PROCEDURE — 99213 OFFICE O/P EST LOW 20 MIN: CPT | Performed by: NURSE PRACTITIONER

## 2020-06-23 PROCEDURE — 1090F PRES/ABSN URINE INCON ASSESS: CPT | Performed by: NURSE PRACTITIONER

## 2020-06-23 PROCEDURE — G8427 DOCREV CUR MEDS BY ELIG CLIN: HCPCS | Performed by: NURSE PRACTITIONER

## 2020-06-23 PROCEDURE — 81002 URINALYSIS NONAUTO W/O SCOPE: CPT | Performed by: NURSE PRACTITIONER

## 2020-06-23 NOTE — PROGRESS NOTES
1 tablet by mouth 2 times daily       ondansetron (ZOFRAN ODT) 4 MG disintegrating tablet Take 1 tablet by mouth every 8 hours as needed for Nausea or Vomiting 15 tablet 0    calcium carbonate 600 MG TABS tablet Take 1 tablet by mouth 2 times daily      Evolocumab (REPATHA) 140 MG/ML SOSY Inject 140 mg into the skin every 14 days Patient has not started      vitamin D (CHOLECALCIFEROL) 1000 UNIT TABS tablet Take 3 tablets daily       BENICAR HCT 40-25 MG per tablet Take 0.5 tablets by mouth 2 times daily       allopurinol (ZYLOPRIM) 300 MG tablet Take 300 mg by mouth daily.  aspirin 81 MG EC tablet Take 81 mg by mouth daily.  Multiple Vitamin (MULTIVITAMIN PO) Take 1 tablet by mouth daily       gabapentin (NEURONTIN) 100 MG capsule TAKE 1 CAPSULE BY MOUTH AT BEDTIME  1    pantoprazole (PROTONIX) 40 MG tablet Take 40 mg by mouth daily      loratadine (CLARITIN) 10 MG tablet Take 10 mg by mouth daily. No current facility-administered medications for this visit. Allergies   Allergen Reactions    Reclast [Zoledronic Acid] Other (See Comments)     Patient has a intolerance to this medication .  It makes her feel \"out of it\"     Ipratropium Bromide Hfa Palpitations    Abatacept Other (See Comments)     Heart palpitations    Asa [Aspirin]     Buspirone     Celexa [Citalopram Hydrobromide] Nausea Only     Nausea and headache    Colchicine     Demerol Nausea Only    Dye [Barium-Containing Compounds] Nausea Only    Dye [Iodides] Itching     CONTRAST DYE, CT DYE, IV CONTRAST     Evolocumab      Other reaction(s): Irritability  Repatha SureClick    Fenofibrate      Chest pain    Hydrocodone-Acetaminophen Other (See Comments)     Shaking, attacks nervous system    Lasix [Furosemide] Nausea Only    Levofloxacin      palpitations    Losartan      Nausea and dizziness    Methotrexate Other (See Comments)     Dizzy and fainted    Mirtazapine      Tremor, nausea, headache    Neurontin UA neg     Spec Grav, UA 1.015     Blood, UA POC neg     pH, UA 6.5     Protein, UA POC neg     Urobilinogen, UA 0.2     Leukocytes, UA neg     Nitrite, UA neg     Appearance, Fluid Clear Clear, Slightly Cloudy         Assessment/ Plan       Diagnosis Orders   1. Stress incontinence     2. History of UTI  POCT Urinalysis no Micro   Patient will complete UDS and follow-up with Dr. Juan Jose Llanos to discuss TOT sling. She would like intervention completed sooner rather than later. We did discuss pelvic floor physical therapy. Discussed use, benefit, and side effects of prescribed medications. All patient questions answered. Pt voiced understanding. Patient agreed with treatment plan. Electronically signed by ULISES Gongora on 6/23/2020 at 4:30 PM     EMR dragon/transcription disclaimer: Much of this encounter note is electronic transcription/translation of spoken language to printed tach. Electronic translation of spoken language may be erroneous, or at times, nonsensical words or phrases may be inadvertently transcribed.  Although, I have reviewed the note for such errors, some may still exist.

## 2020-07-02 ENCOUNTER — OFFICE VISIT (OUTPATIENT)
Dept: OBSTETRICS AND GYNECOLOGY | Facility: CLINIC | Age: 75
End: 2020-07-02

## 2020-07-02 VITALS
HEIGHT: 67 IN | DIASTOLIC BLOOD PRESSURE: 80 MMHG | WEIGHT: 188 LBS | SYSTOLIC BLOOD PRESSURE: 142 MMHG | BODY MASS INDEX: 29.51 KG/M2

## 2020-07-02 DIAGNOSIS — N89.8 VAGINAL IRRITATION: Primary | ICD-10-CM

## 2020-07-02 PROCEDURE — 87481 CANDIDA DNA AMP PROBE: CPT | Performed by: NURSE PRACTITIONER

## 2020-07-02 PROCEDURE — 87512 GARDNER VAG DNA QUANT: CPT | Performed by: NURSE PRACTITIONER

## 2020-07-02 PROCEDURE — 87798 DETECT AGENT NOS DNA AMP: CPT | Performed by: NURSE PRACTITIONER

## 2020-07-02 PROCEDURE — 87563 M. GENITALIUM AMP PROBE: CPT | Performed by: NURSE PRACTITIONER

## 2020-07-02 PROCEDURE — 87661 TRICHOMONAS VAGINALIS AMPLIF: CPT | Performed by: NURSE PRACTITIONER

## 2020-07-02 PROCEDURE — 99213 OFFICE O/P EST LOW 20 MIN: CPT | Performed by: NURSE PRACTITIONER

## 2020-07-07 ENCOUNTER — TELEPHONE (OUTPATIENT)
Dept: OBSTETRICS AND GYNECOLOGY | Facility: CLINIC | Age: 75
End: 2020-07-07

## 2020-07-07 NOTE — TELEPHONE ENCOUNTER
Patient called regarding test results. Called patient back to inform her that results are not back yet and that when we get them we will get in touch with her. Patient voices understanding.

## 2020-07-08 LAB — TRICHOMONAS VAGINALIS PCR: NOT DETECTED

## 2020-07-13 LAB
LAB AP CASE REPORT: NORMAL
Lab: NORMAL
PATH INTERP SPEC-IMP: NORMAL
STAT OF ADQ CVX/VAG CYTO-IMP: NORMAL

## 2020-07-13 NOTE — PROGRESS NOTES
"    Subjective   Nely Hernandez is a 75 y.o. female  YOB: 1945      Chief Complaint   Patient presents with   • Vaginal Pressure     Patient also c/o of having pressure in her vagina. Patient states it comes and goes, sometimes worse than others. Patient states that \"it feels like something is in her vagina that should be there\"       Vaginitis   This is a recurrent problem. Pertinent negatives include no abdominal pain, arthralgias, chest pain, chills, congestion, coughing, diaphoresis, fatigue, fever, headaches, joint swelling, myalgias, nausea, neck pain, numbness, rash, sore throat, vomiting or weakness.       The following portions of the patient's history were reviewed and updated as appropriate: allergies, current medications, past family history, past medical history, past social history, past surgical history and problem list.    Allergies   Allergen Reactions   • Atrovent Hfa [Ipratropium Bromide Hfa] Palpitations   • Aspirin GI Intolerance   • Barium-Containing Compounds Nausea Only   • Boniva [Ibandronic Acid] Nausea And Vomiting   • Buspirone Nausea And Vomiting and Unknown (See Comments)   • Citalopram Hydrobromide Nausea And Vomiting and Nausea Only     Nausea and headache   • Colcrys [Colchicine] Nausea And Vomiting   • Contrast Dye Irritability   • Evolocumab Irritability and Unknown (See Comments)     Repatha SureClick   • Fenofibrate Unknown (See Comments)     Chest pain  Chest pain  Chest pain  Chest pain     • Furosemide Nausea Only     swelling  swelling     • Gabapentin Nausea And Vomiting and Nausea Only     And dizziness  Dizziness     • Levaquin [Levofloxacin] Irritability   • Lisinopril Swelling   • Lortab [Hydrocodone-Acetaminophen] Irritability   • Losartan Unknown (See Comments)     Nausea and dizziness  Nausea and dizziness     • Mirtazapine Other (See Comments)     Tremor, nausea, headache  Tremor, nausea, headache     • Nitroglycerin Irritability   • Plaquenil " [Hydroxychloroquine Sulfate] Irritability   • Trazodone Unknown (See Comments)     Muscle stiffness  Muscle stiffness     • Esomeprazole Magnesium Irritability and Nausea And Vomiting   • Omeprazole Irritability and Nausea And Vomiting     nausea  nausea         Past Medical History:   Diagnosis Date   • Anxiety    • Ataxia    • CAD (coronary artery disease)    • Chronic rhinitis    • Fibromyalgia    • GERD (gastroesophageal reflux disease)    • GERD without esophagitis    • Gout    • HL (hearing loss)    • Hyperlipemia    • Hypertension    • Occlusion and stenosis of bilateral carotid arteries    • Rheumatoid arthritis (CMS/HCC)        Family History   Problem Relation Age of Onset   • No Known Problems Father    • No Known Problems Sister    • No Known Problems Brother    • No Known Problems Daughter    • No Known Problems Son    • No Known Problems Maternal Grandmother    • No Known Problems Paternal Grandmother    • No Known Problems Maternal Aunt    • No Known Problems Paternal Aunt    • BRCA 1/2 Neg Hx    • Colon cancer Neg Hx    • Endometrial cancer Neg Hx    • Ovarian cancer Neg Hx    • Uterine cancer Neg Hx    • Melanoma Neg Hx    • Breast cancer Neg Hx        Social History     Socioeconomic History   • Marital status:      Spouse name: Not on file   • Number of children: Not on file   • Years of education: Not on file   • Highest education level: Not on file   Tobacco Use   • Smoking status: Never Smoker   • Smokeless tobacco: Never Used   Substance and Sexual Activity   • Alcohol use: No     Frequency: Never   • Drug use: No   • Sexual activity: Defer         Current Outpatient Medications:   •  allopurinol (ZYLOPRIM) 300 MG tablet, Take 300 mg by mouth Daily., Disp: , Rfl:   •  aspirin 81 MG EC tablet, Take 81 mg by mouth Daily. Takes buffered tablet only., Disp: , Rfl:   •  calcium carbonate (OS-JABIER) 600 MG tablet, Take 1 tablet by mouth 2 (Two) Times a Day., Disp: , Rfl:   •  Cholecalciferol  (VITAMIN D) 1000 units tablet, Take 1,000 Units by mouth Daily., Disp: , Rfl:   •  diclofenac (VOLTAREN) 1 % gel gel, , Disp: , Rfl:   •  Evolocumab 140 MG/ML solution prefilled syringe, Inject 140 mg under the skin into the appropriate area as directed Every 14 (Fourteen) Days., Disp: , Rfl:   •  famotidine (PEPCID) 40 MG tablet, Take 40 mg by mouth Daily., Disp: , Rfl:   •  loratadine (CLARITIN) 10 MG tablet, 1 tablet Daily., Disp: , Rfl:   •  metoprolol succinate XL (TOPROL-XL) 50 MG 24 hr tablet, Take 50 mg by mouth Daily., Disp: , Rfl:   •  olmesartan-hydrochlorothiazide (BENICAR HCT) 40-25 MG per tablet, 1 tablet Daily., Disp: , Rfl:   •  Omega-3 Fatty Acids (FISH OIL) 1360 MG capsule, Take 1 capsule by mouth Daily., Disp: , Rfl:   •  RESTASIS 0.05 % ophthalmic emulsion, 1 drop 2 (Two) Times a Day., Disp: , Rfl: 3    No LMP recorded. Patient has had a hysterectomy.    Sexual History:         Could not be calculated    Past Surgical History:   Procedure Laterality Date   • APPENDECTOMY     • BREAST BIOPSY     • BREAST CYST EXCISION     • CATARACT EXTRACTION Left    • CERVICAL FUSION     •  SECTION     • CHOLECYSTECTOMY     • CORONARY ANGIOPLASTY WITH STENT PLACEMENT     • CYST REMOVAL     • HYSTERECTOMY      AKHIL due to prolapse.   • KNEE ARTHROSCOPY Left    • US GUIDED FINE NEEDLE ASPIRATION  2018       Review of Systems   Constitutional: Negative for activity change, appetite change, chills, diaphoresis, fatigue, fever and unexpected weight change.   HENT: Negative for congestion, dental problem, drooling, ear discharge, ear pain, facial swelling, hearing loss, mouth sores, nosebleeds, postnasal drip, rhinorrhea, sinus pressure, sinus pain, sneezing, sore throat, tinnitus, trouble swallowing and voice change.    Eyes: Negative for photophobia, pain, discharge, redness, itching and visual disturbance.   Respiratory: Negative for apnea, cough, choking, chest tightness, shortness of breath, wheezing  and stridor.    Cardiovascular: Negative for chest pain, palpitations and leg swelling.   Gastrointestinal: Negative for abdominal distention, abdominal pain, anal bleeding, blood in stool, constipation, diarrhea, nausea, rectal pain and vomiting.   Endocrine: Negative for cold intolerance, heat intolerance, polydipsia, polyphagia and polyuria.   Genitourinary: Positive for vaginal pain (pressure). Negative for decreased urine volume, difficulty urinating, dyspareunia, dysuria, enuresis, flank pain, frequency, genital sores, hematuria, menstrual problem, pelvic pain, urgency, vaginal bleeding and vaginal discharge.   Musculoskeletal: Negative for arthralgias, back pain, gait problem, joint swelling, myalgias, neck pain and neck stiffness.   Skin: Negative for color change, pallor, rash and wound.   Allergic/Immunologic: Negative for environmental allergies, food allergies and immunocompromised state.   Neurological: Negative for dizziness, tremors, seizures, syncope, facial asymmetry, speech difficulty, weakness, light-headedness, numbness and headaches.   Hematological: Negative for adenopathy. Does not bruise/bleed easily.   Psychiatric/Behavioral: Negative for agitation, behavioral problems, confusion, decreased concentration, dysphoric mood, hallucinations, self-injury, sleep disturbance and suicidal ideas. The patient is not nervous/anxious and is not hyperactive.        Objective   Physical Exam   Constitutional: She is oriented to person, place, and time. She appears well-developed and well-nourished.   HENT:   Head: Normocephalic.   Eyes: Pupils are equal, round, and reactive to light.   Neck: Normal range of motion.   Cardiovascular: Normal rate and regular rhythm.   Pulmonary/Chest: Effort normal and breath sounds normal.   Abdominal: Soft.   Musculoskeletal: Normal range of motion.   Neurological: She is alert and oriented to person, place, and time.   Skin: Skin is warm and dry.   Psychiatric: She has a  "normal mood and affect. Her behavior is normal.   Nursing note and vitals reviewed.        Vitals:    07/02/20 1056   BP: 142/80   Weight: 85.3 kg (188 lb)   Height: 170.2 cm (67\")       Nely was seen today for vaginal pressure.    Diagnoses and all orders for this visit:    Vaginal irritation  Comments:  States she has a feeling of \"pressure\" in her vagina.  States it comes and goes and \"feels like something is in there that should'nt be\".  Exam unremarkable.  BV panel done-follow-up pending results.  Orders:  -     Gynecologic Fluid, Supplemental Testing  -     Trichomonas vaginalis, PCR - ,; Future  -     Trichomonas vaginalis, PCR - ThinPrep Vial, Cervix          Patient's Body mass index is 29.44 kg/m². BMI is above normal parameters. Recommendations include: exercise counseling and nutrition counseling.             Non-Smoker    MyChart Instructions Given     Please let patient know that her infection testing was negative.   "

## 2020-07-14 ENCOUNTER — PROCEDURE VISIT (OUTPATIENT)
Dept: UROLOGY | Age: 75
End: 2020-07-14
Payer: MEDICARE

## 2020-07-14 DIAGNOSIS — N39.41 URGE INCONTINENCE: Primary | ICD-10-CM

## 2020-07-14 PROCEDURE — 51784 ANAL/URINARY MUSCLE STUDY: CPT | Performed by: UROLOGY

## 2020-07-14 PROCEDURE — 51728 CYSTOMETROGRAM W/VP: CPT | Performed by: UROLOGY

## 2020-07-14 PROCEDURE — 51741 ELECTRO-UROFLOWMETRY FIRST: CPT | Performed by: UROLOGY

## 2020-07-14 PROCEDURE — 51797 INTRAABDOMINAL PRESSURE TEST: CPT | Performed by: UROLOGY

## 2020-08-06 ENCOUNTER — OFFICE VISIT (OUTPATIENT)
Dept: CARDIOLOGY | Age: 75
End: 2020-08-06
Payer: MEDICARE

## 2020-08-06 VITALS
DIASTOLIC BLOOD PRESSURE: 62 MMHG | HEIGHT: 67 IN | BODY MASS INDEX: 30.35 KG/M2 | SYSTOLIC BLOOD PRESSURE: 140 MMHG | HEART RATE: 91 BPM | WEIGHT: 193.4 LBS

## 2020-08-06 PROCEDURE — 4040F PNEUMOC VAC/ADMIN/RCVD: CPT | Performed by: CLINICAL NURSE SPECIALIST

## 2020-08-06 PROCEDURE — G8417 CALC BMI ABV UP PARAM F/U: HCPCS | Performed by: CLINICAL NURSE SPECIALIST

## 2020-08-06 PROCEDURE — G8427 DOCREV CUR MEDS BY ELIG CLIN: HCPCS | Performed by: CLINICAL NURSE SPECIALIST

## 2020-08-06 PROCEDURE — 1036F TOBACCO NON-USER: CPT | Performed by: CLINICAL NURSE SPECIALIST

## 2020-08-06 PROCEDURE — 1123F ACP DISCUSS/DSCN MKR DOCD: CPT | Performed by: CLINICAL NURSE SPECIALIST

## 2020-08-06 PROCEDURE — 93000 ELECTROCARDIOGRAM COMPLETE: CPT | Performed by: CLINICAL NURSE SPECIALIST

## 2020-08-06 PROCEDURE — 1090F PRES/ABSN URINE INCON ASSESS: CPT | Performed by: CLINICAL NURSE SPECIALIST

## 2020-08-06 PROCEDURE — 99214 OFFICE O/P EST MOD 30 MIN: CPT | Performed by: CLINICAL NURSE SPECIALIST

## 2020-08-06 PROCEDURE — G8400 PT W/DXA NO RESULTS DOC: HCPCS | Performed by: CLINICAL NURSE SPECIALIST

## 2020-08-06 RX ORDER — BUMETANIDE 1 MG/1
1 TABLET ORAL DAILY PRN
Qty: 30 TABLET | Refills: 3 | Status: SHIPPED | OUTPATIENT
Start: 2020-08-06 | End: 2021-07-23

## 2020-08-06 ASSESSMENT — ENCOUNTER SYMPTOMS
BLURRED VISION: 0
SHORTNESS OF BREATH: 1
ORTHOPNEA: 0
HEARTBURN: 0
COUGH: 0

## 2020-08-06 NOTE — PROGRESS NOTES
[facility], [address] [place] [address], [id number]  Phone: [phone]  Fax: [phone]    OFFICE VISIT:  2020    Will Aponte - : 1945    Reason For Visit:  Susan Wiseman is a 66 y.o. female who is here for follow-up for CAD     Diagnosis Orders   1. Coronary artery disease involving native coronary artery of native heart without angina pectoris     2. Essential hypertension  EKG 12 lead   3. Other chest pain  ECHO Pharmacological Stress Test   4. Mixed hyperlipidemia     5. Myalgia due to statin     6. Edema of both lower extremities       HPI   Patient is here for follow-up for CAD, hypertension, hyperlipidemia. A heart cath in  showed mild, nonocclusive CAD and she previously had angioplasty and stent in . Patient reports some issues with chest pain since her last visit here that occurred while she was exercising on several occasions. It was a tight pressure that radiated to the left arm with associated dyspnea. She denied nausea or diaphoresis. She states she cannot take nitroglycerin because it bottoms out her blood pressure. She is also concerned about lower extremity edema recently. She has had a gradual weight gain of 9 pounds since her last visit here    She has a history of statin intolerance and is on Repatha. She reports her cholesterol numbers are still not that good even on the Repatha. She recently had labs done at her PCP office      Andre Echols MD is PCP.   Will Aponte has the following history as recorded in [facility]:    Patient Active Problem List    Diagnosis Date Noted    Bloating 2020    Myalgia due to statin 2019    Urinary tract infection with hematuria 2019    Syncope and collapse 2018    Acute chest pain 2018    Chest pain 2018    Overactive bladder 2017    Coronary artery disease involving native coronary artery of native heart without angina pectoris 2017  Mixed hyperlipidemia 2017    H/O right coronary artery stent placement     Periumbilical abdominal pain 2016    Irritable bowel syndrome with diarrhea 06/10/2016    Internal hemorrhoids 06/10/2016    Allergic reaction 2015    Precordial pain 2015    CAMPOVERDE (dyspnea on exertion) 2015    Palpitations 2015    Gout 2015    Bleeding internal hemorrhoids 07/15/2014    Heartburn 2014    Internal hemorrhoids without complication     Spider veins 2013    Carotid artery stenosis 2012    Leg pain 2012    Leg swelling 2012    Chronic venous insufficiency 2012    Varicose veins 2012    CAD (coronary artery disease)     Hyperlipidemia     Hypertension      Past Medical History:   Diagnosis Date    Acid reflux     Anxiety     Arthritis     Back pain     CAD (coronary artery disease)     CAD (coronary artery disease)     Carotid artery occlusion     Chronic venous insufficiency 2012    Cough     Fibromyalgia     Goiter     Gout     Head ache     headaches    Heart murmur     History of colon polyps     History of colon polyps     Hoarseness     Hypercholesterolemia     Hyperlipidemia     Hypertension     Insomnia     Irregular heart beat     Itching     Muscle cramp     Neck pain     Osteoarthritis     Osteoporosis     Palpitations     RA (rheumatoid arthritis) (HCC)     Rash     Rectal bleeding     SOB (shortness of breath)     Sore throat     Tympanic membrane perforation     Varicose veins 2012     Past Surgical History:   Procedure Laterality Date    APPENDECTOMY      BREAST SURGERY  2002    CARDIAC CATHETERIZATION  9/10/15  JDT    EF 50%    CERVICAL FUSION       SECTION  1968    x 1    CHOLECYSTECTOMY  2012    [place]    COLONOSCOPY  1-    Dr LUJAN    COLONOSCOPY  2014    Dr. Alice Rushing   Dr. Chuckie Braswell in [place]- Polyps-benign,internal Hemorrhoids, per patient.  CORONARY ANGIOPLASTY WITH STENT PLACEMENT  2012    Dr Abhinav Obrien    partial    KNEE SURGERY  2011    left knee surgery    PTCA      stent    TUBAL LIGATION      UPPER GASTROINTESTINAL ENDOSCOPY  2009?  UPPER GASTROINTESTINAL ENDOSCOPY  1-    Dr LUJAN    UPPER GASTROINTESTINAL ENDOSCOPY  05/06/2014    Dr. Beti Israel  2018    Dr. Chuckie Braswell in [facility]-Gastritis per patient.    [address] SURGERY   01- [facility]    Left Leg Ablation    VARICOSE VEIN SURGERY  3-  [facility]    right leg ablation     Family History   Problem Relation Age of Onset    Diabetes Mother     Heart Disease Mother     Heart Disease Father     Diabetes Father     High Blood Pressure Father     Esophageal Cancer Father     Colon Polyps Father     Crohn's Disease Brother     Colon Cancer Neg Hx     Liver Cancer Neg Hx     Liver Disease Neg Hx     Rectal Cancer Neg Hx     Stomach Cancer Neg Hx      Social History     Tobacco Use    Smoking status: Never Smoker    Smokeless tobacco: Never Used   Substance Use Topics    Alcohol use: No      Current Outpatient Medications   Medication Sig Dispense Refill    diclofenac sodium (VOLTAREN) 1 % GEL       bumetanide (BUMEX) 1 MG tablet Take 1 tablet by mouth daily as needed (swelling) 30 tablet 3    metoprolol succinate (TOPROL XL) 50 MG extended release tablet TAKE 1 TABLET TWICE DAILY 180 tablet 3    famotidine (PEPCID) 40 MG tablet Take 1 tablet by mouth daily 30 tablet 11    gabapentin (NEURONTIN) 100 MG capsule TAKE 1 CAPSULE BY MOUTH AT BEDTIME  1    Omega-3 Fatty Acids (FISH OIL) 1360 MG CAPS Take 1 capsule by mouth 2 times daily       azelastine (ASTELIN) 0.1 % nasal spray 1 spray by Nasal route 2 times daily Use in each nostril as directed      fluorometholone (FML) 0.1 % ophthalmic suspension 1 drop 2 times daily      Fexofenadine HCl (ALLEGRA ALLERGY PO) Take 1 tablet by mouth 2 times daily       pantoprazole (PROTONIX) 40 MG tablet Take 40 mg by mouth daily      ondansetron (ZOFRAN ODT) 4 MG disintegrating tablet Take 1 tablet by mouth every 8 hours as needed for Nausea or Vomiting 15 tablet 0    calcium carbonate 600 MG TABS tablet Take 1 tablet by mouth 2 times daily      Evolocumab (REPATHA) 140 MG/ML SOSY Inject 140 mg into the skin every 14 days Patient has not started      vitamin D (CHOLECALCIFEROL) 1000 UNIT TABS tablet Take 3 tablets daily       BENICAR HCT 40-25 MG per tablet Take 0.5 tablets by mouth 2 times daily       allopurinol (ZYLOPRIM) 300 MG tablet Take 300 mg by mouth daily.  aspirin 81 MG EC tablet Take 81 mg by mouth daily.  loratadine (CLARITIN) 10 MG tablet Take 10 mg by mouth daily.  Multiple Vitamin (MULTIVITAMIN PO) Take 1 tablet by mouth daily        No current facility-administered medications for this visit. Allergies: Reclast [zoledronic acid]; Ipratropium bromide hfa; Abatacept; Asa [aspirin]; Buspirone; Celexa [citalopram hydrobromide]; Colchicine; Demerol; Dye [barium-containing compounds]; Dye [iodides]; Evolocumab; Fenofibrate; Hydrocodone-acetaminophen; Lasix [furosemide]; Levofloxacin; Losartan; Methotrexate; Mirtazapine; Neurontin [gabapentin]; Nitroglycerin; Plaquenil [hydroxychloroquine sulfate]; Trazodone; Cozaar [losartan potassium]; Esomeprazole magnesium; Lisinopril; and Prilosec [omeprazole]    Review of Systems  Review of Systems   Constitutional: Positive for malaise/fatigue. Negative for chills. HENT: Negative for nosebleeds. Eyes: Negative for blurred vision. Respiratory: Positive for shortness of breath. Negative for cough. Cardiovascular: Positive for chest pain (exertional) and leg swelling. Negative for palpitations, orthopnea, claudication and PND.    Gastrointestinal: Negative for heartburn. Genitourinary: Negative for hematuria. Musculoskeletal: Negative for falls and myalgias. Skin: Negative for rash. Neurological: Negative for sensory change, speech change and focal weakness. Denies syncope   Endo/Heme/Allergies: Does not bruise/bleed easily. Psychiatric/Behavioral: Negative for depression. Objective  Vital Signs - BP (!) 140/62   Pulse 91   Ht 5' 7\" (1.702 m)   Wt 193 lb 6.4 oz (87.7 kg)   Breastfeeding No   BMI 30.29 kg/m²    Wt Readings from Last 3 Encounters:   08/06/20 193 lb 6.4 oz (87.7 kg)   06/23/20 194 lb (88 kg)   06/08/20 192 lb (87.1 kg)      Physical Exam   Constitutional: She is oriented to person, place, and time. She appears well-developed and well-nourished. No distress. HENT:   Head: Normocephalic and atraumatic. Eyes: Pupils are equal, round, and reactive to light. Right eye exhibits no discharge. Left eye exhibits no discharge. Neck: No JVD present. No tracheal deviation present. Cardiovascular: Normal rate, regular rhythm, normal heart sounds and intact distal pulses. Exam reveals no gallop and no friction rub. No murmur heard. No carotid bruit   Pulmonary/Chest: Effort normal and breath sounds normal. No respiratory distress. She has no wheezes. She has no rales. Abdominal: Soft. There is no abdominal tenderness. Musculoskeletal:         General: Edema (1+ lower extremities, chronic) present. Comments: Normal gait and station   Neurological: She is alert and oriented to person, place, and time. No cranial nerve deficit. Skin: Skin is warm and dry. No rash noted. Psychiatric: She has a normal mood and affect. Her behavior is normal. Judgment normal.   Nursing note and vitals reviewed. Assessment:     Diagnosis Orders   1. Coronary artery disease involving native coronary artery of native heart without angina pectoris     2. Essential hypertension  EKG 12 lead   3.  Other chest pain  ECHO Pharmacological Stress Test   4. Mixed hyperlipidemia     5. Myalgia due to statin     6. Edema of both lower extremities       CAD- she is having symptoms of angina with exertion. Plan will be a dobutamine stress echo to rule out myocardial ischemia    Hypertension- fair control on current regimen    Hyperlipidemia-on Repatha. She has a history of statin intolerance myalgia. . She reports her cholesterol numbers were not as good as they should be. Will request most recent results from PCP office    Edema extremities -patient may use Bumex 1 mg daily as needed for significant edema. Discussed low-sodium diet and elevation of legs. She does not like to use compression stockings    Plan    Followup With Dr Ramon Angulo Echo  Bumex 1mg daily as needed for swelling  Low sodium diet, elevate legs      Call with any questions or concerns  Follow up with Jen Sterling MD for non cardiac problems  Report any new problems  Cardiovascular Fitness-Exercise as tolerated. Strive for 15 minutes of exercise most days of the week. Cardiac / Healthy Diet  Continue current medications as directed  Continue plan of treatment  It is always recommended that you bring your medications bottles with you to each visit - this is for your safety!        ULISES Mccartney

## 2020-08-07 ENCOUNTER — HOSPITAL ENCOUNTER (OUTPATIENT)
Dept: VASCULAR LAB | Age: 75
Discharge: HOME | End: 2020-08-07
Payer: MEDICARE

## 2020-08-07 PROCEDURE — 93880 EXTRACRANIAL BILAT STUDY: CPT

## 2020-08-12 ENCOUNTER — VIRTUAL VISIT (OUTPATIENT)
Dept: VASCULAR SURGERY | Age: 75
End: 2020-08-12

## 2020-08-12 ENCOUNTER — TELEPHONE (OUTPATIENT)
Dept: CARDIOLOGY | Age: 75
End: 2020-08-12

## 2020-08-12 NOTE — PROGRESS NOTES
Scarlett Albert is a 66 y.o. female evaluated via telephone on 8/12/2020. Consent:  She and/or health care decision maker is aware that that she may receive a bill for this telephone service, depending on her insurance coverage, and has provided verbal consent to proceed: Yes    Patient is located at home  Provider is located at [facility]   Also present during call is no one    She presents for follow up of carotid artery stenosis. She has a known history of carotid artery stenosis for 1 - 5 years. Her current treatment includes ASA EC daily. She denies a history of CVA. She reports no TIA's, episodes of lateralizing weakness and episodes of amaurosis fugax.           Scarlett Albert is a 66 y.o. female with the following history reviewed and recorded in [facility]:  Patient Active Problem List    Diagnosis Date Noted    Bloating 03/11/2020    Myalgia due to statin 11/07/2019    Urinary tract infection with hematuria 05/16/2019    Syncope and collapse 07/24/2018    Acute chest pain 07/23/2018    Chest pain 07/23/2018    Overactive bladder 07/24/2017    Coronary artery disease involving native coronary artery of native heart without angina pectoris 04/18/2017    Mixed hyperlipidemia 04/18/2017    H/O right coronary artery stent placement 04/18/2017     Stent placed in 2009 by Dr. Ross Crowe at [id number] abdominal pain 07/21/2016    Irritable bowel syndrome with diarrhea 06/10/2016    Internal hemorrhoids 06/10/2016    Allergic reaction 09/18/2015    Precordial pain 09/08/2015    CAMPOVERDE (dyspnea on exertion) 09/08/2015    Palpitations 09/08/2015    Gout 01/30/2015    Bleeding internal hemorrhoids 07/15/2014    Heartburn 05/20/2014    Internal hemorrhoids without complication 24/12/1088    Spider veins 03/04/2013    Carotid artery stenosis 11/08/2012    Leg pain 11/08/2012    Leg swelling 11/08/2012    Chronic venous insufficiency 11/08/2012    Varicose veins 11/08/2012    CAD (coronary artery disease)      2012  PTCA /stent  Progressive angina; AUC indication 54, AUC score 7, [facility] 2012;[id number]  9/10/15  Cath  Mild CAD, normal LVFX        Hyperlipidemia     Hypertension      Current Outpatient Medications   Medication Sig Dispense Refill    diclofenac sodium (VOLTAREN) 1 % GEL       bumetanide (BUMEX) 1 MG tablet Take 1 tablet by mouth daily as needed (swelling) 30 tablet 3    metoprolol succinate (TOPROL XL) 50 MG extended release tablet TAKE 1 TABLET TWICE DAILY 180 tablet 3    famotidine (PEPCID) 40 MG tablet Take 1 tablet by mouth daily 30 tablet 11    gabapentin (NEURONTIN) 100 MG capsule TAKE 1 CAPSULE BY MOUTH AT BEDTIME  1    Omega-3 Fatty Acids (FISH OIL) 1360 MG CAPS Take 1 capsule by mouth 2 times daily       azelastine (ASTELIN) 0.1 % nasal spray 1 spray by Nasal route 2 times daily Use in each nostril as directed      fluorometholone (FML) 0.1 % ophthalmic suspension 1 drop 2 times daily      Fexofenadine HCl (ALLEGRA ALLERGY PO) Take 1 tablet by mouth 2 times daily       pantoprazole (PROTONIX) 40 MG tablet Take 40 mg by mouth daily      ondansetron (ZOFRAN ODT) 4 MG disintegrating tablet Take 1 tablet by mouth every 8 hours as needed for Nausea or Vomiting 15 tablet 0    calcium carbonate 600 MG TABS tablet Take 1 tablet by mouth 2 times daily      Evolocumab (REPATHA) 140 MG/ML SOSY Inject 140 mg into the skin every 14 days Patient has not started      vitamin D (CHOLECALCIFEROL) 1000 UNIT TABS tablet Take 3 tablets daily       BENICAR HCT 40-25 MG per tablet Take 0.5 tablets by mouth 2 times daily       allopurinol (ZYLOPRIM) 300 MG tablet Take 300 mg by mouth daily.  aspirin 81 MG EC tablet Take 81 mg by mouth daily.  loratadine (CLARITIN) 10 MG tablet Take 10 mg by mouth daily.  Multiple Vitamin (MULTIVITAMIN PO) Take 1 tablet by mouth daily        No current facility-administered medications for this visit.       Allergies: Reclast [zoledronic acid]; Ipratropium bromide hfa; Abatacept; Asa [aspirin]; Buspirone; Celexa [citalopram hydrobromide]; Colchicine; Demerol; Dye [barium-containing compounds]; Dye [iodides]; Evolocumab; Fenofibrate; Hydrocodone-acetaminophen; Lasix [furosemide]; Levofloxacin; Losartan; Methotrexate; Mirtazapine; Neurontin [gabapentin]; Nitroglycerin; Plaquenil [hydroxychloroquine sulfate]; Trazodone; Cozaar [losartan potassium]; Esomeprazole magnesium; Lisinopril; and Prilosec [omeprazole]  Past Medical History:   Diagnosis Date    Acid reflux     Anxiety     Arthritis     Back pain     CAD (coronary artery disease)     CAD (coronary artery disease)     Carotid artery occlusion     Chronic venous insufficiency 2012    Cough     Fibromyalgia     Goiter     Gout     Head ache     headaches    Heart murmur     History of colon polyps     History of colon polyps     Hoarseness     Hypercholesterolemia     Hyperlipidemia     Hypertension     Insomnia     Irregular heart beat     Itching     Muscle cramp     Neck pain     Osteoarthritis     Osteoporosis     Palpitations     RA (rheumatoid arthritis) (HCC)     Rash     Rectal bleeding     SOB (shortness of breath)     Sore throat     Tympanic membrane perforation     Varicose veins 2012     Past Surgical History:   Procedure Laterality Date    APPENDECTOMY      BREAST SURGERY      CARDIAC CATHETERIZATION  9/10/15  JDT    EF 50%    CERVICAL FUSION       SECTION  1968    x 1    CHOLECYSTECTOMY      [place]    COLONOSCOPY  1-    Dr LUJAN    COLONOSCOPY  2014    Dr. Esme Colindres  2018    Dr. Stefan Childress in [place]- Polyps-benign,internal Hemorrhoids, per patient.     CORONARY ANGIOPLASTY WITH STENT PLACEMENT      Dr Marvelyn Dandy    partial    KNEE SURGERY      left knee surgery    PTCA stent    TUBAL LIGATION      UPPER GASTROINTESTINAL ENDOSCOPY  2009?  UPPER GASTROINTESTINAL ENDOSCOPY  1-    Dr LUJAN    UPPER GASTROINTESTINAL ENDOSCOPY  05/06/2014    Dr. Stephy Le  2018    Dr. Yazmin Sweeney in [facility]-Gastritis per patient.  VARICOSE VEIN SURGERY   01- [facility]    Left Leg Ablation    VARICOSE VEIN SURGERY  3-  [facility]    right leg ablation     Family History   Problem Relation Age of Onset    Diabetes Mother     Heart Disease Mother     Heart Disease Father     Diabetes Father     High Blood Pressure Father     Esophageal Cancer Father     Colon Polyps Father     Crohn's Disease Brother     Colon Cancer Neg Hx     Liver Cancer Neg Hx     Liver Disease Neg Hx     Rectal Cancer Neg Hx     Stomach Cancer Neg Hx      Social History     Tobacco Use    Smoking status: Never Smoker    Smokeless tobacco: Never Used   Substance Use Topics    Alcohol use: No         Review of Systems    Constitutional - no significant activity change, appetite change, or unexpected weight change. No fever or chills. No diaphoresis or significant fatigue. HENT - no significant rhinorrhea or epistaxis. No tinnitus or significant hearing loss. Eyes - no sudden vision change or amaurosis. Respiratory - no significant shortness of breath, wheezing, or stridor. No apnea, cough, or chest tightness associated with shortness of breath. Cardiovascular - no chest pain, syncope, or significant dizziness. No palpitations or significant leg swelling.  has not had claudication. Gastrointestinal - has not had abdominal swelling or pain. No blood in stool. No severe constipation, diarrhea, nausea, or vomiting. Genitourinary - No difficulty urinating, dysuria, frequency, or urgency. No flank pain or hematuria. Musculoskeletal - has not had back pain, gait disturbance, or myalgia. Skin - no color change, rash, pallor, has not had new wound.   Neurologic - no dizziness, facial asymmetry, or light headedness. No seizures. No speech difficulty or lateralizing weakness. Hematologic - no easy bruising or excessive bleeding. Psychiatric - no severe anxiety or nervousness. No confusion. All other review of systems are negative. PHYSICAL EXAMINATION:    [ INSTRUCTIONS:  \"[x]\" Indicates a positive item  \"[]\" Indicates a negative item  -- DELETE ALL ITEMS NOT EXAMINED]    [x] Alert  [x] Oriented to person/place/time    [x] No apparent distress  [] Toxic appearing  [x] Normal Mood  [] Anxious appearing    [] Depressed appearing  [] Confused appearing      [] Poor short term memory  [] Poor long term memory   Memory appears to be intact         Doppler results:    Right CCA/ICA <50% stenotic  Left CCA/ICA <50% stenotic  Right verterbral artery flow is antegrade  Left verterbral artery flow is antegrade  Individual velocities reviewed: Yes. Results were reviewed with the patient. Disease process is stable        Assessment    1. Bilateral carotid artery stenosis          Plan    Patient instructed to call or proceed to the emergency room with any symptoms of lateralizing weakness, loss of vision in one eye, or episodes slurred speech. Asa eca daily  12 months with u/s  Strongly encouraged start/continue statin therapy  Recommended no smoking    Documentation:  I communicated with the patient and/or health care decision maker about as above. Details of this discussion including any medical advice provided: as above      I affirm this is a Patient Initiated Episode with an Established Patient who has not had a related appointment within my department in the past 7 days or scheduled within the next 24 hours.     Total Time: minutes: 5-10 minutes    Note: not billable if this call serves to triage the patient into an appointment for the relevant concern      Teresa Barlow

## 2020-08-14 ENCOUNTER — HOSPITAL ENCOUNTER (OUTPATIENT)
Dept: NON INVASIVE DIAGNOSTICS | Age: 75
Discharge: HOME | End: 2020-08-14
Payer: MEDICARE

## 2020-08-14 VITALS
DIASTOLIC BLOOD PRESSURE: 67 MMHG | RESPIRATION RATE: 20 BRPM | WEIGHT: 189.6 LBS | HEART RATE: 98 BPM | TEMPERATURE: 55.4 F | SYSTOLIC BLOOD PRESSURE: 132 MMHG | BODY MASS INDEX: 29.69 KG/M2

## 2020-08-14 PROCEDURE — 93350 STRESS TTE ONLY: CPT

## 2020-08-14 PROCEDURE — 2580000003 HC RX 258: Performed by: INTERNAL MEDICINE

## 2020-08-14 PROCEDURE — 2500000003 HC RX 250 WO HCPCS: Performed by: INTERNAL MEDICINE

## 2020-08-14 PROCEDURE — 6360000002 HC RX W HCPCS: Performed by: INTERNAL MEDICINE

## 2020-08-14 RX ORDER — DOBUTAMINE HYDROCHLORIDE 200 MG/100ML
10 INJECTION INTRAVENOUS CONTINUOUS PRN
Status: DISCONTINUED | OUTPATIENT
Start: 2020-08-14 | End: 2020-08-15 | Stop reason: HOSPADM

## 2020-08-14 RX ORDER — METOPROLOL TARTRATE 5 MG/5ML
5 INJECTION INTRAVENOUS PRN
Status: DISCONTINUED | OUTPATIENT
Start: 2020-08-14 | End: 2020-08-15 | Stop reason: HOSPADM

## 2020-08-14 RX ORDER — SODIUM CHLORIDE 9 MG/ML
INJECTION, SOLUTION INTRAVENOUS
Status: COMPLETED | OUTPATIENT
Start: 2020-08-14 | End: 2020-08-14

## 2020-08-14 RX ADMIN — SODIUM CHLORIDE 50 ML: 9 INJECTION, SOLUTION INTRAVENOUS at 12:56

## 2020-08-14 RX ADMIN — DOBUTAMINE HYDROCHLORIDE 10 MCG/KG/MIN: 200 INJECTION INTRAVENOUS at 13:56

## 2020-08-14 RX ADMIN — METOPROLOL TARTRATE 1 MG: 1 INJECTION, SOLUTION INTRAVENOUS at 13:05

## 2020-08-20 ENCOUNTER — TELEPHONE (OUTPATIENT)
Dept: CARDIOLOGY | Age: 75
End: 2020-08-20

## 2020-08-20 NOTE — TELEPHONE ENCOUNTER
I have tried to call the pts mobile and house number and I had to leave a message on both phones. SM

## 2020-08-20 NOTE — TELEPHONE ENCOUNTER
Called and spoke with patient, have [facility] scheduled for 09/10/2020 at 1000am with arrival of 0800. Patient is to be NPO after midnight. Patient instructed to arrive through front entrance of hospital and make immediate left. Patient advised they can have one person with them but they both must wear a mask. Patient advised may take morning medications with sip of water. Also advised patient must have COVID testing completed on 9/06/2020 anywhere from 800-1100 am at [facility]. Advised patient that they will be able to proceed with procedure as long as test results are negative. Patient made aware that if testing is not resulted evening prior to procedure that they may have to be rescheduled and possibly retested. Given instructions on where to go and to self quarantine between testing and procedure. Patient does not have IV dye allergy. Patient verbally understood. Called and spoke to Garrett Ingram in cath lab on 08/20/2020 and scheduled procedure.

## 2020-08-26 ENCOUNTER — TELEPHONE (OUTPATIENT)
Dept: CARDIOLOGY | Age: 75
End: 2020-08-26

## 2020-09-02 NOTE — TELEPHONE ENCOUNTER
I tried to call the pt this am and let her know that due to the holiday her covid test will have to be on Friday sept 4th before 11am instead of Sunday. SM

## 2020-09-04 ENCOUNTER — OFFICE VISIT (OUTPATIENT)
Age: 75
End: 2020-09-04

## 2020-09-04 VITALS — HEART RATE: 72 BPM | OXYGEN SATURATION: 96 % | TEMPERATURE: 98.9 F

## 2020-09-05 LAB — SARS-COV-2, NAA: NOT DETECTED

## 2020-09-09 RX ORDER — PREDNISONE 50 MG/1
TABLET ORAL
Qty: 3 TABLET | Refills: 0 | Status: ON HOLD | OUTPATIENT
Start: 2020-09-09 | End: 2020-09-10 | Stop reason: ALTCHOICE

## 2020-09-09 RX ORDER — DIPHENHYDRAMINE HYDROCHLORIDE 50 MG/ML
50 INJECTION INTRAMUSCULAR; INTRAVENOUS ONCE
Status: CANCELLED | OUTPATIENT
Start: 2020-09-10

## 2020-09-10 ENCOUNTER — OUTSIDE FACILITY SERVICE (OUTPATIENT)
Dept: PULMONOLOGY | Facility: CLINIC | Age: 75
End: 2020-09-10

## 2020-09-10 ENCOUNTER — APPOINTMENT (OUTPATIENT)
Dept: GENERAL RADIOLOGY | Age: 75
DRG: 234 | End: 2020-09-10
Attending: INTERNAL MEDICINE
Payer: MEDICARE

## 2020-09-10 ENCOUNTER — HOSPITAL ENCOUNTER (INPATIENT)
Dept: CARDIAC CATH/INVASIVE PROCEDURES | Age: 75
LOS: 5 days | Discharge: HOME HEALTH | DRG: 234 | End: 2020-09-15
Attending: INTERNAL MEDICINE | Admitting: INTERNAL MEDICINE
Payer: MEDICARE

## 2020-09-10 PROBLEM — I25.10 CAD IN NATIVE ARTERY: Status: ACTIVE | Noted: 2020-09-10

## 2020-09-10 LAB
ABO/RH: NORMAL
ALBUMIN SERPL-MCNC: 4.2 G/DL (ref 3.5–5.2)
ALBUMIN SERPL-MCNC: 4.3 G/DL (ref 3.5–5.2)
ALP BLD-CCNC: 53 U/L (ref 35–104)
ALP BLD-CCNC: 57 U/L (ref 35–104)
ALT SERPL-CCNC: 29 U/L (ref 5–33)
ALT SERPL-CCNC: 34 U/L (ref 5–33)
ANION GAP SERPL CALCULATED.3IONS-SCNC: 14 MMOL/L (ref 7–19)
ANION GAP SERPL CALCULATED.3IONS-SCNC: 16 MMOL/L (ref 7–19)
ANTIBODY SCREEN: NORMAL
AST SERPL-CCNC: 19 U/L (ref 5–32)
AST SERPL-CCNC: 31 U/L (ref 5–32)
BILIRUB SERPL-MCNC: 0.3 MG/DL (ref 0.2–1.2)
BILIRUB SERPL-MCNC: <0.2 MG/DL (ref 0.2–1.2)
BUN BLDV-MCNC: 19 MG/DL (ref 8–23)
BUN BLDV-MCNC: 23 MG/DL (ref 8–23)
CALCIUM SERPL-MCNC: 9.2 MG/DL (ref 8.8–10.2)
CALCIUM SERPL-MCNC: 9.9 MG/DL (ref 8.8–10.2)
CHLORIDE BLD-SCNC: 101 MMOL/L (ref 98–111)
CHLORIDE BLD-SCNC: 98 MMOL/L (ref 98–111)
CO2: 23 MMOL/L (ref 22–29)
CO2: 26 MMOL/L (ref 22–29)
CREAT SERPL-MCNC: 0.7 MG/DL (ref 0.5–0.9)
CREAT SERPL-MCNC: 0.7 MG/DL (ref 0.5–0.9)
FIBRINOGEN: 309 MG/DL (ref 238–505)
GFR AFRICAN AMERICAN: >59
GFR AFRICAN AMERICAN: >59
GFR NON-AFRICAN AMERICAN: >60
GFR NON-AFRICAN AMERICAN: >60
GLUCOSE BLD-MCNC: 140 MG/DL (ref 74–109)
GLUCOSE BLD-MCNC: 173 MG/DL (ref 74–109)
HBA1C MFR BLD: 6 % (ref 4–6)
HCT VFR BLD CALC: 40.1 % (ref 37–47)
HEMOGLOBIN: 14.1 G/DL (ref 12–16)
INR BLD: 1.04 (ref 0.88–1.18)
MAGNESIUM: 1.9 MG/DL (ref 1.6–2.4)
MCH RBC QN AUTO: 32.3 PG (ref 27–31)
MCHC RBC AUTO-ENTMCNC: 35.2 G/DL (ref 33–37)
MCV RBC AUTO: 92 FL (ref 81–99)
PDW BLD-RTO: 13.2 % (ref 11.5–14.5)
PLATELET # BLD: 235 K/UL (ref 130–400)
PMV BLD AUTO: 8.7 FL (ref 9.4–12.3)
POTASSIUM SERPL-SCNC: 3.7 MMOL/L (ref 3.5–5)
POTASSIUM SERPL-SCNC: 4.5 MMOL/L (ref 3.5–5)
PROTHROMBIN TIME: 13.5 SEC (ref 12–14.6)
RBC # BLD: 4.36 M/UL (ref 4.2–5.4)
SODIUM BLD-SCNC: 138 MMOL/L (ref 136–145)
SODIUM BLD-SCNC: 140 MMOL/L (ref 136–145)
TOTAL PROTEIN: 6.6 G/DL (ref 6.6–8.7)
TOTAL PROTEIN: 7 G/DL (ref 6.6–8.7)
WBC # BLD: 6.5 K/UL (ref 4.8–10.8)

## 2020-09-10 PROCEDURE — 2580000003 HC RX 258: Performed by: INTERNAL MEDICINE

## 2020-09-10 PROCEDURE — 99153 MOD SED SAME PHYS/QHP EA: CPT

## 2020-09-10 PROCEDURE — 94375 RESPIRATORY FLOW VOLUME LOOP: CPT | Performed by: INTERNAL MEDICINE

## 2020-09-10 PROCEDURE — 86901 BLOOD TYPING SEROLOGIC RH(D): CPT

## 2020-09-10 PROCEDURE — 6370000000 HC RX 637 (ALT 250 FOR IP): Performed by: THORACIC SURGERY (CARDIOTHORACIC VASCULAR SURGERY)

## 2020-09-10 PROCEDURE — 2709999900 HC NON-CHARGEABLE SUPPLY

## 2020-09-10 PROCEDURE — 93571 IV DOP VEL&/PRESS C FLO 1ST: CPT

## 2020-09-10 PROCEDURE — 85384 FIBRINOGEN ACTIVITY: CPT

## 2020-09-10 PROCEDURE — 85610 PROTHROMBIN TIME: CPT

## 2020-09-10 PROCEDURE — 93458 L HRT ARTERY/VENTRICLE ANGIO: CPT | Performed by: INTERNAL MEDICINE

## 2020-09-10 PROCEDURE — 99152 MOD SED SAME PHYS/QHP 5/>YRS: CPT

## 2020-09-10 PROCEDURE — 99152 MOD SED SAME PHYS/QHP 5/>YRS: CPT | Performed by: INTERNAL MEDICINE

## 2020-09-10 PROCEDURE — C1887 CATHETER, GUIDING: HCPCS

## 2020-09-10 PROCEDURE — 87081 CULTURE SCREEN ONLY: CPT

## 2020-09-10 PROCEDURE — 2140000000 HC CCU INTERMEDIATE R&B

## 2020-09-10 PROCEDURE — 80053 COMPREHEN METABOLIC PANEL: CPT

## 2020-09-10 PROCEDURE — 93571 IV DOP VEL&/PRESS C FLO 1ST: CPT | Performed by: INTERNAL MEDICINE

## 2020-09-10 PROCEDURE — 86900 BLOOD TYPING SEROLOGIC ABO: CPT

## 2020-09-10 PROCEDURE — 83036 HEMOGLOBIN GLYCOSYLATED A1C: CPT

## 2020-09-10 PROCEDURE — 6370000000 HC RX 637 (ALT 250 FOR IP): Performed by: INTERNAL MEDICINE

## 2020-09-10 PROCEDURE — 83735 ASSAY OF MAGNESIUM: CPT

## 2020-09-10 PROCEDURE — B211YZZ FLUOROSCOPY OF MULTIPLE CORONARY ARTERIES USING OTHER CONTRAST: ICD-10-PCS | Performed by: INTERNAL MEDICINE

## 2020-09-10 PROCEDURE — 2500000003 HC RX 250 WO HCPCS

## 2020-09-10 PROCEDURE — 93458 L HRT ARTERY/VENTRICLE ANGIO: CPT

## 2020-09-10 PROCEDURE — C1769 GUIDE WIRE: HCPCS

## 2020-09-10 PROCEDURE — 6360000002 HC RX W HCPCS

## 2020-09-10 PROCEDURE — 86850 RBC ANTIBODY SCREEN: CPT

## 2020-09-10 PROCEDURE — B215YZZ FLUOROSCOPY OF LEFT HEART USING OTHER CONTRAST: ICD-10-PCS | Performed by: INTERNAL MEDICINE

## 2020-09-10 PROCEDURE — 6360000004 HC RX CONTRAST MEDICATION: Performed by: INTERNAL MEDICINE

## 2020-09-10 PROCEDURE — C1894 INTRO/SHEATH, NON-LASER: HCPCS

## 2020-09-10 PROCEDURE — 4A023N7 MEASUREMENT OF CARDIAC SAMPLING AND PRESSURE, LEFT HEART, PERCUTANEOUS APPROACH: ICD-10-PCS | Performed by: INTERNAL MEDICINE

## 2020-09-10 PROCEDURE — 94375 RESPIRATORY FLOW VOLUME LOOP: CPT

## 2020-09-10 PROCEDURE — 93970 EXTREMITY STUDY: CPT

## 2020-09-10 PROCEDURE — 71046 X-RAY EXAM CHEST 2 VIEWS: CPT

## 2020-09-10 PROCEDURE — 2580000003 HC RX 258: Performed by: THORACIC SURGERY (CARDIOTHORACIC VASCULAR SURGERY)

## 2020-09-10 PROCEDURE — 36415 COLL VENOUS BLD VENIPUNCTURE: CPT

## 2020-09-10 PROCEDURE — 85027 COMPLETE CBC AUTOMATED: CPT

## 2020-09-10 RX ORDER — SODIUM CHLORIDE 0.9 % (FLUSH) 0.9 %
10 SYRINGE (ML) INJECTION EVERY 12 HOURS SCHEDULED
Status: DISCONTINUED | OUTPATIENT
Start: 2020-09-10 | End: 2020-09-10 | Stop reason: SDUPTHER

## 2020-09-10 RX ORDER — SODIUM CHLORIDE 0.9 % (FLUSH) 0.9 %
10 SYRINGE (ML) INJECTION PRN
Status: DISCONTINUED | OUTPATIENT
Start: 2020-09-10 | End: 2020-09-11 | Stop reason: HOSPADM

## 2020-09-10 RX ORDER — CHLORHEXIDINE GLUCONATE 0.12 MG/ML
15 RINSE ORAL ONCE
Status: COMPLETED | OUTPATIENT
Start: 2020-09-11 | End: 2020-09-11

## 2020-09-10 RX ORDER — FAMOTIDINE 20 MG/1
40 TABLET, FILM COATED ORAL DAILY
Status: DISCONTINUED | OUTPATIENT
Start: 2020-09-10 | End: 2020-09-11

## 2020-09-10 RX ORDER — SODIUM CHLORIDE 0.9 % (FLUSH) 0.9 %
10 SYRINGE (ML) INJECTION EVERY 12 HOURS SCHEDULED
Status: DISCONTINUED | OUTPATIENT
Start: 2020-09-10 | End: 2020-09-11

## 2020-09-10 RX ORDER — IODIXANOL 320 MG/ML
200 INJECTION, SOLUTION INTRAVASCULAR
Status: COMPLETED | OUTPATIENT
Start: 2020-09-10 | End: 2020-09-10

## 2020-09-10 RX ORDER — PREDNISONE 50 MG/1
50 TABLET ORAL ONCE
Status: COMPLETED | OUTPATIENT
Start: 2020-09-10 | End: 2020-09-10

## 2020-09-10 RX ORDER — ALPRAZOLAM 0.5 MG/1
0.5 TABLET ORAL ONCE
Status: DISCONTINUED | OUTPATIENT
Start: 2020-09-10 | End: 2020-09-10

## 2020-09-10 RX ORDER — SODIUM CHLORIDE 9 MG/ML
INJECTION, SOLUTION INTRAVENOUS CONTINUOUS
Status: DISCONTINUED | OUTPATIENT
Start: 2020-09-10 | End: 2020-09-10 | Stop reason: ALTCHOICE

## 2020-09-10 RX ORDER — ASPIRIN 81 MG/1
81 TABLET ORAL DAILY
Status: DISCONTINUED | OUTPATIENT
Start: 2020-09-10 | End: 2020-09-11

## 2020-09-10 RX ORDER — ONDANSETRON 2 MG/ML
4 INJECTION INTRAMUSCULAR; INTRAVENOUS EVERY 6 HOURS PRN
Status: DISCONTINUED | OUTPATIENT
Start: 2020-09-10 | End: 2020-09-11

## 2020-09-10 RX ORDER — SODIUM CHLORIDE 0.9 % (FLUSH) 0.9 %
10 SYRINGE (ML) INJECTION EVERY 12 HOURS SCHEDULED
Status: DISCONTINUED | OUTPATIENT
Start: 2020-09-10 | End: 2020-09-11 | Stop reason: HOSPADM

## 2020-09-10 RX ORDER — SODIUM CHLORIDE 0.9 % (FLUSH) 0.9 %
10 SYRINGE (ML) INJECTION PRN
Status: DISCONTINUED | OUTPATIENT
Start: 2020-09-10 | End: 2020-09-11

## 2020-09-10 RX ORDER — PANTOPRAZOLE SODIUM 40 MG/1
40 TABLET, DELAYED RELEASE ORAL DAILY
Status: DISCONTINUED | OUTPATIENT
Start: 2020-09-10 | End: 2020-09-11

## 2020-09-10 RX ORDER — ACETAMINOPHEN 325 MG/1
650 TABLET ORAL EVERY 4 HOURS PRN
Status: DISCONTINUED | OUTPATIENT
Start: 2020-09-10 | End: 2020-09-11

## 2020-09-10 RX ORDER — ALLOPURINOL 300 MG/1
300 TABLET ORAL DAILY
Status: DISCONTINUED | OUTPATIENT
Start: 2020-09-10 | End: 2020-09-11

## 2020-09-10 RX ORDER — DIPHENHYDRAMINE HCL 25 MG
25 TABLET ORAL ONCE
Status: COMPLETED | OUTPATIENT
Start: 2020-09-10 | End: 2020-09-10

## 2020-09-10 RX ORDER — SODIUM CHLORIDE 9 MG/ML
INJECTION, SOLUTION INTRAVENOUS CONTINUOUS
Status: DISCONTINUED | OUTPATIENT
Start: 2020-09-10 | End: 2020-09-11

## 2020-09-10 RX ORDER — METOPROLOL SUCCINATE 50 MG/1
50 TABLET, EXTENDED RELEASE ORAL 2 TIMES DAILY
Status: DISCONTINUED | OUTPATIENT
Start: 2020-09-10 | End: 2020-09-11

## 2020-09-10 RX ORDER — CHLORHEXIDINE GLUCONATE 4 G/100ML
SOLUTION TOPICAL SEE ADMIN INSTRUCTIONS
Status: DISCONTINUED | OUTPATIENT
Start: 2020-09-10 | End: 2020-09-11 | Stop reason: HOSPADM

## 2020-09-10 RX ORDER — ONDANSETRON 2 MG/ML
4 INJECTION INTRAMUSCULAR; INTRAVENOUS EVERY 6 HOURS PRN
Status: DISCONTINUED | OUTPATIENT
Start: 2020-09-10 | End: 2020-09-10 | Stop reason: SDUPTHER

## 2020-09-10 RX ORDER — ONDANSETRON 4 MG/1
4 TABLET, ORALLY DISINTEGRATING ORAL EVERY 8 HOURS PRN
Status: DISCONTINUED | OUTPATIENT
Start: 2020-09-10 | End: 2020-09-11

## 2020-09-10 RX ORDER — BUMETANIDE 1 MG/1
1 TABLET ORAL DAILY PRN
Status: DISCONTINUED | OUTPATIENT
Start: 2020-09-10 | End: 2020-09-11

## 2020-09-10 RX ORDER — SODIUM CHLORIDE 0.9 % (FLUSH) 0.9 %
10 SYRINGE (ML) INJECTION PRN
Status: DISCONTINUED | OUTPATIENT
Start: 2020-09-10 | End: 2020-09-10 | Stop reason: SDUPTHER

## 2020-09-10 RX ADMIN — IODIXANOL 140 ML: 320 INJECTION, SOLUTION INTRAVASCULAR at 11:17

## 2020-09-10 RX ADMIN — SODIUM CHLORIDE, PRESERVATIVE FREE 10 ML: 5 INJECTION INTRAVENOUS at 21:23

## 2020-09-10 RX ADMIN — PREDNISONE 50 MG: 50 TABLET ORAL at 08:59

## 2020-09-10 RX ADMIN — ASPIRIN 81 MG: 81 TABLET, COATED ORAL at 16:42

## 2020-09-10 RX ADMIN — ALLOPURINOL 300 MG: 300 TABLET ORAL at 21:16

## 2020-09-10 RX ADMIN — ACETAMINOPHEN 650 MG: 325 TABLET, FILM COATED ORAL at 15:17

## 2020-09-10 RX ADMIN — METOPROLOL SUCCINATE 50 MG: 50 TABLET, EXTENDED RELEASE ORAL at 21:16

## 2020-09-10 RX ADMIN — PANTOPRAZOLE SODIUM 40 MG: 40 TABLET, DELAYED RELEASE ORAL at 16:42

## 2020-09-10 RX ADMIN — SODIUM CHLORIDE: 9 INJECTION, SOLUTION INTRAVENOUS at 08:59

## 2020-09-10 RX ADMIN — SODIUM CHLORIDE: 9 INJECTION, SOLUTION INTRAVENOUS at 15:18

## 2020-09-10 RX ADMIN — DIPHENHYDRAMINE HCL 25 MG: 25 TABLET ORAL at 23:24

## 2020-09-10 RX ADMIN — SODIUM CHLORIDE, PRESERVATIVE FREE 10 ML: 5 INJECTION INTRAVENOUS at 21:16

## 2020-09-10 RX ADMIN — FAMOTIDINE 40 MG: 20 TABLET ORAL at 16:42

## 2020-09-10 RX ADMIN — MUPIROCIN: 20 OINTMENT TOPICAL at 21:17

## 2020-09-10 ASSESSMENT — ENCOUNTER SYMPTOMS
BACK PAIN: 0
EYE DISCHARGE: 0
WHEEZING: 0
DIARRHEA: 0
BLOOD IN STOOL: 0
ABDOMINAL DISTENTION: 0
CHEST TIGHTNESS: 1
CONSTIPATION: 0
COUGH: 0
VOMITING: 0
SHORTNESS OF BREATH: 1

## 2020-09-10 ASSESSMENT — PAIN DESCRIPTION - PAIN TYPE: TYPE: ACUTE PAIN

## 2020-09-10 ASSESSMENT — PAIN DESCRIPTION - LOCATION
LOCATION: HEAD
LOCATION: HEAD

## 2020-09-10 ASSESSMENT — PAIN SCALES - GENERAL
PAINLEVEL_OUTOF10: 7
PAINLEVEL_OUTOF10: 5

## 2020-09-10 NOTE — CONSULTS
Consultation History & Physical    Date of Admission:  9/10/2020  8:09 AM  Date of Consultation:  9/10/2020    Surgeon: Dr. Kierra Lebron        Cardiologist: Dr. Fabian Rico      PCP:  Indra Morrison MD        Reason for Consultation: Left Main Disease with progressive angina and abnormal DSE    History of Present Illness:   Ms. Alysa Toussaint is a 66 y.o. overweight female who suffers from rheumatoid arthritis who has been experiencing progressive fatigue with more frequent episodes of shortness of breath and chest discomfort  with minimal to moderate exertion. She has a long history of known coronary disease having undergone PCI and stenting of her ostial RCA many years ago. She had cardiac catheterizations 3 and 5 years ago both of which demonstrated only mild disease of a very short left main artery and mild disease of the proximal RCA. Her symptoms of fatigue and dyspnea have increased over the last several months and therefore dobutamine stress echo was performed that demonstrated no echocardiographic signs of ischemia but she was markedly symptomatic during the exam.  she was seen by cardiology  Therefore, she was admitted today for elective cardiac catheterization. Cardiac catheterization via right radial per Dr. Yi Kearney demonstrated normal LV systolic function with EF > 70%. The RCA is a dominant system. There is an ostial stent in place which remains patent in the midsegment of the vessel there is a narrowing that approaches 50% in some views but does not appear to be hemodynamically significant. The left main artery remains a very short vessel in fact that there appears to be almost separate ostia to the circumflex and LAD vessels with Dr. Yi Kearney instrumented this ostium the patient developed symptoms of chest tightness requiring administration of nitroglycerin.   Although angiographically this area does not appear to be hemodynamically significant from a clinical standpoint there did appear to be likely some degree of left main equivalent stenosis. The patient was therefore referred to me for consideration of bypass surgery. She has always been a non-smoker she does have some moderate carotid disease without other significant peripheral arterial disease she had a history of venous insufficiency in both lower extremities and underwent greater saphenous vein ablation several years ago there is a question as to whether or not she has adequate usable saphenous vein on both lower extremities near the ankle. Due to left main disease via catheter dampening with abnormal DSE, CTS asked to see to evaluate for possible revascularization surgery. Patient has history of left facial CVA.       Past Medical History:    Past Medical History:   Diagnosis Date    Acid reflux     Anxiety     Arthritis     Back pain     CAD (coronary artery disease)     CAD (coronary artery disease)     Carotid artery occlusion     Chronic venous insufficiency 2012    Cough     Fibromyalgia     Goiter     Gout     Head ache     headaches    Heart murmur     History of colon polyps     History of colon polyps     Hoarseness     Hypercholesterolemia     Hyperlipidemia     Hypertension     Insomnia     Irregular heart beat     Itching     Muscle cramp     Neck pain     Osteoarthritis     Osteoporosis     Palpitations     RA (rheumatoid arthritis) (HCC)     Rash     Rectal bleeding     SOB (shortness of breath)     Sore throat     Tympanic membrane perforation     Varicose veins 2012       Past Surgical History:    Past Surgical History:   Procedure Laterality Date    APPENDECTOMY      BREAST SURGERY  2002    CARDIAC CATHETERIZATION  9/10/15  JDT    EF 50%    CERVICAL FUSION       SECTION  1968    x 1    CHOLECYSTECTOMY  2012    [place]    COLONOSCOPY  1-    Dr LUJAN    COLONOSCOPY  2014    Dr. Junaid Cordero  2018     [facility] [place]- Polyps-benign,internal Hemorrhoids, per patient.  CORONARY ANGIOPLASTY WITH STENT PLACEMENT  2012    Dr Tiera Anderson    partial    KNEE SURGERY  2011    left knee surgery    PTCA      stent    TUBAL LIGATION      UPPER GASTROINTESTINAL ENDOSCOPY  2009?  UPPER GASTROINTESTINAL ENDOSCOPY  1-    Dr LUJAN    UPPER GASTROINTESTINAL ENDOSCOPY  05/06/2014    Dr. Jacquelyn Canas  2018    Dr. Micheal Cruz in [facility]-Gastritis per patient.  VARICOSE VEIN SURGERY   01- [facility]    Left Leg Ablation    VARICOSE VEIN SURGERY  3-  [facility]    right leg ablation         Home Medications:   Prior to Admission medications    Medication Sig Start Date End Date Taking?  Authorizing Provider   predniSONE (DELTASONE) 50 MG tablet Take 1 tablet now, one tonight, and one in the am prior to procedure 9/9/20  Yes Oswaldo Rivera MD   metoprolol succinate (TOPROL XL) 50 MG extended release tablet TAKE 1 TABLET TWICE DAILY 4/27/20  Yes ULISES Figueredo   famotidine (PEPCID) 40 MG tablet Take 1 tablet by mouth daily 3/11/20  Yes ULISES Peters   Omega-3 Fatty Acids (FISH OIL) 1360 MG CAPS Take 1 capsule by mouth 2 times daily    Yes Historical Provider, MD   azelastine (ASTELIN) 0.1 % nasal spray 1 spray by Nasal route 2 times daily Use in each nostril as directed   Yes Historical Provider, MD   fluorometholone (FML) 0.1 % ophthalmic suspension 1 drop 2 times daily   Yes Historical Provider, MD   pantoprazole (PROTONIX) 40 MG tablet Take 40 mg by mouth daily   Yes Historical Provider, MD   calcium carbonate 600 MG TABS tablet Take 1 tablet by mouth 2 times daily   Yes Historical Provider, MD   Evolocumab (REPATHA) 140 MG/ML SOSY Inject 140 mg into the skin every 14 days Patient has not started   Yes Historical Provider, MD   vitamin D (CHOLECALCIFEROL) 1000 UNIT TABS tablet Take 3 tablets daily    Yes Historical Provider, MD RIOS HCT 40-25 MG per tablet Take 0.5 tablets by mouth 2 times daily  10/10/15  Yes Historical Provider, MD   allopurinol (ZYLOPRIM) 300 MG tablet Take 300 mg by mouth daily. Yes Historical Provider, MD   aspirin 81 MG EC tablet Take 81 mg by mouth daily. Yes Historical Provider, MD   loratadine (CLARITIN) 10 MG tablet Take 10 mg by mouth daily. Yes Historical Provider, MD   Multiple Vitamin (MULTIVITAMIN PO) Take 1 tablet by mouth daily    Yes Historical Provider, MD   bumetanide (BUMEX) 1 MG tablet Take 1 tablet by mouth daily as needed (swelling) 8/6/20   ULISES Thomason   ondansetron (ZOFRAN ODT) 4 MG disintegrating tablet Take 1 tablet by mouth every 8 hours as needed for Nausea or Vomiting 2/18/19   Ventura Leyden, MD        Facility Administered Medications:    sodium chloride flush  10 mL Intravenous 2 times per day       Allergies:  Reclast [zoledronic acid]; Ipratropium bromide hfa; Abatacept; Asa [aspirin]; Buspirone; Celexa [citalopram hydrobromide]; Colchicine; Demerol; Dye [barium-containing compounds]; Dye [iodides]; Evolocumab; Fenofibrate; Hydrocodone-acetaminophen; Lasix [furosemide]; Levofloxacin; Losartan; Methotrexate; Mirtazapine; Neurontin [gabapentin]; Nitroglycerin; Plaquenil [hydroxychloroquine sulfate]; Trazodone; Cozaar [losartan potassium]; Esomeprazole magnesium;  Lisinopril; and Prilosec [omeprazole]     Social History:       Social History     Socioeconomic History    Marital status:      Spouse name: Not on file    Number of children: Not on file    Years of education: Not on file    Highest education level: Not on file   Occupational History    Not on file   Social Needs    Financial resource strain: Not on file    Food insecurity     Worry: Not on file     Inability: Not on file    Transportation needs     Medical: Not on file     Non-medical: Not on file   Tobacco Use    Smoking status: Never Smoker    Smokeless tobacco: Never Has severe GERD. Genitourinary:  No dysuria, urgency, frequency, or history of urinary tract infections. No history of nephrolithiasis or renal insufficiency. Neurological:  No history of transient ischemic attack, or amaurosis fugax. No history of seizure disorder. Hx of left facial CVA with residual left eye drooping. Integumentary: No rash, history of nonhealing wounds or skin cancer removal.   Psychiatric:  No anxiety or depression. Endocrine: No polyuria, polydipsia, or significant weight gain. No heat or cold intolerance. Musculoskeletal: No limit to range of motion of joints or swelling of limbs. Hematologic: No history of DVT, PE, or anemia. Physical Examination:    /66   Pulse 86   Temp 98.9 °F (37.2 °C) (Tympanic)   Resp 19   Ht 5' 7\" (1.702 m)   Wt 185 lb (83.9 kg)   SpO2 97%   BMI 28.98 kg/m²       General:  Alert & Oriented x 3 in no apparent distress. HEENT:  Normocephalic. Atraumatic. DEBBIE. Note left eyelid drooping and mild left facial drooping with smile. Neck: No JVD, no lymphadenopathy. Supple. Trachea midline. Thyroid normal.   Respiratory: Effort is unlabored. Clear bilateral breath sounds, no crackles, wheezes or rubs  Cardiovascular: Normal HT with RRR. No murmurs, gallops or rubs. Left carotid bruit. Mild BLE edema with multiple spider veins. Pulses: Normal  GI: Abdomen soft, nondistended, no organomegaly. Musculoskeletal: Strength and tone normal  Extremities: PPP, trace edema  Skin: Warm & Dry  Neuro/psychiatric: Grossly intact    MEDICAL DECISION MAKING/TESTING    DSE:    Dobutamine stress echocardiogram with clinical and EKG criteria for possible ischemia. No echocardiographic criteria for ischemia noted.       Carotid duplex (08/07/2020):   There is mixed plaque visualized in the bilateral internal carotid arteries.        There is less than 50% stenosis in the right internal carotid artery.    Lyle Tarcey is less than 50% stenosis of the left internal carotid artery.    Mccormick Matt is normal antegrade flow in the bilateral vertebral arteries.          Signature          ----------------------------------------------------------------     Electronically signed by Bao Fleming MD(Interpreting     physician) on 08/07/2020 03:13 PM     ----------------------------------------------------------------        Labs:   CBC:   Recent Labs     09/10/20  0824   WBC 6.5   HGB 14.1   HCT 40.1   MCV 92.0        BMP:   Recent Labs     09/10/20  0824      K 4.5   CL 98   CO2 26   BUN 23   CREATININE 0.7     Liver Profile:  Lab Results   Component Value Date    AST 31 09/10/2020    ALT 34 09/10/2020    BILITOT 0.3 09/10/2020    ALKPHOS 57 09/10/2020       Diagnosis:      1. Unstable Angina with Abnormal DSE Associated with Left Main Disease  2. Essential HTN  3. Mixed Hyperlipidemia  4. Chronic GERD  5. Chronic Venous Insufficiency with PMHx BLE Ablation  6. Hx Left Facial CVA   7. Obesity 2' to Excess Calories with BMI 28.9    Plan:     1. CXR PA/LAT  2. Stevphen Moose    Dr. Maco Mathis to review records/ images. He will discuss recommendations as well as R/B/A with patient and family. Further recommendations per Dr. Maco Mathis. Josh Astorga, APRN  9/10/2020  12:26 PM  I have seen and examined the patient myself and the above was discussed with the nurse practitioner. This 69-year-old female with rheumatoid arthritis has symptoms suggesting angina with increasing dyspnea on exertion fatigability and increased lethargy her chest discomfort is not typical of angina and that she develops this with almost no exertion sometimes at rest she feels the discomfort there continuously is simply comes and goes with exacerbation of activity be that as it may she did have some evidence of ischemia on the dobutamine stress echo ECG.   I expressed my concern with the patient and her  and daughter regarding her symptoms which are well out of proportion to what I see on the cardiac catheterization as far as causing ischemia. Although, Dr. Aubrey García felt that there was significant damping of the left main ostia with manipulation of the catheter. The patient also did require administration of nitroglycerin during the cardiac catheterization when she developed severe chest pain during this time. I certainly agree that if her left main disease is the culprit bypass surgery would be the only viable treatment. Therefore discussed proceeding with bypass surgery grafting her LAD and the obtuse marginal branch vein may be at a premium and therefore I doubt I would graft the RCA at this time as it does not appear to be hemodynamically significant and  this could be addressed later quite easily with a stent. Therefore discussed procedure bypass surgery in detail with the patient and her family we discussed the risk benefits and alternatives to the operation the risks include bleeding stroke infection reoperation blood transfusion multisystem organ failure respiratory failure and sudden cardiac death. Her STS risk score is less than 2% mortality with a 30% major morbidity mortality. I will asked the vascular lab to come and do vein mapping of her lower extremities from below the knee to see if there is adequate vein for least 1 bypass conduit. I will place her on the operating schedule for tomorrow morning.   Yessenia Wolf MD 9/10/2020  1300

## 2020-09-10 NOTE — LETTER
Tarsha Aldana was here to visit and be with her mother on 9-11-20 for her surgery. If you have any questions or concerns, please don't hesitate to call.       Brenda Perez  [phone]

## 2020-09-10 NOTE — PROGRESS NOTES
4 Eyes Skin Assessment    Alysa Toussaint is being assessed upon: Admission    I agree that I, Janay Kenyon, along with Chaya Randall RN (either 2 RN's or 1 LPN and 1 RN) have performed a thorough Head to Toe Skin Assessment on the patient. ALL assessment sites listed below have been assessed. Areas assessed by both nurses:     [x]   Head, Face, and Ears   [x]   Shoulders, Back, and Chest  [x]   Arms, Elbows, and Hands   [x]   Coccyx, Sacrum, and Ischium  [x]   Legs, Feet, and Heels    Does the Patient have Skin Breakdown? No    Yanick Prevention initiated: NA  Wound Care Orders initiated: NA    [facility] nurse consulted for Pressure Injury (Stage 3,4, Unstageable, DTI, NWPT, and Complex wounds) and New or Established Ostomies: NA        Primary Nurse eSignature:  Janay Kenyon RN on 9/10/2020 at 3:57 PM      Co-Signer eSignature: Electronically signed by Gilford Learn, RN on 9/10/20 at 4:17 PM CDT

## 2020-09-10 NOTE — H&P
Patient:  Alysa Toussaint                  1945  MRN: [id number]    PROBLEM LIST:    Patient Active Problem List    Diagnosis Date Noted    Bloating 03/11/2020     Priority: Low    Myalgia due to statin 11/07/2019     Priority: Low    Urinary tract infection with hematuria 05/16/2019     Priority: Low    Syncope and collapse 07/24/2018     Priority: Low    Acute chest pain 07/23/2018     Priority: Low    Chest pain 07/23/2018     Priority: Low    Overactive bladder 07/24/2017     Priority: Low    Coronary artery disease involving native coronary artery of native heart without angina pectoris 04/18/2017     Priority: Low    Mixed hyperlipidemia 04/18/2017     Priority: Low    H/O right coronary artery stent placement 04/18/2017     Priority: Low     Overview Note:     Stent placed in 2009 by Dr. Tavo Matthews at [address] abdominal pain 07/21/2016     Priority: Low    Irritable bowel syndrome with diarrhea 06/10/2016     Priority: [address] Internal hemorrhoids 06/10/2016     Priority: Low    Allergic reaction 09/18/2015     Priority: Low    Precordial pain 09/08/2015     Priority: Low    CAMPOVERDE (dyspnea on exertion) 09/08/2015     Priority: Low    Palpitations 09/08/2015     Priority: Low    Gout 01/30/2015     Priority: Low    Bleeding internal hemorrhoids 07/15/2014     Priority: Low    Heartburn 05/20/2014     Priority: Low    Internal hemorrhoids without complication 33/40/5710     Priority: Low    Spider veins 03/04/2013     Priority: Low    Carotid artery stenosis 11/08/2012     Priority: Low    Leg pain 11/08/2012     Priority: Low    Leg swelling 11/08/2012     Priority: Low    Chronic venous insufficiency 11/08/2012     Priority: Low    Varicose veins 11/08/2012     Priority: Low    CAD (coronary artery disease)      Priority: Low     Overview Note:     2012  PTCA /stent  Progressive angina; AUC indication 54, AUC score 7, JACC 2012;[id number]  9/10/15  Cath  Mild CAD, normal LVFX        Hyperlipidemia      Priority: Low    Hypertension      Priority: Low       PRESENTATION: Owen Krishna is a 66y.o. year old female who presents with complaints of chest pain with exercise associated with shortness of breath and recent onset of leg swelling and weight gain with an abnormal DSE with clinical and EKG criteria for ischemia referred for cardiac catheterization. Patient's history of coronary artery disease with prior PCI to ostial RCA, short left main with intermediate disease, hypertension, hyperlipidemia and rheumatoid arthritis. REVIEW OF SYSTEMS:  Review of Systems   Constitutional: Negative for activity change, fatigue and fever. HENT: Negative for ear pain, hearing loss and tinnitus. Eyes: Negative for discharge and visual disturbance. Respiratory: Positive for chest tightness and shortness of breath. Negative for cough and wheezing. Cardiovascular: Positive for leg swelling. Negative for chest pain and palpitations. Gastrointestinal: Negative for abdominal distention, blood in stool, constipation, diarrhea and vomiting. Endocrine: Negative for cold intolerance, heat intolerance, polydipsia and polyuria. Genitourinary: Negative for dysuria and hematuria. Musculoskeletal: Negative for arthralgias, back pain and myalgias. Skin: Negative for pallor and rash. Neurological: Negative for seizures, syncope, weakness and headaches. Psychiatric/Behavioral: Negative for behavioral problems and dysphoric mood.        Past Medical History:      Diagnosis Date    Acid reflux     Anxiety     Arthritis     Back pain     CAD (coronary artery disease)     CAD (coronary artery disease)     Carotid artery occlusion     Chronic venous insufficiency 11/8/2012    Cough     Fibromyalgia     Goiter     Gout     Head ache     headaches    Heart murmur     History of colon polyps     History of colon polyps     Hoarseness     Hypercholesterolemia     Hyperlipidemia     Hypertension     Insomnia     Irregular heart beat     Itching     Muscle cramp     Neck pain     Osteoarthritis     Osteoporosis     Palpitations     RA (rheumatoid arthritis) (HCC)     Rash     Rectal bleeding     SOB (shortness of breath)     Sore throat     Tympanic membrane perforation     Varicose veins 2012       Past Surgical History:      Procedure Laterality Date    APPENDECTOMY      BREAST SURGERY  2002    CARDIAC CATHETERIZATION  9/10/15  JDT    EF 50%    CERVICAL FUSION       SECTION  1968    x 1    CHOLECYSTECTOMY  2012    [place]    COLONOSCOPY  1-    Dr LUJAN    COLONOSCOPY  2014    Dr. Govind Soto  2018    Dr. Felisha Fitch in [place]- Polyps-benign,internal Hemorrhoids, per patient.  CORONARY ANGIOPLASTY WITH STENT PLACEMENT      Dr Mira Cox    partial    KNEE SURGERY      left knee surgery    PTCA      stent    TUBAL LIGATION      UPPER GASTROINTESTINAL ENDOSCOPY  ?  UPPER GASTROINTESTINAL ENDOSCOPY  1-    Dr LUJAN    UPPER GASTROINTESTINAL ENDOSCOPY  2014    Dr. Wilian Mcghee  2018    Dr. Felisha Fitch in [facility]-Gastritis per patient. [address] SURGERY   2013 [facility]    Left Leg Ablation    VARICOSE VEIN SURGERY  3-  [facility]    right leg ablation       Medications Prior to Admission:    Prior to Admission medications    Medication Sig Start Date End Date Taking?  Authorizing Provider   predniSONE (DELTASONE) 50 MG tablet Take 1 tablet now, one tonight, and one in the am prior to procedure 20   Talisha Martin MD   diclofenac sodium (VOLTAREN) 1 % GEL  20   Historical Provider, MD   bumetanide (BUMEX) 1 MG tablet Take 1 tablet by mouth daily as needed (swelling) 20   ULISES Singletary   metoprolol succinate (TOPROL XL) 50 MG extended release tablet TAKE 1 TABLET TWICE DAILY 4/27/20   ULISES Lopez   famotidine (PEPCID) 40 MG tablet Take 1 tablet by mouth daily 3/11/20   ULISES Preston   gabapentin (NEURONTIN) 100 MG capsule TAKE 1 CAPSULE BY MOUTH AT BEDTIME 12/4/19   Historical Provider, MD   Omega-3 Fatty Acids (FISH OIL) 1360 MG CAPS Take 1 capsule by mouth 2 times daily     Historical Provider, MD   azelastine (ASTELIN) 0.1 % nasal spray 1 spray by Nasal route 2 times daily Use in each nostril as directed    Historical Provider, MD   fluorometholone (FML) 0.1 % ophthalmic suspension 1 drop 2 times daily    Historical Provider, MD   Fexofenadine HCl (ALLEGRA ALLERGY PO) Take 1 tablet by mouth 2 times daily     Historical Provider, MD   pantoprazole (PROTONIX) 40 MG tablet Take 40 mg by mouth daily    Historical Provider, MD   ondansetron (ZOFRAN ODT) 4 MG disintegrating tablet Take 1 tablet by mouth every 8 hours as needed for Nausea or Vomiting 2/18/19   [facility], MD   calcium carbonate 600 MG TABS tablet Take 1 tablet by mouth 2 times daily    Historical Provider, MD   Evolocumab (REPATHA) 140 MG/ML SOSY Inject 140 mg into the skin every 14 days Patient has not started    Historical Provider, MD   vitamin D (CHOLECALCIFEROL) 1000 UNIT TABS tablet Take 3 tablets daily     Historical Provider, MD   BENICAR HCT 40-25 MG per tablet Take 0.5 tablets by mouth 2 times daily  10/10/15   Historical Provider, MD   allopurinol (ZYLOPRIM) 300 MG tablet Take 300 mg by mouth daily. Historical Provider, MD   aspirin 81 MG EC tablet Take 81 mg by mouth daily. Historical Provider, MD   loratadine (CLARITIN) 10 MG tablet Take 10 mg by mouth daily. Historical Provider, MD   Multiple Vitamin (MULTIVITAMIN PO) Take 1 tablet by mouth daily     Historical Provider, MD       Allergies:  Reclast [zoledronic acid]; Ipratropium bromide hfa; Abatacept; Asa [aspirin]; Buspirone; Celexa [citalopram hydrobromide];  Colchicine; Demerol; Dye [barium-containing compounds]; Dye [iodides]; Evolocumab; Fenofibrate; Hydrocodone-acetaminophen; Lasix [furosemide]; Levofloxacin; Losartan; Methotrexate; Mirtazapine; Neurontin [gabapentin]; Nitroglycerin; Plaquenil [hydroxychloroquine sulfate]; Trazodone; Cozaar [losartan potassium]; Esomeprazole magnesium;  Lisinopril; and Prilosec [omeprazole]    Past Social History:  Social History     Socioeconomic History    Marital status:      Spouse name: Not on file    Number of children: Not on file    Years of education: Not on file    Highest education level: Not on file   Occupational History    Not on file   Social Needs    Financial resource strain: Not on file    Food insecurity     Worry: Not on file     Inability: Not on file    Transportation needs     Medical: Not on file     Non-medical: Not on file   Tobacco Use    Smoking status: Never Smoker    Smokeless tobacco: Never Used   Substance and Sexual Activity    Alcohol use: No    Drug use: No    Sexual activity: Not on file   Lifestyle    Physical activity     Days per week: Not on file     Minutes per session: Not on file    Stress: Not on file   Relationships    Social connections     Talks on phone: Not on file     Gets together: Not on file     Attends Bahai service: Not on file     Active member of club or organization: Not on file     Attends meetings of clubs or organizations: Not on file     Relationship status: Not on file    Intimate partner violence     Fear of current or ex partner: Not on file     Emotionally abused: Not on file     Physically abused: Not on file     Forced sexual activity: Not on file   Other Topics Concern    Not on file   Social History Narrative    Not on file       Family History:       Problem Relation Age of Onset    Diabetes Mother     Heart Disease Mother     Heart Disease Father     Diabetes Father     High Blood Pressure Father     Esophageal Cancer Father     Colon Polyps Father     Crohn's Disease Brother     Colon Cancer Neg Hx     Liver Cancer Neg Hx     Liver Disease Neg Hx     Rectal Cancer Neg Hx     Stomach Cancer Neg Hx      Physical Exam:    Vitals: There were no vitals taken for this visit. 24HR INTAKE/OUTPUT:  No intake or output data in the 24 hours ending 09/10/20 0759    Physical Exam  Constitutional:       General: She is not in acute distress. Appearance: She is not diaphoretic. HENT:      Mouth/Throat:      Pharynx: No oropharyngeal exudate. Eyes:      General: No scleral icterus. Right eye: No discharge. Left eye: No discharge. Neck:      Thyroid: No thyromegaly. Vascular: No JVD. Cardiovascular:      Rate and Rhythm: Normal rate and regular rhythm. No extrasystoles are present. Heart sounds: Normal heart sounds, S1 normal and S2 normal. No murmur. No systolic murmur. No diastolic murmur. No friction rub. No gallop. No S3 or S4 sounds. Pulmonary:      Effort: Pulmonary effort is normal. No respiratory distress. Breath sounds: Normal breath sounds. No wheezing or rales. Chest:      Chest wall: No tenderness. Abdominal:      General: Bowel sounds are normal. There is no distension. Palpations: Abdomen is soft. There is no mass. Tenderness: There is no abdominal tenderness. There is no guarding or rebound. Hernia: No hernia is present. Musculoskeletal: Normal range of motion. Skin:     General: Skin is warm. Coloration: Skin is not pale. Findings: No rash. Neurological:      Mental Status: She is alert and oriented to person, place, and time. Cranial Nerves: No cranial nerve deficit. Deep Tendon Reflexes: Reflexes normal.         LAB DATA:  CBC: No results for input(s): WBC, HGB, PLT in the last 72 hours. BMP:  No results for input(s): NA, K, CL, CO2, BUN, CREATININE, GLUCOSE in the last 72 hours. Hepatic: No results for input(s): AST, ALT, ALB, BILITOT, ALKPHOS in the last 72 hours.   CK, CKMB, Troponin: @LABRCNT (CKTOTAL:3, CKMB:3, TROPONINI:3)@  Pro-BNP: No results for input(s): BNP in the last 72 hours. Lipids: No results for input(s): CHOL, HDL in the last 72 hours. Invalid input(s): LDL  ABGs: No results for input(s): PHART, UNU8AJE, PO2ART, MVU5FMK, BEART, HGBAE, A9BYYSZF, CARBOXHGBART, 02THERAPY in the last 72 hours. INR: No results for input(s): INR in the last 72 hours. A1c:Invalid input(s): HEMOGLOBIN A1C  URINALYSIS:   Lab Results   Component Value Date    NITRU Negative 03/03/2020    WBCUA TNTC 04/14/2019    BACTERIA trace 04/14/2019    RBCUA TNTC 04/14/2019    BLOODU neg 06/23/2020    BLOODU Negative 03/03/2020    SPECGRAV 1.015 06/23/2020    SPECGRAV 1.020 03/03/2020    GLUCOSEU neg 06/23/2020    GLUCOSEU neg 03/03/2020     -----------------------------------------------------------------  IMAGING:  No orders to display     ECG   Sinus rhythm with no significant ST-T wave changes      Standing HR:90 bpm      Stress      Stress Type: Pharmacologic      Peak HR: 134 bpm                                   HR BP Product: [id number]   Peak BP: 162/59 mmHg   Predicted HR: 143 bpm   % of predicted HR: 94   Test Duration: 55:29 min   Reason for Termination: Target Heart Rate achieved      Stress Interpretation      Overall Impression: No echocardiographic evidence of ischemia with good   wall motion with dobutamine infusion. Clinical criteria for ischemia with chest pain. Borderline EKG criteria for ischemia with 1 mm ST depressions noted in   inferolateral leads      Conclusion:   Dobutamine stress echocardiogram with clinical and EKG criteria for   possible ischemia. No echocardiographic criteria for ischemia noted. Results      ECG   1 mm flat to upsloping ST depressions noted in inferior and lateral leads      Arrhythmias   No significant arrhythmia      Symptoms   Patient developed chest pain during infusion    Assessment and Recommendations:     This is a 66y.o. year old female with past medical history of coronary artery disease, prior PCI to RCA ostium,  intermediate left main disease, hypertension, hyperlipidemia, rheumatoid arthritis with recent onset of chest pain with exercise and shortness of breath with leg swelling and weight gain with abnormal dobutamine stress echo with clinical and EKG criteria for ischemia. Referred for cardiac catheterization. Risks, benefits, alternatives of cardiac catheterization/PCI discussed with the patient and full informed consent obtained.   Acceptable Mallampati score  Consent for moderate conscious sedation  ASA 3            Electronically signed by Fabian Rico MD on 9/10/2020 at 7:59 AM

## 2020-09-10 NOTE — PROGRESS NOTES
Vascular Preliminary Report    Bilateral Vein mapping for CABG    Right GSV:   Zone 1: 6mm  Zone 2: 2mm  Zone 3:non-vis  Zone 4: 1.7mm  Zone 5: 3mm  Zone 6: 2.5mm  Zone 7: 2.1mm  Zone 8: 3.3mm      Left GSV:     Zone 1: 6.6mm  Zone 2: 2.6mm  Zone 3: 1.1mm  Zone 4: 1.1mm  Zone 5: 2.7mm  Zone 6: 2.3mm  Zone 7: 1.7mm  Zone 8: 2.5mm

## 2020-09-10 NOTE — PROGRESS NOTES
Vein mapping completed at bedside per Vascular and patient transported to 715 per stretcher with transportation and telemetry.

## 2020-09-10 NOTE — PROGRESS NOTES
Brenton Alberto arrived to room # 440-0. Presented with: s/p Heart cath, CABG in AM  Mental Status: Patient is oriented, alert, coherent, logical, thought processes intact and able to concentrate and follow conversation. Vitals:    09/10/20 1545   BP: (!) 143/77   Pulse: 91   Resp: 20   Temp: 97.6 °F (36.4 °C)   SpO2: 91%     Patient safety contract and falls prevention contract reviewed with patient Yes. Oriented Patient to room. Call light within reach. Yes.   Needs, issues or concerns expressed at this time: no.      Electronically signed by Robert Cornell RN on 9/10/2020 at 3:58 PM

## 2020-09-11 ENCOUNTER — APPOINTMENT (OUTPATIENT)
Dept: GENERAL RADIOLOGY | Age: 75
DRG: 234 | End: 2020-09-11
Attending: INTERNAL MEDICINE
Payer: MEDICARE

## 2020-09-11 ENCOUNTER — ANESTHESIA (OUTPATIENT)
Dept: OPERATING ROOM | Age: 75
DRG: 234 | End: 2020-09-11
Payer: MEDICARE

## 2020-09-11 ENCOUNTER — ANESTHESIA EVENT (OUTPATIENT)
Dept: OPERATING ROOM | Age: 75
DRG: 234 | End: 2020-09-11
Payer: MEDICARE

## 2020-09-11 VITALS
SYSTOLIC BLOOD PRESSURE: 165 MMHG | RESPIRATION RATE: 1 BRPM | OXYGEN SATURATION: 97 % | TEMPERATURE: 96.4 F | DIASTOLIC BLOOD PRESSURE: 73 MMHG

## 2020-09-11 LAB
ANION GAP SERPL CALCULATED.3IONS-SCNC: 9 MMOL/L (ref 7–19)
APTT: 28.9 SEC (ref 26–36.2)
BASE EXCESS ARTERIAL: -1 MMOL/L (ref -2–2)
BASE EXCESS ARTERIAL: 1 (ref -3–3)
BASE EXCESS ARTERIAL: 2.3 MMOL/L (ref -2–2)
BASE EXCESS ARTERIAL: 3 (ref -3–3)
BASE EXCESS ARTERIAL: 4 (ref -3–3)
BASE EXCESS VENOUS: 4
BUN BLDV-MCNC: 13 MG/DL (ref 8–23)
CALCIUM IONIZED: 1.05 MMOL/L (ref 1.1–1.3)
CALCIUM IONIZED: 1.09 MMOL/L (ref 1.1–1.3)
CALCIUM IONIZED: 1.15 MMOL/L (ref 1.1–1.3)
CALCIUM IONIZED: 1.16 MMOL/L (ref 1.1–1.3)
CALCIUM SERPL-MCNC: 8.4 MG/DL (ref 8.8–10.2)
CARBOXYHEMOGLOBIN ARTERIAL: 1.2 % (ref 0–5)
CARBOXYHEMOGLOBIN ARTERIAL: 1.7 % (ref 0–5)
CHLORIDE BLD-SCNC: 110 MMOL/L (ref 98–111)
CO2: 24 MMOL/L (ref 22–29)
CO2: 26 MEQ/L (ref 21–32)
CO2: 27 MEQ/L (ref 21–32)
CO2: 27 MEQ/L (ref 21–32)
CO2: 28 MEQ/L (ref 21–32)
CREAT SERPL-MCNC: 0.6 MG/DL (ref 0.5–0.9)
GFR AFRICAN AMERICAN: >59
GFR NON-AFRICAN AMERICAN: >60
GLUCOSE BLD-MCNC: 102 MG/DL (ref 70–99)
GLUCOSE BLD-MCNC: 103 MG/DL (ref 70–99)
GLUCOSE BLD-MCNC: 112 MG/DL (ref 70–99)
GLUCOSE BLD-MCNC: 115 MG/DL (ref 70–99)
GLUCOSE BLD-MCNC: 120 MG/DL (ref 70–99)
GLUCOSE BLD-MCNC: 127 MG/DL (ref 70–99)
GLUCOSE BLD-MCNC: 128 MG/DL (ref 70–99)
GLUCOSE BLD-MCNC: 132 MG/DL (ref 70–99)
GLUCOSE BLD-MCNC: 134 MG/DL (ref 70–99)
GLUCOSE BLD-MCNC: 136 MG/DL (ref 70–99)
GLUCOSE BLD-MCNC: 147 MG/DL (ref 70–99)
GLUCOSE BLD-MCNC: 153 MG/DL (ref 74–109)
GLUCOSE BLD-MCNC: 158 MG/DL (ref 70–99)
GLUCOSE BLD-MCNC: 177 MG/DL (ref 70–99)
GLUCOSE BLD-MCNC: 193 MG/DL (ref 70–99)
GLUCOSE BLD-MCNC: 97 MG/DL (ref 70–99)
GLUCOSE BLD-MCNC: 98 MG/DL (ref 70–99)
HCO3 ARTERIAL: 24.8 MMOL/L (ref 22–26)
HCO3 ARTERIAL: 26.4 MMOL/L (ref 22–26)
HCT VFR BLD CALC: 26.7 % (ref 37–47)
HCT VFR BLD CALC: 27.7 % (ref 37–47)
HEMOGLOBIN, ART, EXTENDED: 10.4 G/DL (ref 12–16)
HEMOGLOBIN, ART, EXTENDED: 9.5 G/DL (ref 12–16)
HEMOGLOBIN: 11.2 GM/DL (ref 12–18)
HEMOGLOBIN: 8.1 GM/DL (ref 12–18)
HEMOGLOBIN: 8.5 GM/DL (ref 12–18)
HEMOGLOBIN: 9 G/DL (ref 12–16)
HEMOGLOBIN: 9.2 G/DL (ref 12–16)
HEMOGLOBIN: 9.9 GM/DL (ref 12–18)
INR BLD: 1.49 (ref 0.88–1.18)
MAGNESIUM: 1.6 MG/DL (ref 1.6–2.4)
MCH RBC QN AUTO: 31.1 PG (ref 27–31)
MCH RBC QN AUTO: 31.9 PG (ref 27–31)
MCHC RBC AUTO-ENTMCNC: 33.2 G/DL (ref 33–37)
MCHC RBC AUTO-ENTMCNC: 33.7 G/DL (ref 33–37)
MCV RBC AUTO: 93.6 FL (ref 81–99)
MCV RBC AUTO: 94.7 FL (ref 81–99)
METHEMOGLOBIN ARTERIAL: 0.9 %
METHEMOGLOBIN ARTERIAL: 1.2 %
O2 CONTENT ARTERIAL: 13.6 ML/DL
O2 CONTENT ARTERIAL: 14.3 ML/DL
O2 SAT, ARTERIAL: 100 % (ref 93–100)
O2 SAT, ARTERIAL: 92.8 %
O2 SAT, ARTERIAL: 97 %
O2 SAT, VEN: 82 %
O2 THERAPY: ABNORMAL
O2 THERAPY: ABNORMAL
PCO2 ARTERIAL: 35 MM HG (ref 35–48)
PCO2 ARTERIAL: 37 MM HG (ref 35–48)
PCO2 ARTERIAL: 38 MMHG (ref 35–45)
PCO2 ARTERIAL: 43 MM HG (ref 35–48)
PCO2 ARTERIAL: 45 MMHG (ref 35–45)
PCO2, VEN: 39.7 MM HG (ref 40–50)
PDW BLD-RTO: 13.6 % (ref 11.5–14.5)
PDW BLD-RTO: 13.9 % (ref 11.5–14.5)
PERFORMED ON: ABNORMAL
PERFORMED ON: NORMAL
PH ARTERIAL: 7.35 (ref 7.35–7.45)
PH ARTERIAL: 7.38 (ref 7.3–7.5)
PH ARTERIAL: 7.45 (ref 7.35–7.45)
PH ARTERIAL: 7.48 (ref 7.3–7.5)
PH ARTERIAL: 7.49 (ref 7.3–7.5)
PH VENOUS: 7.46
PLATELET # BLD: 127 K/UL (ref 130–400)
PLATELET # BLD: 152 K/UL (ref 130–400)
PMV BLD AUTO: 8.2 FL (ref 9.4–12.3)
PMV BLD AUTO: 8.6 FL (ref 9.4–12.3)
PO2 ARTERIAL: 484 MM HG (ref 83–108)
PO2 ARTERIAL: 489 MMHG (ref 80–100)
PO2 ARTERIAL: 516 MM HG (ref 83–108)
PO2 ARTERIAL: 637 MM HG (ref 83–108)
PO2 ARTERIAL: 69 MMHG (ref 80–100)
PO2, VEN: 43.4 MM HG
POC HEMATOCRIT: 24 % (ref 37–52)
POC HEMATOCRIT: 25 % (ref 37–52)
POC HEMATOCRIT: 29 % (ref 37–52)
POC HEMATOCRIT: 33 % (ref 37–52)
POC POTASSIUM: 2.5 MEQ/L (ref 3.5–5.1)
POC POTASSIUM: 2.8 MEQ/L (ref 3.5–5.1)
POC POTASSIUM: 3 MEQ/L (ref 3.5–5.1)
POC POTASSIUM: 3.2 MEQ/L (ref 3.5–5.1)
POC SAMPLE TYPE: ABNORMAL
POC SODIUM: 139 MEQ/L (ref 136–145)
POC SODIUM: 140 MEQ/L (ref 136–145)
POC SODIUM: 140 MEQ/L (ref 136–145)
POC SODIUM: 141 MEQ/L (ref 136–145)
POTASSIUM SERPL-SCNC: 3 MMOL/L (ref 3.5–5)
POTASSIUM SERPL-SCNC: 3.9 MMOL/L (ref 3.5–5)
POTASSIUM, WHOLE BLOOD: 2.7
POTASSIUM, WHOLE BLOOD: 3.7
PROTHROMBIN TIME: 18.1 SEC (ref 12–14.6)
RBC # BLD: 2.82 M/UL (ref 4.2–5.4)
RBC # BLD: 2.96 M/UL (ref 4.2–5.4)
SODIUM BLD-SCNC: 143 MMOL/L (ref 136–145)
TCO2 ARTERIAL: 27 MMOL/L
TCO2 ARTERIAL: 28 MMOL/L
TCO2 ARTERIAL: 28 MMOL/L
TCO2 CALC VENOUS: 30 MMOL/L
WBC # BLD: 9.1 K/UL (ref 4.8–10.8)
WBC # BLD: 9.5 K/UL (ref 4.8–10.8)

## 2020-09-11 PROCEDURE — 6360000002 HC RX W HCPCS: Performed by: THORACIC SURGERY (CARDIOTHORACIC VASCULAR SURGERY)

## 2020-09-11 PROCEDURE — 85610 PROTHROMBIN TIME: CPT

## 2020-09-11 PROCEDURE — 2580000003 HC RX 258: Performed by: THORACIC SURGERY (CARDIOTHORACIC VASCULAR SURGERY)

## 2020-09-11 PROCEDURE — 84132 ASSAY OF SERUM POTASSIUM: CPT

## 2020-09-11 PROCEDURE — 37799 UNLISTED PX VASCULAR SURGERY: CPT

## 2020-09-11 PROCEDURE — 85530 HEPARIN-PROTAMINE TOLERANCE: CPT | Performed by: THORACIC SURGERY (CARDIOTHORACIC VASCULAR SURGERY)

## 2020-09-11 PROCEDURE — 06BP0ZZ EXCISION OF RIGHT SAPHENOUS VEIN, OPEN APPROACH: ICD-10-PCS | Performed by: THORACIC SURGERY (CARDIOTHORACIC VASCULAR SURGERY)

## 2020-09-11 PROCEDURE — 85014 HEMATOCRIT: CPT

## 2020-09-11 PROCEDURE — 6370000000 HC RX 637 (ALT 250 FOR IP): Performed by: THORACIC SURGERY (CARDIOTHORACIC VASCULAR SURGERY)

## 2020-09-11 PROCEDURE — 2720000010 HC SURG SUPPLY STERILE: Performed by: THORACIC SURGERY (CARDIOTHORACIC VASCULAR SURGERY)

## 2020-09-11 PROCEDURE — 82810 BLOOD GASES O2 SAT ONLY: CPT

## 2020-09-11 PROCEDURE — 3600000090 HC PERFUSION PUMP ON: Performed by: THORACIC SURGERY (CARDIOTHORACIC VASCULAR SURGERY)

## 2020-09-11 PROCEDURE — 94640 AIRWAY INHALATION TREATMENT: CPT

## 2020-09-11 PROCEDURE — 021009W BYPASS CORONARY ARTERY, ONE ARTERY FROM AORTA WITH AUTOLOGOUS VENOUS TISSUE, OPEN APPROACH: ICD-10-PCS | Performed by: THORACIC SURGERY (CARDIOTHORACIC VASCULAR SURGERY)

## 2020-09-11 PROCEDURE — 2000000000 HC ICU R&B

## 2020-09-11 PROCEDURE — 2500000003 HC RX 250 WO HCPCS: Performed by: THORACIC SURGERY (CARDIOTHORACIC VASCULAR SURGERY)

## 2020-09-11 PROCEDURE — 82947 ASSAY GLUCOSE BLOOD QUANT: CPT

## 2020-09-11 PROCEDURE — 6360000002 HC RX W HCPCS

## 2020-09-11 PROCEDURE — C1751 CATH, INF, PER/CENT/MIDLINE: HCPCS | Performed by: THORACIC SURGERY (CARDIOTHORACIC VASCULAR SURGERY)

## 2020-09-11 PROCEDURE — 84295 ASSAY OF SERUM SODIUM: CPT

## 2020-09-11 PROCEDURE — 82803 BLOOD GASES ANY COMBINATION: CPT

## 2020-09-11 PROCEDURE — L8612 AQUEOUS SHUNT PROSTHESIS: HCPCS | Performed by: THORACIC SURGERY (CARDIOTHORACIC VASCULAR SURGERY)

## 2020-09-11 PROCEDURE — 3600000018 HC SURGERY OHS ADDTL 15MIN: Performed by: THORACIC SURGERY (CARDIOTHORACIC VASCULAR SURGERY)

## 2020-09-11 PROCEDURE — 2780000010 HC IMPLANT OTHER: Performed by: THORACIC SURGERY (CARDIOTHORACIC VASCULAR SURGERY)

## 2020-09-11 PROCEDURE — 3600000008 HC SURGERY OHS BASE: Performed by: THORACIC SURGERY (CARDIOTHORACIC VASCULAR SURGERY)

## 2020-09-11 PROCEDURE — 80048 BASIC METABOLIC PNL TOTAL CA: CPT

## 2020-09-11 PROCEDURE — 2580000003 HC RX 258: Performed by: NURSE ANESTHETIST, CERTIFIED REGISTERED

## 2020-09-11 PROCEDURE — 94002 VENT MGMT INPAT INIT DAY: CPT

## 2020-09-11 PROCEDURE — 82330 ASSAY OF CALCIUM: CPT

## 2020-09-11 PROCEDURE — B246ZZ4 ULTRASONOGRAPHY OF RIGHT AND LEFT HEART, TRANSESOPHAGEAL: ICD-10-PCS | Performed by: THORACIC SURGERY (CARDIOTHORACIC VASCULAR SURGERY)

## 2020-09-11 PROCEDURE — 3700000001 HC ADD 15 MINUTES (ANESTHESIA): Performed by: THORACIC SURGERY (CARDIOTHORACIC VASCULAR SURGERY)

## 2020-09-11 PROCEDURE — 36592 COLLECT BLOOD FROM PICC: CPT

## 2020-09-11 PROCEDURE — 85347 COAGULATION TIME ACTIVATED: CPT | Performed by: THORACIC SURGERY (CARDIOTHORACIC VASCULAR SURGERY)

## 2020-09-11 PROCEDURE — 2709999900 HC NON-CHARGEABLE SUPPLY: Performed by: THORACIC SURGERY (CARDIOTHORACIC VASCULAR SURGERY)

## 2020-09-11 PROCEDURE — 02100Z9 BYPASS CORONARY ARTERY, ONE ARTERY FROM LEFT INTERNAL MAMMARY, OPEN APPROACH: ICD-10-PCS | Performed by: THORACIC SURGERY (CARDIOTHORACIC VASCULAR SURGERY)

## 2020-09-11 PROCEDURE — 82374 ASSAY BLOOD CARBON DIOXIDE: CPT

## 2020-09-11 PROCEDURE — 82800 BLOOD PH: CPT

## 2020-09-11 PROCEDURE — 6360000002 HC RX W HCPCS: Performed by: NURSE ANESTHETIST, CERTIFIED REGISTERED

## 2020-09-11 PROCEDURE — 71045 X-RAY EXAM CHEST 1 VIEW: CPT

## 2020-09-11 PROCEDURE — 2700000000 HC OXYGEN THERAPY PER DAY

## 2020-09-11 PROCEDURE — 2500000003 HC RX 250 WO HCPCS: Performed by: NURSE ANESTHETIST, CERTIFIED REGISTERED

## 2020-09-11 PROCEDURE — 3700000000 HC ANESTHESIA ATTENDED CARE: Performed by: THORACIC SURGERY (CARDIOTHORACIC VASCULAR SURGERY)

## 2020-09-11 PROCEDURE — 85027 COMPLETE CBC AUTOMATED: CPT

## 2020-09-11 PROCEDURE — C1894 INTRO/SHEATH, NON-LASER: HCPCS | Performed by: THORACIC SURGERY (CARDIOTHORACIC VASCULAR SURGERY)

## 2020-09-11 PROCEDURE — 33533 CABG ARTERIAL SINGLE: CPT | Performed by: THORACIC SURGERY (CARDIOTHORACIC VASCULAR SURGERY)

## 2020-09-11 PROCEDURE — 85730 THROMBOPLASTIN TIME PARTIAL: CPT

## 2020-09-11 PROCEDURE — 83735 ASSAY OF MAGNESIUM: CPT

## 2020-09-11 PROCEDURE — 33517 CABG ARTERY-VEIN SINGLE: CPT | Performed by: THORACIC SURGERY (CARDIOTHORACIC VASCULAR SURGERY)

## 2020-09-11 PROCEDURE — C1729 CATH, DRAINAGE: HCPCS | Performed by: THORACIC SURGERY (CARDIOTHORACIC VASCULAR SURGERY)

## 2020-09-11 PROCEDURE — 5A1221Z PERFORMANCE OF CARDIAC OUTPUT, CONTINUOUS: ICD-10-PCS | Performed by: THORACIC SURGERY (CARDIOTHORACIC VASCULAR SURGERY)

## 2020-09-11 DEVICE — DISK-SHAPED STYLE, SILICONE (1 PER STERILE PKG)
Type: IMPLANTABLE DEVICE | Site: AORTA | Status: FUNCTIONAL
Brand: SCANLAN® RADIOMARK® GRAFT MARKERS

## 2020-09-11 RX ORDER — SODIUM CHLORIDE 9 MG/ML
INJECTION, SOLUTION INTRAVENOUS CONTINUOUS
Status: DISCONTINUED | OUTPATIENT
Start: 2020-09-11 | End: 2020-09-13

## 2020-09-11 RX ORDER — POTASSIUM CHLORIDE 2 MEQ/ML
INJECTION, SOLUTION, CONCENTRATE INTRAVENOUS PRN
Status: DISCONTINUED | OUTPATIENT
Start: 2020-09-11 | End: 2020-09-11 | Stop reason: SDUPTHER

## 2020-09-11 RX ORDER — DEXTROSE MONOHYDRATE 50 MG/ML
100 INJECTION, SOLUTION INTRAVENOUS PRN
Status: DISCONTINUED | OUTPATIENT
Start: 2020-09-11 | End: 2020-09-12

## 2020-09-11 RX ORDER — PROTAMINE SULFATE 10 MG/ML
50 INJECTION, SOLUTION INTRAVENOUS
Status: ACTIVE | OUTPATIENT
Start: 2020-09-11 | End: 2020-09-11

## 2020-09-11 RX ORDER — ROCURONIUM BROMIDE 10 MG/ML
INJECTION, SOLUTION INTRAVENOUS PRN
Status: DISCONTINUED | OUTPATIENT
Start: 2020-09-11 | End: 2020-09-11 | Stop reason: SDUPTHER

## 2020-09-11 RX ORDER — ATORVASTATIN CALCIUM 20 MG/1
20 TABLET, FILM COATED ORAL NIGHTLY
Status: DISCONTINUED | OUTPATIENT
Start: 2020-09-12 | End: 2020-09-14

## 2020-09-11 RX ORDER — LIDOCAINE HYDROCHLORIDE 10 MG/ML
INJECTION, SOLUTION EPIDURAL; INFILTRATION; INTRACAUDAL; PERINEURAL PRN
Status: DISCONTINUED | OUTPATIENT
Start: 2020-09-11 | End: 2020-09-11 | Stop reason: SDUPTHER

## 2020-09-11 RX ORDER — ZOLPIDEM TARTRATE 5 MG/1
5 TABLET ORAL NIGHTLY PRN
Status: DISCONTINUED | OUTPATIENT
Start: 2020-09-12 | End: 2020-09-15 | Stop reason: HOSPADM

## 2020-09-11 RX ORDER — SODIUM CHLORIDE 450 MG/100ML
INJECTION, SOLUTION INTRAVENOUS CONTINUOUS PRN
Status: DISCONTINUED | OUTPATIENT
Start: 2020-09-11 | End: 2020-09-11 | Stop reason: SDUPTHER

## 2020-09-11 RX ORDER — PROMETHAZINE HYDROCHLORIDE 25 MG/ML
6.25 INJECTION, SOLUTION INTRAMUSCULAR; INTRAVENOUS
Status: DISCONTINUED | OUTPATIENT
Start: 2020-09-11 | End: 2020-09-11 | Stop reason: HOSPADM

## 2020-09-11 RX ORDER — INSULIN GLARGINE 100 [IU]/ML
0.15 INJECTION, SOLUTION SUBCUTANEOUS NIGHTLY
Status: DISCONTINUED | OUTPATIENT
Start: 2020-09-12 | End: 2020-09-12 | Stop reason: ALTCHOICE

## 2020-09-11 RX ORDER — SODIUM CHLORIDE 9 MG/ML
INJECTION, SOLUTION INTRAVENOUS CONTINUOUS PRN
Status: DISCONTINUED | OUTPATIENT
Start: 2020-09-11 | End: 2020-09-11 | Stop reason: SDUPTHER

## 2020-09-11 RX ORDER — LABETALOL HYDROCHLORIDE 5 MG/ML
5 INJECTION, SOLUTION INTRAVENOUS EVERY 10 MIN PRN
Status: DISCONTINUED | OUTPATIENT
Start: 2020-09-11 | End: 2020-09-11 | Stop reason: HOSPADM

## 2020-09-11 RX ORDER — SODIUM CHLORIDE 0.9 % (FLUSH) 0.9 %
10 SYRINGE (ML) INJECTION PRN
Status: DISCONTINUED | OUTPATIENT
Start: 2020-09-11 | End: 2020-09-15 | Stop reason: HOSPADM

## 2020-09-11 RX ORDER — MIDAZOLAM HYDROCHLORIDE 1 MG/ML
INJECTION INTRAMUSCULAR; INTRAVENOUS PRN
Status: DISCONTINUED | OUTPATIENT
Start: 2020-09-11 | End: 2020-09-11 | Stop reason: SDUPTHER

## 2020-09-11 RX ORDER — METHYLENE BLUE 10 MG/ML
INJECTION INTRAVENOUS PRN
Status: DISCONTINUED | OUTPATIENT
Start: 2020-09-11 | End: 2020-09-11 | Stop reason: ALTCHOICE

## 2020-09-11 RX ORDER — NICOTINE POLACRILEX 4 MG
15 LOZENGE BUCCAL PRN
Status: DISCONTINUED | OUTPATIENT
Start: 2020-09-11 | End: 2020-09-12

## 2020-09-11 RX ORDER — NITROGLYCERIN 20 MG/100ML
INJECTION INTRAVENOUS CONTINUOUS PRN
Status: DISCONTINUED | OUTPATIENT
Start: 2020-09-11 | End: 2020-09-11 | Stop reason: SDUPTHER

## 2020-09-11 RX ORDER — HYDROMORPHONE HYDROCHLORIDE 1 MG/ML
0.5 INJECTION, SOLUTION INTRAMUSCULAR; INTRAVENOUS; SUBCUTANEOUS EVERY 5 MIN PRN
Status: DISCONTINUED | OUTPATIENT
Start: 2020-09-11 | End: 2020-09-11 | Stop reason: HOSPADM

## 2020-09-11 RX ORDER — MORPHINE SULFATE 4 MG/ML
2 INJECTION, SOLUTION INTRAMUSCULAR; INTRAVENOUS
Status: DISCONTINUED | OUTPATIENT
Start: 2020-09-11 | End: 2020-09-13

## 2020-09-11 RX ORDER — 0.9 % SODIUM CHLORIDE 0.9 %
500 INTRAVENOUS SOLUTION INTRAVENOUS CONTINUOUS PRN
Status: DISCONTINUED | OUTPATIENT
Start: 2020-09-11 | End: 2020-09-12

## 2020-09-11 RX ORDER — ONDANSETRON 2 MG/ML
4 INJECTION INTRAMUSCULAR; INTRAVENOUS EVERY 8 HOURS PRN
Status: DISCONTINUED | OUTPATIENT
Start: 2020-09-11 | End: 2020-09-15 | Stop reason: HOSPADM

## 2020-09-11 RX ORDER — MEPERIDINE HYDROCHLORIDE 50 MG/ML
12.5 INJECTION INTRAMUSCULAR; INTRAVENOUS; SUBCUTANEOUS EVERY 5 MIN PRN
Status: DISCONTINUED | OUTPATIENT
Start: 2020-09-11 | End: 2020-09-11 | Stop reason: HOSPADM

## 2020-09-11 RX ORDER — DEXTROSE MONOHYDRATE 25 G/50ML
12.5 INJECTION, SOLUTION INTRAVENOUS PRN
Status: DISCONTINUED | OUTPATIENT
Start: 2020-09-11 | End: 2020-09-12

## 2020-09-11 RX ORDER — MORPHINE SULFATE 4 MG/ML
INJECTION, SOLUTION INTRAMUSCULAR; INTRAVENOUS
Status: COMPLETED
Start: 2020-09-11 | End: 2020-09-11

## 2020-09-11 RX ORDER — OXYCODONE HYDROCHLORIDE 5 MG/1
5 TABLET ORAL EVERY 4 HOURS PRN
Status: DISCONTINUED | OUTPATIENT
Start: 2020-09-11 | End: 2020-09-15 | Stop reason: HOSPADM

## 2020-09-11 RX ORDER — OXYCODONE HYDROCHLORIDE 5 MG/1
10 TABLET ORAL EVERY 4 HOURS PRN
Status: DISCONTINUED | OUTPATIENT
Start: 2020-09-11 | End: 2020-09-15 | Stop reason: HOSPADM

## 2020-09-11 RX ORDER — DIPHENHYDRAMINE HYDROCHLORIDE 50 MG/ML
12.5 INJECTION INTRAMUSCULAR; INTRAVENOUS
Status: DISCONTINUED | OUTPATIENT
Start: 2020-09-11 | End: 2020-09-11 | Stop reason: HOSPADM

## 2020-09-11 RX ORDER — MORPHINE SULFATE 4 MG/ML
2 INJECTION, SOLUTION INTRAMUSCULAR; INTRAVENOUS EVERY 5 MIN PRN
Status: DISCONTINUED | OUTPATIENT
Start: 2020-09-11 | End: 2020-09-11 | Stop reason: HOSPADM

## 2020-09-11 RX ORDER — CLOPIDOGREL BISULFATE 75 MG/1
75 TABLET ORAL DAILY
Status: DISCONTINUED | OUTPATIENT
Start: 2020-09-12 | End: 2020-09-15 | Stop reason: HOSPADM

## 2020-09-11 RX ORDER — SODIUM CHLORIDE 0.9 % (FLUSH) 0.9 %
10 SYRINGE (ML) INJECTION EVERY 12 HOURS SCHEDULED
Status: DISCONTINUED | OUTPATIENT
Start: 2020-09-11 | End: 2020-09-15 | Stop reason: HOSPADM

## 2020-09-11 RX ORDER — IPRATROPIUM BROMIDE AND ALBUTEROL SULFATE 2.5; .5 MG/3ML; MG/3ML
1 SOLUTION RESPIRATORY (INHALATION)
Status: DISCONTINUED | OUTPATIENT
Start: 2020-09-11 | End: 2020-09-15 | Stop reason: HOSPADM

## 2020-09-11 RX ORDER — SODIUM CHLORIDE 450 MG/100ML
INJECTION, SOLUTION INTRAVENOUS CONTINUOUS PRN
Status: DISCONTINUED | OUTPATIENT
Start: 2020-09-11 | End: 2020-09-11

## 2020-09-11 RX ORDER — MORPHINE SULFATE 4 MG/ML
4 INJECTION, SOLUTION INTRAMUSCULAR; INTRAVENOUS EVERY 5 MIN PRN
Status: DISCONTINUED | OUTPATIENT
Start: 2020-09-11 | End: 2020-09-11 | Stop reason: HOSPADM

## 2020-09-11 RX ORDER — LABETALOL 20 MG/4 ML (5 MG/ML) INTRAVENOUS SYRINGE
PRN
Status: DISCONTINUED | OUTPATIENT
Start: 2020-09-11 | End: 2020-09-11 | Stop reason: SDUPTHER

## 2020-09-11 RX ORDER — HYDROMORPHONE HYDROCHLORIDE 1 MG/ML
0.25 INJECTION, SOLUTION INTRAMUSCULAR; INTRAVENOUS; SUBCUTANEOUS EVERY 5 MIN PRN
Status: DISCONTINUED | OUTPATIENT
Start: 2020-09-11 | End: 2020-09-11 | Stop reason: HOSPADM

## 2020-09-11 RX ORDER — PROTAMINE SULFATE 10 MG/ML
INJECTION, SOLUTION INTRAVENOUS PRN
Status: DISCONTINUED | OUTPATIENT
Start: 2020-09-11 | End: 2020-09-11 | Stop reason: SDUPTHER

## 2020-09-11 RX ORDER — POTASSIUM CHLORIDE 29.8 MG/ML
10 INJECTION INTRAVENOUS PRN
Status: DISCONTINUED | OUTPATIENT
Start: 2020-09-11 | End: 2020-09-15 | Stop reason: HOSPADM

## 2020-09-11 RX ORDER — SUFENTANIL CITRATE 50 UG/ML
INJECTION EPIDURAL; INTRAVENOUS PRN
Status: DISCONTINUED | OUTPATIENT
Start: 2020-09-11 | End: 2020-09-11 | Stop reason: SDUPTHER

## 2020-09-11 RX ORDER — CALCIUM CHLORIDE 100 MG/ML
INJECTION INTRAVENOUS; INTRAVENTRICULAR PRN
Status: DISCONTINUED | OUTPATIENT
Start: 2020-09-11 | End: 2020-09-11 | Stop reason: SDUPTHER

## 2020-09-11 RX ORDER — SODIUM CHLORIDE 0.9 % (FLUSH) 0.9 %
10 SYRINGE (ML) INJECTION EVERY 12 HOURS SCHEDULED
Status: DISCONTINUED | OUTPATIENT
Start: 2020-09-11 | End: 2020-09-11 | Stop reason: HOSPADM

## 2020-09-11 RX ORDER — MEPERIDINE HYDROCHLORIDE 50 MG/ML
25 INJECTION INTRAMUSCULAR; INTRAVENOUS; SUBCUTANEOUS
Status: COMPLETED | OUTPATIENT
Start: 2020-09-11 | End: 2020-09-11

## 2020-09-11 RX ORDER — PROPOFOL 10 MG/ML
INJECTION, EMULSION INTRAVENOUS PRN
Status: DISCONTINUED | OUTPATIENT
Start: 2020-09-11 | End: 2020-09-11 | Stop reason: SDUPTHER

## 2020-09-11 RX ORDER — NITROGLYCERIN 20 MG/100ML
INJECTION INTRAVENOUS PRN
Status: DISCONTINUED | OUTPATIENT
Start: 2020-09-11 | End: 2020-09-11 | Stop reason: SDUPTHER

## 2020-09-11 RX ORDER — SODIUM CHLORIDE 0.9 % (FLUSH) 0.9 %
10 SYRINGE (ML) INJECTION PRN
Status: DISCONTINUED | OUTPATIENT
Start: 2020-09-11 | End: 2020-09-11 | Stop reason: HOSPADM

## 2020-09-11 RX ORDER — MORPHINE SULFATE 4 MG/ML
4 INJECTION, SOLUTION INTRAMUSCULAR; INTRAVENOUS
Status: DISCONTINUED | OUTPATIENT
Start: 2020-09-11 | End: 2020-09-13

## 2020-09-11 RX ORDER — ACETAMINOPHEN 325 MG/1
650 TABLET ORAL EVERY 4 HOURS PRN
Status: DISCONTINUED | OUTPATIENT
Start: 2020-09-11 | End: 2020-09-15 | Stop reason: HOSPADM

## 2020-09-11 RX ORDER — HYDRALAZINE HYDROCHLORIDE 20 MG/ML
5 INJECTION INTRAMUSCULAR; INTRAVENOUS EVERY 10 MIN PRN
Status: DISCONTINUED | OUTPATIENT
Start: 2020-09-11 | End: 2020-09-11 | Stop reason: HOSPADM

## 2020-09-11 RX ORDER — METOCLOPRAMIDE HYDROCHLORIDE 5 MG/ML
10 INJECTION INTRAMUSCULAR; INTRAVENOUS
Status: DISCONTINUED | OUTPATIENT
Start: 2020-09-11 | End: 2020-09-11 | Stop reason: HOSPADM

## 2020-09-11 RX ORDER — ASPIRIN 81 MG/1
81 TABLET ORAL DAILY
Status: DISCONTINUED | OUTPATIENT
Start: 2020-09-11 | End: 2020-09-15 | Stop reason: HOSPADM

## 2020-09-11 RX ADMIN — LIDOCAINE HYDROCHLORIDE 50 MG: 10 INJECTION, SOLUTION EPIDURAL; INFILTRATION; INTRACAUDAL; PERINEURAL at 07:28

## 2020-09-11 RX ADMIN — SUFENTANIL CITRATE 10 MCG: 50 INJECTION EPIDURAL; INTRAVENOUS at 07:24

## 2020-09-11 RX ADMIN — SODIUM CHLORIDE: 4.5 INJECTION, SOLUTION INTRAVENOUS at 07:37

## 2020-09-11 RX ADMIN — SODIUM CHLORIDE: 4.5 INJECTION, SOLUTION INTRAVENOUS at 07:25

## 2020-09-11 RX ADMIN — MORPHINE SULFATE 4 MG: 4 INJECTION, SOLUTION INTRAMUSCULAR; INTRAVENOUS at 12:22

## 2020-09-11 RX ADMIN — MORPHINE SULFATE 2 MG: 4 INJECTION, SOLUTION INTRAMUSCULAR; INTRAVENOUS at 19:01

## 2020-09-11 RX ADMIN — SODIUM CHLORIDE, PRESERVATIVE FREE 10 ML: 5 INJECTION INTRAVENOUS at 15:52

## 2020-09-11 RX ADMIN — MORPHINE SULFATE 4 MG: 4 INJECTION, SOLUTION INTRAMUSCULAR; INTRAVENOUS at 21:00

## 2020-09-11 RX ADMIN — SODIUM CHLORIDE: 9 INJECTION, SOLUTION INTRAVENOUS at 07:46

## 2020-09-11 RX ADMIN — SUFENTANIL CITRATE 20 MCG: 50 INJECTION EPIDURAL; INTRAVENOUS at 08:56

## 2020-09-11 RX ADMIN — NITROGLYCERIN 20 MCG: 20 INJECTION INTRAVENOUS at 08:26

## 2020-09-11 RX ADMIN — INSULIN LISPRO 1 UNITS: 100 INJECTION, SOLUTION INTRAVENOUS; SUBCUTANEOUS at 12:15

## 2020-09-11 RX ADMIN — CHLORHEXIDINE GLUCONATE 15 ML: 1.2 RINSE ORAL at 05:53

## 2020-09-11 RX ADMIN — POTASSIUM CHLORIDE 10 MEQ: 400 INJECTION, SOLUTION INTRAVENOUS at 12:16

## 2020-09-11 RX ADMIN — SUFENTANIL CITRATE 20 MCG: 50 INJECTION EPIDURAL; INTRAVENOUS at 08:11

## 2020-09-11 RX ADMIN — HYDROMORPHONE HYDROCHLORIDE 0.5 MG: 1 INJECTION, SOLUTION INTRAMUSCULAR; INTRAVENOUS; SUBCUTANEOUS at 10:55

## 2020-09-11 RX ADMIN — DEXTROSE MONOHYDRATE 4 MG/HR: 50 INJECTION, SOLUTION INTRAVENOUS at 21:37

## 2020-09-11 RX ADMIN — CEFAZOLIN SODIUM 2 G: 10 INJECTION, POWDER, FOR SOLUTION INTRAVENOUS at 23:50

## 2020-09-11 RX ADMIN — SODIUM CHLORIDE: 9 INJECTION, SOLUTION INTRAVENOUS at 12:26

## 2020-09-11 RX ADMIN — SUFENTANIL CITRATE 10 MCG: 50 INJECTION EPIDURAL; INTRAVENOUS at 08:23

## 2020-09-11 RX ADMIN — LABETALOL 20 MG/4 ML (5 MG/ML) INTRAVENOUS SYRINGE 5 MG: at 10:17

## 2020-09-11 RX ADMIN — SODIUM CHLORIDE: 4.5 INJECTION, SOLUTION INTRAVENOUS at 07:36

## 2020-09-11 RX ADMIN — NITROGLYCERIN 40 MCG: 20 INJECTION INTRAVENOUS at 09:08

## 2020-09-11 RX ADMIN — POTASSIUM CHLORIDE 15 MEQ: 149 INJECTION, SOLUTION, CONCENTRATE INTRAVENOUS at 09:58

## 2020-09-11 RX ADMIN — ONDANSETRON 4 MG: 2 INJECTION INTRAMUSCULAR; INTRAVENOUS at 15:52

## 2020-09-11 RX ADMIN — ROCURONIUM BROMIDE 50 MG: 10 INJECTION, SOLUTION INTRAVENOUS at 09:16

## 2020-09-11 RX ADMIN — MIDAZOLAM 2 MG: 1 INJECTION INTRAMUSCULAR; INTRAVENOUS at 07:20

## 2020-09-11 RX ADMIN — SUFENTANIL CITRATE 10 MCG: 50 INJECTION EPIDURAL; INTRAVENOUS at 08:25

## 2020-09-11 RX ADMIN — SODIUM CHLORIDE 3.5 UNITS/HR: 9 INJECTION, SOLUTION INTRAVENOUS at 12:22

## 2020-09-11 RX ADMIN — NITROGLYCERIN 40 MCG: 20 INJECTION INTRAVENOUS at 10:15

## 2020-09-11 RX ADMIN — SUFENTANIL CITRATE 10 MCG: 50 INJECTION EPIDURAL; INTRAVENOUS at 10:55

## 2020-09-11 RX ADMIN — NITROGLYCERIN 20 MCG/MIN: 20 INJECTION INTRAVENOUS at 08:09

## 2020-09-11 RX ADMIN — MIDAZOLAM 2 MG: 1 INJECTION INTRAMUSCULAR; INTRAVENOUS at 09:58

## 2020-09-11 RX ADMIN — CALCIUM CHLORIDE 0.5 G: 100 INJECTION, SOLUTION INTRAVENOUS; INTRAVENTRICULAR at 10:32

## 2020-09-11 RX ADMIN — PROTAMINE SULFATE 200 MG: 10 INJECTION, SOLUTION INTRAVENOUS at 10:14

## 2020-09-11 RX ADMIN — DEXTROSE MONOHYDRATE 5 MG/HR: 50 INJECTION, SOLUTION INTRAVENOUS at 17:18

## 2020-09-11 RX ADMIN — SODIUM CHLORIDE, PRESERVATIVE FREE 10 ML: 5 INJECTION INTRAVENOUS at 21:14

## 2020-09-11 RX ADMIN — NITROGLYCERIN 40 MCG: 20 INJECTION INTRAVENOUS at 09:03

## 2020-09-11 RX ADMIN — MORPHINE SULFATE 4 MG: 4 INJECTION, SOLUTION INTRAMUSCULAR; INTRAVENOUS at 14:29

## 2020-09-11 RX ADMIN — SUFENTANIL CITRATE 70 MCG: 50 INJECTION EPIDURAL; INTRAVENOUS at 07:28

## 2020-09-11 RX ADMIN — IPRATROPIUM BROMIDE AND ALBUTEROL SULFATE 1 AMPULE: .5; 3 SOLUTION RESPIRATORY (INHALATION) at 18:24

## 2020-09-11 RX ADMIN — NITROGLYCERIN 20 MCG: 20 INJECTION INTRAVENOUS at 09:05

## 2020-09-11 RX ADMIN — ROCURONIUM BROMIDE 30 MG: 10 INJECTION, SOLUTION INTRAVENOUS at 09:56

## 2020-09-11 RX ADMIN — MIDAZOLAM 2 MG: 1 INJECTION INTRAMUSCULAR; INTRAVENOUS at 09:02

## 2020-09-11 RX ADMIN — MEPERIDINE HYDROCHLORIDE 25 MG: 50 INJECTION, SOLUTION INTRAMUSCULAR; INTRAVENOUS; SUBCUTANEOUS at 13:24

## 2020-09-11 RX ADMIN — PROPOFOL 70 MG: 10 INJECTION, EMULSION INTRAVENOUS at 07:30

## 2020-09-11 RX ADMIN — ROCURONIUM BROMIDE 50 MG: 10 INJECTION, SOLUTION INTRAVENOUS at 07:30

## 2020-09-11 RX ADMIN — ROCURONIUM BROMIDE 50 MG: 10 INJECTION, SOLUTION INTRAVENOUS at 08:09

## 2020-09-11 RX ADMIN — HYDROMORPHONE HYDROCHLORIDE 1 MG: 1 INJECTION, SOLUTION INTRAMUSCULAR; INTRAVENOUS; SUBCUTANEOUS at 11:07

## 2020-09-11 RX ADMIN — CEFAZOLIN SODIUM 2 G: 10 INJECTION, POWDER, FOR SOLUTION INTRAVENOUS at 16:31

## 2020-09-11 RX ADMIN — NITROGLYCERIN 20 MCG: 20 INJECTION INTRAVENOUS at 08:28

## 2020-09-11 RX ADMIN — OXYCODONE 10 MG: 5 TABLET ORAL at 19:45

## 2020-09-11 RX ADMIN — LABETALOL 20 MG/4 ML (5 MG/ML) INTRAVENOUS SYRINGE 5 MG: at 08:38

## 2020-09-11 RX ADMIN — IPRATROPIUM BROMIDE AND ALBUTEROL SULFATE 1 AMPULE: .5; 3 SOLUTION RESPIRATORY (INHALATION) at 14:16

## 2020-09-11 RX ADMIN — SUFENTANIL CITRATE 20 MCG: 50 INJECTION EPIDURAL; INTRAVENOUS at 08:24

## 2020-09-11 RX ADMIN — NITROGLYCERIN 40 MCG: 20 INJECTION INTRAVENOUS at 08:43

## 2020-09-11 RX ADMIN — HYDROMORPHONE HYDROCHLORIDE 0.5 MG: 1 INJECTION, SOLUTION INTRAMUSCULAR; INTRAVENOUS; SUBCUTANEOUS at 10:53

## 2020-09-11 RX ADMIN — POTASSIUM CHLORIDE 10 MEQ: 400 INJECTION, SOLUTION INTRAVENOUS at 13:38

## 2020-09-11 RX ADMIN — Medication 2 G: at 07:45

## 2020-09-11 RX ADMIN — MORPHINE SULFATE 4 MG: 4 INJECTION, SOLUTION INTRAMUSCULAR; INTRAVENOUS at 23:22

## 2020-09-11 RX ADMIN — PROPOFOL 30 MG: 10 INJECTION, EMULSION INTRAVENOUS at 07:54

## 2020-09-11 RX ADMIN — SUFENTANIL CITRATE 10 MCG: 50 INJECTION EPIDURAL; INTRAVENOUS at 08:34

## 2020-09-11 RX ADMIN — SUFENTANIL CITRATE 20 MCG: 50 INJECTION EPIDURAL; INTRAVENOUS at 08:36

## 2020-09-11 RX ADMIN — ONDANSETRON 4 MG: 2 INJECTION INTRAMUSCULAR; INTRAVENOUS at 18:58

## 2020-09-11 ASSESSMENT — PULMONARY FUNCTION TESTS
PIF_VALUE: 0
PIF_VALUE: 7
PIF_VALUE: 22
PIF_VALUE: 27
PIF_VALUE: 15
PIF_VALUE: 20
PIF_VALUE: 22
PIF_VALUE: 26
PIF_VALUE: 22
PIF_VALUE: 22
PIF_VALUE: 0
PIF_VALUE: 27
PIF_VALUE: 26
PIF_VALUE: 24
PIF_VALUE: 6.9
PIF_VALUE: 26

## 2020-09-11 ASSESSMENT — PAIN SCALES - WONG BAKER
WONGBAKER_NUMERICALRESPONSE: 2
WONGBAKER_NUMERICALRESPONSE: 0
WONGBAKER_NUMERICALRESPONSE: 8
WONGBAKER_NUMERICALRESPONSE: 2
WONGBAKER_NUMERICALRESPONSE: 8
WONGBAKER_NUMERICALRESPONSE: 2
WONGBAKER_NUMERICALRESPONSE: 0
WONGBAKER_NUMERICALRESPONSE: 0

## 2020-09-11 ASSESSMENT — PAIN SCALES - GENERAL
PAINLEVEL_OUTOF10: 2
PAINLEVEL_OUTOF10: 2
PAINLEVEL_OUTOF10: 4
PAINLEVEL_OUTOF10: 8
PAINLEVEL_OUTOF10: 0
PAINLEVEL_OUTOF10: 8
PAINLEVEL_OUTOF10: 9
PAINLEVEL_OUTOF10: 2
PAINLEVEL_OUTOF10: 2
PAINLEVEL_OUTOF10: 10
PAINLEVEL_OUTOF10: 2
PAINLEVEL_OUTOF10: 8
PAINLEVEL_OUTOF10: 8
PAINLEVEL_OUTOF10: 2
PAINLEVEL_OUTOF10: 10
PAINLEVEL_OUTOF10: 0

## 2020-09-11 ASSESSMENT — PAIN DESCRIPTION - ORIENTATION
ORIENTATION: MID
ORIENTATION: MID

## 2020-09-11 ASSESSMENT — PAIN DESCRIPTION - LOCATION
LOCATION: CHEST

## 2020-09-11 ASSESSMENT — PAIN DESCRIPTION - DESCRIPTORS
DESCRIPTORS: ACHING

## 2020-09-11 ASSESSMENT — ENCOUNTER SYMPTOMS: SHORTNESS OF BREATH: 1

## 2020-09-11 ASSESSMENT — PAIN DESCRIPTION - PAIN TYPE
TYPE: SURGICAL PAIN

## 2020-09-11 ASSESSMENT — LIFESTYLE VARIABLES: SMOKING_STATUS: 0

## 2020-09-11 NOTE — PROGRESS NOTES
@6347 extubated pt to 4lpm nasal cannula. r-19bpm. spo2 92%. nif-40. [id number]. No event noted. Pt alert oriented and follows commnads.

## 2020-09-11 NOTE — BRIEF OP NOTE
Brief Postoperative Note      Patient: Naeem Guerra  YOB: 1945  MRN: [id number]    Date of Procedure: 9/11/2020    Pre-Op Diagnosis: CAD    Post-Op Diagnosis: Same       Procedure(s):  CORONARY ARTERY BYPASS GRAFT X2 WITH LEFT INTERNAL MAMMARY ARTERY WITH OPEN VEIN HARVESTING WITH PERFUSION TRANSESOPHAGEAL ECHOCARDIOGRAM    Surgeon(s):  Abad Antonio MD    Assistant:  First Assistant: Pierce Amaya    Anesthesia: General    Estimated Blood Loss (mL): N/A    Complications: None    Specimens:   * No specimens in log *    Implants:  Implant Name Type Inv. Item Serial No.  Lot No. LRB No. Used Action    AC GRAFT AMMY Vascular/Graft/Patch/Filter  AC GRAFT AMMY  [employer] [id number] N/A 1 Implanted         Drains:   Chest Tube 1 Anterior Pleural 24 Israeli (Active)       Chest Tube Anterior Mediastinal 19 Israeli (Active)       Urethral Catheter Temperature probe 16 fr (Active)       Findings: Poor GSV. Only acceptable venous  conduit was right lower leg.     Electronically signed by Abad Antonio MD on 9/11/2020 at 11:06 AM

## 2020-09-11 NOTE — PROGRESS NOTES
Attempted to wean patient from vent at 1600. Patient only pulling TV of 160. Began second attempt at 1815. On c-pap mode at this time pulling  to 600. Will monitor and wean if patient tolerates.

## 2020-09-11 NOTE — PROGRESS NOTES
Pt requesting something to help her relax and rest. Notified Dr Josseline Jiménez of request. Telephone order received and entered for xanax.  Electronically signed by Shanon Ribeiro RN on 9/10/2020 at 10:39 PM

## 2020-09-11 NOTE — ANESTHESIA PROCEDURE NOTES
Central Venous Line:    A central venous line was placed using surface landmarks, in the OR for the following indication(s): central venous access and CVP monitoring. 9/11/2020 10:56 AM9/11/2020 10:56 AM    Sterility preparation included the following: hand hygiene performed prior to procedure, maximum sterile barriers used and sterile technique used to drape from head to toe. The patient was placed in Trendelenburg position. The right internal jugular vein was prepped. The site was prepped with Chloraprep. A 8.5 Fr (size), 10 (length), introducer     During the procedure, the following specific steps were taken: target vein identified, needle advanced into vein and blood aspirated and guidewire advanced into vein. Intravenous verification was obtained by venous blood return and HOWIE. Post insertion care included: all ports aspirated, all ports flushed easily, guidewire removed intact, Biopatch applied, line sutured in place and dressing applied. During the procedure the patient experienced: patient tolerated procedure well with no complications. Anesthesia type: generalA(n) non-oximetric, 7.5 (size) Pulmonary Artery Catheter (PAC) was placed through the Introducer CVL in the right internal jugular vein. The PAC placement was confirmed by pressure tracing changes and HOWIE. The patient experienced the following events during the procedure: patient tolerated procedure well with no complications. PA Cath placed?: Yes    Additional notes:  Sterile dressing/Tegaderm was applied   Staffing  Anesthesiologist: Eric Mckinley MD  Performed: Anesthesiologist   Preanesthetic Checklist  Completed: patient identified, site marked, surgical consent, pre-op evaluation, timeout performed, IV checked, risks and benefits discussed, monitors and equipment checked, anesthesia consent given, oxygen available and patient being monitored

## 2020-09-11 NOTE — ANESTHESIA PROCEDURE NOTES
Procedure Performed: HOWIE       Start Time:  9/11/2020 10:56 AM       End Time:   9/11/2020 10:56 AM    Preanesthesia Checklist:  Patient identified, IV assessed, risks and benefits discussed, monitors and equipment assessed, procedure being performed at surgeon's request and anesthesia consent obtained. General Procedure Information  Diagnostic Indications for Echo:  assessment of ascending aorta, hemodynamic monitoring and assessment of valve function  Physician Requesting Echo: Gerry Méndez MD  Location performed:  OR  Intubated  Bite block placed  Heart visualized  Probe Insertion:  Easy  Probe Type:  3D  Modalities:  2D only, 3D, color flow mapping, continuous wave Doppler and pulse wave Doppler    Echocardiographic and Doppler Measurements    Ventricles    Right Ventricle:  Cavity size normal.  Hypertrophy not present. Thrombus not present. Global function normal.    Left Ventricle:  Cavity size normal.  Hypertrophy not present. Thrombus not present. Global Function normal.      Ventricular Regional Function:  1- Basal Anteroseptal:  normal  2- Basal Anterior:  normal  3- Basal Anterolateral:  normal  4- Basal Inferolateral:  normal  5- Basal Inferior:  normal  6- Basal Inferoseptal:  normal  7- Mid Anteroseptal:  normal  8- Mid Anterior:  normal  9- Mid Anterolateral:  normal  10- Mid Inferolateral:  normal  11- Mid Inferior:  normal  12- Mid Inferoseptal:  normal  13- Apical Anterior:  normal  14- Apical Lateral:  normal  15- Apical Inferior:  normal  16- Apical Septal:  normal  17- Barton:  normal      Valves    Aortic Valve: Annulus normal.  Stenosis not present. Regurgitation none. Leaflets normal.  Leaflet motions normal.      Mitral Valve: Annulus normal.  Stenosis not present. Regurgitation none. Leaflets normal.  Leaflet motions normal.      Tricuspid Valve: Annulus normal.  Stenosis not present. Regurgitation none. Leaflets normal.  Leaflet motions normal.    Pulmonic Valve:   Annulus normal.  Stenosis not present. Regurgitation none. Aorta    Ascending Aorta:  Size normal.  Dissection not present. Aortic Arch:  Size normal.  Dissection not present. Descending Aorta:  Size normal.  Dissection not present. Atria    Right Atrium:  Size normal.  Spontaneous echo contrast not present. Thrombus not present. Tumor not present. Device present. Left Atrium:  Size normal.  Spontaneous echo contrast not present. Thrombus not present. Tumor not present. Device not present.     Left atrial appendage normal.      Septa    Atrial Septum:  Intra-atrial septal morphology normal.      Other atrial septal defect findings:       No pfo by cfd    Ventricular Septum:  Intra-ventricular septum morphology normal.          Other Findings  Pericardium:  normal  Pleural Effusion:  none  Pulmonary Arteries:  normal  Pulmonary Venous Flow:  normal    Anesthesia Information  Performed Personally  Anesthesiologist:  Tyson Guthrie MD

## 2020-09-11 NOTE — PLAN OF CARE
Problem: Falls - Risk of:  Goal: Will remain free from falls  Description: Will remain free from falls  Outcome: Met This Shift  Goal: Absence of physical injury  Description: Absence of physical injury  Outcome: Met This Shift     Problem: SAFETY  Goal: Free from accidental physical injury  Outcome: Met This Shift  Goal: Free from intentional harm  Outcome: Met This Shift     Problem: DAILY CARE  Goal: Daily care needs are met  Outcome: Met This Shift     Problem: PAIN  Goal: Patient's pain/discomfort is manageable  Outcome: Ongoing     Problem: SKIN INTEGRITY  Goal: Skin integrity is maintained or improved  Outcome: Ongoing     Problem: KNOWLEDGE DEFICIT  Goal: Patient/S.O. demonstrates understanding of disease process, treatment plan, medications, and discharge instructions. Outcome: Ongoing     Problem: DISCHARGE BARRIERS  Goal: Patient's continuum of care needs are met  Outcome: Ongoing     Problem: Discharge Planning:  Goal: Discharged to appropriate level of care  Description: Discharged to appropriate level of care  Outcome: Ongoing     Problem:  Activity Intolerance:  Goal: Able to perform prescribed physical activity  Description: Able to perform prescribed physical activity  Outcome: Ongoing  Goal: Ability to tolerate increased activity will improve  Description: Ability to tolerate increased activity will improve  Outcome: Ongoing     Problem: Anxiety:  Goal: Level of anxiety will decrease  Description: Level of anxiety will decrease  Outcome: Ongoing     Problem: Cardiac Output - Decreased:  Goal: Cardiac output within specified parameters  Description: Cardiac output within specified parameters  Outcome: Ongoing  Goal: Hemodynamic stability will improve  Description: Hemodynamic stability will improve  Outcome: Ongoing     Problem: Fluid Volume - Imbalance:  Goal: Ability to achieve a balanced intake and output will improve  Description: Ability to achieve a balanced intake and output will improve  Outcome: Ongoing  Goal: Chest tube drainage is within specified parameters  Description: Chest tube drainage is within specified parameters  Outcome: Ongoing     Problem: Gas Exchange - Impaired:  Goal: Levels of oxygenation will improve  Description: Levels of oxygenation will improve  Outcome: Ongoing  Goal: Ability to maintain adequate ventilation will improve  Description: Ability to maintain adequate ventilation will improve  Outcome: Ongoing     Problem: Pain:  Goal: Pain level will decrease  Description: Pain level will decrease  Outcome: Ongoing  Goal: Control of acute pain  Description: Control of acute pain  Outcome: Ongoing  Goal: Control of chronic pain  Description: Control of chronic pain  Outcome: Ongoing     Problem: Tissue Perfusion - Cardiopulmonary, Altered:  Goal: Absence of angina  Description: Absence of angina  Outcome: Ongoing  Goal: Hemodynamic stability will improve  Description: Hemodynamic stability will improve  Outcome: Ongoing  Goal: Will show no evidence of cardiac arrhythmias  Description: Will show no evidence of cardiac arrhythmias  Outcome: Ongoing

## 2020-09-11 NOTE — ANESTHESIA POSTPROCEDURE EVALUATION
Department of Anesthesiology  Postprocedure Note    Patient: Kelly Curiel  MRN: [id number]  YOB: 1945  Date of evaluation: 9/11/2020  Time:  11:20 AM     Procedure Summary     Date:  09/11/20 Room / Location:  [facility] OR [place] [address] / [facility]    Anesthesia Start:  0719 Anesthesia Stop:  1120    Procedure:  CORONARY ARTERY BYPASS GRAFT X2 WITH LEFT INTERNAL MAMMARY ARTERY WITH OPEN VEIN HARVESTING WITH PERFUSION TRANSESOPHAGEAL ECHOCARDIOGRAM (N/A ) Diagnosis:  (CAD)    Surgeon:  Mark Metcalf MD Responsible Provider:  ULISES Flores CRNA    Anesthesia Type:  general ASA Status:  4          Anesthesia Type: general    Sarah Phase I:      Sarah Phase II:      Last vitals: Reviewed and per EMR flowsheets.        Anesthesia Post Evaluation    Patient location during evaluation: ICU  Patient participation: complete - patient cannot participate  Level of consciousness: sedated and ventilated  Pain score: 0  Airway patency: patent  Nausea & Vomiting: no nausea and no vomiting  Complications: no  Cardiovascular status: hemodynamically stable  Respiratory status: ventilator, acceptable and intubated

## 2020-09-11 NOTE — PROGRESS NOTES
1112 pt arrived to room 143 from surgery with a 7.5 ETT at the 19 cm aziza in place. Placed on vent with initial settings of SIMV 12 550 + 5 100%. ABG to follow.

## 2020-09-11 NOTE — OP NOTE
[facility] [address] [facility]                                OPERATIVE REPORT    PATIENT NAME: Pion Marino                    :        1945  MED REC NO:   [id number]                              ROOM:       [facility]  ACCOUNT NO:   [de-identified]                           ADMIT DATE: 09/10/2020  PROVIDER:     Aric Murillo MD      DATE OF PROCEDURE:  2020    PREOPERATIVE DIAGNOSIS:  Progressive angina associated with left main  and multivessel coronary artery disease. POSTOPERATIVE DIAGNOSIS:  Progressive angina associated with left main  and multivessel coronary artery disease. OPERATIVE PROCEDURE:  Two-vessel perfusion-assisted coronary artery  bypass grafting placing the left internal mammary artery to the left  anterior descending artery and a saphenous vein bypass graft to the  obtuse marginal branch. SURGEON:  Aric Murillo MD    ASSISTANT:  Benny Block CFA    ANESTHESIA:  General.    HISTORY AND FINDINGS:  The patient is a 75-year-old obese female with a  history of rheumatoid arthritis who has had multiple heart  catheterizations over the last several years for investigation of chest  pain, shortness of breath, and lethargy. She did undergo stenting of  her proximal RCA many years ago. She recently had a dobutamine stress  echo that clinically showed evidence of myocardial ischemia as the  patient developed chest pain during the exam.  This prompted heart  catheterization. Catheterization demonstrates a left ventricle that  shows normal function, ejection fraction is estimated at 70%. The left  main artery is a rather short vessel, very difficult to see; there is  actually almost separate ostia to the LAD and the circumflex systems. There was significant damping of the pressures when the cardiologist  engaged the left main artery.   The patient also developed chest pain on  the cath table with this and required administration of nitroglycerin. Suspecting that there was significant left main disease, the patient was  referred for bypass surgery. At the time of operation, the patient was  somewhat difficult to operate on because of her obesity. The depth of  fat on the chest wall was considerable. The aorta was normal size,  normal caliber, and free of any palpable atherosclerotic disease. The  heart was normal size with good wall motion. There was no evidence of  transmural infarction. The RCA had some proximal disease but was fairly  good beyond the acute margin. The LAD was accessible at the mid-third  where it measured 2 mm in size and was a rather soft vessel. The obtuse  marginal branch is also a soft vessel, free of disease at its midpoint  where it also measured 2 mm in size. The other difficulty was obtaining  adequate saphenous vein on this patient, who has had prior ablation of  her greater saphenous vein in both upper legs. Preoperative vein  mapping suggested that the only really usable vein was going to be on  the right side just above the ankle. This vein was usable up to about  the mid-calf level before it became unusable. Fortunately, this was  enough to perform the single vein bypass graft necessary. For this  reason, no further conduit could be obtained and therefore, the RCA was  not grafted in this setting. Overall, this patient was somewhat  difficult to operate on. 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 She is not a candidate for redo cardiac surgery. DESCRIPTION OF OPERATION:  The patient was taken to the operating room  and placed in the supine position. General anesthesia was then  administered. The patient was then prepped and draped in a sterile  fashion for cardiac surgery. Saphenous vein was then taken using the  open technique from the right leg from the ankle to the mid-calf area.    The patient had preoperative vein mapping and marking, which facilitated  the harvesting. Fortunately, this segment of vein was usable, the  remainder of the vein was unusable. The chest was then entered through  a median sternotomy incision. The left internal mammary artery was then  dissected off the left chest wall as a pedicle. The patient was then  systemically heparinized and was cannulated in the usual fashion. The  mammary artery was then retrieved from the left chest.  This appeared to  be an excellent conduit with very good length. The distal lumen that  measured about 1.5 to 2 mm in size with excellent flows. The patient  was then placed on cardiopulmonary bypass and maintained at 37 degrees  centigrade using the perfusion-assisted beating heart technique. I then  used a cardiac stabilizer to help dissect out the distal vessels. I  approached the obtuse marginal branch first.  A proximal vessel loop was  then placed, the cardiac stabilizer was then applied, the vessel was  opened sharply. I was able to insert a 2 mm shunt and a segment of  saphenous vein was then anastomosed end-to-side to the obtuse marginal  branch with a running 7-0 Prolene suture. I then approached the LAD at  its midpoint. The left internal mammary artery was then brought through  a pericardial tunnel and prepared for distal anastomosis. A proximal  vessel loop was then placed, the cardiac stabilizer was then applied,  the vessel was opened sharply. I was able to squeeze in a 1.5 mm shunt  and the left internal mammary artery was then anastomosed end-to-side to  the LAD with a running 7-0 Prolene suture. The stabilizer was then  removed and hemostasis was secured. I then placed a partial occlusion  clamp on the ascending aorta and the single proximal vein graft  anastomosis was then performed with a running 6-0 Prolene suture. The  partial clamp was then removed and the vein graft was de-aired. Both  distal anastomoses were then checked for hemostasis.   I then placed a  24-Yobani drain into the left pleural space. Two right atrial and single  bipolar right ventricular epicardial pacemaker lead were then inserted. The patient maintained a sinus rhythm at 90 to 100 beats per minute. Ventilation was then resumed. The patient was then weaned from  cardiopulmonary bypass coming off with a systemic pressure of 90 to 100,  a cardiac output of 5 to 6 liters per minute and pulmonary artery  pressures of 27/14. Protamine was then administered. The cannulas were  then removed. As the patient remained stable, the pericardium was  reapproximated. Two Yobani mediastinal chest drains were then placed. The sternum was then reapproximated with a combination of single and  double twisted stainless steel wires. The fascia was then closed with a  running 0 PDS suture. The subcutaneous tissue and skin were closed with  2-0 and 4-0 Vicryl with a Prineo dressing. Sponge and needle counts  were correct. There were no apparent complications during the  operation. No blood was transfused. Total cardiopulmonary bypass time  was 52 minutes. The cross-clamp was not used. The patient was then  taken to the intensive care unit in stable and serious condition.     ebl = n/a    Chivo Segundo MD    D: 09/11/2020 12:18:05      T: 09/11/2020 14:46:04     [email or web address]  Job#: [id number]     Doc#: [id number]    CC:

## 2020-09-11 NOTE — PROGRESS NOTES
pH, Arterial  7.350  7.350 - 7.450  Final  09/11/2020  6:47 PM  [address] [facility]    pCO2, Arterial  45.0  35.0 - 45.0 mmHg  Final  09/11/2020  6:47 PM  [address] [facility]    pO2, Arterial  69. 0Low    80.0 - 100.0 mmHg  Final  09/11/2020  6:47 PM  [facility]    HCO3, Arterial  24.8  22.0 - 26.0 mmol/L  Final  09/11/2020  6:47 PM  [facility] Excess, Arterial  -1.0  -2.0 - 2.0 mmol/L  Final  09/11/2020  6:47 PM  [address] [place] [address] Lab    Hemoglobin, Art, Extended  10.4Low    12.0 - 16.0 g/dL  Final  09/11/2020  6:47 PM  [address] [place] [address] Lab    O2 Sat, Arterial  92.8  >92 %  Final  09/11/2020  6:47 PM  [facility]    Carboxyhgb, Arterial  1.7  0.0 - 5.0 %  Final  09/11/2020  6:47 PM  [facility]         0.0-1.5   (Smokers 1.5-5.0)    Methemoglobin, Arterial  0.9  <1.5 %  Final  09/11/2020  6:47 PM  [facility]    O2 Content, Arterial  13.6          cpap 5 peep, 10psv, and 30%   juan manuel

## 2020-09-11 NOTE — ANESTHESIA PRE PROCEDURE
Department of Anesthesiology  Preprocedure Note       Name:  Julita Reeder   Age:  66 y.o.  :  1945                                          MRN:  [id number]         Date:  2020      Surgeon: Taylor Zhang):  Jase Kamara MD    Procedure: Procedure(s):  CORONARY ARTERY BYPASS GRAFT X2 WITH LEFT INTERNAL MAMMARY ARTERY WITH ENDOSCOPIC VEIN HARVESTING WITH PERFUSION TRANSESOPHAGEAL ECHOCARDIOGRAM    Medications prior to admission:   Prior to Admission medications    Medication Sig Start Date End Date Taking? Authorizing Provider   metoprolol succinate (TOPROL XL) 50 MG extended release tablet TAKE 1 TABLET TWICE DAILY 20  Yes ULISES Villanueva   famotidine (PEPCID) 40 MG tablet Take 1 tablet by mouth daily  Patient taking differently: Take 40 mg by mouth as needed  3/11/20  Yes ULISES Cunningham   Omega-3 Fatty Acids (FISH OIL) 1360 MG CAPS Take 1 capsule by mouth 2 times daily    Yes Historical Provider, MD   pantoprazole (PROTONIX) 40 MG tablet Take 40 mg by mouth as needed    Yes Historical Provider, MD   calcium carbonate 600 MG TABS tablet Take 1 tablet by mouth 2 times daily   Yes Historical Provider, MD   Evolocumab (REPATHA) 140 MG/ML SOSY Inject 140 mg into the skin every 14 days Patient has not started   Yes Historical Provider, MD   vitamin D (CHOLECALCIFEROL) 1000 UNIT TABS tablet 1,000 Units 2 times daily Take 3 tablets daily    Yes Historical Provider, MD   BENICAR HCT 40-25 MG per tablet Take 0.5 tablets by mouth 2 times daily  10/10/15  Yes Historical Provider, MD   allopurinol (ZYLOPRIM) 300 MG tablet Take 300 mg by mouth daily. Yes Historical Provider, MD   aspirin 81 MG EC tablet Take 81 mg by mouth daily.    Yes Historical Provider, MD   loratadine (CLARITIN) 10 MG tablet Take 10 mg by mouth 2 times daily    Yes Historical Provider, MD   Multiple Vitamin (MULTIVITAMIN PO) Take 1 tablet by mouth daily    Yes Historical Provider, MD   bumetanide (BUMEX) 1 MG tablet Take 1 tablet Carotid artery stenosis I65.29    Leg pain M79.606    Leg swelling M79.89    Chronic venous insufficiency I87.2    Varicose veins I83.90    Spider veins I78.1    Heartburn R12    Internal hemorrhoids without complication U76.6    Bleeding internal hemorrhoids K64.8    Precordial pain R07.2    CAMPOVERDE (dyspnea on exertion) R06.09    Palpitations R00.2    Irritable bowel syndrome with diarrhea K58.0    Internal hemorrhoids W61.5    Periumbilical abdominal pain R10.33    Coronary artery disease involving native coronary artery of native heart without angina pectoris I25.10    Mixed hyperlipidemia E78.2    H/O right coronary artery stent placement Z95.5    Overactive bladder N32.81    Acute chest pain R07.9    Chest pain R07.9    Syncope and collapse R55    Urinary tract infection with hematuria N39.0, R31.9    Allergic reaction T78.40XA    Gout M10.9    Myalgia due to statin M79.10, T46.6X5A    Bloating R14.0    CAD in native artery I25.10       Past Medical History:        Diagnosis Date    Acid reflux     Anxiety     Arthritis     Back pain     CAD (coronary artery disease)     CAD (coronary artery disease)     Carotid artery occlusion     Chronic venous insufficiency 11/8/2012    Cough     Fibromyalgia     Goiter     Gout     Head ache     headaches    Heart murmur     History of colon polyps     History of colon polyps     Hoarseness     Hypercholesterolemia     Hyperlipidemia     Hypertension     Insomnia     Irregular heart beat     Itching     Muscle cramp     Neck pain     Osteoarthritis     Osteoporosis     Palpitations     RA (rheumatoid arthritis) (HCC)     Rash     Rectal bleeding     SOB (shortness of breath)     Sore throat     Tympanic membrane perforation     Varicose veins 11/8/2012       Past Surgical History:        Procedure Laterality Date    APPENDECTOMY      BREAST SURGERY  2002    CARDIAC CATHETERIZATION  9/10/15  JDT    EF 50%    CERVICAL FUSION  1999     SECTION  1968    x 1    CHOLECYSTECTOMY  2012    [place]    COLONOSCOPY  1-    Dr LUJAN    COLONOSCOPY  2014    Dr. Anthony Vences  2018    Dr. Shelley Blanco in [place]- Polyps-benign,internal Hemorrhoids, per patient.  CORONARY ANGIOPLASTY WITH STENT PLACEMENT      Dr Sally Fatima    partial    KNEE SURGERY      left knee surgery    PTCA      stent    TUBAL LIGATION      UPPER GASTROINTESTINAL ENDOSCOPY  2009?  UPPER GASTROINTESTINAL ENDOSCOPY  1-    Dr LUJAN    UPPER GASTROINTESTINAL ENDOSCOPY  2014    Dr. Saira Boykin  2018    Dr. Shelley Blanco in [facility]-Gastritis per patient. [address] SURGERY   2013 [facility]    Left Leg Ablation    VARICOSE VEIN SURGERY  3-  [facility]    right leg ablation       Social History:    Social History     Tobacco Use    Smoking status: Never Smoker    Smokeless tobacco: Never Used   Substance Use Topics    Alcohol use:  No                                Counseling given: Not Answered      Vital Signs (Current):   Vitals:    09/10/20 2112 20 0032 20 0551 20 0552   BP:  108/62 138/67 (!) 140/69   Pulse: 90 85 89 88   Resp:  17 16    Temp:  97.9 °F (36.6 °C) 98 °F (36.7 °C)    TempSrc:  Temporal Temporal    SpO2:  94%  94%   Weight:       Height:                                                  BP Readings from Last 3 Encounters:   20 (!) 140/69   20 132/67   20 (!) 140/62       NPO Status: Time of last liquid consumption: 0840                        Time of last solid consumption: 1830                        Date of last liquid consumption: 09/10/20                        Date of last solid food consumption: 20    BMI:   Wt Readings from Last 3 Encounters:   09/10/20 185 lb (83.9 kg)   20 189 lb 9.5 oz (86 kg)   20 193 lb 6.4 oz (87.7 kg)     Body mass index is 28.98 kg/m². CBC:   Lab Results   Component Value Date    WBC 6.5 09/10/2020    RBC 4.36 09/10/2020    HGB 14.1 09/10/2020    HCT 40.1 09/10/2020    MCV 92.0 09/10/2020    RDW 13.2 09/10/2020     09/10/2020       CMP:   Lab Results   Component Value Date     09/10/2020    K 3.7 09/10/2020     09/10/2020    CO2 23 09/10/2020    BUN 19 09/10/2020    CREATININE 0.7 09/10/2020    GFRAA >59 09/10/2020    LABGLOM >60 09/10/2020    GLUCOSE 173 09/10/2020    PROT 6.6 09/10/2020    CALCIUM 9.2 09/10/2020    BILITOT <0.2 09/10/2020    ALKPHOS 53 09/10/2020    AST 19 09/10/2020    ALT 29 09/10/2020       POC Tests: No results for input(s): POCGLU, POCNA, POCK, POCCL, POCBUN, POCHEMO, POCHCT in the last 72 hours. Coags:   Lab Results   Component Value Date    PROTIME 13.5 09/10/2020    INR 1.04 09/10/2020    APTT 25.9 07/23/2018       HCG (If Applicable): No results found for: PREGTESTUR, PREGSERUM, HCG, HCGQUANT     ABGs: No results found for: PHART, PO2ART, LDN0QHP, PRW5TTA, BEART, N3KXDXHC     Type & Screen (If Applicable):  No results found for: LABABO, LABRH    Drug/Infectious Status (If Applicable):  No results found for: HIV, HEPCAB    COVID-19 Screening (If Applicable):   Lab Results   Component Value Date    COVID19 NOT DETECTED 09/04/2020         Anesthesia Evaluation  Patient summary reviewed and Nursing notes reviewed no history of anesthetic complications:   Airway: Mallampati: II  TM distance: >3 FB   Neck ROM: full  Mouth opening: > = 3 FB Dental: normal exam         Pulmonary:Negative Pulmonary ROS and normal exam  breath sounds clear to auscultation  (+) shortness of breath:      (-) not a current smoker          Patient did not smoke on day of surgery.                  Cardiovascular:    (+) hypertension:, CAD:, dysrhythmias:, CAMPOVERDE:,     (-)  angina and  CHF    NYHA Classification: I  ECG reviewed  Rhythm: regular  Rate: normal           Beta Blocker:  Not on Beta Blocker      ROS comment: Angiographic Findings      Cardiac Arteries and Lesion Findings     LMCA: Left main is short with diffuse 55% stenosis. Severe catheter damping  (5-[place]) with engagement and reproduction of symptoms relieved with  nitroglycerin. IFR testing was equivocal but significant damping with 5-[place] guide. Lesion on LMCA: 55% stenosis . Comments:Left main is a short vessel with 55% stenosis. Severe catheter damping (5-[place]) with engagement with reproduction of    symptoms of chest discomfort relieved with IC nitroglycerin. LAD: LAD mid 30% stenosis. Lesion on Mid LAD: 30% stenosis 10 mm length. LCx: Circumflex with mild luminal irregularities.     RCA: RCA is a dominant vessel. RCA ostial 40% stenosis. Ostial to proximal stent is patent. RCA mid 60% stenosis. Lesion on Prox RCA: Ostial.40% stenosis 6 mm length. Lesion on Prox RCA: 60% stenosis 10 mm length. [facility]     LV function assessed as:Normal.  Ejection Fraction    - Method: LV gram. EF%: 65.     Procedure Summary  Anesthesia: Lidocaine LA  Sedation: Versed 2 mg; Fentanyl 25 mg  Start time: 10:27 AM  Stop time: 11:04 AM  ASA Class: 3  Estimated blood loss: Less than 10 mL  An independent trained observer pushed medications at my direction.     The patient was monitored continuously with the ECG, pulse oximetry, blood  pressure monitoring, and direct observation for level of consciousness.      Procedure Data     Procedure Date  Date: 09/10/2020Start: 09:55     Neuro/Psych:   Negative Neuro/Psych ROS  (+) neuromuscular disease:, headaches:,    (-) seizures, CVA and depression/anxiety            GI/Hepatic/Renal: Neg GI/Hepatic/Renal ROS  (+) GERD:,      (-) hiatal hernia       Endo/Other: Negative Endo/Other ROS   (+) : arthritis:., .          Pt had PAT visit. Abdominal:       Abdomen: soft.     Vascular:                                        Anesthesia

## 2020-09-12 ENCOUNTER — APPOINTMENT (OUTPATIENT)
Dept: GENERAL RADIOLOGY | Age: 75
DRG: 234 | End: 2020-09-12
Attending: INTERNAL MEDICINE
Payer: MEDICARE

## 2020-09-12 LAB
ANION GAP SERPL CALCULATED.3IONS-SCNC: 9 MMOL/L (ref 7–19)
BASE EXCESS ARTERIAL: -2.1 MMOL/L (ref -2–2)
BUN BLDV-MCNC: 12 MG/DL (ref 8–23)
CALCIUM SERPL-MCNC: 7.9 MG/DL (ref 8.8–10.2)
CARBOXYHEMOGLOBIN ARTERIAL: 2.1 % (ref 0–5)
CHLORIDE BLD-SCNC: 112 MMOL/L (ref 98–111)
CO2: 22 MMOL/L (ref 22–29)
CREAT SERPL-MCNC: 0.7 MG/DL (ref 0.5–0.9)
GFR AFRICAN AMERICAN: >59
GFR NON-AFRICAN AMERICAN: >60
GLUCOSE BLD-MCNC: 100 MG/DL (ref 74–109)
GLUCOSE BLD-MCNC: 103 MG/DL (ref 70–99)
GLUCOSE BLD-MCNC: 105 MG/DL (ref 70–99)
GLUCOSE BLD-MCNC: 86 MG/DL (ref 70–99)
GLUCOSE BLD-MCNC: 88 MG/DL (ref 70–99)
GLUCOSE BLD-MCNC: 89 MG/DL (ref 70–99)
GLUCOSE BLD-MCNC: 91 MG/DL (ref 70–99)
GLUCOSE BLD-MCNC: 93 MG/DL (ref 70–99)
GLUCOSE BLD-MCNC: 94 MG/DL (ref 70–99)
HCO3 ARTERIAL: 23.7 MMOL/L (ref 22–26)
HCT VFR BLD CALC: 28.4 % (ref 37–47)
HCT VFR BLD CALC: 30.8 % (ref 37–47)
HCT VFR BLD CALC: 31.3 % (ref 37–47)
HEMOGLOBIN, ART, EXTENDED: 10.5 G/DL (ref 12–16)
HEMOGLOBIN: 10.2 G/DL (ref 12–16)
HEMOGLOBIN: 10.4 G/DL (ref 12–16)
HEMOGLOBIN: 9.4 G/DL (ref 12–16)
MCH RBC QN AUTO: 31.4 PG (ref 27–31)
MCH RBC QN AUTO: 31.5 PG (ref 27–31)
MCH RBC QN AUTO: 32.4 PG (ref 27–31)
MCHC RBC AUTO-ENTMCNC: 33.1 G/DL (ref 33–37)
MCHC RBC AUTO-ENTMCNC: 33.1 G/DL (ref 33–37)
MCHC RBC AUTO-ENTMCNC: 33.2 G/DL (ref 33–37)
MCV RBC AUTO: 95 FL (ref 81–99)
MCV RBC AUTO: 95.1 FL (ref 81–99)
MCV RBC AUTO: 97.5 FL (ref 81–99)
METHEMOGLOBIN ARTERIAL: 0.8 %
MRSA CULTURE ONLY: NORMAL
O2 CONTENT ARTERIAL: 13.5 ML/DL
O2 SAT, ARTERIAL: 91 %
O2 THERAPY: ABNORMAL
PCO2 ARTERIAL: 44 MMHG (ref 35–45)
PDW BLD-RTO: 14.4 % (ref 11.5–14.5)
PDW BLD-RTO: 14.5 % (ref 11.5–14.5)
PDW BLD-RTO: 14.5 % (ref 11.5–14.5)
PERFORMED ON: ABNORMAL
PERFORMED ON: ABNORMAL
PERFORMED ON: NORMAL
PH ARTERIAL: 7.34 (ref 7.35–7.45)
PLATELET # BLD: 139 K/UL (ref 130–400)
PLATELET # BLD: 175 K/UL (ref 130–400)
PLATELET # BLD: 194 K/UL (ref 130–400)
PMV BLD AUTO: 8.1 FL (ref 9.4–12.3)
PMV BLD AUTO: 8.4 FL (ref 9.4–12.3)
PMV BLD AUTO: 8.7 FL (ref 9.4–12.3)
PO2 ARTERIAL: 57 MMHG (ref 80–100)
POTASSIUM SERPL-SCNC: 4.3 MMOL/L (ref 3.5–5)
POTASSIUM SERPL-SCNC: 4.3 MMOL/L (ref 3.5–5)
POTASSIUM, WHOLE BLOOD: 4.1
RBC # BLD: 2.99 M/UL (ref 4.2–5.4)
RBC # BLD: 3.21 M/UL (ref 4.2–5.4)
RBC # BLD: 3.24 M/UL (ref 4.2–5.4)
SODIUM BLD-SCNC: 143 MMOL/L (ref 136–145)
WBC # BLD: 11.6 K/UL (ref 4.8–10.8)
WBC # BLD: 12.4 K/UL (ref 4.8–10.8)
WBC # BLD: 14 K/UL (ref 4.8–10.8)

## 2020-09-12 PROCEDURE — 84132 ASSAY OF SERUM POTASSIUM: CPT

## 2020-09-12 PROCEDURE — 6370000000 HC RX 637 (ALT 250 FOR IP): Performed by: THORACIC SURGERY (CARDIOTHORACIC VASCULAR SURGERY)

## 2020-09-12 PROCEDURE — 99024 POSTOP FOLLOW-UP VISIT: CPT | Performed by: THORACIC SURGERY (CARDIOTHORACIC VASCULAR SURGERY)

## 2020-09-12 PROCEDURE — 6360000002 HC RX W HCPCS: Performed by: THORACIC SURGERY (CARDIOTHORACIC VASCULAR SURGERY)

## 2020-09-12 PROCEDURE — 71045 X-RAY EXAM CHEST 1 VIEW: CPT

## 2020-09-12 PROCEDURE — 2700000000 HC OXYGEN THERAPY PER DAY

## 2020-09-12 PROCEDURE — 82947 ASSAY GLUCOSE BLOOD QUANT: CPT

## 2020-09-12 PROCEDURE — 2500000003 HC RX 250 WO HCPCS: Performed by: THORACIC SURGERY (CARDIOTHORACIC VASCULAR SURGERY)

## 2020-09-12 PROCEDURE — 82803 BLOOD GASES ANY COMBINATION: CPT

## 2020-09-12 PROCEDURE — 2580000003 HC RX 258: Performed by: THORACIC SURGERY (CARDIOTHORACIC VASCULAR SURGERY)

## 2020-09-12 PROCEDURE — 93005 ELECTROCARDIOGRAM TRACING: CPT | Performed by: THORACIC SURGERY (CARDIOTHORACIC VASCULAR SURGERY)

## 2020-09-12 PROCEDURE — 51702 INSERT TEMP BLADDER CATH: CPT

## 2020-09-12 PROCEDURE — 85027 COMPLETE CBC AUTOMATED: CPT

## 2020-09-12 PROCEDURE — 80048 BASIC METABOLIC PNL TOTAL CA: CPT

## 2020-09-12 PROCEDURE — 37799 UNLISTED PX VASCULAR SURGERY: CPT

## 2020-09-12 PROCEDURE — 2000000000 HC ICU R&B

## 2020-09-12 PROCEDURE — 94640 AIRWAY INHALATION TREATMENT: CPT

## 2020-09-12 RX ORDER — OLMESARTAN MEDOXOMIL AND HYDROCHLOROTHIAZIDE 40/25 40; 25 MG/1; MG/1
0.5 TABLET ORAL 2 TIMES DAILY
Status: DISCONTINUED | OUTPATIENT
Start: 2020-09-12 | End: 2020-09-15 | Stop reason: HOSPADM

## 2020-09-12 RX ORDER — BUMETANIDE 1 MG/1
1 TABLET ORAL DAILY PRN
Status: DISCONTINUED | OUTPATIENT
Start: 2020-09-12 | End: 2020-09-15 | Stop reason: HOSPADM

## 2020-09-12 RX ORDER — METOPROLOL SUCCINATE 50 MG/1
50 TABLET, EXTENDED RELEASE ORAL 2 TIMES DAILY
Status: DISCONTINUED | OUTPATIENT
Start: 2020-09-12 | End: 2020-09-15 | Stop reason: HOSPADM

## 2020-09-12 RX ORDER — ALLOPURINOL 300 MG/1
300 TABLET ORAL DAILY
Status: DISCONTINUED | OUTPATIENT
Start: 2020-09-12 | End: 2020-09-15 | Stop reason: HOSPADM

## 2020-09-12 RX ORDER — OLMESARTAN MEDOXOMIL AND HYDROCHLOROTHIAZIDE 40/25 40; 25 MG/1; MG/1
0.5 TABLET ORAL 2 TIMES DAILY
Status: DISCONTINUED | OUTPATIENT
Start: 2020-09-12 | End: 2020-09-12

## 2020-09-12 RX ADMIN — CEFAZOLIN SODIUM 2 G: 10 INJECTION, POWDER, FOR SOLUTION INTRAVENOUS at 15:15

## 2020-09-12 RX ADMIN — ATORVASTATIN CALCIUM 20 MG: 20 TABLET, FILM COATED ORAL at 19:41

## 2020-09-12 RX ADMIN — IPRATROPIUM BROMIDE AND ALBUTEROL SULFATE 1 AMPULE: .5; 3 SOLUTION RESPIRATORY (INHALATION) at 14:00

## 2020-09-12 RX ADMIN — OXYCODONE 10 MG: 5 TABLET ORAL at 15:15

## 2020-09-12 RX ADMIN — CEFAZOLIN SODIUM 2 G: 10 INJECTION, POWDER, FOR SOLUTION INTRAVENOUS at 22:54

## 2020-09-12 RX ADMIN — CEFAZOLIN SODIUM 2 G: 10 INJECTION, POWDER, FOR SOLUTION INTRAVENOUS at 07:45

## 2020-09-12 RX ADMIN — CLOPIDOGREL BISULFATE 75 MG: 75 TABLET ORAL at 08:33

## 2020-09-12 RX ADMIN — ALLOPURINOL 300 MG: 300 TABLET ORAL at 08:57

## 2020-09-12 RX ADMIN — METOPROLOL SUCCINATE 50 MG: 50 TABLET, EXTENDED RELEASE ORAL at 19:41

## 2020-09-12 RX ADMIN — SODIUM CHLORIDE: 9 INJECTION, SOLUTION INTRAVENOUS at 12:21

## 2020-09-12 RX ADMIN — IPRATROPIUM BROMIDE AND ALBUTEROL SULFATE 1 AMPULE: .5; 3 SOLUTION RESPIRATORY (INHALATION) at 06:06

## 2020-09-12 RX ADMIN — SODIUM CHLORIDE 500 ML: 9 INJECTION, SOLUTION INTRAVENOUS at 00:53

## 2020-09-12 RX ADMIN — DEXTROSE MONOHYDRATE 5 MG/HR: 50 INJECTION, SOLUTION INTRAVENOUS at 19:42

## 2020-09-12 RX ADMIN — SODIUM CHLORIDE, PRESERVATIVE FREE 10 ML: 5 INJECTION INTRAVENOUS at 22:55

## 2020-09-12 RX ADMIN — ASPIRIN 81 MG: 81 TABLET, COATED ORAL at 08:33

## 2020-09-12 RX ADMIN — IPRATROPIUM BROMIDE AND ALBUTEROL SULFATE 1 AMPULE: .5; 3 SOLUTION RESPIRATORY (INHALATION) at 10:17

## 2020-09-12 RX ADMIN — SODIUM CHLORIDE: 9 INJECTION, SOLUTION INTRAVENOUS at 19:22

## 2020-09-12 RX ADMIN — ENOXAPARIN SODIUM 40 MG: 40 INJECTION SUBCUTANEOUS at 08:59

## 2020-09-12 RX ADMIN — METOPROLOL SUCCINATE 50 MG: 50 TABLET, EXTENDED RELEASE ORAL at 08:57

## 2020-09-12 RX ADMIN — OLMESARTAN MEDOXOMIL AND HYDROCHLOROTHIAZIDE 40/25 0.5 TABLET: 40; 25 TABLET ORAL at 19:42

## 2020-09-12 RX ADMIN — OXYCODONE 10 MG: 5 TABLET ORAL at 02:16

## 2020-09-12 RX ADMIN — OXYCODONE 10 MG: 5 TABLET ORAL at 08:33

## 2020-09-12 ASSESSMENT — PAIN DESCRIPTION - LOCATION
LOCATION: CHEST

## 2020-09-12 ASSESSMENT — PAIN SCALES - WONG BAKER
WONGBAKER_NUMERICALRESPONSE: 0

## 2020-09-12 ASSESSMENT — PAIN SCALES - GENERAL
PAINLEVEL_OUTOF10: 0
PAINLEVEL_OUTOF10: 6
PAINLEVEL_OUTOF10: 2
PAINLEVEL_OUTOF10: 0
PAINLEVEL_OUTOF10: 2
PAINLEVEL_OUTOF10: 0
PAINLEVEL_OUTOF10: 3
PAINLEVEL_OUTOF10: 8
PAINLEVEL_OUTOF10: 7
PAINLEVEL_OUTOF10: 2
PAINLEVEL_OUTOF10: 7
PAINLEVEL_OUTOF10: 0

## 2020-09-12 ASSESSMENT — PAIN DESCRIPTION - PAIN TYPE
TYPE: SURGICAL PAIN

## 2020-09-12 ASSESSMENT — PAIN DESCRIPTION - DESCRIPTORS: DESCRIPTORS: ACHING

## 2020-09-12 NOTE — PLAN OF CARE
Problem: Falls - Risk of:  Goal: Will remain free from falls  Description: Will remain free from falls  Outcome: Met This Shift  Goal: Absence of physical injury  Description: Absence of physical injury  Outcome: Met This Shift     Problem: SAFETY  Goal: Free from accidental physical injury  Outcome: Met This Shift  Goal: Free from intentional harm  Outcome: Met This Shift     Problem: DAILY CARE  Goal: Daily care needs are met  Outcome: Ongoing     Problem: PAIN  Goal: Patient's pain/discomfort is manageable  Outcome: Ongoing     Problem: SKIN INTEGRITY  Goal: Skin integrity is maintained or improved  Outcome: Ongoing     Problem: KNOWLEDGE DEFICIT  Goal: Patient/S.O. demonstrates understanding of disease process, treatment plan, medications, and discharge instructions. Outcome: Ongoing     Problem: DISCHARGE BARRIERS  Goal: Patient's continuum of care needs are met  Outcome: Ongoing     Problem: Discharge Planning:  Goal: Discharged to appropriate level of care  Description: Discharged to appropriate level of care  Outcome: Ongoing     Problem:  Activity Intolerance:  Goal: Able to perform prescribed physical activity  Description: Able to perform prescribed physical activity  Outcome: Ongoing  Goal: Ability to tolerate increased activity will improve  Description: Ability to tolerate increased activity will improve  Outcome: Ongoing     Problem: Anxiety:  Goal: Level of anxiety will decrease  Description: Level of anxiety will decrease  Outcome: Ongoing     Problem: Cardiac Output - Decreased:  Goal: Cardiac output within specified parameters  Description: Cardiac output within specified parameters  Outcome: Ongoing  Goal: Hemodynamic stability will improve  Description: Hemodynamic stability will improve  Outcome: Ongoing     Problem: Fluid Volume - Imbalance:  Goal: Ability to achieve a balanced intake and output will improve  Description: Ability to achieve a balanced intake and output will improve  Outcome: Ongoing  Goal: Chest tube drainage is within specified parameters  Description: Chest tube drainage is within specified parameters  Outcome: Ongoing     Problem: Gas Exchange - Impaired:  Goal: Levels of oxygenation will improve  Description: Levels of oxygenation will improve  Outcome: Ongoing  Goal: Ability to maintain adequate ventilation will improve  Description: Ability to maintain adequate ventilation will improve  Outcome: Ongoing     Problem: Pain:  Goal: Pain level will decrease  Description: Pain level will decrease  Outcome: Ongoing  Goal: Control of acute pain  Description: Control of acute pain  Outcome: Ongoing  Goal: Control of chronic pain  Description: Control of chronic pain  Outcome: Ongoing     Problem: Tissue Perfusion - Cardiopulmonary, Altered:  Goal: Absence of angina  Description: Absence of angina  Outcome: Ongoing  Goal: Hemodynamic stability will improve  Description: Hemodynamic stability will improve  Outcome: Ongoing  Goal: Will show no evidence of cardiac arrhythmias  Description: Will show no evidence of cardiac arrhythmias  Outcome: Ongoing     Problem: Pain:  Goal: Pain level will decrease  Description: Pain level will decrease  Outcome: Ongoing  Goal: Control of acute pain  Description: Control of acute pain  Outcome: Ongoing  Goal: Control of chronic pain  Description: Control of chronic pain  Outcome: Ongoing

## 2020-09-12 NOTE — PLAN OF CARE
Problem: Falls - Risk of:  Goal: Will remain free from falls  Description: Will remain free from falls  9/12/2020 0028 by Merlyn Black RN  Outcome: Ongoing  9/11/2020 1539 by Hannah Babcock RN  Outcome: Met This Shift  Goal: Absence of physical injury  Description: Absence of physical injury  9/12/2020 0028 by Merlyn Black RN  Outcome: Ongoing  9/11/2020 1539 by Hannah Babcock RN  Outcome: Met This Shift     Problem: SAFETY  Goal: Free from accidental physical injury  9/12/2020 0028 by Merlyn Black RN  Outcome: Ongoing  9/11/2020 1539 by Hannah Babcock RN  Outcome: Met This Shift  Goal: Free from intentional harm  9/12/2020 0028 by Merlyn Black RN  Outcome: Ongoing  9/11/2020 1539 by Hnanah Babcock RN  Outcome: Met This Shift     Problem: DAILY CARE  Goal: Daily care needs are met  9/12/2020 0028 by Merlyn Black RN  Outcome: Ongoing  9/11/2020 1539 by Hannah Babcock RN  Outcome: Met This Shift     Problem: PAIN  Goal: Patient's pain/discomfort is manageable  9/12/2020 0028 by Merlyn Black RN  Outcome: Ongoing  9/11/2020 1539 by Hannah Babcock RN  Outcome: Ongoing     Problem: SKIN INTEGRITY  Goal: Skin integrity is maintained or improved  9/12/2020 0028 by Merlyn Black RN  Outcome: Ongoing  9/11/2020 1539 by Hannah Babcock RN  Outcome: Ongoing     Problem: KNOWLEDGE DEFICIT  Goal: Patient/S.O. demonstrates understanding of disease process, treatment plan, medications, and discharge instructions.   9/12/2020 0028 by Merlyn Black RN  Outcome: Ongoing  9/11/2020 1539 by Hannah Babcock RN  Outcome: Ongoing     Problem: DISCHARGE BARRIERS  Goal: Patient's continuum of care needs are met  9/12/2020 0028 by Merlyn Black RN  Outcome: Ongoing  9/11/2020 1539 by Hannah Babcock RN  Outcome: Ongoing     Problem: Discharge Planning:  Goal: Discharged to appropriate level of care  Description: Discharged to appropriate level of care  9/12/2020 0028 by Merlyn Black RN  Outcome: Ongoing  9/11/2020 1539 by Toi Gudino RN  Outcome: Ongoing     Problem:  Activity Intolerance:  Goal: Able to perform prescribed physical activity  Description: Able to perform prescribed physical activity  9/12/2020 0028 by Soila Marcelo RN  Outcome: Ongoing  9/11/2020 1539 by Toi Gudino RN  Outcome: Ongoing  Goal: Ability to tolerate increased activity will improve  Description: Ability to tolerate increased activity will improve  9/12/2020 0028 by Soila Marcelo RN  Outcome: Ongoing  9/11/2020 1539 by Toi Gudino RN  Outcome: Ongoing     Problem: Anxiety:  Goal: Level of anxiety will decrease  Description: Level of anxiety will decrease  9/12/2020 0028 by Soila Marcelo RN  Outcome: Ongoing  9/11/2020 1539 by Toi Gudino RN  Outcome: Ongoing     Problem: Cardiac Output - Decreased:  Goal: Cardiac output within specified parameters  Description: Cardiac output within specified parameters  9/12/2020 0028 by Soila Marcelo RN  Outcome: Ongoing  9/11/2020 1539 by Toi Gudino RN  Outcome: Ongoing  Goal: Hemodynamic stability will improve  Description: Hemodynamic stability will improve  9/12/2020 0028 by Soila Marcelo RN  Outcome: Ongoing  9/11/2020 1539 by Toi Gudino RN  Outcome: Ongoing     Problem: Fluid Volume - Imbalance:  Goal: Ability to achieve a balanced intake and output will improve  Description: Ability to achieve a balanced intake and output will improve  9/12/2020 0028 by Soila Marcelo RN  Outcome: Ongoing  9/11/2020 1539 by Toi Gudino RN  Outcome: Ongoing  Goal: Chest tube drainage is within specified parameters  Description: Chest tube drainage is within specified parameters  9/12/2020 0028 by Soila Marcelo RN  Outcome: Ongoing  9/11/2020 1539 by Toi Gudino RN  Outcome: Ongoing     Problem: Gas Exchange - Impaired:  Goal: Levels of oxygenation will improve  Description: Levels of oxygenation will improve  9/12/2020 0028 by Mya Barraza RN  Outcome: Ongoing  9/11/2020 1539 by Christopher Sorenson RN  Outcome: Ongoing  Goal: Ability to maintain adequate ventilation will improve  Description: Ability to maintain adequate ventilation will improve  9/12/2020 0028 by Mya Barraza RN  Outcome: Ongoing  9/11/2020 1539 by Christopher Sorenson RN  Outcome: Ongoing     Problem: Pain:  Goal: Pain level will decrease  Description: Pain level will decrease  9/12/2020 0028 by Mya Barraza RN  Outcome: Ongoing  9/11/2020 1539 by Christopher Sorenson RN  Outcome: Ongoing  Goal: Control of acute pain  Description: Control of acute pain  9/12/2020 0028 by Mya Barraza RN  Outcome: Ongoing  9/11/2020 1539 by Christopher Sorenson RN  Outcome: Ongoing  Goal: Control of chronic pain  Description: Control of chronic pain  9/12/2020 0028 by Mya Barraza RN  Outcome: Ongoing  9/11/2020 1539 by Christopher Sorenson RN  Outcome: Ongoing     Problem: Tissue Perfusion - Cardiopulmonary, Altered:  Goal: Absence of angina  Description: Absence of angina  9/12/2020 0028 by Mya Barraza RN  Outcome: Ongoing  9/11/2020 1539 by Christopher Sorenson RN  Outcome: Ongoing  Goal: Hemodynamic stability will improve  Description: Hemodynamic stability will improve  9/12/2020 0028 by Mya Barraza RN  Outcome: Ongoing  9/11/2020 1539 by Christopher Sorenson RN  Outcome: Ongoing  Goal: Will show no evidence of cardiac arrhythmias  Description: Will show no evidence of cardiac arrhythmias  9/12/2020 0028 by Mya Barraza RN  Outcome: Ongoing  9/11/2020 1539 by Christopher Sorenson RN  Outcome: Ongoing     Problem: Pain:  Goal: Pain level will decrease  Description: Pain level will decrease  9/12/2020 0028 by Mya Barraza RN  Outcome: Ongoing  9/11/2020 1539 by Christopher Sorenson RN  Outcome: Ongoing  Goal: Control of acute pain  Description: Control of acute pain  Outcome: Ongoing  Goal: Control of chronic pain  Description: Control of chronic pain  Outcome: Ongoing

## 2020-09-12 NOTE — PROGRESS NOTES
POST OP CARDIOTHORACIC SURGERY PROGRESS NOTE    Post op day 1    SUBJECTIVE:  Awake and alert. Extubated last evening. Up in chair. Appears comfortable. BP (!) 120/51   Pulse 101   Temp 98.3 °F (36.8 °C) (Temporal)   Resp 15   Ht 5' 7\" (1.702 m)   Wt 201 lb 9.6 oz (91.4 kg)   SpO2 93%   BMI 31.58 kg/m²   Average, Min, and Max for last 24 hours Vitals:  TEMPERATURE:  Temp  Av.8 °F (36 °C)  Min: 95.3 °F (35.2 °C)  Max: 99.2 °F (37.3 °C)  RESPIRATIONS RANGE: Resp  Avg: 10.8  Min: 1  Max: 29  PULSE RANGE: Pulse  Av.8  Min: 81  Max: 110  BLOOD PRESSURE RANGE:  Systolic (53KTL), ZFI:075 , Min:120 , ITP:753   ; Diastolic (37BWK), SD, Min:51, Max:67    PULSE OXIMETRY RANGE: SpO2  Av.3 %  Min: 89 %  Max: 100 %    I/O last 3 completed shifts: In: 6599.8 [P.O.:600; I.V.:5099.8; Blood:450; IV Piggyback:450]  Out: 2684 [NELZZ:7963; Chest EIZF:697]    CHEST: lungs clear    CARDIOVASCULAR: regular rhythm, no murmurs    INCISION: no drainage, skin edges intact    DRAINS: output - 739 ml/24 hours; no air leak    LABS:  CBC with Differential:    Lab Results   Component Value Date    WBC 14.0 2020    RBC 3.21 2020    HGB 10.4 2020    HCT 31.3 2020     2020    MCV 97.5 2020    MCH 32.4 2020    MCHC 33.2 2020    RDW 14.5 2020    LYMPHOPCT 33.2 06/10/2019    MONOPCT 9.1 06/10/2019    BASOPCT 0.7 06/10/2019    MONOSABS 0.50 06/10/2019    LYMPHSABS 2.0 06/10/2019    EOSABS 0.20 06/10/2019    BASOSABS 0.00 06/10/2019     BMP:    Lab Results   Component Value Date     2020    K 4.3 2020     2020    CO2 22 2020    BUN 12 2020    LABALBU 4.2 09/10/2020    CREATININE 0.7 2020    CALCIUM 7.9 2020    GFRAA >59 2020    LABGLOM >60 2020    GLUCOSE 100 2020       CHEST XRAY: normal postoperative chest xray    ASSESSMENT: normal postoperative course    PLAN: Stop insulin drip.  Resume BP meds. Keep in ICU today    Electronically signed by Robbi Nazario MD on 9/12/20 at 8:08 AM CDT

## 2020-09-13 ENCOUNTER — APPOINTMENT (OUTPATIENT)
Dept: GENERAL RADIOLOGY | Age: 75
DRG: 234 | End: 2020-09-13
Attending: INTERNAL MEDICINE
Payer: MEDICARE

## 2020-09-13 LAB
ANION GAP SERPL CALCULATED.3IONS-SCNC: 9 MMOL/L (ref 7–19)
BUN BLDV-MCNC: 10 MG/DL (ref 8–23)
CALCIUM SERPL-MCNC: 7.8 MG/DL (ref 8.8–10.2)
CHLORIDE BLD-SCNC: 104 MMOL/L (ref 98–111)
CO2: 22 MMOL/L (ref 22–29)
CREAT SERPL-MCNC: 0.6 MG/DL (ref 0.5–0.9)
GFR AFRICAN AMERICAN: >59
GFR NON-AFRICAN AMERICAN: >60
GLUCOSE BLD-MCNC: 137 MG/DL (ref 74–109)
HCT VFR BLD CALC: 26 % (ref 37–47)
HEMOGLOBIN: 8.8 G/DL (ref 12–16)
MCH RBC QN AUTO: 32.5 PG (ref 27–31)
MCHC RBC AUTO-ENTMCNC: 33.8 G/DL (ref 33–37)
MCV RBC AUTO: 95.9 FL (ref 81–99)
PDW BLD-RTO: 14.4 % (ref 11.5–14.5)
PLATELET # BLD: 140 K/UL (ref 130–400)
PMV BLD AUTO: 8.5 FL (ref 9.4–12.3)
POTASSIUM SERPL-SCNC: 3.5 MMOL/L (ref 3.5–5)
POTASSIUM SERPL-SCNC: 3.7 MMOL/L (ref 3.5–5)
RBC # BLD: 2.71 M/UL (ref 4.2–5.4)
SODIUM BLD-SCNC: 135 MMOL/L (ref 136–145)
WBC # BLD: 9.3 K/UL (ref 4.8–10.8)

## 2020-09-13 PROCEDURE — 2580000003 HC RX 258: Performed by: THORACIC SURGERY (CARDIOTHORACIC VASCULAR SURGERY)

## 2020-09-13 PROCEDURE — 97116 GAIT TRAINING THERAPY: CPT

## 2020-09-13 PROCEDURE — 6360000002 HC RX W HCPCS: Performed by: THORACIC SURGERY (CARDIOTHORACIC VASCULAR SURGERY)

## 2020-09-13 PROCEDURE — 97110 THERAPEUTIC EXERCISES: CPT

## 2020-09-13 PROCEDURE — 97162 PT EVAL MOD COMPLEX 30 MIN: CPT

## 2020-09-13 PROCEDURE — 2700000000 HC OXYGEN THERAPY PER DAY

## 2020-09-13 PROCEDURE — 6370000000 HC RX 637 (ALT 250 FOR IP): Performed by: THORACIC SURGERY (CARDIOTHORACIC VASCULAR SURGERY)

## 2020-09-13 PROCEDURE — 94640 AIRWAY INHALATION TREATMENT: CPT

## 2020-09-13 PROCEDURE — 85027 COMPLETE CBC AUTOMATED: CPT

## 2020-09-13 PROCEDURE — 84132 ASSAY OF SERUM POTASSIUM: CPT

## 2020-09-13 PROCEDURE — 99024 POSTOP FOLLOW-UP VISIT: CPT | Performed by: THORACIC SURGERY (CARDIOTHORACIC VASCULAR SURGERY)

## 2020-09-13 PROCEDURE — 2140000000 HC CCU INTERMEDIATE R&B

## 2020-09-13 PROCEDURE — 80048 BASIC METABOLIC PNL TOTAL CA: CPT

## 2020-09-13 PROCEDURE — 71045 X-RAY EXAM CHEST 1 VIEW: CPT

## 2020-09-13 RX ADMIN — ALLOPURINOL 300 MG: 300 TABLET ORAL at 08:22

## 2020-09-13 RX ADMIN — METOPROLOL SUCCINATE 50 MG: 50 TABLET, EXTENDED RELEASE ORAL at 20:37

## 2020-09-13 RX ADMIN — OLMESARTAN MEDOXOMIL AND HYDROCHLOROTHIAZIDE 40/25 0.5 TABLET: 40; 25 TABLET ORAL at 20:37

## 2020-09-13 RX ADMIN — OXYCODONE 10 MG: 5 TABLET ORAL at 08:22

## 2020-09-13 RX ADMIN — CLOPIDOGREL BISULFATE 75 MG: 75 TABLET ORAL at 08:22

## 2020-09-13 RX ADMIN — OLMESARTAN MEDOXOMIL AND HYDROCHLOROTHIAZIDE 40/25 0.5 TABLET: 40; 25 TABLET ORAL at 08:20

## 2020-09-13 RX ADMIN — IPRATROPIUM BROMIDE AND ALBUTEROL SULFATE 1 AMPULE: .5; 3 SOLUTION RESPIRATORY (INHALATION) at 17:54

## 2020-09-13 RX ADMIN — ASPIRIN 81 MG: 81 TABLET, COATED ORAL at 08:22

## 2020-09-13 RX ADMIN — SODIUM CHLORIDE, PRESERVATIVE FREE 10 ML: 5 INJECTION INTRAVENOUS at 08:27

## 2020-09-13 RX ADMIN — IPRATROPIUM BROMIDE AND ALBUTEROL SULFATE 1 AMPULE: .5; 3 SOLUTION RESPIRATORY (INHALATION) at 10:02

## 2020-09-13 RX ADMIN — METOPROLOL SUCCINATE 50 MG: 50 TABLET, EXTENDED RELEASE ORAL at 08:22

## 2020-09-13 RX ADMIN — ENOXAPARIN SODIUM 40 MG: 40 INJECTION SUBCUTANEOUS at 08:21

## 2020-09-13 RX ADMIN — ONDANSETRON 4 MG: 2 INJECTION INTRAMUSCULAR; INTRAVENOUS at 04:17

## 2020-09-13 RX ADMIN — IPRATROPIUM BROMIDE AND ALBUTEROL SULFATE 1 AMPULE: .5; 3 SOLUTION RESPIRATORY (INHALATION) at 06:11

## 2020-09-13 RX ADMIN — POTASSIUM CHLORIDE 10 MEQ: 400 INJECTION, SOLUTION INTRAVENOUS at 04:51

## 2020-09-13 RX ADMIN — ATORVASTATIN CALCIUM 20 MG: 20 TABLET, FILM COATED ORAL at 20:37

## 2020-09-13 RX ADMIN — ONDANSETRON 4 MG: 2 INJECTION INTRAMUSCULAR; INTRAVENOUS at 19:34

## 2020-09-13 RX ADMIN — IPRATROPIUM BROMIDE AND ALBUTEROL SULFATE 1 AMPULE: .5; 3 SOLUTION RESPIRATORY (INHALATION) at 13:59

## 2020-09-13 ASSESSMENT — PAIN SCALES - GENERAL
PAINLEVEL_OUTOF10: 0
PAINLEVEL_OUTOF10: 8
PAINLEVEL_OUTOF10: 0
PAINLEVEL_OUTOF10: 8
PAINLEVEL_OUTOF10: 0
PAINLEVEL_OUTOF10: 0
PAINLEVEL_OUTOF10: 3

## 2020-09-13 ASSESSMENT — PAIN - FUNCTIONAL ASSESSMENT
PAIN_FUNCTIONAL_ASSESSMENT: ACTIVITIES ARE NOT PREVENTED
PAIN_FUNCTIONAL_ASSESSMENT: ACTIVITIES ARE NOT PREVENTED

## 2020-09-13 ASSESSMENT — PAIN SCALES - WONG BAKER
WONGBAKER_NUMERICALRESPONSE: 0
WONGBAKER_NUMERICALRESPONSE: 0

## 2020-09-13 NOTE — PROGRESS NOTES
Physical Therapy    Facility/Department: [facility] ICU  Initial Assessment    NAME: Kelly Curiel  : 1945  MRN: [id number]    Date of Service: 2020    Discharge Recommendations:  Continue to assess pending progress, Patient would benefit from continued therapy after discharge        Assessment   Body structures, Functions, Activity limitations: Decreased functional mobility ; Decreased strength;Decreased endurance;Decreased balance  Assessment: Pt. tolerated amb. well today. She states she amb. a short distance yesterday, but she reports this is the farthest she has amb. in a long time. Anticipate pt. will progress well with amb. and be able to d/c home with family support. Pt. should use staff A, gait belt and RW for amb. while in acute care. Treatment Diagnosis: impaired gait and mobility  Prognosis: Good  Decision Making: Medium Complexity  PT Education: Goals;PT Role;Plan of Care;General Safety;Transfer Training;Gait Training;Functional Mobility Training  Patient Education: use of call light for A  Barriers to Learning: none noted  REQUIRES PT FOLLOW UP: Yes  Activity Tolerance  Activity Tolerance: Patient Tolerated treatment well  Activity Tolerance: /47 sat 94%  after amb. Patient Diagnosis(es): There were no encounter diagnoses. has a past medical history of Acid reflux, Anxiety, Arthritis, Back pain, CAD (coronary artery disease), CAD (coronary artery disease), Carotid artery occlusion, Chronic venous insufficiency, Cough, Fibromyalgia, Goiter, Gout, Head ache, Heart murmur, History of colon polyps, History of colon polyps, Hoarseness, Hypercholesterolemia, Hyperlipidemia, Hypertension, Insomnia, Irregular heart beat, Itching, Muscle cramp, Neck pain, Osteoarthritis, Osteoporosis, Palpitations, RA (rheumatoid arthritis) (HCC), Rash, Rectal bleeding, SOB (shortness of breath), Sore throat, Tympanic membrane perforation, and Varicose veins.    has a past surgical history that includes since 4AM. gait belt and heart pillow used for amb. Balance  Posture: Good  Sitting - Static: Good  Sitting - Dynamic: Good;-  Standing - Static: Fair  Standing - Dynamic: Poor;+  Exercises  Comments: BLE AROM x 10 reps HS, AP and hip abd/add in supine after gait, shoulder flexion x 2 reps     Plan   Plan  Times per week: 3-7  Times per day: Daily  Plan weeks: 2  Current Treatment Recommendations: Strengthening, ROM, Balance Training, Functional Mobility Training, Transfer Training, Endurance Training, Gait Training, Safety Education & Training, Positioning, Equipment Evaluation, Education, & procurement, Patient/Caregiver Education & Training  Plan Comment: cont. PT per POC.   Safety Devices  Type of devices: Gait belt, Patient at risk for falls, Nurse notified, Left in bed, Call light within reach    G-Code       OutComes Score                                                  AM-PAC Score             Goals  Short term goals  Time Frame for Short term goals: 2 wks  Short term goal 1: supine to sit indep  Short term goal 2: sit to stand indep  Short term goal 3: amb. 300' with RW SBA  Patient Goals   Patient goals : get better, walk       Therapy Time   Individual Concurrent Group Co-treatment   Time In           Time Out           Minutes                   Leonardo Vega PT    Electronically signed by Leonardo Vega PT on 9/13/2020 at 10:30 AM

## 2020-09-13 NOTE — PROGRESS NOTES
Kailash Dai transferred to 5 from [facility] via wheelchair. Reason for transfer: stable for transfer to Progressive care   Explained reason for transfer to Patient and Family. Belongings: clothing with patient at bedside . Soft chart transferred with patient: Yes. Telemetry box number [id number] transferred with patient: yes. Report given to: [facility] via telephone.       Electronically signed by Myriam Taylor RN on 9/13/2020 at 4:03 PM

## 2020-09-13 NOTE — PLAN OF CARE
Problem: Falls - Risk of:  Goal: Will remain free from falls  Description: Will remain free from falls  9/12/2020 2021 by Maria E Hutton RN  Outcome: Ongoing  9/12/2020 1501 by Zach Yen  Outcome: Met This Shift  Goal: Absence of physical injury  Description: Absence of physical injury  9/12/2020 2021 by Maria E Hutton RN  Outcome: Ongoing  9/12/2020 1501 by Zach Yen  Outcome: Met This Shift     Problem: SAFETY  Goal: Free from accidental physical injury  9/12/2020 2021 by Maria E Hutton RN  Outcome: Ongoing  9/12/2020 1501 by Zach Yen  Outcome: Met This Shift  Goal: Free from intentional harm  9/12/2020 2021 by Maria E Hutton RN  Outcome: Ongoing  9/12/2020 1501 by Zach Yen  Outcome: Met This Shift     Problem: DAILY CARE  Goal: Daily care needs are met  9/12/2020 2021 by Maria E Hutton RN  Outcome: Ongoing  9/12/2020 1501 by Zach Yen  Outcome: Ongoing     Problem: PAIN  Goal: Patient's pain/discomfort is manageable  9/12/2020 2021 by Maria E Hutton RN  Outcome: Ongoing  9/12/2020 1501 by Zach Yen  Outcome: Ongoing     Problem: SKIN INTEGRITY  Goal: Skin integrity is maintained or improved  9/12/2020 2021 by Maria E Htuton RN  Outcome: Ongoing  9/12/2020 1501 by Zach Yen  Outcome: Ongoing     Problem: KNOWLEDGE DEFICIT  Goal: Patient/S.O. demonstrates understanding of disease process, treatment plan, medications, and discharge instructions.   9/12/2020 2021 by Maria E Hutton RN  Outcome: Ongoing  9/12/2020 1501 by Zach Yen  Outcome: Ongoing     Problem: DISCHARGE BARRIERS  Goal: Patient's continuum of care needs are met  9/12/2020 2021 by Maria E Hutton RN  Outcome: Ongoing  9/12/2020 1501 by Zcah Yen  Outcome: Ongoing     Problem: Discharge Planning:  Goal: Discharged to appropriate level of care  Description: Discharged to appropriate level of care  9/12/2020 2021 by Maria E Hutton RN  Outcome: Ongoing  9/12/2020 1501 by Zach Ross Farshad  Outcome: Ongoing     Problem:  Activity Intolerance:  Goal: Able to perform prescribed physical activity  Description: Able to perform prescribed physical activity  9/12/2020 2021 by Nacho Terry RN  Outcome: Ongoing  9/12/2020 1501 by Cale Yen  Outcome: Ongoing  Goal: Ability to tolerate increased activity will improve  Description: Ability to tolerate increased activity will improve  9/12/2020 2021 by Nacho Terry RN  Outcome: Ongoing  9/12/2020 1501 by Cale Yen  Outcome: Ongoing     Problem: Anxiety:  Goal: Level of anxiety will decrease  Description: Level of anxiety will decrease  9/12/2020 2021 by Nacho Terry RN  Outcome: Ongoing  9/12/2020 1501 by Cale Yen  Outcome: Ongoing     Problem: Cardiac Output - Decreased:  Goal: Cardiac output within specified parameters  Description: Cardiac output within specified parameters  9/12/2020 2021 by Nacho Terry RN  Outcome: Ongoing  9/12/2020 1501 by Cale Yen  Outcome: Ongoing  Goal: Hemodynamic stability will improve  Description: Hemodynamic stability will improve  9/12/2020 2021 by Nacho Terry RN  Outcome: Ongoing  9/12/2020 1501 by Cale Yen  Outcome: Ongoing     Problem: Fluid Volume - Imbalance:  Goal: Ability to achieve a balanced intake and output will improve  Description: Ability to achieve a balanced intake and output will improve  9/12/2020 2021 by Nacho Terry RN  Outcome: Ongoing  9/12/2020 1501 by Cale Yen  Outcome: Ongoing  Goal: Chest tube drainage is within specified parameters  Description: Chest tube drainage is within specified parameters  9/12/2020 2021 by Nacho Terry RN  Outcome: Ongoing  9/12/2020 1501 by Cale Yen  Outcome: Ongoing     Problem: Gas Exchange - Impaired:  Goal: Levels of oxygenation will improve  Description: Levels of oxygenation will improve  9/12/2020 2021 by Nacho Terry RN  Outcome: Ongoing  9/12/2020 1501 by Cale Yen  Outcome: Ongoing  Goal: Ability to maintain adequate ventilation will improve  Description: Ability to maintain adequate ventilation will improve  9/12/2020 2021 by Jorge Reilly RN  Outcome: Ongoing  9/12/2020 1501 by Ana María Yen  Outcome: Ongoing     Problem: Pain:  Description: Pain management should include both nonpharmacologic and pharmacologic interventions. Goal: Pain level will decrease  Description: Pain level will decrease  9/12/2020 2021 by Jorge Reilly RN  Outcome: Ongoing  9/12/2020 1501 by Ana María Yen  Outcome: Ongoing  Goal: Control of acute pain  Description: Control of acute pain  9/12/2020 2021 by Jorge Reilly RN  Outcome: Ongoing  9/12/2020 1501 by Ana María Yen  Outcome: Ongoing  Goal: Control of chronic pain  Description: Control of chronic pain  9/12/2020 2021 by Jorge Reilly RN  Outcome: Ongoing  9/12/2020 1501 by Ana María Yen  Outcome: Ongoing     Problem: Tissue Perfusion - Cardiopulmonary, Altered:  Goal: Absence of angina  Description: Absence of angina  9/12/2020 2021 by Jorge Reilly RN  Outcome: Ongoing  9/12/2020 1501 by Ana María Yen  Outcome: Ongoing  Goal: Hemodynamic stability will improve  Description: Hemodynamic stability will improve  9/12/2020 2021 by Jorge Reilly RN  Outcome: Ongoing  9/12/2020 1501 by Ana María Yen  Outcome: Ongoing  Goal: Will show no evidence of cardiac arrhythmias  Description: Will show no evidence of cardiac arrhythmias  9/12/2020 2021 by Jorge Reilly RN  Outcome: Ongoing  9/12/2020 1501 by Ana María Yen  Outcome: Ongoing     Problem: Pain:  Description: Pain management should include both nonpharmacologic and pharmacologic interventions.   Goal: Pain level will decrease  Description: Pain level will decrease  9/12/2020 2021 by Jorge Reilly RN  Outcome: Ongoing  9/12/2020 1501 by Ana María Yen  Outcome: Ongoing  Goal: Control of acute pain  Description: Control of acute pain  9/12/2020 2021 by Jorge Reilly

## 2020-09-14 PROBLEM — R07.9 CHEST PAIN: Status: RESOLVED | Noted: 2018-07-23 | Resolved: 2020-09-14

## 2020-09-14 PROBLEM — M79.10 MYALGIA DUE TO STATIN: Chronic | Status: ACTIVE | Noted: 2019-11-07

## 2020-09-14 PROBLEM — E78.2 MIXED HYPERLIPIDEMIA: Chronic | Status: ACTIVE | Noted: 2017-04-18

## 2020-09-14 PROBLEM — T46.6X5A MYALGIA DUE TO STATIN: Chronic | Status: ACTIVE | Noted: 2019-11-07

## 2020-09-14 PROBLEM — R07.9 ACUTE CHEST PAIN: Status: RESOLVED | Noted: 2018-07-23 | Resolved: 2020-09-14

## 2020-09-14 PROBLEM — R55 SYNCOPE AND COLLAPSE: Status: RESOLVED | Noted: 2018-07-24 | Resolved: 2020-09-14

## 2020-09-14 LAB
ANION GAP SERPL CALCULATED.3IONS-SCNC: 14 MMOL/L (ref 7–19)
BUN BLDV-MCNC: 11 MG/DL (ref 8–23)
CALCIUM SERPL-MCNC: 8.3 MG/DL (ref 8.8–10.2)
CHLORIDE BLD-SCNC: 99 MMOL/L (ref 98–111)
CO2: 23 MMOL/L (ref 22–29)
CREAT SERPL-MCNC: 0.7 MG/DL (ref 0.5–0.9)
GFR AFRICAN AMERICAN: >59
GFR NON-AFRICAN AMERICAN: >60
GLUCOSE BLD-MCNC: 120 MG/DL (ref 74–109)
POTASSIUM SERPL-SCNC: 3.7 MMOL/L (ref 3.5–5)
SODIUM BLD-SCNC: 136 MMOL/L (ref 136–145)

## 2020-09-14 PROCEDURE — 2580000003 HC RX 258: Performed by: THORACIC SURGERY (CARDIOTHORACIC VASCULAR SURGERY)

## 2020-09-14 PROCEDURE — 6370000000 HC RX 637 (ALT 250 FOR IP): Performed by: THORACIC SURGERY (CARDIOTHORACIC VASCULAR SURGERY)

## 2020-09-14 PROCEDURE — 6370000000 HC RX 637 (ALT 250 FOR IP): Performed by: NURSE PRACTITIONER

## 2020-09-14 PROCEDURE — 36415 COLL VENOUS BLD VENIPUNCTURE: CPT

## 2020-09-14 PROCEDURE — 80048 BASIC METABOLIC PNL TOTAL CA: CPT

## 2020-09-14 PROCEDURE — 2140000000 HC CCU INTERMEDIATE R&B

## 2020-09-14 PROCEDURE — 99232 SBSQ HOSP IP/OBS MODERATE 35: CPT | Performed by: INTERNAL MEDICINE

## 2020-09-14 PROCEDURE — 94640 AIRWAY INHALATION TREATMENT: CPT

## 2020-09-14 PROCEDURE — 6360000002 HC RX W HCPCS: Performed by: THORACIC SURGERY (CARDIOTHORACIC VASCULAR SURGERY)

## 2020-09-14 PROCEDURE — 2700000000 HC OXYGEN THERAPY PER DAY

## 2020-09-14 RX ORDER — DOCUSATE SODIUM 100 MG/1
100 CAPSULE, LIQUID FILLED ORAL 2 TIMES DAILY
Status: DISCONTINUED | OUTPATIENT
Start: 2020-09-14 | End: 2020-09-15 | Stop reason: HOSPADM

## 2020-09-14 RX ORDER — BISACODYL 10 MG
10 SUPPOSITORY, RECTAL RECTAL ONCE
Status: DISCONTINUED | OUTPATIENT
Start: 2020-09-14 | End: 2020-09-15 | Stop reason: HOSPADM

## 2020-09-14 RX ADMIN — ALLOPURINOL 300 MG: 300 TABLET ORAL at 09:01

## 2020-09-14 RX ADMIN — ASPIRIN 81 MG: 81 TABLET, COATED ORAL at 09:01

## 2020-09-14 RX ADMIN — DOCUSATE SODIUM 100 MG: 100 CAPSULE, LIQUID FILLED ORAL at 09:01

## 2020-09-14 RX ADMIN — OLMESARTAN MEDOXOMIL AND HYDROCHLOROTHIAZIDE 40/25 0.5 TABLET: 40; 25 TABLET ORAL at 09:02

## 2020-09-14 RX ADMIN — ENOXAPARIN SODIUM 40 MG: 40 INJECTION SUBCUTANEOUS at 09:01

## 2020-09-14 RX ADMIN — IPRATROPIUM BROMIDE AND ALBUTEROL SULFATE 1 AMPULE: .5; 3 SOLUTION RESPIRATORY (INHALATION) at 18:44

## 2020-09-14 RX ADMIN — OXYCODONE HYDROCHLORIDE 5 MG: 5 TABLET ORAL at 02:20

## 2020-09-14 RX ADMIN — OLMESARTAN MEDOXOMIL AND HYDROCHLOROTHIAZIDE 40/25 0.5 TABLET: 40; 25 TABLET ORAL at 20:34

## 2020-09-14 RX ADMIN — IPRATROPIUM BROMIDE AND ALBUTEROL SULFATE 1 AMPULE: .5; 3 SOLUTION RESPIRATORY (INHALATION) at 10:52

## 2020-09-14 RX ADMIN — METOPROLOL SUCCINATE 50 MG: 50 TABLET, EXTENDED RELEASE ORAL at 09:01

## 2020-09-14 RX ADMIN — SODIUM CHLORIDE, PRESERVATIVE FREE 10 ML: 5 INJECTION INTRAVENOUS at 20:33

## 2020-09-14 RX ADMIN — DOCUSATE SODIUM 100 MG: 100 CAPSULE, LIQUID FILLED ORAL at 20:33

## 2020-09-14 RX ADMIN — IPRATROPIUM BROMIDE AND ALBUTEROL SULFATE 1 AMPULE: .5; 3 SOLUTION RESPIRATORY (INHALATION) at 07:05

## 2020-09-14 RX ADMIN — OXYCODONE HYDROCHLORIDE 5 MG: 5 TABLET ORAL at 20:39

## 2020-09-14 RX ADMIN — METOPROLOL SUCCINATE 50 MG: 50 TABLET, EXTENDED RELEASE ORAL at 20:33

## 2020-09-14 RX ADMIN — SODIUM CHLORIDE, PRESERVATIVE FREE 10 ML: 5 INJECTION INTRAVENOUS at 09:07

## 2020-09-14 RX ADMIN — IPRATROPIUM BROMIDE AND ALBUTEROL SULFATE 1 AMPULE: .5; 3 SOLUTION RESPIRATORY (INHALATION) at 14:56

## 2020-09-14 RX ADMIN — CLOPIDOGREL BISULFATE 75 MG: 75 TABLET ORAL at 09:01

## 2020-09-14 RX ADMIN — MAGNESIUM HYDROXIDE 30 ML: 400 SUSPENSION ORAL at 09:09

## 2020-09-14 ASSESSMENT — PAIN SCALES - GENERAL
PAINLEVEL_OUTOF10: 7
PAINLEVEL_OUTOF10: 7
PAINLEVEL_OUTOF10: 0
PAINLEVEL_OUTOF10: 4

## 2020-09-14 ASSESSMENT — PAIN DESCRIPTION - PAIN TYPE: TYPE: SURGICAL PAIN

## 2020-09-14 NOTE — PLAN OF CARE
Problem: Falls - Risk of:  Goal: Will remain free from falls  Description: Will remain free from falls  Outcome: Ongoing  Goal: Absence of physical injury  Description: Absence of physical injury  Outcome: Ongoing     Problem: SAFETY  Goal: Free from accidental physical injury  Outcome: Ongoing  Goal: Free from intentional harm  Outcome: Ongoing     Problem: DAILY CARE  Goal: Daily care needs are met  Outcome: Ongoing     Problem: PAIN  Goal: Patient's pain/discomfort is manageable  Outcome: Ongoing     Problem: SKIN INTEGRITY  Goal: Skin integrity is maintained or improved  Outcome: Ongoing     Problem: KNOWLEDGE DEFICIT  Goal: Patient/S.O. demonstrates understanding of disease process, treatment plan, medications, and discharge instructions. Outcome: Ongoing     Problem: DISCHARGE BARRIERS  Goal: Patient's continuum of care needs are met  Outcome: Ongoing     Problem: Discharge Planning:  Goal: Discharged to appropriate level of care  Description: Discharged to appropriate level of care  Outcome: Ongoing     Problem:  Activity Intolerance:  Goal: Able to perform prescribed physical activity  Description: Able to perform prescribed physical activity  Outcome: Ongoing  Goal: Ability to tolerate increased activity will improve  Description: Ability to tolerate increased activity will improve  Outcome: Ongoing     Problem: Anxiety:  Goal: Level of anxiety will decrease  Description: Level of anxiety will decrease  Outcome: Ongoing     Problem: Cardiac Output - Decreased:  Goal: Cardiac output within specified parameters  Description: Cardiac output within specified parameters  Outcome: Ongoing  Goal: Hemodynamic stability will improve  Description: Hemodynamic stability will improve  Outcome: Ongoing     Problem: Fluid Volume - Imbalance:  Goal: Ability to achieve a balanced intake and output will improve  Description: Ability to achieve a balanced intake and output will improve  Outcome: Ongoing  Goal: Chest tube drainage is within specified parameters  Description: Chest tube drainage is within specified parameters  Outcome: Ongoing     Problem: Gas Exchange - Impaired:  Goal: Levels of oxygenation will improve  Description: Levels of oxygenation will improve  Outcome: Ongoing  Goal: Ability to maintain adequate ventilation will improve  Description: Ability to maintain adequate ventilation will improve  Outcome: Ongoing     Problem: Pain:  Goal: Pain level will decrease  Description: Pain level will decrease  Outcome: Ongoing  Goal: Control of acute pain  Description: Control of acute pain  Outcome: Ongoing  Goal: Control of chronic pain  Description: Control of chronic pain  Outcome: Ongoing

## 2020-09-14 NOTE — PROGRESS NOTES
Clean and changed dressings to right evh which had some bloody drainage, small amount. Sternum dressing removed and clean, left open to air. Notified respiratory of need for IS.

## 2020-09-14 NOTE — CARE COORDINATION
Spoke with patient regarding MD orders for [facility] services. Patient agreeable and has chosen [facility]. Referral Faxed. [address] [place] [address] [phone]. [phone]. Please notify [address] [place] [address] when patient discharges and fax DC Summary,  DC med list and any new [facility] orders. The Patient  was provided with a choice of provider and agrees with the discharge plan. [x] Yes [] No    Freedom of choice list was provided with basic dialogue that supports the patient's individualized plan of care/goals, treatment preferences and shares the quality data associated with the providers.  [x] Yes [] No  Electronically signed by Ricky Barrios RN on 9/14/2020 at 4:52 PM

## 2020-09-14 NOTE — PROGRESS NOTES
Ambulated with patient in the hallway about 300 ft using rolling walker and portable O2 tank. Tolerated well.

## 2020-09-14 NOTE — PROGRESS NOTES
POST OP CARDIOTHORACIC SURGERY PROGRESS NOTE    Post op day 3    SUBJECTIVE:  Sitting up in chair. Pain controlled. No BM. BP (!) 123/51   Pulse 101   Temp 97.4 °F (36.3 °C) (Temporal)   Resp 16   Ht 5' 7\" (1.702 m)   Wt 201 lb (91.2 kg)   SpO2 91%   BMI 31.48 kg/m²   Average, Min, and Max for last 24 hours Vitals:  TEMPERATURE:  Temp  Av.2 °F (36.8 °C)  Min: 97.1 °F (36.2 °C)  Max: 99.6 °F (37.6 °C)  RESPIRATIONS RANGE: Resp  Av.9  Min: 15  Max: 22  PULSE RANGE: Pulse  Av.9  Min: 89  Max: 109  BLOOD PRESSURE RANGE:  Systolic (89VHL), PNU:077 , Min:87 , NTV:698   ; Diastolic (15QRX), QTP:29, Min:37, Max:67    PULSE OXIMETRY RANGE: SpO2  Av %  Min: 90 %  Max: 100 %    I/O last 3 completed shifts: In: 240 [P.O.:240]  Out: 5020 [GVRET:2563; Chest Tube:30]    CHEST: Clear bilateral breath sounds without wheezes or rhonchi. CARDIOVASCULAR: Normal HT with RRR. No murmurs, gallops or rubs. INCISION: Sternal incision open to air. Edges approximated. No drainage or erythema. RLE incision open to air. Edges approximated. No drainage or erythema. LABS:  BMP:    Lab Results   Component Value Date     2020    K 3.7 2020    CL 99 2020    CO2 23 2020    BUN 11 2020    LABALBU 4.2 09/10/2020    CREATININE 0.7 2020    CALCIUM 8.3 2020    GFRAA >59 2020    LABGLOM >60 2020    GLUCOSE 120 2020           ASSESSMENT:     1. 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 Essential HTN  3. Mixed Hyperlipidemia - Intolerant to Statin Therapy  4. Chronic GERD  5. Chronic Venous Insufficiency with PMHx BLE Ablation  6. Hx Left Facial CVA   7. Obesity 2' to Excess Calories with BMI 28.9     PLAN:     1. D/C Pacing Wires  2. Labs and CXR in AM  3.  Anticipate D/C in AM      Electronically signed by Jayesh Greenfield, APRN on 9/14/20 at 7:16 AM CDT

## 2020-09-14 NOTE — PLAN OF CARE
Problem: Falls - Risk of:  Goal: Will remain free from falls  Description: Will remain free from falls  9/14/2020 1020 by Mya Montilla RN  Outcome: Ongoing  9/14/2020 0017 by Floria Boas, RN  Outcome: Ongoing  Goal: Absence of physical injury  Description: Absence of physical injury  9/14/2020 1020 by Mya Montilla RN  Outcome: Ongoing  9/14/2020 0017 by Floria Boas, RN  Outcome: Ongoing     Problem: SAFETY  Goal: Free from accidental physical injury  9/14/2020 1020 by Mya Montilla RN  Outcome: Ongoing  9/14/2020 0017 by Floria Boas, RN  Outcome: Ongoing  Goal: Free from intentional harm  9/14/2020 1020 by Mya Montilla RN  Outcome: Ongoing  9/14/2020 0017 by Floria Boas, RN  Outcome: Ongoing     Problem: DAILY CARE  Goal: Daily care needs are met  9/14/2020 1020 by Mya Montilla RN  Outcome: Ongoing  9/14/2020 0017 by Floria Boas, RN  Outcome: Ongoing     Problem: PAIN  Goal: Patient's pain/discomfort is manageable  9/14/2020 1020 by Mya Montilla RN  Outcome: Ongoing  9/14/2020 0017 by Floria Boas, RN  Outcome: Ongoing     Problem: SKIN INTEGRITY  Goal: Skin integrity is maintained or improved  9/14/2020 1020 by Mya Montilla RN  Outcome: Ongoing  9/14/2020 0017 by Floria Boas, RN  Outcome: Ongoing     Problem: KNOWLEDGE DEFICIT  Goal: Patient/S.O. demonstrates understanding of disease process, treatment plan, medications, and discharge instructions.   9/14/2020 1020 by Mya Montilla RN  Outcome: Ongoing  9/14/2020 0017 by Floria Boas, RN  Outcome: Ongoing     Problem: DISCHARGE BARRIERS  Goal: Patient's continuum of care needs are met  9/14/2020 1020 by Mya Montilla RN  Outcome: Ongoing  9/14/2020 0017 by Floria Boas, RN  Outcome: Ongoing     Problem: Discharge Planning:  Goal: Discharged to appropriate level of care  Description: Discharged to appropriate level of care  9/14/2020 1020 by Mya Montilla RN  Outcome: Ongoing  9/14/2020 0017 by Floria Boas, RN  Outcome: Ongoing Problem:  Activity Intolerance:  Goal: Able to perform prescribed physical activity  Description: Able to perform prescribed physical activity  9/14/2020 1020 by Deyanira Aj RN  Outcome: Ongoing  9/14/2020 0017 by Ruby Bhatia RN  Outcome: Ongoing  Goal: Ability to tolerate increased activity will improve  Description: Ability to tolerate increased activity will improve  9/14/2020 1020 by Deyanira Aj RN  Outcome: Ongoing  9/14/2020 0017 by Ruby Bhatia RN  Outcome: Ongoing     Problem: Anxiety:  Goal: Level of anxiety will decrease  Description: Level of anxiety will decrease  9/14/2020 1020 by Deyanira Aj RN  Outcome: Ongoing  9/14/2020 0017 by Ruby Bhatia RN  Outcome: Ongoing     Problem: Cardiac Output - Decreased:  Goal: Cardiac output within specified parameters  Description: Cardiac output within specified parameters  9/14/2020 1020 by Deyanira Aj RN  Outcome: Ongoing  9/14/2020 0017 by Ruby Bhatia RN  Outcome: Ongoing  Goal: Hemodynamic stability will improve  Description: Hemodynamic stability will improve  9/14/2020 1020 by Deyanira Aj RN  Outcome: Ongoing  9/14/2020 0017 by Ruby Bhatia RN  Outcome: Ongoing     Problem: Fluid Volume - Imbalance:  Goal: Ability to achieve a balanced intake and output will improve  Description: Ability to achieve a balanced intake and output will improve  9/14/2020 1020 by Deyanira Aj RN  Outcome: Ongoing  9/14/2020 0017 by Ruby Bhatia RN  Outcome: Ongoing  Goal: Chest tube drainage is within specified parameters  Description: Chest tube drainage is within specified parameters  9/14/2020 1020 by Deyanira Aj RN  Outcome: Ongoing  9/14/2020 0017 by Ruby Bhatia RN  Outcome: Ongoing     Problem: Gas Exchange - Impaired:  Goal: Levels of oxygenation will improve  Description: Levels of oxygenation will improve  9/14/2020 1020 by Deyanira Aj RN  Outcome: Ongoing  9/14/2020 0017 by Ruby Bhatia RN  Outcome: Ongoing  Goal: Ability to maintain adequate ventilation will improve  Description: Ability to maintain adequate ventilation will improve  9/14/2020 1020 by Caty Mariee RN  Outcome: Ongoing  9/14/2020 0017 by Reece Aaron RN  Outcome: Ongoing     Problem: Pain:  Goal: Pain level will decrease  Description: Pain level will decrease  9/14/2020 1020 by Caty Mariee RN  Outcome: Ongoing  9/14/2020 0017 by Reece Aaron RN  Outcome: Ongoing  Goal: Control of acute pain  Description: Control of acute pain  9/14/2020 1020 by Caty Mariee RN  Outcome: Ongoing  9/14/2020 0017 by Reece Aaron RN  Outcome: Ongoing  Goal: Control of chronic pain  Description: Control of chronic pain  9/14/2020 1020 by Caty Mariee RN  Outcome: Ongoing  9/14/2020 0017 by Reece Aaron RN  Outcome: Ongoing     Problem: Tissue Perfusion - Cardiopulmonary, Altered:  Goal: Absence of angina  Description: Absence of angina  9/14/2020 1020 by Caty Mariee RN  Outcome: Ongoing  9/14/2020 0017 by Reece Aaron RN  Outcome: Ongoing  Goal: Hemodynamic stability will improve  Description: Hemodynamic stability will improve  9/14/2020 1020 by Caty Mariee RN  Outcome: Ongoing  9/14/2020 0017 by Reece Aaron RN  Outcome: Ongoing  Goal: Will show no evidence of cardiac arrhythmias  Description: Will show no evidence of cardiac arrhythmias  9/14/2020 1020 by Caty Mariee RN  Outcome: Ongoing  9/14/2020 0017 by Reece Aaron RN  Outcome: Ongoing     Problem: Pain:  Goal: Pain level will decrease  Description: Pain level will decrease  9/14/2020 1020 by Caty Mariee RN  Outcome: Ongoing  9/14/2020 0017 by Reece Aaron RN  Outcome: Ongoing  Goal: Control of acute pain  Description: Control of acute pain  9/14/2020 1020 by Caty Mariee RN  Outcome: Ongoing  9/14/2020 0017 by Reece Aaron RN  Outcome: Ongoing  Goal: Control of chronic pain  Description: Control of chronic pain  9/14/2020 1020 by Caty Mariee RN  Outcome: Ongoing  9/14/2020 0017 by Gilbert Mejia Chuy Domingo, RN  Outcome: Ongoing

## 2020-09-14 NOTE — CARE COORDINATION
Requested DME order for RW as OP and home O2 eval prior to dc from [facility]. Pt was agreeable to using [employer] as the supplier and SW will send orders and documentation for referral when entered.      [employer] Ph: [phone]  Fa: [phone]

## 2020-09-15 ENCOUNTER — TELEPHONE (OUTPATIENT)
Dept: CARDIOTHORACIC SURGERY | Age: 75
End: 2020-09-15

## 2020-09-15 ENCOUNTER — APPOINTMENT (OUTPATIENT)
Dept: GENERAL RADIOLOGY | Age: 75
DRG: 234 | End: 2020-09-15
Attending: INTERNAL MEDICINE
Payer: MEDICARE

## 2020-09-15 VITALS
TEMPERATURE: 98.8 F | RESPIRATION RATE: 16 BRPM | DIASTOLIC BLOOD PRESSURE: 61 MMHG | HEART RATE: 86 BPM | BODY MASS INDEX: 30.53 KG/M2 | SYSTOLIC BLOOD PRESSURE: 127 MMHG | WEIGHT: 194.5 LBS | OXYGEN SATURATION: 92 % | HEIGHT: 67 IN

## 2020-09-15 LAB
ALBUMIN SERPL-MCNC: 2.6 G/DL (ref 3.5–5.2)
ALP BLD-CCNC: 64 U/L (ref 35–104)
ALT SERPL-CCNC: 16 U/L (ref 5–33)
ANION GAP SERPL CALCULATED.3IONS-SCNC: 12 MMOL/L (ref 7–19)
AST SERPL-CCNC: 30 U/L (ref 5–32)
BILIRUB SERPL-MCNC: 0.4 MG/DL (ref 0.2–1.2)
BUN BLDV-MCNC: 12 MG/DL (ref 8–23)
CALCIUM SERPL-MCNC: 8.9 MG/DL (ref 8.8–10.2)
CHLORIDE BLD-SCNC: 100 MMOL/L (ref 98–111)
CO2: 25 MMOL/L (ref 22–29)
CREAT SERPL-MCNC: 0.6 MG/DL (ref 0.5–0.9)
EKG P AXIS: 55 DEGREES
EKG P-R INTERVAL: 142 MS
EKG Q-T INTERVAL: 350 MS
EKG QRS DURATION: 80 MS
EKG QTC CALCULATION (BAZETT): 420 MS
EKG T AXIS: 23 DEGREES
GFR AFRICAN AMERICAN: >59
GFR NON-AFRICAN AMERICAN: >60
GLUCOSE BLD-MCNC: 114 MG/DL (ref 74–109)
HCT VFR BLD CALC: 25.6 % (ref 37–47)
HEMOGLOBIN: 8.3 G/DL (ref 12–16)
MAGNESIUM: 2 MG/DL (ref 1.6–2.4)
MCH RBC QN AUTO: 31.3 PG (ref 27–31)
MCHC RBC AUTO-ENTMCNC: 32.4 G/DL (ref 33–37)
MCV RBC AUTO: 96.6 FL (ref 81–99)
PDW BLD-RTO: 14.2 % (ref 11.5–14.5)
PLATELET # BLD: 201 K/UL (ref 130–400)
PMV BLD AUTO: 9 FL (ref 9.4–12.3)
POTASSIUM SERPL-SCNC: 3.9 MMOL/L (ref 3.5–5)
RBC # BLD: 2.65 M/UL (ref 4.2–5.4)
SODIUM BLD-SCNC: 137 MMOL/L (ref 136–145)
TOTAL PROTEIN: 5.3 G/DL (ref 6.6–8.7)
WBC # BLD: 7.1 K/UL (ref 4.8–10.8)

## 2020-09-15 PROCEDURE — 80053 COMPREHEN METABOLIC PANEL: CPT

## 2020-09-15 PROCEDURE — 6370000000 HC RX 637 (ALT 250 FOR IP): Performed by: THORACIC SURGERY (CARDIOTHORACIC VASCULAR SURGERY)

## 2020-09-15 PROCEDURE — 36415 COLL VENOUS BLD VENIPUNCTURE: CPT

## 2020-09-15 PROCEDURE — 2580000003 HC RX 258: Performed by: THORACIC SURGERY (CARDIOTHORACIC VASCULAR SURGERY)

## 2020-09-15 PROCEDURE — 71046 X-RAY EXAM CHEST 2 VIEWS: CPT

## 2020-09-15 PROCEDURE — 94640 AIRWAY INHALATION TREATMENT: CPT

## 2020-09-15 PROCEDURE — 83735 ASSAY OF MAGNESIUM: CPT

## 2020-09-15 PROCEDURE — 6370000000 HC RX 637 (ALT 250 FOR IP): Performed by: NURSE PRACTITIONER

## 2020-09-15 PROCEDURE — 94761 N-INVAS EAR/PLS OXIMETRY MLT: CPT

## 2020-09-15 PROCEDURE — 93010 ELECTROCARDIOGRAM REPORT: CPT | Performed by: INTERNAL MEDICINE

## 2020-09-15 PROCEDURE — 85027 COMPLETE CBC AUTOMATED: CPT

## 2020-09-15 PROCEDURE — 6360000002 HC RX W HCPCS: Performed by: THORACIC SURGERY (CARDIOTHORACIC VASCULAR SURGERY)

## 2020-09-15 RX ORDER — FAMOTIDINE 20 MG/1
20 TABLET, FILM COATED ORAL 2 TIMES DAILY
Status: DISCONTINUED | OUTPATIENT
Start: 2020-09-15 | End: 2020-09-15 | Stop reason: HOSPADM

## 2020-09-15 RX ORDER — CLOPIDOGREL BISULFATE 75 MG/1
75 TABLET ORAL DAILY
Qty: 30 TABLET | Refills: 1 | Status: SHIPPED | OUTPATIENT
Start: 2020-09-15 | End: 2021-01-27 | Stop reason: ALTCHOICE

## 2020-09-15 RX ORDER — FERROUS SULFATE 325(65) MG
325 TABLET ORAL DAILY
Qty: 30 TABLET | Refills: 0 | Status: SHIPPED | OUTPATIENT
Start: 2020-09-15 | End: 2020-12-04

## 2020-09-15 RX ORDER — OXYCODONE HYDROCHLORIDE 5 MG/1
5 TABLET ORAL EVERY 6 HOURS PRN
Qty: 50 TABLET | Refills: 0
Start: 2020-09-15 | End: 2020-09-29

## 2020-09-15 RX ADMIN — METOPROLOL SUCCINATE 50 MG: 50 TABLET, EXTENDED RELEASE ORAL at 08:06

## 2020-09-15 RX ADMIN — ALLOPURINOL 300 MG: 300 TABLET ORAL at 08:06

## 2020-09-15 RX ADMIN — CLOPIDOGREL BISULFATE 75 MG: 75 TABLET ORAL at 08:11

## 2020-09-15 RX ADMIN — IPRATROPIUM BROMIDE AND ALBUTEROL SULFATE 1 AMPULE: .5; 3 SOLUTION RESPIRATORY (INHALATION) at 07:03

## 2020-09-15 RX ADMIN — SODIUM CHLORIDE, PRESERVATIVE FREE 10 ML: 5 INJECTION INTRAVENOUS at 08:06

## 2020-09-15 RX ADMIN — ASPIRIN 81 MG: 81 TABLET, COATED ORAL at 08:06

## 2020-09-15 RX ADMIN — DOCUSATE SODIUM 100 MG: 100 CAPSULE, LIQUID FILLED ORAL at 08:06

## 2020-09-15 RX ADMIN — OLMESARTAN MEDOXOMIL AND HYDROCHLOROTHIAZIDE 40/25 0.5 TABLET: 40; 25 TABLET ORAL at 08:06

## 2020-09-15 RX ADMIN — FAMOTIDINE 20 MG: 20 TABLET ORAL at 08:06

## 2020-09-15 RX ADMIN — ENOXAPARIN SODIUM 40 MG: 40 INJECTION SUBCUTANEOUS at 08:06

## 2020-09-15 ASSESSMENT — PAIN SCALES - WONG BAKER: WONGBAKER_NUMERICALRESPONSE: 0

## 2020-09-15 ASSESSMENT — PAIN SCALES - GENERAL
PAINLEVEL_OUTOF10: 0
PAINLEVEL_OUTOF10: 0

## 2020-09-15 NOTE — PROGRESS NOTES
postoperative changes. ASSESSMENT:     1. 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         Postoperative Anemia 2' to Acute Blood Loss, Expected  3. Essential HTN  4.         Mixed Hyperlipidemia - Intolerant to Statin Therapy  5.         Chronic GERD  6.         Chronic Venous Insufficiency with PMHx BLE Ablation  7.         Hx Left Facial CVA   8.         Obesity 2' to Excess Calories with BMI 28.9              PLAN:     1. Add Iron Replacement  2. Evaluate for Home O2 Need  3.  D/C Home      Electronically signed by ULISES French on 9/15/20 at 6:59 AM CDT

## 2020-09-15 NOTE — PROGRESS NOTES
(PROTONIX) 40 MG tablet Take 40 mg by mouth as needed    Yes Historical Provider, MD   calcium carbonate 600 MG TABS tablet Take 1 tablet by mouth 2 times daily   Yes Historical Provider, MD   Evolocumab (REPATHA) 140 MG/ML SOSY Inject 140 mg into the skin every 14 days Patient has not started   Yes Historical Provider, MD   vitamin D (CHOLECALCIFEROL) 1000 UNIT TABS tablet 1,000 Units 2 times daily Take 3 tablets daily    Yes Historical Provider, MD   BENICAR HCT 40-25 MG per tablet Take 0.5 tablets by mouth 2 times daily  10/10/15  Yes Historical Provider, MD   allopurinol (ZYLOPRIM) 300 MG tablet Take 300 mg by mouth daily. Yes Historical Provider, MD   aspirin 81 MG EC tablet Take 81 mg by mouth daily. Yes Historical Provider, MD   loratadine (CLARITIN) 10 MG tablet Take 10 mg by mouth 2 times daily    Yes Historical Provider, MD   Multiple Vitamin (MULTIVITAMIN PO) Take 1 tablet by mouth daily    Yes Historical Provider, MD   bumetanide (BUMEX) 1 MG tablet Take 1 tablet by mouth daily as needed (swelling) 8/6/20   ULISES Whitlock   ondansetron (ZOFRAN ODT) 4 MG disintegrating tablet Take 1 tablet by mouth every 8 hours as needed for Nausea or Vomiting 2/18/19   Ernestina Taylor MD        docusate sodium  100 mg Oral BID    bisacodyl  10 mg Rectal Once    allopurinol  300 mg Oral Daily    metoprolol succinate  50 mg Oral BID    enoxaparin  40 mg Subcutaneous Daily    olmesartan-hydroCHLOROthiazide  0.5 tablet Oral BID    sodium chloride flush  10 mL Intravenous 2 times per day    aspirin  81 mg Oral Daily    clopidogrel  75 mg Oral Daily    ipratropium-albuterol  1 ampule Inhalation Q4H WA       TELEMETRY: Sinus     Physical Exam:      Physical Exam  Constitutional:       General: She is not in acute distress. Appearance: She is not diaphoretic. HENT:      Mouth/Throat:      Pharynx: No oropharyngeal exudate. Eyes:      General: No scleral icterus. Right eye: No discharge. frontal portable supine view of the chest is obtained and compared with the previous study dated 9/10/2020. The Lungs are poorly inflated with areas of discoid atelectasis bilaterally. No pleural effusion, pulmonary congestion or pneumothorax. An endotracheal tube is seen with distal end 5.2 cm from trachea bifurcation. This is in appropriate position. Right internal jugular approach Griffith-Nola catheter is seen with distal end in the mid right pulmonary artery. Mediastinal drain is seen in place. Left-sided chest tubes are seen with distal end in the lower chest laterally. Heart size in the normal range. The hardware fusion of cervical spine is incompletely visualized. The poor lung expansion with discoid atelectatic changes. No infiltrate, pulmonary congestion or pneumothorax. The endotracheal tube, the Griffith-Nola catheter, the mediastinal drain and left-sided chest tube are in optimal and stable position. Above findings are recorded on a digital voice clip in PACS. Signed by Dr Nuvia Jason on 9/11/2020 12:07 PM    Vl Vein Mapping Lower Bilateral    Result Date: 9/10/2020  Vascular Lower Extremity Vein Mapping Procedure  Demographics   Patient Name    Giancarlo Ramirez  Age                  66                  B   Patient Number  [id number]         Gender               Female   Visit Number    [id number]      Interpreting         Salina Nassar MD                                 Physician   Date of Birth   1945     Referring Physician  Jorge Brenner   Accession       [id number]     [address], RVT,  Number                                              RDMS  Procedure Type of Study:   [address] [address]. Indications for Study:Pre-op CABG. Risk Factors History of Disease +--------------------------+----+------------------------------------------+ ! Diagnosis                 ! Date! Comments                                  ! +--------------------------+----+------------------------------------------+ ! Peripheral vascular       ! !CAROTID ARTERY DS                         ! !disease                   !    !                                          ! +--------------------------+----+------------------------------------------+   - The patient's last creatinine was 0.7 mg/dl. Allergies   - Shellfish:Reaction - Lungs ->tightness(WHEEZING & TIGHTNESS WITH RASH). - Lisinopril:Reaction - OtherSensitivity - Allergy(TONGUE SWELLS). - Neurontin:Reaction - OtherSensitivity - Allergy(CAUSES BODY TREMORS). - Nexium:Reaction - OtherSensitivity - Allergy(CAUSES HTN & PALPITATIONS). - Other allergy:Reaction - OtherSensitivity - Allergy(PLAQUENIL CAUSES     NAUSEA). - Demerol:Reaction - Stomach ->vomitingSensitivity - Allergy.   - Lortab:Reaction - Stomach ->nausea(CAUSES FLU LIKE SYMPTOMS). - Other allergy:Reaction - OtherSensitivity - Allergy(PRILOSEC - CAUSES     FLU LIKE SYMPTOMS). - Other allergy:Sensitivity - Allergy(TRICOR - CAUSES FLU LIKE SYMPTOMS &     PALPITATIONS). - Other allergy:Reaction - OtherSensitivity - Allergy(CELEXA - CAUSES     ANXIETY). - Other allergy:Reaction - OtherSensitivity - Allergy(COZAAR - CAUSES     PALPITATIONS). - Other allergy:Sensitivity - Allergy(ALL CHOLESTEROL DRUGS). - Other allergy:Sensitivity - Allergy(BONIVA). - Levaquin:Reaction - Other;Severity - Moderate;Sensitivity -     Allergy(PALPITATIONS). - Lasix:Reaction - Skin ->swelling;Severity - Moderate;Sensitivity -     Allergy. Impression   Right greater saphenous vein occluded mid thigh. Bilateral lower extremity saphenous vein mapping performed. Veins are  otherwise patent with measurements noted.    Signature   ----------------------------------------------------------------  Electronically signed by Alfredo Bloom MD([facility]  physician) on 09/10/2020 03:15 PM  ---------------------------------------------------------------- Velocities are measured in cm/s ; Diameters are measured in mm +--------------------------------------++--------+-----+----+--------+-----+ ! Superficial - Great Saphenous Vein    ! ! Right   ! ! Left!        !     ! +--------------------------------------++--------+-----+----+--------+-----+ ! Location                              ! !Diameter! Depth! !Diameter! Depth! +--------------------------------------++--------+-----+----+--------+-----+ ! GSV 2 cm Distal to Junction           !!6       !     !    !6.6     !     ! +--------------------------------------++--------+-----+----+--------+-----+ ! GSV High Thigh                        !!2       !     !    !2.6     !     ! +--------------------------------------++--------+-----+----+--------+-----+ ! GSV Mid Thigh                         !!0       !     !    !1.1     !     ! +--------------------------------------++--------+-----+----+--------+-----+ ! GSV Low Thigh                         ! !1.7     !     !    !1.1     !     ! +--------------------------------------++--------+-----+----+--------+-----+ ! GSV Knee                              !!3       !     !    !2.7     !     ! +--------------------------------------++--------+-----+----+--------+-----+ ! GSV High Calf                         !!2.5     !     !    !2.3     !     ! +--------------------------------------++--------+-----+----+--------+-----+ ! GSV Mid Calf                          !!2.1     !     !    !1.7     !     ! +--------------------------------------++--------+-----+----+--------+-----+ ! GSV Ankle                             ! !3.3     !     !    !2.5     !     ! +--------------------------------------++--------+-----+----+--------+-----+        Assessment and Plan:    41-year-old female patient with past medical history of hypertension, reported arthritis, coronary artery disease, prior PCI to the ostial RCA with catheterization showing obstructive left main disease 9/10/2020, status post CABG 9/11/2020 with two-vessel grafting, normal LV ejection fraction. 1. She is doing quite well post CABG with stents with discharge tomorrow. She remains in sinus rhythm. Can follow-up as an outpatient.     Oswaldo Rivera MD 9/14/2020 11:02 PM

## 2020-09-15 NOTE — CONSULTS
[facility] Post-op Cardiac Rehab education packet was sent to the patient's address on record. Handouts included were titled; \"Home Instructions Following a Cardiac Event\", \"Cardiac Home Exercise Program - Phase I\", \"Risk Factors for Heart Disease and Stroke\" and \"Cardiac Diet/Low Cholesterol\". Patient was instructed to contact [facility] or the hospital nearest their residence for the opportunity to enroll in Phase II Outpatient Cardiac Rehab.

## 2020-09-15 NOTE — DISCHARGE SUMMARY
[address] [facility]  Discharge Summary    Patient ID: Julita Reeder      Patient's PCP: Karla Randolph MD    Admit Date: 9/10/2020     Discharge Date:  09/15/2020    Admitting Physician: Charis Blizzard, MD     Discharge Physician: Dr. Maci Saleh     Discharge Diagnoses:     Primary:   1.         Progressive Angina Associated with Left Main and Multivessel CAD      Secondary:   2.         Postoperative Anemia 2' to Acute Blood Loss, Expected  3. Essential HTN  4.         Mixed Hyperlipidemia - Intolerant to Statin Therapy  5.         Chronic GERD  6.         Chronic Venous Insufficiency with PMHx BLE Ablation  7.         Hx Left Facial CVA   8.         Obesity 2' to Excess Calories with BMI 28.9         Procedures This Admit:   1. On 09/10/2020, Cardiac Catheterization by Dr. Charis Blizzard  2. On 09/11/2020, 2-Vessel Perfusion-Assisted Bypass Grafting, Placing LIMA - LAD and SVG - OM by Dr. Maci Saleh    Consults:   1. Dr. Maci Saleh, Cardiothoracic Surgery      Complications:   1.         Postoperative Anemia 2' to Acute Blood Loss, Expected    The patient was seen and examined on day of discharge and this discharge summary is in conjunction with any daily progress note from day of discharge. History of Present Illness and Hospital Course:   Ms. Sanya Gao is a 66year old obese female with a history of rheumatoid arthritis who has had multiple heart catheterizations over the last several years for investigation of chest pain, shortness of breath, and lethargy. She did undergo stenting of her proximal RCA many years ago. She recently had a dobutamine stress echo that clinically showed evidence of myocardial ischemia as the patient developed chest pain during the exam. This prompted heart catheterization. Catheterization demonstrated a left ventricle that showed normal function, ejection fraction was estimated at 70%.  The left main artery was a rather short vessel, very difficult to see; Appointment with ULISES Green on 10/23/2020 at 11 am.       Discharge Medications:     allopurinol (ZYLOPRIM) 300 MG tablet  Take 300 mg by mouth daily. aspirin 81 MG EC tablet  Take 81 mg by mouth daily. BENICAR HCT 40-25 MG per tablet  Take 0.5 tablets by mouth 2 times daily      bumetanide (BUMEX) 1 MG tablet  Take 1 tablet by mouth daily as needed (swelling)     calcium carbonate 600 MG TABS tablet  Take 1 tablet by mouth 2 times daily     clopidogrel (PLAVIX) 75 MG tablet  Take 1 tablet by mouth daily     Evolocumab (REPATHA) 140 MG/ML SOSY  Inject 140 mg into the skin every 14 days Patient has not started     famotidine (PEPCID) 40 MG tablet  Take 1 tablet by mouth daily     ferrous sulfate (STEVE-DMITRY) 325 (65 Fe) MG tablet  Take 1 tablet by mouth daily     loratadine (CLARITIN) 10 MG tablet  Take 10 mg by mouth 2 times daily      metoprolol succinate (TOPROL XL) 50 MG extended release tablet  TAKE 1 TABLET TWICE DAILY     Multiple Vitamin (MULTIVITAMIN PO)  Take 1 tablet by mouth daily      Omega-3 Fatty Acids (FISH OIL) 1360 MG CAPS  Take 1 capsule by mouth 2 times daily      ondansetron (ZOFRAN ODT) 4 MG disintegrating tablet  Take 1 tablet by mouth every 8 hours as needed for Nausea or Vomiting     oxyCODONE (ROXICODONE) 5 MG immediate release tablet  Take 1 tablet by mouth every 6 hours as needed for Pain for up to 14 days.      pantoprazole (PROTONIX) 40 MG tablet  Take 40 mg by mouth as needed      vitamin D (CHOLECALCIFEROL) 1000 UNIT TABS tablet  1,000 Units 2 times daily Take 3 tablets daily

## 2020-09-16 ENCOUNTER — NURSE TRIAGE (OUTPATIENT)
Dept: CALL CENTER | Facility: HOSPITAL | Age: 75
End: 2020-09-16

## 2020-09-16 NOTE — TELEPHONE ENCOUNTER
"Cortney from [facility] called to arlene Patiño this pt was admitted to their service today for 1 visit per week.     Reason for Disposition  • [1] Other NON-URGENT information for PCP AND [2] does not require PCP response    Additional Information  • Negative: Lab calling with strep throat test results and triager can call in prescription  • Negative: Lab calling with urinalysis test results and triager can call in prescription  • Negative: Medication questions  • Negative: ED call to PCP  • Negative: Physician call to PCP  • Negative: Call about patient who is currently hospitalized  • Negative: Lab or radiology calling with CRITICAL test results  • Negative: [1] Prescription not at pharmacy AND [2] was prescribed today by PCP  • Negative: [1] Follow-up call from patient regarding patient's clinical status AND [2] information urgent  • Negative: [1] Caller requests to speak ONLY to PCP AND [2] URGENT question  • Negative: [1] Caller requests to speak to PCP now AND [2] won't tell us reason for call  (Exception: if 10 pm to 6 am, caller must first discuss reason for the call)  • Negative: Notification of hospital admission  • Negative: Notification of death  • Negative: Caller requesting lab results  • Negative: Lab or radiology calling with test results  • Negative: [1] Follow-up call from patient regarding patient's clinical status AND [2] information NON-URGENT  • Negative: [1] Caller requests to speak ONLY to PCP AND [2] NON-URGENT question  • Negative: Caller requesting an appointment, triage offered and declined    Answer Assessment - Initial Assessment Questions  1. REASON FOR CALL or QUESTION: \"What is your reason for calling today?\" or \"How can I best  help you?\" or \"What question do you have that I can help answer?\"       nurse Cortney calling to let the doctor know they have admitted this pt. For 1 visit a week  2. CALLER: Document the source of call. (e.g., laboratory, patient).        nurse " Cortney, [facility]    Protocols used: PCP CALL - NO TRIAGE-ADULT-AH

## 2020-09-17 NOTE — PROCEDURES
NAN HUMMEL [facility] OF [facility] RAEANN [address], [address] [facility]                               PULMONARY FUNCTION    PATIENT NAME: Arjun Zurita                    :        1945  MED REC NO:   [id number]                              ROOM:       [facility]  ACCOUNT NO:   [de-identified]                           ADMIT DATE: 09/10/2020  PROVIDER:     Lexie Bucio MD    DATE OF PROCEDURE:  09/10/2020    FLOW VOLUME LOOP RESULT    FLOW VOLUME LOOP:  1. Spirometry shows moderate restrictive physiology. 2.  Peak expiratory flow is mildly reduced. 3.  Reproducibility criteria are not met, so results may not be valid. Interpretation assumes valid results.         Monica Nicole MD    D: 2020 14:07:32      T: 2020 14:23:25     [id number]  Job#: [id number]     Doc#: [id number]    CC:

## 2020-09-24 ENCOUNTER — TELEPHONE (OUTPATIENT)
Dept: CARDIOTHORACIC SURGERY | Age: 75
End: 2020-09-24

## 2020-09-24 NOTE — TELEPHONE ENCOUNTER
Pt.  called stating pt is having a lot of issue with acid reflux. She is not even wanting to eat because of it. They have tried protonix and it has not helped any. Spoke with Abner MONTGOMERY who is calling in [facility] for pt. Instructed  to let tablet dissolve in 5ml of water and drink which works better. If pt does not get relief from this Medardo said they will need to see PCP for possible referral to a gastro doctor.  voiced understanding.

## 2020-09-25 ENCOUNTER — TELEPHONE (OUTPATIENT)
Dept: CARDIOTHORACIC SURGERY | Age: 75
End: 2020-09-25

## 2020-09-25 NOTE — TELEPHONE ENCOUNTER
Received call from  stating they are having issues with bp being low still. PCP decreased benicar to 1xday but bp is still running 90/50. He reports pt fell about 1am .  He does not think she is injured, but she gets lightheaded and dizzy any time she gets up. Spoke with Tony MONTGOMERY   Orders called to [facility] for CBC, BMP, dc Benicar, change Toprol xl to one time a day at bed time. HH to remove staples, may take every other out this week and the rest next week if they are more comfortable with that or all today. They are also to check pt bp monitor with theres for accuracy. Spoke with Manisha Norris at [facility]. Called  back and also went over everything with him. He verbalized understanding.  Pt will not be coming to office today for staple removal.

## 2020-09-25 NOTE — TELEPHONE ENCOUNTER
[address] called stating she had removed staples and site was well approximated but was red and warm. Picture sent and given to [facility] she does not want to order ATB but states to continue to clean with soap and water and keep leg elevated when possible. If redness does not start to ease by Monday call office.

## 2020-09-26 ENCOUNTER — NURSE TRIAGE (OUTPATIENT)
Dept: CALL CENTER | Facility: HOSPITAL | Age: 75
End: 2020-09-26

## 2020-09-26 NOTE — TELEPHONE ENCOUNTER
Home care nurse, Kailyn, requested to speak with on call for Dr. Alfonso Patiño. The patient had CABG and her operative right leg is infected.  Vlad Jones was notified and he spoke with Kailyn and ordered doxycycline 100mg, #14, one po bid for 7 days.  Called to [facility] [address] to Tabby MANCERA and read back done.      Reason for Disposition  • [1] Caller has URGENT question AND [2] triager unable to answer question    Additional Information  • Negative: Sounds like a life-threatening emergency to the triager  • Negative: Chest pain  • Negative: Difficulty breathing  • Negative: Acting confused (e.g., disoriented, slurred speech) or excessively sleepy  • Negative: Surgical incision symptoms and questions  • Negative: [1] Discomfort (pain, burning or stinging) when passing urine AND [2] male  • Negative: [1] Discomfort (pain, burning or stinging) when passing urine AND [2] female  • Negative: Constipation  • Negative: New or worsening leg (calf, thigh) pain  • Negative: New or worsening leg swelling  • Negative: Dizziness is severe, or persists > 24 hours after surgery  • Negative: Pain, redness, swelling, or pus at IV Site  • Negative: Symptoms arising from use of a urinary catheter (Oquendo or Coude)  • Negative: Cast problems or questions  • Negative: Medication question  • Negative: [1] Widespread rash AND [2] bright red, sunburn-like  • Negative: [1] SEVERE headache AND [2] after spinal (epidural) anesthesia  • Negative: [1] Vomiting AND [2] persists > 4 hours  • Negative: [1] Vomiting AND [2] abdomen looks much more swollen than usual  • Negative: [1] Drinking very little AND [2] dehydration suspected (e.g., no urine > 12 hours, very dry mouth, very lightheaded)  • Negative: Patient sounds very sick or weak to the triager  • Negative: Sounds like a serious complication to the triager  • Negative: Fever > 100.4 F (38.0 C)  • Negative: [1] SEVERE post-op pain (e.g., excruciating, pain scale 8-10) AND [2] not  "controlled with pain medications    Answer Assessment - Initial Assessment Questions  1. SYMPTOM: \"What's the main symptom you're concerned about?\" (e.g., pain, fever, vomiting)      Infection to operative leg s/p CABG  2. ONSET: \"When did thius  start?\"       nurse made prn visit today   3. SURGERY: \"What surgery was performed?\"      CABG 9/10/20 @ [facility]  4. DATE of SURGERY: \"When was surgery performed?\"       9/11/20  5. ANESTHESIA: \" What type of anesthesia did you have?\" (e.g., general, spinal, epidural, local)      General  6. PAIN: \"Is there any pain?\" If so, ask: \"How bad is it?\"  (Scale 1-10; or mild, moderate, severe)      Area is warm, sore, pus drainage  7. FEVER: \"Do you have a fever?\" If so, ask: \"What is your temperature, how was it measured, and when did it start?\"      Afebrile  8. VOMITING: \"Is there any vomiting?\" If yes, ask: \"How many times?\"      NA  9. BLEEDING: \"Is there any bleeding?\" If so, ask: \"How much?\" and \"Where?\"      NA  10. OTHER SYMPTOMS: \"Do you have any other symptoms?\" (e.g., drainage from wound, painful urination, constipation)        HH nurse notes there is a pocket of infection.  Vlad Jones conference call done, he ordered doxycycline 100mg, #14, take one po bid for 7 days. VTO called to Milana mcelroy [facility] on [address]    Protocols used: POST-OP SYMPTOMS AND QUESTIONS-ADULT-AH      "

## 2020-10-09 ENCOUNTER — TELEPHONE (OUTPATIENT)
Dept: CARDIOTHORACIC SURGERY | Age: 75
End: 2020-10-09

## 2020-10-14 ENCOUNTER — OFFICE VISIT (OUTPATIENT)
Dept: CARDIOTHORACIC SURGERY | Age: 75
End: 2020-10-14

## 2020-10-14 VITALS
OXYGEN SATURATION: 96 % | SYSTOLIC BLOOD PRESSURE: 134 MMHG | RESPIRATION RATE: 18 BRPM | HEIGHT: 67 IN | WEIGHT: 181 LBS | BODY MASS INDEX: 28.41 KG/M2 | HEART RATE: 94 BPM | DIASTOLIC BLOOD PRESSURE: 64 MMHG

## 2020-10-14 PROCEDURE — 1111F DSCHRG MED/CURRENT MED MERGE: CPT | Performed by: THORACIC SURGERY (CARDIOTHORACIC VASCULAR SURGERY)

## 2020-10-14 PROCEDURE — 1036F TOBACCO NON-USER: CPT | Performed by: THORACIC SURGERY (CARDIOTHORACIC VASCULAR SURGERY)

## 2020-10-14 PROCEDURE — 99024 POSTOP FOLLOW-UP VISIT: CPT | Performed by: NURSE PRACTITIONER

## 2020-10-14 PROCEDURE — G8484 FLU IMMUNIZE NO ADMIN: HCPCS | Performed by: THORACIC SURGERY (CARDIOTHORACIC VASCULAR SURGERY)

## 2020-10-14 PROCEDURE — 1090F PRES/ABSN URINE INCON ASSESS: CPT | Performed by: THORACIC SURGERY (CARDIOTHORACIC VASCULAR SURGERY)

## 2020-10-14 PROCEDURE — 4040F PNEUMOC VAC/ADMIN/RCVD: CPT | Performed by: THORACIC SURGERY (CARDIOTHORACIC VASCULAR SURGERY)

## 2020-10-14 PROCEDURE — 1123F ACP DISCUSS/DSCN MKR DOCD: CPT | Performed by: THORACIC SURGERY (CARDIOTHORACIC VASCULAR SURGERY)

## 2020-10-14 PROCEDURE — G8417 CALC BMI ABV UP PARAM F/U: HCPCS | Performed by: THORACIC SURGERY (CARDIOTHORACIC VASCULAR SURGERY)

## 2020-10-14 PROCEDURE — G8400 PT W/DXA NO RESULTS DOC: HCPCS | Performed by: THORACIC SURGERY (CARDIOTHORACIC VASCULAR SURGERY)

## 2020-10-14 PROCEDURE — G8428 CUR MEDS NOT DOCUMENT: HCPCS | Performed by: THORACIC SURGERY (CARDIOTHORACIC VASCULAR SURGERY)

## 2020-10-14 RX ORDER — POTASSIUM BICARBONATE 25 MEQ/1
25 TABLET, EFFERVESCENT ORAL DAILY
COMMUNITY
End: 2021-01-27

## 2020-10-14 NOTE — PROGRESS NOTES
[address], [place] [address]  Phone: [phone]  Fax: [phone]      Office Visit:  10/14/20    Juanjo Singh - : 1945    Reason For Visit:  Laila Parker is a 66 y.o. female who is here for Post-Op Check (heart)      History of Present Illness:      I had the pleasure of seeing Juanjo Singh in the office today. As you know, she is a 66 y.o. female who presented on 09/10/2020 for elective cardiac catheterization for evaluation for progressive shortness of breath and lethargy. Catheterization demonstrated a left ventricle that showed normal function, ejection fraction was estimated at 70%. The left main artery was a rather short vessel, very difficult to see; there was actually almost separate ostia to the LAD and the circumflex systems. There was significant damping of the pressures when the cardiologist engaged the left main artery. The patient also developed chest pain on the cath table with this and required administration of nitroglycerin. The RCA was the dominant vessel with patent proximal stent followed by a 60% stenosis in the mid portion of the vessel. She was taken to the OR on 2020, underwent 2V PACABG, placing LIMA - LAD and SVG - OM. Ms. Caro Boas did well during the postoperative course. She suffered no major complications and was discharged home on 09/15/2020. Since her return home, she has continued to increase her ambulation as much as possible, now walking several times per day. Her appetite is improving and she otherwise has the normal amount of postoperative discomfort. ALLERGIES: Demerol; Reclast [zoledronic acid]; Ipratropium bromide hfa; Abatacept; Buspirone; Celexa [citalopram hydrobromide]; Colchicine; Dye [barium-containing compounds]; Dye [iodides]; Evolocumab; Fenofibrate; Hydrocodone-acetaminophen; Lasix [furosemide]; Levofloxacin;  Losartan; Methotrexate; Mirtazapine; Neurontin [gabapentin]; Nitroglycerin; Plaquenil [hydroxychloroquine sulfate]; Trazodone; Cozaar [losartan potassium]; Esomeprazole magnesium; Lisinopril; and Prilosec [omeprazole]    Current Outpatient Medications   Medication Sig Dispense Refill    potassium bicarbonate (K-LYTE) 25 MEQ disintegrating tablet Take 25 mEq by mouth daily      ferrous sulfate (STEVE-DMITRY) 325 (65 Fe) MG tablet Take 1 tablet by mouth daily 30 tablet 0    clopidogrel (PLAVIX) 75 MG tablet Take 1 tablet by mouth daily 30 tablet 1    bumetanide (BUMEX) 1 MG tablet Take 1 tablet by mouth daily as needed (swelling) 30 tablet 3    metoprolol succinate (TOPROL XL) 50 MG extended release tablet TAKE 1 TABLET TWICE DAILY 180 tablet 3    famotidine (PEPCID) 40 MG tablet Take 1 tablet by mouth daily (Patient taking differently: Take 40 mg by mouth as needed ) 30 tablet 11    Omega-3 Fatty Acids (FISH OIL) 1360 MG CAPS Take 1 capsule by mouth 2 times daily       pantoprazole (PROTONIX) 40 MG tablet Take 40 mg by mouth as needed       ondansetron (ZOFRAN ODT) 4 MG disintegrating tablet Take 1 tablet by mouth every 8 hours as needed for Nausea or Vomiting 15 tablet 0    calcium carbonate 600 MG TABS tablet Take 1 tablet by mouth 2 times daily      Evolocumab (REPATHA) 140 MG/ML SOSY Inject 140 mg into the skin every 14 days Patient has not started      vitamin D (CHOLECALCIFEROL) 1000 UNIT TABS tablet 1,000 Units 2 times daily Take 3 tablets daily       BENICAR HCT 40-25 MG per tablet Take 0.5 tablets by mouth 2 times daily Was holding due to low bp but now is taking randomly as bp is elevated. They are thinking needs daily again      allopurinol (ZYLOPRIM) 300 MG tablet Take 300 mg by mouth daily.  aspirin 81 MG EC tablet Take 81 mg by mouth daily.       loratadine (CLARITIN) 10 MG tablet Take 10 mg by mouth 2 times daily       Multiple Vitamin (MULTIVITAMIN PO) Take 1 tablet by mouth daily        No current facility-administered medications for this visit. She is to stop the Plavix after 60 days. Review of Systems:   General: Denies any fever or chills. Energy level and endurance are returning to normal.    Respiratory: Denies any cough or hoarseness. Denies any SOB or CAMPOVERDE. Cardiac: Denies any chest pain or pressure. Denies any palpitations. Denies any presyncope or syncope. Denies any orthopnea or PND. Physical Examination:   Vital signs: Blood pressure 134/64, pulse 94, resp. rate 18, height 5' 7\" (1.702 m), weight 181 lb (82.1 kg), SpO2 96 %, not currently breastfeeding. Lungs: Both lungs are clear with equal breath sounds. Cardiac: Demonstrates a regular rate and rhythm without murmurs, rubs, or gallops. Chest: The sternal incision has healed well, and the sternum appears to be stable. Lower Extremities: The right leg incision has healed well. Assessment: At this time, Ms. Jose Manuel Wynn is progressing very well following her 2V PACABG. Plan:   She may resume driving and may lift up to 15 pounds over the next 8 weeks, then resume normal activity. She is also advised to begin the cardiac rehab program as soon as possible. At this time, I will defer medical management to your discretion. Please call me if you have any questions regarding her care.

## 2020-10-16 ENCOUNTER — OFFICE VISIT (OUTPATIENT)
Dept: WOUND CARE | Facility: HOSPITAL | Age: 75
End: 2020-10-16

## 2020-10-16 PROCEDURE — G0463 HOSPITAL OUTPT CLINIC VISIT: HCPCS

## 2020-10-16 PROCEDURE — 99203 OFFICE O/P NEW LOW 30 MIN: CPT | Performed by: PODIATRIST

## 2020-10-22 ENCOUNTER — HOSPITAL ENCOUNTER (OUTPATIENT)
Dept: CARDIAC REHAB | Age: 75
Setting detail: THERAPIES SERIES
Discharge: HOME | End: 2020-10-22
Payer: MEDICARE

## 2020-10-23 ENCOUNTER — OFFICE VISIT (OUTPATIENT)
Dept: WOUND CARE | Facility: HOSPITAL | Age: 75
End: 2020-10-23

## 2020-10-23 ENCOUNTER — OFFICE VISIT (OUTPATIENT)
Dept: CARDIOLOGY | Age: 75
End: 2020-10-23
Payer: MEDICARE

## 2020-10-23 VITALS
SYSTOLIC BLOOD PRESSURE: 122 MMHG | DIASTOLIC BLOOD PRESSURE: 68 MMHG | HEART RATE: 84 BPM | BODY MASS INDEX: 29.19 KG/M2 | HEIGHT: 67 IN | WEIGHT: 186 LBS

## 2020-10-23 PROCEDURE — G8417 CALC BMI ABV UP PARAM F/U: HCPCS | Performed by: CLINICAL NURSE SPECIALIST

## 2020-10-23 PROCEDURE — 99212 OFFICE O/P EST SF 10 MIN: CPT | Performed by: PODIATRIST

## 2020-10-23 PROCEDURE — 4040F PNEUMOC VAC/ADMIN/RCVD: CPT | Performed by: CLINICAL NURSE SPECIALIST

## 2020-10-23 PROCEDURE — 1123F ACP DISCUSS/DSCN MKR DOCD: CPT | Performed by: CLINICAL NURSE SPECIALIST

## 2020-10-23 PROCEDURE — 1090F PRES/ABSN URINE INCON ASSESS: CPT | Performed by: CLINICAL NURSE SPECIALIST

## 2020-10-23 PROCEDURE — G8484 FLU IMMUNIZE NO ADMIN: HCPCS | Performed by: CLINICAL NURSE SPECIALIST

## 2020-10-23 PROCEDURE — G0463 HOSPITAL OUTPT CLINIC VISIT: HCPCS

## 2020-10-23 PROCEDURE — G8427 DOCREV CUR MEDS BY ELIG CLIN: HCPCS | Performed by: CLINICAL NURSE SPECIALIST

## 2020-10-23 PROCEDURE — 99214 OFFICE O/P EST MOD 30 MIN: CPT | Performed by: CLINICAL NURSE SPECIALIST

## 2020-10-23 PROCEDURE — G8400 PT W/DXA NO RESULTS DOC: HCPCS | Performed by: CLINICAL NURSE SPECIALIST

## 2020-10-23 PROCEDURE — 1036F TOBACCO NON-USER: CPT | Performed by: CLINICAL NURSE SPECIALIST

## 2020-10-23 RX ORDER — OLMESARTAN MEDOXOMIL AND HYDROCHLOROTHIAZIDE 40/25 40; 25 MG/1; MG/1
0.5 TABLET ORAL DAILY
Qty: 30 TABLET | Refills: 5 | Status: SHIPPED
Start: 2020-10-23 | End: 2022-04-12 | Stop reason: ALTCHOICE

## 2020-10-23 NOTE — PATIENT INSTRUCTIONS
POSTOPERATIVE CORONARY ARTERY BYPASS GRAFTING    ONE MONTH AND BEYOND, POST OP INSTRUCTIONS    We are pleased to provide you with this instruction sheet to help speed your recovery from your operation, and to provide ready support for any question that may arise. Activity  Walking is still the best way to regain your strength after your operation. A walking program was provided to you at your discharge from the hospital.  At this point you should be walking 20 minutes or greater twice a day unless otherwise instructed by your physician. You may walk outside, weather permitting, treadmills and stationary bikes are also permitted. Cardiac Rehab is recommended for anyone after having bypass surgery or a heart attack. Cardiac Rehab is a very important step in your recovery process and we highly recommend you attend. Please feel free to request a specific facility if you live outside the [facility] [place] and we will be happy to make arrangements for you to attend. · DO NOT lift anything greater than 15 pounds for the next 8 weeks. If you have pain in your sternum with lifting stop, you are probably doing too much. Slowly progress and listen to your body. · Sexual relations will not interfere with healing, again, avoid weight-bearing positions. · If you golf, chipping and putting are fine at this time, you may begin driving the ball in eight more weeks. · You may mow or ride a tractor if it requires less than 15 pounds of pull. · You may not drive a motorcycle for 8 more weeks. · You may drive a car, unless you are still requiring narcotics or otherwise directed by your surgeon or cardiologist  · When you are 12 weeks post op you may resume normal activity unless directed by your physician. Wound Care  At this time your incision should be well healed. You may begin using your choice of soap and lotions as you wish unless otherwise directed by your provider.     Tobacco Use  Continued tobacco use will significantly affect long-term results of your operation and cause advancement of your heart disease. Smoking also causes graft closure and clots in your legs. Diet  If you are diabetic or on a special diet you should continue. A cardiac diet pamphlet was provided for you and should be followed closely. Things to Expect After a Heart Operation  Many of the following may be resolving at this time, others will continue to improve over time. 1.  Surgical pain - it is uncommon for you to need pain meds one month after your operation. Please call our office if you feel your pain is excessive or not improving. 2.  Difficulty sleeping - it sometimes takes up to 3 months to return to a normal sleep - wake cycle. Taking naps during the day will help supplement this. 3.  Loss of appetite - Your appetite should be returning to normal, however, some patients report likes or dislikes may never be what they were prior to surgery. This is a very individual perception. 4.  Mild depression - Some patients report mild depression over the next several months. Please speak to your primary care physician if this continues or does not improve. Medications  Take 1/2 tablet of the Benicar  BP goal < 130/80 at rest   You may stop your Plavix after the 60 days   Please contact your Primary care provider for non cardiac medications. If your are now taking a cholesterol medication that you were not taking prior to surgery- we will need to check your cholesterol and liver enzymes after taking the medication for 2 mos. Please bring ALL medication bottles to every appointment. If you have more that one medical provider, medications can be changed and all providers need to know the medications you are taking  Please remember to be honest in reporting what you are actually taking so we can better care for you.               Please remember to bring all of your medications bottles to your appointment so

## 2020-10-23 NOTE — PROGRESS NOTES
[address] [place], [address] [address]  Phone: [phone]  Fax: [phone]    OFFICE VISIT:  10/23/2020    Fred Patterson - : 1945    Reason For Visit:  Pati Bueno is a 66 y.o. female who is here for Follow-up (HFU  patient has issues with flucuating BPs) and Coronary Artery Disease  History of coronary disease with stenting in  and cath in  revealing nonocclusive disease. Clinically positive dobutamine stress echo in August led to heart catheterization on 9/10/2020 that showed obstructive left main disease with intermediate RCA stenosis  2020, underwent 2V PACABG, placing LIMA - LAD and SVG - OM. She had uncomplicated postoperative course and has been referred to cardiac rehab. She returns today for follow-up accompanied with her . He reports a week or so after surgery she woke up in the middle the night and passed out and fell and hit her hip. Her blood pressure was in the 90s. He stopped giving her her blood pressure medication at that time  They checked it last night and her blood pressure was 158 so he gave her half a tablet of her Benicar    Otherwise she is slowly progressing well. Still has some soreness but most of the swelling has resolved and not needed her Bumex. She had consultation for cardiac rehab and will start on Monday. She is not taking any pain medicine    Subjective  Pati Bueno denies exertional chest pain, shortness of breath, orthopnea, paroxysmal nocturnal dyspnea, syncope, presyncope, arrhythmia, edema and fatigue. The patient denies numbness or weakness to suggest cerebrovascular accident or transient ischemic attack. Otilia Mccray MD is PCP and follows labs including lipids.   Fred Patterson has the following history as recorded in [facility]:    Patient Active Problem List    Diagnosis Date Noted    Bloating 2020    Myalgia due to statin 2019    Urinary tract infection with hematuria 2019    CHOLECYSTECTOMY  2012    [place]    COLONOSCOPY  1-    Dr LUJAN    COLONOSCOPY  05/06/2014    Dr. Wiliam Luna  2018    Dr. Jamila Ro in [place]- Polyps-benign,internal Hemorrhoids, per patient.  CORONARY ANGIOPLASTY WITH STENT PLACEMENT  2012    Dr LUJAN    CORONARY ARTERY BYPASS GRAFT N/A 9/11/2020    CORONARY ARTERY BYPASS GRAFT X2 WITH LEFT INTERNAL MAMMARY ARTERY WITH OPEN VEIN HARVESTING WITH PERFUSION TRANSESOPHAGEAL ECHOCARDIOGRAM performed by Laruie Gustafson MD at [address]     partial    KNEE SURGERY  2011    left knee surgery    PTCA      stent    TUBAL LIGATION      UPPER GASTROINTESTINAL ENDOSCOPY  2009?  UPPER GASTROINTESTINAL ENDOSCOPY  1-    Dr LUJAN    UPPER GASTROINTESTINAL ENDOSCOPY  05/06/2014    Dr. Lorin Gonzalez  2018    Dr. Jamila Ro in [facility]-Gastritis per patient.  VARICOSE VEIN SURGERY   01- [facility]    Left Leg Ablation    VARICOSE VEIN SURGERY  3-  [facility]    right leg ablation     Family History   Problem Relation Age of Onset    Diabetes Mother     Heart Disease Mother     Heart Disease Father     Diabetes Father     High Blood Pressure Father     Esophageal Cancer Father     Colon Polyps Father     Crohn's Disease Brother     Colon Cancer Neg Hx     Liver Cancer Neg Hx     Liver Disease Neg Hx     Rectal Cancer Neg Hx     Stomach Cancer Neg Hx      Social History     Tobacco Use    Smoking status: Never Smoker    Smokeless tobacco: Never Used   Substance Use Topics    Alcohol use: No      Current Outpatient Medications   Medication Sig Dispense Refill    olmesartan-hydroCHLOROthiazide (BENICAR HCT) 40-25 MG per tablet Take 0.5 tablets by mouth daily Was holding due to low bp but now is taking randomly as bp is elevated.  They are thinking needs daily again 30 tablet 5    potassium bicarbonate (K-LYTE) 25 [losartan potassium]; Esomeprazole magnesium; Lisinopril; and Prilosec [omeprazole]    Review of Systems  Constitutional - no significant activity change, appetite change, or unexpected weight change. No fever, chills or diaphoresis. No fatigue. HEENT - no significant rhinorrhea or epistaxis. No tinnitus or significant hearing loss. Eyes - no sudden vision change or amaurosis. Respiratory - no significant wheezing, stridor, apnea or cough. No dyspnea on exertion or shortness of breath. Cardiovascular - no exertional chest pain, orthopnea or PND. No sensation of arrhythmia or slow heart rate. No claudication or leg edema. Gastrointestinal - no abdominal swelling or pain. No blood in stool. No severe constipation, diarrhea, nausea, or vomiting. Genitourinary - no difficulty urinating, dysuria, frequency, or urgency. No flank pain or hematuria. Musculoskeletal - no back pain, gait disturbance, or myalgia. Has some chest soreness with movement skin - no color change or rash. No pallor. Sternotomy and left lower leg vein graft site. Neurologic - no speech difficulty, facial asymmetry or lateralizing weakness. No seizures, presyncope, syncope, or significant dizziness. Hematologic - no easy bruising or excessive bleeding. Psychiatric - no severe anxiety or insomnia. No confusion. All other review of systems are negative. Objective  Vital Signs - /68   Pulse 84   Ht 5' 7\" (1.702 m)   Wt 186 lb (84.4 kg)   BMI 29.13 kg/m²   General - Orlando Durán is alert, cooperative, and pleasant. Well groomed. No acute distress. Body habitus is overweight. HEENT - The head is normocephalic. No circumoral cyanosis. Dentition is normal.   EYES -  No Xanthelasma, no arcus senilis, no conjunctival hemorrhages or discharge. Neck - Supple, without increased jugular venous pressures. No carotid bruits. No mass. Respiratory - Lungs are clear bilaterally. No wheezes or rales.   Normal effort without use of accessory muscles. Cardiovascular - Heart has regular rhythm and rate. No murmurs, rubs or gallops. + pedal pulses and no varicosities. Abdominal -  Soft, nontender, nondistended. Bowel sounds are intact. Extremities - No clubbing, cyanosis, or  edema. Musculoskeletal -  No clubbing . No Osler's nodes. Gait normal .  No kyphosis or scoliosis. Skin -  no statis ulcers or dermatitis. Sternotomy and left lower leg vein graft site well-healed  Neurological - No focal signs are identified. Oriented to person, place and time. Psychiatric -  Appropriate affect and mood. Assessment:     Diagnosis Orders   1. Coronary artery disease involving native coronary artery of native heart without angina pectoris     2. S/P CABG x 2     3. Mixed hyperlipidemia     4. Essential hypertension       Data:  BP Readings from Last 3 Encounters:   10/23/20 122/68   10/14/20 134/64   09/15/20 127/61    Pulse Readings from Last 3 Encounters:   10/23/20 84   10/14/20 94   09/15/20 86        Wt Readings from Last 3 Encounters:   10/23/20 186 lb (84.4 kg)   10/14/20 181 lb (82.1 kg)   09/15/20 194 lb 8 oz (88.2 kg)     Patient reports some labile blood pressure but today controlled. Medical manage includes beta-blocker and ARB/HCTZ. She is on DAPT with aspirin and Plavix  May stop Plavix after 60 days  No statin but is on Repatha and fish oil    Progressing well from her surgery. Antihypertensive stop her  due to syncopal spell in the middle the night. Blood pressure is now running a little bit higher at home. We will have her restart her Benicar at half a tablet and have them monitor blood pressure. She will be starting cardiac rehab next week who will also help us monitor her blood pressure     States taking medications as prescribed  Stable cardiovascular status. No evidence of overt heart failure, angina or dysrhythmia.      Plan  Stop Plavix after 60 days  Continue cardiac rehab program that will help monitor blood pressure  Goal less than 130/80 at rest  Follow up in 3 months with Dr. Apple Hairston  Call with any questions or concerns  Follow up with Anthony Bell MD for non cardiac problems  Report any new problems  Cardiovascular Fitness-Exercise as tolerated. Strive for 30 minutes of exercise most days of the week. Cardiac / Healthy Diet  Continue current medications as directed  Continue plan of treatment  It is always recommended that you bring your medications bottles with you to each visit - this is for your safety! ULISES [place]    EMR dragon/transcription disclaimer: Much of this encounter note is electronic transcription/translation of spoken language to printed tach. Electronic translation of spoken language may be erroneous, or at times, nonsensical words or phrases may be inadvertently transcribed.  Although, I have reviewed the note for such errors, some may still exist.

## 2020-10-26 ENCOUNTER — HOSPITAL ENCOUNTER (OUTPATIENT)
Dept: CARDIAC REHAB | Age: 75
Setting detail: THERAPIES SERIES
Discharge: HOME | End: 2020-10-26
Payer: MEDICARE

## 2020-10-26 PROCEDURE — 93798 PHYS/QHP OP CAR RHAB W/ECG: CPT

## 2020-10-28 ENCOUNTER — HOSPITAL ENCOUNTER (OUTPATIENT)
Dept: CARDIAC REHAB | Age: 75
Setting detail: THERAPIES SERIES
Discharge: HOME | End: 2020-10-28
Payer: MEDICARE

## 2020-10-28 PROCEDURE — 93798 PHYS/QHP OP CAR RHAB W/ECG: CPT

## 2020-10-30 ENCOUNTER — HOSPITAL ENCOUNTER (OUTPATIENT)
Dept: CARDIAC REHAB | Age: 75
Setting detail: THERAPIES SERIES
Discharge: HOME | End: 2020-10-30
Payer: MEDICARE

## 2020-10-30 ENCOUNTER — OFFICE VISIT (OUTPATIENT)
Dept: WOUND CARE | Facility: HOSPITAL | Age: 75
End: 2020-10-30

## 2020-10-30 ENCOUNTER — LAB REQUISITION (OUTPATIENT)
Dept: LAB | Facility: HOSPITAL | Age: 75
End: 2020-10-30

## 2020-10-30 DIAGNOSIS — Z00.00 ENCOUNTER FOR GENERAL ADULT MEDICAL EXAMINATION WITHOUT ABNORMAL FINDINGS: ICD-10-CM

## 2020-10-30 PROCEDURE — 87186 SC STD MICRODIL/AGAR DIL: CPT | Performed by: PODIATRIST

## 2020-10-30 PROCEDURE — 87075 CULTR BACTERIA EXCEPT BLOOD: CPT | Performed by: PODIATRIST

## 2020-10-30 PROCEDURE — 87147 CULTURE TYPE IMMUNOLOGIC: CPT | Performed by: PODIATRIST

## 2020-10-30 PROCEDURE — 93798 PHYS/QHP OP CAR RHAB W/ECG: CPT

## 2020-10-30 PROCEDURE — 87070 CULTURE OTHR SPECIMN AEROBIC: CPT | Performed by: PODIATRIST

## 2020-10-30 PROCEDURE — 11042 DBRDMT SUBQ TIS 1ST 20SQCM/<: CPT | Performed by: PODIATRIST

## 2020-10-30 PROCEDURE — 87205 SMEAR GRAM STAIN: CPT | Performed by: PODIATRIST

## 2020-10-30 PROCEDURE — 87176 TISSUE HOMOGENIZATION CULTR: CPT | Performed by: PODIATRIST

## 2020-11-02 ENCOUNTER — HOSPITAL ENCOUNTER (OUTPATIENT)
Dept: CARDIAC REHAB | Age: 75
Setting detail: THERAPIES SERIES
Discharge: HOME | End: 2020-11-02
Payer: MEDICARE

## 2020-11-02 LAB
BACTERIA SPEC AEROBE CULT: ABNORMAL
GRAM STN SPEC: ABNORMAL
GRAM STN SPEC: ABNORMAL

## 2020-11-02 PROCEDURE — 93798 PHYS/QHP OP CAR RHAB W/ECG: CPT

## 2020-11-04 ENCOUNTER — HOSPITAL ENCOUNTER (OUTPATIENT)
Dept: CARDIAC REHAB | Age: 75
Setting detail: THERAPIES SERIES
Discharge: HOME | End: 2020-11-04
Payer: MEDICARE

## 2020-11-04 ENCOUNTER — TELEPHONE (OUTPATIENT)
Dept: CARDIOTHORACIC SURGERY | Age: 75
End: 2020-11-04

## 2020-11-04 LAB — BACTERIA SPEC ANAEROBE CULT: NORMAL

## 2020-11-04 PROCEDURE — 93798 PHYS/QHP OP CAR RHAB W/ECG: CPT

## 2020-11-04 NOTE — TELEPHONE ENCOUNTER
Pt.  has called and reported that pt is still having issues with the area on her leg where vein was harvested. He reports they went to wound care and they have cultured it and have ordered antibiotic for it and said there was no staph or MRSA in culture.  says they say her body doesn't like the stitches. He just wanted us to know. Will notify Dr Sultana Anna he would like her to have another office visit.

## 2020-11-04 NOTE — TELEPHONE ENCOUNTER
Received call from wound care stating they are seeing Denzel Kidd. No one referred pt but they can choose wound care on there own. They have seen pt 3 times already. Cultures are in Epic and can be pulled up per them. They will send over visit notes to Dr Jourdan Vicente. Pt seeing Dr Kinza Hogue this coming Friday.

## 2020-11-06 ENCOUNTER — HOSPITAL ENCOUNTER (OUTPATIENT)
Dept: CARDIAC REHAB | Age: 75
Setting detail: THERAPIES SERIES
Discharge: HOME | End: 2020-11-06
Payer: MEDICARE

## 2020-11-06 ENCOUNTER — OFFICE VISIT (OUTPATIENT)
Dept: WOUND CARE | Facility: HOSPITAL | Age: 75
End: 2020-11-06

## 2020-11-06 PROCEDURE — 11042 DBRDMT SUBQ TIS 1ST 20SQCM/<: CPT | Performed by: PODIATRIST

## 2020-11-06 PROCEDURE — 93798 PHYS/QHP OP CAR RHAB W/ECG: CPT

## 2020-11-09 ENCOUNTER — HOSPITAL ENCOUNTER (OUTPATIENT)
Dept: CARDIAC REHAB | Age: 75
Setting detail: THERAPIES SERIES
Discharge: HOME | End: 2020-11-09
Payer: MEDICARE

## 2020-11-09 ENCOUNTER — OFFICE VISIT (OUTPATIENT)
Dept: CARDIOTHORACIC SURGERY | Age: 75
End: 2020-11-09

## 2020-11-09 PROCEDURE — 99024 POSTOP FOLLOW-UP VISIT: CPT | Performed by: THORACIC SURGERY (CARDIOTHORACIC VASCULAR SURGERY)

## 2020-11-09 PROCEDURE — 93798 PHYS/QHP OP CAR RHAB W/ECG: CPT

## 2020-11-09 RX ORDER — CLINDAMYCIN HYDROCHLORIDE 300 MG/1
300 CAPSULE ORAL 3 TIMES DAILY
COMMUNITY
End: 2021-01-27 | Stop reason: ALTCHOICE

## 2020-11-09 NOTE — PROGRESS NOTES
The patient comes into the office today at my request.  She is a 64-year-old female who underwent bypass surgery 7 weeks ago. Her right lower leg incision was closed with skin staples. she returned to the office 1 month later she was seen by my nurse practitioner as I was unavailable when she came in for that visit. Apparently her leg wound was doing well at that time but over the next several weeks she developed some swelling and drainage from the leg incision after the staples were removed. She has been cared for by the wound care center as well as her primary care physician and she has undergone multiple rounds of antibiotics. The patient tells me that the wound care center \"dog\" into the lower portion of her leg incision and removed a suture. Because of conflicting reports on how her leg was doing and multiple other physicians and facilities involved in her care I wanted to see her in the office myself to make a determination how the leg incision was actually healing. On examination today the lower leg incision is healing well there is some chronic inflammation at the lower pole where the knot was tied to the suture was subsequently removed there is no evidence of infection this is simply some redness for foreign body reaction. Remainder the incision is healing well and there is no edema noted. The patient and  tell me that he looks much better now than he did a month ago. At this time I feel the patient does not require any further antibiotics just simply local wound care keeping it clean. It should heal fine over the next couple of weeks they are to call me if it does not. She is currently enrolled in the cardiac rehab program and is progressing nicely with exercises.   I will see her again as needed

## 2020-11-11 ENCOUNTER — HOSPITAL ENCOUNTER (OUTPATIENT)
Dept: CARDIAC REHAB | Age: 75
Setting detail: THERAPIES SERIES
Discharge: HOME | End: 2020-11-11
Payer: MEDICARE

## 2020-11-11 PROCEDURE — 93798 PHYS/QHP OP CAR RHAB W/ECG: CPT

## 2020-11-13 ENCOUNTER — HOSPITAL ENCOUNTER (OUTPATIENT)
Dept: CARDIAC REHAB | Age: 75
Setting detail: THERAPIES SERIES
Discharge: HOME | End: 2020-11-13
Payer: MEDICARE

## 2020-11-13 PROCEDURE — 93798 PHYS/QHP OP CAR RHAB W/ECG: CPT

## 2020-11-16 ENCOUNTER — HOSPITAL ENCOUNTER (OUTPATIENT)
Dept: CARDIAC REHAB | Age: 75
Setting detail: THERAPIES SERIES
Discharge: HOME | End: 2020-11-16
Payer: MEDICARE

## 2020-11-16 PROCEDURE — 93798 PHYS/QHP OP CAR RHAB W/ECG: CPT

## 2020-11-18 ENCOUNTER — HOSPITAL ENCOUNTER (OUTPATIENT)
Dept: CARDIAC REHAB | Age: 75
Setting detail: THERAPIES SERIES
Discharge: HOME | End: 2020-11-18
Payer: MEDICARE

## 2020-11-18 PROCEDURE — 93798 PHYS/QHP OP CAR RHAB W/ECG: CPT

## 2020-11-23 ENCOUNTER — HOSPITAL ENCOUNTER (OUTPATIENT)
Dept: CARDIAC REHAB | Age: 75
Setting detail: THERAPIES SERIES
Discharge: HOME | End: 2020-11-23
Payer: MEDICARE

## 2020-11-23 PROCEDURE — 93798 PHYS/QHP OP CAR RHAB W/ECG: CPT

## 2020-11-25 ENCOUNTER — HOSPITAL ENCOUNTER (OUTPATIENT)
Dept: CARDIAC REHAB | Age: 75
Setting detail: THERAPIES SERIES
Discharge: HOME | End: 2020-11-25
Payer: MEDICARE

## 2020-11-25 PROCEDURE — 93798 PHYS/QHP OP CAR RHAB W/ECG: CPT

## 2020-11-27 ENCOUNTER — APPOINTMENT (OUTPATIENT)
Dept: CARDIAC REHAB | Age: 75
End: 2020-11-27
Payer: MEDICARE

## 2020-11-30 ENCOUNTER — HOSPITAL ENCOUNTER (OUTPATIENT)
Dept: CARDIAC REHAB | Age: 75
Setting detail: THERAPIES SERIES
Discharge: HOME | End: 2020-11-30
Payer: MEDICARE

## 2020-11-30 PROCEDURE — 93798 PHYS/QHP OP CAR RHAB W/ECG: CPT

## 2020-12-02 ENCOUNTER — HOSPITAL ENCOUNTER (OUTPATIENT)
Dept: CARDIAC REHAB | Age: 75
Setting detail: THERAPIES SERIES
Discharge: HOME | End: 2020-12-02
Payer: MEDICARE

## 2020-12-02 PROCEDURE — 93798 PHYS/QHP OP CAR RHAB W/ECG: CPT

## 2020-12-07 ENCOUNTER — HOSPITAL ENCOUNTER (OUTPATIENT)
Dept: CARDIAC REHAB | Age: 75
Setting detail: THERAPIES SERIES
Discharge: HOME | End: 2020-12-07
Payer: MEDICARE

## 2020-12-07 PROCEDURE — 93798 PHYS/QHP OP CAR RHAB W/ECG: CPT

## 2020-12-09 ENCOUNTER — HOSPITAL ENCOUNTER (OUTPATIENT)
Dept: GENERAL RADIOLOGY | Facility: HOSPITAL | Age: 75
Discharge: HOME | End: 2020-12-09
Admitting: PHYSICIAN ASSISTANT

## 2020-12-09 ENCOUNTER — TRANSCRIBE ORDERS (OUTPATIENT)
Dept: ADMINISTRATIVE | Facility: HOSPITAL | Age: 75
End: 2020-12-09

## 2020-12-09 DIAGNOSIS — M54.2 CERVICALGIA: ICD-10-CM

## 2020-12-09 DIAGNOSIS — M25.511 RIGHT SHOULDER PAIN, UNSPECIFIED CHRONICITY: Primary | ICD-10-CM

## 2020-12-09 PROCEDURE — 73030 X-RAY EXAM OF SHOULDER: CPT

## 2020-12-09 PROCEDURE — 72040 X-RAY EXAM NECK SPINE 2-3 VW: CPT

## 2020-12-14 ENCOUNTER — HOSPITAL ENCOUNTER (OUTPATIENT)
Dept: CARDIAC REHAB | Age: 75
Setting detail: THERAPIES SERIES
Discharge: HOME | End: 2020-12-14
Payer: MEDICARE

## 2020-12-14 PROCEDURE — 93798 PHYS/QHP OP CAR RHAB W/ECG: CPT

## 2020-12-16 ENCOUNTER — HOSPITAL ENCOUNTER (OUTPATIENT)
Dept: CARDIAC REHAB | Age: 75
Setting detail: THERAPIES SERIES
Discharge: HOME | End: 2020-12-16
Payer: MEDICARE

## 2020-12-16 PROCEDURE — 93798 PHYS/QHP OP CAR RHAB W/ECG: CPT

## 2020-12-18 ENCOUNTER — HOSPITAL ENCOUNTER (OUTPATIENT)
Dept: CARDIAC REHAB | Age: 75
Setting detail: THERAPIES SERIES
Discharge: HOME | End: 2020-12-18
Payer: MEDICARE

## 2020-12-18 PROCEDURE — 93798 PHYS/QHP OP CAR RHAB W/ECG: CPT

## 2020-12-21 ENCOUNTER — HOSPITAL ENCOUNTER (OUTPATIENT)
Dept: CARDIAC REHAB | Age: 75
Setting detail: THERAPIES SERIES
Discharge: HOME | End: 2020-12-21
Payer: MEDICARE

## 2020-12-21 PROCEDURE — 93798 PHYS/QHP OP CAR RHAB W/ECG: CPT

## 2020-12-23 ENCOUNTER — HOSPITAL ENCOUNTER (OUTPATIENT)
Dept: CARDIAC REHAB | Age: 75
Setting detail: THERAPIES SERIES
Discharge: HOME | End: 2020-12-23
Payer: MEDICARE

## 2020-12-23 PROCEDURE — 93798 PHYS/QHP OP CAR RHAB W/ECG: CPT

## 2020-12-25 ENCOUNTER — APPOINTMENT (OUTPATIENT)
Dept: CARDIAC REHAB | Age: 75
End: 2020-12-25
Payer: MEDICARE

## 2020-12-28 ENCOUNTER — HOSPITAL ENCOUNTER (OUTPATIENT)
Dept: CARDIAC REHAB | Age: 75
Setting detail: THERAPIES SERIES
Discharge: HOME | End: 2020-12-28
Payer: MEDICARE

## 2020-12-28 PROCEDURE — 93798 PHYS/QHP OP CAR RHAB W/ECG: CPT

## 2020-12-30 ENCOUNTER — HOSPITAL ENCOUNTER (OUTPATIENT)
Dept: CARDIAC REHAB | Age: 75
Setting detail: THERAPIES SERIES
Discharge: HOME | End: 2020-12-30
Payer: MEDICARE

## 2020-12-30 PROCEDURE — 93798 PHYS/QHP OP CAR RHAB W/ECG: CPT

## 2021-01-01 ENCOUNTER — APPOINTMENT (OUTPATIENT)
Dept: CARDIAC REHAB | Age: 76
End: 2021-01-01
Payer: MEDICARE

## 2021-01-04 ENCOUNTER — HOSPITAL ENCOUNTER (OUTPATIENT)
Dept: CARDIAC REHAB | Age: 76
Setting detail: THERAPIES SERIES
Discharge: HOME | End: 2021-01-04
Payer: MEDICARE

## 2021-01-04 PROCEDURE — 93798 PHYS/QHP OP CAR RHAB W/ECG: CPT

## 2021-01-06 ENCOUNTER — HOSPITAL ENCOUNTER (OUTPATIENT)
Dept: CARDIAC REHAB | Age: 76
Setting detail: THERAPIES SERIES
Discharge: HOME | End: 2021-01-06
Payer: MEDICARE

## 2021-01-06 PROCEDURE — 93798 PHYS/QHP OP CAR RHAB W/ECG: CPT

## 2021-01-08 ENCOUNTER — HOSPITAL ENCOUNTER (OUTPATIENT)
Dept: CARDIAC REHAB | Age: 76
Setting detail: THERAPIES SERIES
Discharge: HOME | End: 2021-01-08
Payer: MEDICARE

## 2021-01-08 PROCEDURE — 93798 PHYS/QHP OP CAR RHAB W/ECG: CPT

## 2021-01-11 ENCOUNTER — HOSPITAL ENCOUNTER (OUTPATIENT)
Dept: CARDIAC REHAB | Age: 76
Setting detail: THERAPIES SERIES
Discharge: HOME | End: 2021-01-11
Payer: MEDICARE

## 2021-01-11 PROCEDURE — 93798 PHYS/QHP OP CAR RHAB W/ECG: CPT

## 2021-01-13 ENCOUNTER — HOSPITAL ENCOUNTER (OUTPATIENT)
Dept: CARDIAC REHAB | Age: 76
Setting detail: THERAPIES SERIES
Discharge: HOME | End: 2021-01-13
Payer: MEDICARE

## 2021-01-13 PROCEDURE — 93798 PHYS/QHP OP CAR RHAB W/ECG: CPT

## 2021-01-15 ENCOUNTER — HOSPITAL ENCOUNTER (OUTPATIENT)
Dept: CARDIAC REHAB | Age: 76
Setting detail: THERAPIES SERIES
Discharge: HOME | End: 2021-01-15
Payer: MEDICARE

## 2021-01-15 PROCEDURE — 93798 PHYS/QHP OP CAR RHAB W/ECG: CPT

## 2021-01-18 ENCOUNTER — HOSPITAL ENCOUNTER (OUTPATIENT)
Dept: CARDIAC REHAB | Age: 76
Setting detail: THERAPIES SERIES
Discharge: HOME | End: 2021-01-18
Payer: MEDICARE

## 2021-01-18 PROCEDURE — 93798 PHYS/QHP OP CAR RHAB W/ECG: CPT

## 2021-01-22 ENCOUNTER — HOSPITAL ENCOUNTER (OUTPATIENT)
Dept: CARDIAC REHAB | Age: 76
Setting detail: THERAPIES SERIES
Discharge: HOME | End: 2021-01-22
Payer: MEDICARE

## 2021-01-22 PROCEDURE — 93798 PHYS/QHP OP CAR RHAB W/ECG: CPT

## 2021-01-25 ENCOUNTER — HOSPITAL ENCOUNTER (OUTPATIENT)
Dept: CARDIAC REHAB | Age: 76
Setting detail: THERAPIES SERIES
Discharge: HOME | End: 2021-01-25
Payer: MEDICARE

## 2021-01-25 PROCEDURE — 93798 PHYS/QHP OP CAR RHAB W/ECG: CPT

## 2021-01-27 ENCOUNTER — HOSPITAL ENCOUNTER (OUTPATIENT)
Dept: CARDIAC REHAB | Age: 76
Setting detail: THERAPIES SERIES
Discharge: HOME | End: 2021-01-27
Payer: MEDICARE

## 2021-01-27 ENCOUNTER — OFFICE VISIT (OUTPATIENT)
Dept: CARDIOLOGY CLINIC | Age: 76
End: 2021-01-27
Payer: MEDICARE

## 2021-01-27 VITALS
SYSTOLIC BLOOD PRESSURE: 124 MMHG | HEIGHT: 67 IN | WEIGHT: 184 LBS | HEART RATE: 80 BPM | DIASTOLIC BLOOD PRESSURE: 70 MMHG | BODY MASS INDEX: 28.88 KG/M2

## 2021-01-27 DIAGNOSIS — I10 ESSENTIAL HYPERTENSION: ICD-10-CM

## 2021-01-27 DIAGNOSIS — Z95.1 S/P CABG X 2: ICD-10-CM

## 2021-01-27 DIAGNOSIS — I25.10 CORONARY ARTERY DISEASE INVOLVING NATIVE CORONARY ARTERY OF NATIVE HEART WITHOUT ANGINA PECTORIS: Primary | ICD-10-CM

## 2021-01-27 DIAGNOSIS — E78.2 MIXED HYPERLIPIDEMIA: ICD-10-CM

## 2021-01-27 PROCEDURE — 4040F PNEUMOC VAC/ADMIN/RCVD: CPT | Performed by: CLINICAL NURSE SPECIALIST

## 2021-01-27 PROCEDURE — 1123F ACP DISCUSS/DSCN MKR DOCD: CPT | Performed by: CLINICAL NURSE SPECIALIST

## 2021-01-27 PROCEDURE — 99214 OFFICE O/P EST MOD 30 MIN: CPT | Performed by: CLINICAL NURSE SPECIALIST

## 2021-01-27 PROCEDURE — G8417 CALC BMI ABV UP PARAM F/U: HCPCS | Performed by: CLINICAL NURSE SPECIALIST

## 2021-01-27 PROCEDURE — 93798 PHYS/QHP OP CAR RHAB W/ECG: CPT

## 2021-01-27 PROCEDURE — 1036F TOBACCO NON-USER: CPT | Performed by: CLINICAL NURSE SPECIALIST

## 2021-01-27 PROCEDURE — G8400 PT W/DXA NO RESULTS DOC: HCPCS | Performed by: CLINICAL NURSE SPECIALIST

## 2021-01-27 PROCEDURE — G8484 FLU IMMUNIZE NO ADMIN: HCPCS | Performed by: CLINICAL NURSE SPECIALIST

## 2021-01-27 PROCEDURE — G8427 DOCREV CUR MEDS BY ELIG CLIN: HCPCS | Performed by: CLINICAL NURSE SPECIALIST

## 2021-01-27 PROCEDURE — 1090F PRES/ABSN URINE INCON ASSESS: CPT | Performed by: CLINICAL NURSE SPECIALIST

## 2021-01-27 NOTE — PATIENT INSTRUCTIONS
Follow up in 6 mos With Dr. Derek Doll   Call with any questions or concerns  Follow up with Yasmin Lundberg MD for non cardiac problems  Report any new problems  Cardiovascular Fitness-Exercise as tolerated. Strive for 30 minutes of exercise most days of the week. Cardiac / Healthy Diet  Continue current medications as directed  Continue plan of treatment  It is always recommended that you bring your medications bottles with you to each visit - this is for your safety!

## 2021-01-27 NOTE — PROGRESS NOTES
[address] [place], [address] [address]  Phone: [phone]  Fax: [phone]    OFFICE VISIT:  2021    Dariel Perry - : 1945    Reason For Visit:  Rosalva Matthew is a 66 y.o. female who is here for 3 Month Follow-Up (Patient states when she lies on her left side she can feel her heart beating. Could be positional.) and Coronary Artery Disease  History of coronary disease with stenting in  and cath in  revealing nonocclusive disease. Clinically positive dobutamine stress echo in August led to heart catheterization on 9/10/2020 that showed obstructive left main disease with intermediate RCA stenosis  2020, underwent 2V PACABG, placing LIMA - LAD and SVG - OM.   She has been participating in cardiac rehab. She returns today for routine follow-up. She states she is feeling quite well. She hopes to go to the gym regularly after she finishes her cardiac rehab. She states that has helped her tremendously. Blood pressure has been well controlled. She is not needed any diuretic. Notices some positional chest discomfort related to surgery. Can feel her heart more when she lays on her left side. Jose Martin Horowitz denies exertional chest pain, shortness of breath, orthopnea, paroxysmal nocturnal dyspnea, syncope, presyncope, arrhythmia, edema and fatigue. The patient denies numbness or weakness to suggest cerebrovascular accident or transient ischemic attack.     Lynda Figueredo MD is PCP and follows labs including lipids- has apt in Feb. .  Dariel Perry has the following history as recorded in [facility]:    Patient Active Problem List    Diagnosis Date Noted    Bloating 2020    Myalgia due to statin 2019    Urinary tract infection with hematuria 2019    Overactive bladder 2017    Mixed hyperlipidemia 2017    H/O right coronary artery stent placement     Periumbilical abdominal pain 2016  Irritable bowel syndrome with diarrhea 06/10/2016    Internal hemorrhoids 06/10/2016    Allergic reaction 2015    Precordial pain 2015    Gout 2015    Bleeding internal hemorrhoids 07/15/2014    Gastroesophageal reflux disease without esophagitis 2014    Internal hemorrhoids without complication     Spider veins 2013    Carotid artery stenosis 2012    Leg pain 2012    Leg swelling 2012    Chronic venous insufficiency 2012    Varicose veins 2012    Coronary artery disease involving native coronary artery of native heart with angina pectoris with documented spasm (HCC)     Essential hypertension      Past Medical History:   Diagnosis Date    Acid reflux     Anxiety     Arthritis     Back pain     CAD (coronary artery disease)     CAD (coronary artery disease)     Carotid artery occlusion     Chronic venous insufficiency 2012    Cough     Fibromyalgia     Goiter     Gout     Head ache     headaches    Heart murmur     History of colon polyps     History of colon polyps     Hoarseness     Hypercholesterolemia     Hyperlipidemia     Hypertension     Insomnia     Irregular heart beat     Itching     Muscle cramp     Neck pain     Osteoarthritis     Osteoporosis     Palpitations     RA (rheumatoid arthritis) (HCC)     Rash     Rectal bleeding     SOB (shortness of breath)     Sore throat     Tympanic membrane perforation     Varicose veins 2012     Past Surgical History:   Procedure Laterality Date    APPENDECTOMY      BREAST SURGERY  2002    CARDIAC CATHETERIZATION  9/10/15  JDT    EF 50%    CARDIAC CATHETERIZATION  09/10/2020    LM disease- sent for bypass     CERVICAL FUSION  1999     SECTION  1968    x 1    CHOLECYSTECTOMY  2012    [place]    COLONOSCOPY  1-    Dr LUJAN    COLONOSCOPY  2014    Dr. Katia Saldana  2018 Dr. Delicia Marte in [place]- Polyps-benign,internal Hemorrhoids, per patient.  CORONARY ANGIOPLASTY WITH STENT PLACEMENT  2012    Dr LUJAN    CORONARY ARTERY BYPASS GRAFT N/A 9/11/2020    CORONARY ARTERY BYPASS GRAFT X2 WITH LEFT INTERNAL MAMMARY ARTERY WITH OPEN VEIN HARVESTING WITH PERFUSION TRANSESOPHAGEAL ECHOCARDIOGRAM performed by Jamshid Garcia MD at [address]    partial    KNEE SURGERY  2011    left knee surgery    PTCA      stent    TUBAL LIGATION      UPPER GASTROINTESTINAL ENDOSCOPY  2009?  UPPER GASTROINTESTINAL ENDOSCOPY  1-    Dr LUJAN    UPPER GASTROINTESTINAL ENDOSCOPY  05/06/2014    Dr. Rigoberto Zeng  2018    Dr. Delicia Marte in [facility]-Gastritis per patient.  VARICOSE VEIN SURGERY   01- [facility]    Left Leg Ablation    VARICOSE VEIN SURGERY  3-  [facility]    right leg ablation     Family History   Problem Relation Age of Onset    Diabetes Mother     Heart Disease Mother     Heart Disease Father     Diabetes Father     High Blood Pressure Father     Esophageal Cancer Father     Colon Polyps Father     Crohn's Disease Brother     Colon Cancer Neg Hx     Liver Cancer Neg Hx     Liver Disease Neg Hx     Rectal Cancer Neg Hx     Stomach Cancer Neg Hx      Social History     Tobacco Use    Smoking status: Never Smoker    Smokeless tobacco: Never Used   Substance Use Topics    Alcohol use: No      Current Outpatient Medications   Medication Sig Dispense Refill    olmesartan-hydroCHLOROthiazide (BENICAR HCT) 40-25 MG per tablet Take 0.5 tablets by mouth daily Was holding due to low bp but now is taking randomly as bp is elevated. They are thinking needs daily again (Patient taking differently: Take 0.5 tablets by mouth nightly Was holding due to low bp but now is taking randomly as bp is elevated.  They are thinking needs daily again) 30 tablet 5 Cardiovascular  no exertional chest pain, orthopnea or PND. No sensation of arrhythmia or slow heart rate. No claudication or leg edema. Gastrointestinal  no abdominal swelling or pain. No blood in stool. No severe constipation, diarrhea, nausea, or vomiting. Genitourinary  no difficulty urinating, dysuria, frequency, or urgency. No flank pain or hematuria. Musculoskeletal  no back pain, gait disturbance, or myalgia. Skin  no color change or rash. No pallor. No new surgical incision. Neurologic  no speech difficulty, facial asymmetry or lateralizing weakness. No seizures, presyncope, syncope, or significant dizziness. Hematologic  no easy bruising or excessive bleeding. Psychiatric  no severe anxiety or insomnia. No confusion. All other review of systems are negative. Objective  Vital Signs - /70   Pulse 80   Ht 5' 7\" (1.702 m)   Wt 184 lb (83.5 kg)   BMI 28.82 kg/m²   General - Jane Watson is alert, cooperative, and pleasant. Well groomed. No acute distress. Body habitus is overweight. HEENT  The head is normocephalic. No circumoral cyanosis. Dentition is normal.   EYES -  No Xanthelasma, no arcus senilis, no conjunctival hemorrhages or discharge. Neck - Supple, without increased jugular venous pressures. No carotid bruits. No mass. Respiratory - Lungs are clear bilaterally. No wheezes or rales. Normal effort without use of accessory muscles. Cardiovascular  Heart has regular rhythm and rate. No murmurs, rubs or gallops. + pedal pulses and no varicosities. Abdominal -  Soft, nontender, nondistended. Bowel sounds are intact. Extremities - No clubbing, cyanosis, or  edema. Musculoskeletal -  No clubbing . No Osler's nodes. Gait normal .  No kyphosis or scoliosis. Skin -  no statis ulcers or dermatitis. Neurological - No focal signs are identified. Oriented to person, place and time. Psychiatric -  Appropriate affect and mood. Assessment:     Diagnosis Orders   1. Coronary artery disease involving native coronary artery of native heart without angina pectoris     2. S/P CABG x 2     3. Essential hypertension     4. Mixed hyperlipidemia       Data:  BP Readings from Last 3 Encounters:   01/27/21 124/70   10/23/20 122/68   10/14/20 134/64    Pulse Readings from Last 3 Encounters:   01/27/21 80   10/23/20 84   10/14/20 94        Wt Readings from Last 3 Encounters:   01/27/21 184 lb (83.5 kg)   10/23/20 186 lb (84.4 kg)   10/14/20 181 lb (82.1 kg)     Aggressing well from past surgery. Participating in cardiac rehab and doing well. Blood pressure and heart rate controlled. Medical management includes beta-blocker, ARB  Low-dose aspirin and Repatha for her cholesterol    Patient has progressed quite well and feeling much better after almost completing her cardiac rehab program.  She wishes to go to the gym after she finishes the program to continue her exercise. Will send note to Dr Franco Smallwood     States taking medications as prescribed  Stable cardiovascular status. No evidence of overt heart failure, angina or dysrhythmia. Plan    Follow up in 6 mos With Dr. Eleazar Scott   Call with any questions or concerns  Follow up with Oleg Malone MD for non cardiac problems  Report any new problems  Cardiovascular Fitness-Exercise as tolerated. Strive for 30 minutes of exercise most days of the week. Cardiac / Healthy Diet  Continue current medications as directed  Continue plan of treatment  It is always recommended that you bring your medications bottles with you to each visit - this is for your safety!        ULISES Roa ROBERTA dragon/transcription disclaimer: Much of this encounter note is electronic transcription/translation of spoken language to printed tach. Electronic translation of spoken language may be erroneous, or at times, nonsensical words or phrases may be inadvertently transcribed.  Although, I have reviewed the note for such errors, some may still exist.

## 2021-01-29 ENCOUNTER — HOSPITAL ENCOUNTER (OUTPATIENT)
Dept: CARDIAC REHAB | Age: 76
Setting detail: THERAPIES SERIES
Discharge: HOME | End: 2021-01-29
Payer: MEDICARE

## 2021-01-29 PROCEDURE — 93798 PHYS/QHP OP CAR RHAB W/ECG: CPT

## 2021-02-01 ENCOUNTER — HOSPITAL ENCOUNTER (OUTPATIENT)
Dept: CARDIAC REHAB | Age: 76
Setting detail: THERAPIES SERIES
Discharge: HOME | End: 2021-02-01
Payer: MEDICARE

## 2021-02-01 PROCEDURE — 93798 PHYS/QHP OP CAR RHAB W/ECG: CPT

## 2021-02-08 RX ORDER — METOPROLOL SUCCINATE 50 MG/1
TABLET, EXTENDED RELEASE ORAL
Qty: 180 TABLET | Refills: 3 | Status: SHIPPED | OUTPATIENT
Start: 2021-02-08 | End: 2021-11-15

## 2021-02-24 ENCOUNTER — TELEPHONE (OUTPATIENT)
Dept: VASCULAR SURGERY | Age: 76
End: 2021-02-24

## 2021-02-24 ENCOUNTER — NURSE TRIAGE (OUTPATIENT)
Dept: OTHER | Facility: CLINIC | Age: 76
End: 2021-02-24

## 2021-02-24 ENCOUNTER — TELEPHONE (OUTPATIENT)
Dept: CARDIOLOGY CLINIC | Age: 76
End: 2021-02-24

## 2021-02-24 NOTE — TELEPHONE ENCOUNTER
Patient wants to know if she can go back on tumeric to help with arthritis pain. She was on it before her open heart sx 5 months ago.

## 2021-02-24 NOTE — TELEPHONE ENCOUNTER
[facility] [address] [facility]  with red flag complaint. Brief description of triage: see below    Triage indicates for patient to be seen today in office. Pt's  stated they wouldn't be able to go until tomorrow. Instructed patient's  to bring her to [facility] or ED if necessary or to call back if worse if unable to get an appointment. Care advice provided, patient verbalizes understanding; denies any other questions or concerns; instructed to call back for any new or worsening symptoms. Writer provided warm transfer to [facility] for appointment scheduling. Attention Provider: Thank you for allowing me to participate in the care of your patient. The patient was connected to triage in response to information provided to the ECC. Please do not respond through this encounter as the response is not directed to a shared pool. Reason for Disposition   Patient wants to be seen    Answer Assessment - Initial Assessment Questions  1. ONSET: \"When did the swelling start? \" (e.g., minutes, hours, days)      Pt's  stated this has been going on for weeks    2. LOCATION: \"What part of the leg is swollen? \"  \"Are both legs swollen or just one leg? \"      Both legs have \"bulging veins\" and swelling bilaterally with pain. 3. SEVERITY: \"How bad is the swelling? \" (e.g., localized; mild, moderate, severe)   - Localized - small area of swelling localized to one leg   - MILD pedal edema - swelling limited to foot and ankle, pitting edema < 1/4 inch (6 mm) deep, rest and elevation eliminate most or all swelling   - MODERATE edema - swelling of lower leg to knee, pitting edema > 1/4 inch (6 mm) deep, rest and elevation only partially reduce swelling   - SEVERE edema - swelling extends above knee, facial or hand swelling present       Moderate - from knees to feet    4. REDNESS: \"Does the swelling look red or infected? \"      Pt's  stated redness around the bulging veins sometimes \"from time to time, not always\". Stated the surgeons harvested a vein out of her right leg to do her open heart surgery per . 5. PAIN: \"Is the swelling painful to touch? \" If so, ask: \"How painful is it? \"   (Scale 1-10; mild, moderate or severe)      Worse symptom is the pain. Per pt's  painful to touch. 6. FEVER: \"Do you have a fever? \" If so, ask: \"What is it, how was it measured, and when did it start? \"       Denies    7. CAUSE: \"What do you think is causing the leg swelling? \"      Unknown. 8. MEDICAL HISTORY: \"Do you have a history of heart failure, kidney disease, liver failure, or cancer? \"      Pt had open heart surgery a couple weeks ago    9. RECURRENT SYMPTOM: \"Have you had leg swelling before? \" If so, ask: \"When was the last time? \" \"What happened that time? \"      Pt's  states pt has had swelling from time to time, but attributed it to arthritis. Stated the pain is different from arthritis pain. 10. OTHER SYMPTOMS: \"Do you have any other symptoms? \" (e.g., chest pain, difficulty breathing)        Pt's  denies. 11. PREGNANCY: \"Is there any chance you are pregnant? \" \"When was your last menstrual period? \"       N/A    Protocols used: LEG SWELLING AND EDEMA-ADULT-[place]

## 2021-02-25 ENCOUNTER — VIRTUAL VISIT (OUTPATIENT)
Dept: VASCULAR SURGERY | Age: 76
End: 2021-02-25
Payer: MEDICARE

## 2021-02-25 DIAGNOSIS — M79.605 LEG PAIN, LEFT: Primary | ICD-10-CM

## 2021-02-25 DIAGNOSIS — M79.604 LEG PAIN, RIGHT: ICD-10-CM

## 2021-02-25 DIAGNOSIS — M79.89 LEG SWELLING: ICD-10-CM

## 2021-02-25 PROCEDURE — 1090F PRES/ABSN URINE INCON ASSESS: CPT | Performed by: NURSE PRACTITIONER

## 2021-02-25 PROCEDURE — 1123F ACP DISCUSS/DSCN MKR DOCD: CPT | Performed by: NURSE PRACTITIONER

## 2021-02-25 PROCEDURE — G8400 PT W/DXA NO RESULTS DOC: HCPCS | Performed by: NURSE PRACTITIONER

## 2021-02-25 PROCEDURE — G8484 FLU IMMUNIZE NO ADMIN: HCPCS | Performed by: NURSE PRACTITIONER

## 2021-02-25 PROCEDURE — G8427 DOCREV CUR MEDS BY ELIG CLIN: HCPCS | Performed by: NURSE PRACTITIONER

## 2021-02-25 PROCEDURE — 1036F TOBACCO NON-USER: CPT | Performed by: NURSE PRACTITIONER

## 2021-02-25 PROCEDURE — 4040F PNEUMOC VAC/ADMIN/RCVD: CPT | Performed by: NURSE PRACTITIONER

## 2021-02-25 PROCEDURE — G8417 CALC BMI ABV UP PARAM F/U: HCPCS | Performed by: NURSE PRACTITIONER

## 2021-02-25 PROCEDURE — 99212 OFFICE O/P EST SF 10 MIN: CPT | Performed by: NURSE PRACTITIONER

## 2021-02-25 NOTE — PROGRESS NOTES
Solitario Chu (:  1945) is a 66 y.o. female,Established patient, here for evaluation of the following chief complaint(s): Follow-up          SUBJECTIVE/OBJECTIVE:    She presents for evaluation of suspected of chronic venous insufficiency. She reports prominent veins on the  Bilateral leg(s), spider veins on the  Bilateral leg(s), lower extremity edema on the  Bilateral leg(s), the veins are painful -- severity:  moderate and pain is aggravated by upright posture. She reports previous treatment includes support hose, but they are about 1years old. She does not have a history of spontaneous rupture. Differential diagnosis for leg pain includes but is not limited to: varicose veins, peripheral vascular disease, arthritic pain, DVT, lumbar disc disease, and neurogenic pain.       Solitario Chu is a 66 y.o. female with the following history as recorded in [facility]:  Patient Active Problem List    Diagnosis Date Noted    Bloating 2020    Myalgia due to statin 2019    Urinary tract infection with hematuria 2019    Overactive bladder 2017    Mixed hyperlipidemia 2017    H/O right coronary artery stent placement     Periumbilical abdominal pain 2016    Irritable bowel syndrome with diarrhea 06/10/2016    Internal hemorrhoids 06/10/2016    Allergic reaction 2015    Precordial pain 2015    Gout 2015    Bleeding internal hemorrhoids 07/15/2014    Gastroesophageal reflux disease without esophagitis 2014    Internal hemorrhoids without complication     Spider veins 2013    Carotid artery stenosis 2012    Leg pain 2012    Leg swelling 2012    Chronic venous insufficiency 2012    Varicose veins 2012    Coronary artery disease involving native coronary artery of native heart with angina pectoris with documented spasm ([address]     Essential hypertension Current Outpatient Medications   Medication Sig Dispense Refill    metoprolol succinate (TOPROL XL) 50 MG extended release tablet TAKE 1 TABLET TWICE DAILY 180 tablet 3    olmesartan-hydroCHLOROthiazide (BENICAR HCT) 40-25 MG per tablet Take 0.5 tablets by mouth daily Was holding due to low bp but now is taking randomly as bp is elevated. They are thinking needs daily again (Patient taking differently: Take 0.5 tablets by mouth nightly Was holding due to low bp but now is taking randomly as bp is elevated. They are thinking needs daily again) 30 tablet 5    bumetanide (BUMEX) 1 MG tablet Take 1 tablet by mouth daily as needed (swelling) 30 tablet 3    Omega-3 Fatty Acids (FISH OIL) 1360 MG CAPS Take 1 capsule by mouth 2 times daily       ondansetron (ZOFRAN ODT) 4 MG disintegrating tablet Take 1 tablet by mouth every 8 hours as needed for Nausea or Vomiting 15 tablet 0    Evolocumab (REPATHA) 140 MG/ML SOSY Inject 140 mg into the skin every 14 days Patient has not started      vitamin D (CHOLECALCIFEROL) 1000 UNIT TABS tablet 1,000 Units 2 times daily Take 3 tablets daily       allopurinol (ZYLOPRIM) 300 MG tablet Take 300 mg by mouth daily.  aspirin 81 MG EC tablet Take 81 mg by mouth daily.  loratadine (CLARITIN) 10 MG tablet Take 10 mg by mouth 2 times daily       Multiple Vitamin (MULTIVITAMIN PO) Take 1 tablet by mouth daily        No current facility-administered medications for this visit.       Allergies: Demerol, Reclast [zoledronic acid], Ipratropium bromide hfa, Abatacept, Buspirone, Celexa [citalopram hydrobromide], Colchicine, Dye [barium-containing compounds], Dye [iodides], Evolocumab, Fenofibrate, Hydrocodone-acetaminophen, Lasix [furosemide], Levofloxacin, Losartan, Methotrexate, Mirtazapine, Neurontin [gabapentin], Nitroglycerin, Plaquenil [hydroxychloroquine sulfate], Trazodone, Cozaar [losartan potassium], Esomeprazole magnesium, Lisinopril, and Prilosec [omeprazole] Past Medical History:   Diagnosis Date    Acid reflux     Anxiety     Arthritis     Back pain     CAD (coronary artery disease)     CAD (coronary artery disease)     Carotid artery occlusion     Chronic venous insufficiency 2012    Cough     Fibromyalgia     Goiter     Gout     Head ache     headaches    Heart murmur     History of colon polyps     History of colon polyps     Hoarseness     Hypercholesterolemia     Hyperlipidemia     Hypertension     Insomnia     Irregular heart beat     Itching     Muscle cramp     Neck pain     Osteoarthritis     Osteoporosis     Palpitations     RA (rheumatoid arthritis) (HCC)     Rash     Rectal bleeding     SOB (shortness of breath)     Sore throat     Tympanic membrane perforation     Varicose veins 2012     Past Surgical History:   Procedure Laterality Date    APPENDECTOMY      BREAST SURGERY      CARDIAC CATHETERIZATION  9/10/15  JDT    EF 50%    CARDIAC CATHETERIZATION  09/10/2020    LM disease- sent for bypass     CERVICAL FUSION  1999     SECTION  1968    x 1    CHOLECYSTECTOMY  2012    [place]    COLONOSCOPY  1-    Dr LUJAN    COLONOSCOPY  2014    Dr. Wendy Tinajero  2018    Dr. Sonja Lowery in [place]- Polyps-benign,internal Hemorrhoids, per patient.  CORONARY ANGIOPLASTY WITH STENT PLACEMENT      Dr LUJAN    CORONARY ARTERY BYPASS GRAFT N/A 2020    CORONARY ARTERY BYPASS GRAFT X2 WITH LEFT INTERNAL MAMMARY ARTERY WITH OPEN VEIN HARVESTING WITH PERFUSION TRANSESOPHAGEAL ECHOCARDIOGRAM performed by Lindy Madrid MD at [address] [facility]""    partial    KNEE SURGERY      left knee surgery    PTCA      stent    TUBAL LIGATION      UPPER GASTROINTESTINAL ENDOSCOPY  ?     UPPER GASTROINTESTINAL ENDOSCOPY  1-    Dr LUJAN    UPPER GASTROINTESTINAL ENDOSCOPY  2014 Dr. Ziat Hernandez  2018    Dr. Ziyad Khoury in [facility]-Gastritis per patient.  VARICOSE VEIN SURGERY   01- [facility]    Left Leg Ablation    VARICOSE VEIN SURGERY  3-  [facility]    right leg ablation     Family History   Problem Relation Age of Onset    Diabetes Mother     Heart Disease Mother     Heart Disease Father     Diabetes Father     High Blood Pressure Father     Esophageal Cancer Father     Colon Polyps Father     Crohn's Disease Brother     Colon Cancer Neg Hx     Liver Cancer Neg Hx     Liver Disease Neg Hx     Rectal Cancer Neg Hx     Stomach Cancer Neg Hx      Social History     Tobacco Use    Smoking status: Never Smoker    Smokeless tobacco: Never Used   Substance Use Topics    Alcohol use: No       ROS  Eyes  no sudden vision change or amaurosis. Respiratory  no significant shortness of breath,  Cardiovascular  no chest pain or syncope. No  significant leg swelling. No claudication. Musculoskeletal  no gait disturbance  Skin  no new wound. Neurologic   No speech difficulty or lateralizing weakness. All other review of systems are negative. No flowsheet data found. Physical Exam    Due to this being a TeleHealth encounter, evaluation of the following organ systems is limited: Vitals/Constitutional/EENT/Resp/CV/GI//MS/Neuro/Skin/Heme-Lymph-Imm. Constitutional  well developed, well nourished. No diaphoresis or acute distress. Neck- ROM appears normal  Extremities -No cyanosis, clubbing, mild edema. No signs atheroembolic event. Has multiple varicosities and reticular veins. Few varicose veins both legs as well  Pulmonary  effort appears normal.  No respiratory distress. No accessory muscle use  Neurologic  alert and oriented X 3. Cranial Nerves II-XII grossly intact  Skin  intact. No rash, erythema, or pallor.    Psychiatric  mood, affect, and behavior appear normal.  Judgment and thought processes appear normal. Risk factors for atherosclerosis of all vascular beds have been reviewed with the patient including:  Family history, tobacco abuse in all forms, elevated cholesterol, hyperlipidemia, and diabetes. ASSESSMENT/PLAN:  1. Leg pain, left  2. Leg pain, right  3. Leg swelling    1. Leg pain, left    2. Leg pain, right    3. Leg swelling     stable and chronic    Discussed management of suspected reflux   Support hose  20-30 mmHg compression - new pairs  Support hose jennings to help with getting them on  Start/Continue ASA EC 81 mg daily  Leg elevation  Good moisturization  Good skin care  Follow up if symptoms dont improve in the next couple of months  Diet   cassandra Duarte is a 66 y.o. female being evaluated by a Virtual Visit (video visit) encounter to address concerns as mentioned above. A caregiver was present when appropriate. Due to this being a TeleHealth encounter ([facility] emergency), evaluation of the following organ systems was limited: Vitals/Constitutional/EENT/Resp/CV/GI//MS/Neuro/Skin/Heme-Lymph-Imm. Pursuant to the emergency declaration under the [address] [place] [address], [address] [place] [address] authority and the [employer] and [employer] Act, this Virtual Visit was conducted with patient's (and/or legal guardian's) consent, to reduce the patient's risk of exposure to COVID-19 and provide necessary medical care. The patient (and/or legal guardian) has also been advised to contact this office for worsening conditions or problems, and seek emergency medical treatment and/or call 911 if deemed necessary. Patient identification was verified at the start of the visit: Yes      Services were provided through a video synchronous discussion virtually to substitute for in-person clinic visit. Patient and provider were located at their individual homes. An electronic signature was used to authenticate this note.     --Gilbert Pal, APRN

## 2021-03-19 ENCOUNTER — APPOINTMENT (OUTPATIENT)
Dept: CT IMAGING | Age: 76
End: 2021-03-19
Payer: MEDICARE

## 2021-03-19 ENCOUNTER — HOSPITAL ENCOUNTER (EMERGENCY)
Age: 76
Discharge: HOME | End: 2021-03-19
Attending: EMERGENCY MEDICINE
Payer: MEDICARE

## 2021-03-19 VITALS
OXYGEN SATURATION: 91 % | DIASTOLIC BLOOD PRESSURE: 70 MMHG | WEIGHT: 180 LBS | HEIGHT: 67 IN | BODY MASS INDEX: 28.25 KG/M2 | RESPIRATION RATE: 25 BRPM | SYSTOLIC BLOOD PRESSURE: 134 MMHG | TEMPERATURE: 98.7 F | HEART RATE: 104 BPM

## 2021-03-19 DIAGNOSIS — I10 ESSENTIAL HYPERTENSION: ICD-10-CM

## 2021-03-19 DIAGNOSIS — R51.9 ACUTE NONINTRACTABLE HEADACHE, UNSPECIFIED HEADACHE TYPE: Primary | ICD-10-CM

## 2021-03-19 LAB
ALBUMIN SERPL-MCNC: 3.8 G/DL (ref 3.5–5.2)
ALP BLD-CCNC: 75 U/L (ref 35–104)
ALT SERPL-CCNC: 24 U/L (ref 5–33)
ANION GAP SERPL CALCULATED.3IONS-SCNC: 10 MMOL/L (ref 7–19)
AST SERPL-CCNC: 31 U/L (ref 5–32)
BASOPHILS ABSOLUTE: 0 K/UL (ref 0–0.2)
BASOPHILS RELATIVE PERCENT: 0.5 % (ref 0–1)
BILIRUB SERPL-MCNC: <0.2 MG/DL (ref 0.2–1.2)
BUN BLDV-MCNC: 24 MG/DL (ref 8–23)
CALCIUM SERPL-MCNC: 11 MG/DL (ref 8.8–10.2)
CHLORIDE BLD-SCNC: 103 MMOL/L (ref 98–111)
CO2: 27 MMOL/L (ref 22–29)
CREAT SERPL-MCNC: 0.9 MG/DL (ref 0.5–0.9)
EOSINOPHILS ABSOLUTE: 0.6 K/UL (ref 0–0.6)
EOSINOPHILS RELATIVE PERCENT: 7.5 % (ref 0–5)
GFR AFRICAN AMERICAN: >59
GFR NON-AFRICAN AMERICAN: >60
GLUCOSE BLD-MCNC: 125 MG/DL (ref 74–109)
HCT VFR BLD CALC: 40.6 % (ref 37–47)
HEMOGLOBIN: 13.1 G/DL (ref 12–16)
IMMATURE GRANULOCYTES #: 0 K/UL
LYMPHOCYTES ABSOLUTE: 1.9 K/UL (ref 1.1–4.5)
LYMPHOCYTES RELATIVE PERCENT: 22 % (ref 20–40)
MCH RBC QN AUTO: 31.3 PG (ref 27–31)
MCHC RBC AUTO-ENTMCNC: 32.3 G/DL (ref 33–37)
MCV RBC AUTO: 96.9 FL (ref 81–99)
MONOCYTES ABSOLUTE: 0.7 K/UL (ref 0–0.9)
MONOCYTES RELATIVE PERCENT: 7.8 % (ref 0–10)
NEUTROPHILS ABSOLUTE: 5.3 K/UL (ref 1.5–7.5)
NEUTROPHILS RELATIVE PERCENT: 62 % (ref 50–65)
PDW BLD-RTO: 14.7 % (ref 11.5–14.5)
PLATELET # BLD: 199 K/UL (ref 130–400)
PMV BLD AUTO: 9.3 FL (ref 9.4–12.3)
POTASSIUM SERPL-SCNC: 4.3 MMOL/L (ref 3.5–5)
RBC # BLD: 4.19 M/UL (ref 4.2–5.4)
SODIUM BLD-SCNC: 140 MMOL/L (ref 136–145)
TOTAL PROTEIN: 6.7 G/DL (ref 6.6–8.7)
WBC # BLD: 8.6 K/UL (ref 4.8–10.8)

## 2021-03-19 PROCEDURE — 96375 TX/PRO/DX INJ NEW DRUG ADDON: CPT

## 2021-03-19 PROCEDURE — 85025 COMPLETE CBC W/AUTO DIFF WBC: CPT

## 2021-03-19 PROCEDURE — 80053 COMPREHEN METABOLIC PANEL: CPT

## 2021-03-19 PROCEDURE — 96374 THER/PROPH/DIAG INJ IV PUSH: CPT

## 2021-03-19 PROCEDURE — 70450 CT HEAD/BRAIN W/O DYE: CPT

## 2021-03-19 PROCEDURE — 99283 EMERGENCY DEPT VISIT LOW MDM: CPT

## 2021-03-19 PROCEDURE — 6360000002 HC RX W HCPCS: Performed by: EMERGENCY MEDICINE

## 2021-03-19 PROCEDURE — 2580000003 HC RX 258: Performed by: EMERGENCY MEDICINE

## 2021-03-19 PROCEDURE — 36415 COLL VENOUS BLD VENIPUNCTURE: CPT

## 2021-03-19 PROCEDURE — 93005 ELECTROCARDIOGRAM TRACING: CPT | Performed by: EMERGENCY MEDICINE

## 2021-03-19 RX ORDER — SODIUM CHLORIDE, SODIUM LACTATE, POTASSIUM CHLORIDE, AND CALCIUM CHLORIDE .6; .31; .03; .02 G/100ML; G/100ML; G/100ML; G/100ML
1000 INJECTION, SOLUTION INTRAVENOUS ONCE
Status: COMPLETED | OUTPATIENT
Start: 2021-03-19 | End: 2021-03-19

## 2021-03-19 RX ORDER — PROMETHAZINE HYDROCHLORIDE 25 MG/ML
25 INJECTION, SOLUTION INTRAMUSCULAR; INTRAVENOUS ONCE
Status: COMPLETED | OUTPATIENT
Start: 2021-03-19 | End: 2021-03-19

## 2021-03-19 RX ORDER — MORPHINE SULFATE 4 MG/ML
4 INJECTION, SOLUTION INTRAMUSCULAR; INTRAVENOUS ONCE
Status: COMPLETED | OUTPATIENT
Start: 2021-03-19 | End: 2021-03-19

## 2021-03-19 RX ADMIN — SODIUM CHLORIDE, POTASSIUM CHLORIDE, SODIUM LACTATE AND CALCIUM CHLORIDE 1000 ML: 600; 310; 30; 20 INJECTION, SOLUTION INTRAVENOUS at 02:32

## 2021-03-19 RX ADMIN — PROMETHAZINE HYDROCHLORIDE 25 MG: 25 INJECTION INTRAMUSCULAR; INTRAVENOUS at 02:32

## 2021-03-19 RX ADMIN — MORPHINE SULFATE 4 MG: 4 INJECTION, SOLUTION INTRAMUSCULAR; INTRAVENOUS at 02:31

## 2021-03-19 ASSESSMENT — PAIN DESCRIPTION - FREQUENCY: FREQUENCY: CONTINUOUS

## 2021-03-19 ASSESSMENT — ENCOUNTER SYMPTOMS
ABDOMINAL PAIN: 1
VOMITING: 0
COUGH: 0
SHORTNESS OF BREATH: 0
NAUSEA: 1
DIARRHEA: 0

## 2021-03-19 ASSESSMENT — PAIN SCALES - GENERAL
PAINLEVEL_OUTOF10: 7
PAINLEVEL_OUTOF10: 7

## 2021-03-19 ASSESSMENT — PAIN DESCRIPTION - PAIN TYPE: TYPE: ACUTE PAIN

## 2021-03-19 NOTE — ED PROVIDER NOTES
[facility] EMERGENCY DEPT  eMERGENCY dEPARTMENT eNCOUnter      Pt Name: Ebony Beasley  MRN: [id number]  [place] 1945  Date of evaluation: 3/19/2021  Provider: Sloane Del Rio MD    CHIEF COMPLAINT       Chief Complaint   Patient presents with    Hypertension    Headache    Abdominal Pain         HISTORY OF PRESENT ILLNESS   (Location/Symptom, Timing/Onset,Context/Setting, Quality, Duration, Modifying Factors, Severity)  Note limiting factors. Ebony Beasley is a 66 y.o. female who presents to the emergency department for evaluation of headache and elevated blood pressure. Patient has a prior history of coronary artery disease with prior CABG performed in September 2020. Reports that over the past couple of days she has had a moderate severity headache with some associated nausea and body aches. Tells me that she received her second Covid vaccine on 3/14. She has not really felt very well since receiving her second shot. She has had some nausea but denies vomiting. She has not had any diarrhea or fevers or chills. Patient denies any chest pain or shortness of breath. She describes a mild to moderate severity headache that is described as a pressure and bandlike around her head. Denies any acute visual changes or difficulty with ambulation. HPI    NursingNotes were reviewed. REVIEW OF SYSTEMS    (2-9 systems for level 4, 10 or more for level 5)     Review of Systems   Constitutional: Positive for fatigue. Negative for chills and fever. HENT: Negative for congestion. Respiratory: Negative for cough and shortness of breath. Cardiovascular: Negative for chest pain. Gastrointestinal: Positive for abdominal pain and nausea. Negative for diarrhea and vomiting. Genitourinary: Negative for dysuria. Neurological: Positive for headaches. Negative for dizziness and syncope. All other systems reviewed and are negative.            PAST MEDICALHISTORY     Past Medical History:   Diagnosis Date    Acid reflux     Anxiety     Arthritis     Back pain     CAD (coronary artery disease)     CAD (coronary artery disease)     Carotid artery occlusion     Chronic venous insufficiency 2012    Cough     Fibromyalgia     Goiter     Gout     Head ache     headaches    Heart murmur     History of colon polyps     History of colon polyps     Hoarseness     Hypercholesterolemia     Hyperlipidemia     Hypertension     Insomnia     Irregular heart beat     Itching     Muscle cramp     Neck pain     Osteoarthritis     Osteoporosis     Palpitations     RA (rheumatoid arthritis) (HCC)     Rash     Rectal bleeding     SOB (shortness of breath)     Sore throat     Tympanic membrane perforation     Varicose veins 2012         SURGICAL HISTORY       Past Surgical History:   Procedure Laterality Date    APPENDECTOMY      BREAST SURGERY      CARDIAC CATHETERIZATION  9/10/15  JDT    EF 50%    CARDIAC CATHETERIZATION  09/10/2020    LM disease- sent for bypass     CERVICAL FUSION       SECTION  1968    x 1    CHOLECYSTECTOMY  2012    [place]    COLONOSCOPY  1-    Dr LUJAN    COLONOSCOPY  2014    Dr. Minoo Cummings  2018    Dr. Damir Stacy in [place]- Polyps-benign,internal Hemorrhoids, per patient.  CORONARY ANGIOPLASTY WITH STENT PLACEMENT      Dr LUJAN    CORONARY ARTERY BYPASS GRAFT N/A 2020    CORONARY ARTERY BYPASS GRAFT X2 WITH LEFT INTERNAL MAMMARY ARTERY WITH OPEN VEIN HARVESTING WITH PERFUSION TRANSESOPHAGEAL ECHOCARDIOGRAM performed by Jorge Leung MD at [facility]    partial    KNEE SURGERY      left knee surgery    PTCA      stent    TUBAL LIGATION      UPPER GASTROINTESTINAL ENDOSCOPY  ?     UPPER GASTROINTESTINAL ENDOSCOPY  1-    Dr LUJAN    UPPER GASTROINTESTINAL ENDOSCOPY  2014    Dr. Jonatan Dixon GASTROINTESTINAL ENDOSCOPY  2018    Dr. Liz Vega in [facility]-Gastritis per patient.  VARICOSE VEIN SURGERY   01- [facility]    Left Leg Ablation    VARICOSE VEIN SURGERY  3-  [facility]    right leg ablation         CURRENT MEDICATIONS     Previous Medications    ALLOPURINOL (ZYLOPRIM) 300 MG TABLET    Take 300 mg by mouth daily. ASPIRIN 81 MG EC TABLET    Take 81 mg by mouth daily. BUMETANIDE (BUMEX) 1 MG TABLET    Take 1 tablet by mouth daily as needed (swelling)    EVOLOCUMAB (REPATHA) 140 MG/ML SOSY    Inject 140 mg into the skin every 14 days Patient has not started    LORATADINE (CLARITIN) 10 MG TABLET    Take 10 mg by mouth 2 times daily     METOPROLOL SUCCINATE (TOPROL XL) 50 MG EXTENDED RELEASE TABLET    TAKE 1 TABLET TWICE DAILY    MULTIPLE VITAMIN (MULTIVITAMIN PO)    Take 1 tablet by mouth daily     OLMESARTAN-HYDROCHLOROTHIAZIDE (BENICAR HCT) 40-25 MG PER TABLET    Take 0.5 tablets by mouth daily Was holding due to low bp but now is taking randomly as bp is elevated.  They are thinking needs daily again    OMEGA-3 FATTY ACIDS (FISH OIL) 1360 MG CAPS    Take 1 capsule by mouth 2 times daily     VITAMIN D (CHOLECALCIFEROL) 1000 UNIT TABS TABLET    1,000 Units 2 times daily Take 3 tablets daily     ZINC PO    Take by mouth       ALLERGIES     Demerol, Reclast [zoledronic acid], Ipratropium bromide hfa, Abatacept, Buspirone, Celexa [citalopram hydrobromide], Colchicine, Dye [barium-containing compounds], Dye [iodides], Evolocumab, Fenofibrate, Hydrocodone-acetaminophen, Lasix [furosemide], Levofloxacin, Losartan, Methotrexate, Mirtazapine, Neurontin [gabapentin], Nitroglycerin, Plaquenil [hydroxychloroquine sulfate], Trazodone, Cozaar [losartan potassium], Esomeprazole magnesium, Lisinopril, and Prilosec [omeprazole]    FAMILY HISTORY       Family History   Problem Relation Age of Onset    Diabetes Mother     Heart Disease Mother     Heart Disease Father     Diabetes Father     High Blood Pressure Father     Esophageal Cancer Father     Colon Polyps Father     Crohn's Disease Brother     Colon Cancer Neg Hx     Liver Cancer Neg Hx     Liver Disease Neg Hx     Rectal Cancer Neg Hx     Stomach Cancer Neg Hx           SOCIAL HISTORY       Social History     Socioeconomic History    Marital status:      Spouse name: None    Number of children: None    Years of education: None    Highest education level: None   Occupational History    None   Social Needs    Financial resource strain: None    Food insecurity     Worry: None     Inability: None    Transportation needs     Medical: None     Non-medical: None   Tobacco Use    Smoking status: Never Smoker    Smokeless tobacco: Never Used   Substance and Sexual Activity    Alcohol use: No    Drug use: No    Sexual activity: None   Lifestyle    Physical activity     Days per week: None     Minutes per session: None    Stress: None   Relationships    Social connections     Talks on phone: None     Gets together: None     Attends Jain service: None     Active member of club or organization: None     Attends meetings of clubs or organizations: None     Relationship status: None    Intimate partner violence     Fear of current or ex partner: None     Emotionally abused: None     Physically abused: None     Forced sexual activity: None   Other Topics Concern    None   Social History Narrative    None       SCREENINGS             PHYSICAL EXAM    (up to 7 for level 4, 8 or more for level 5)     ED Triage Vitals [03/19/21 0108]   BP Temp Temp Source Pulse Resp SpO2 Height Weight   (!) 183/90 98.7 °F (37.1 °C) Oral 100 18 95 % 5' 7\" (1.702 m) 180 lb (81.6 kg)       Physical Exam  Vitals signs and nursing note reviewed. HENT:      Head: Atraumatic. Mouth/Throat:      Mouth: Mucous membranes are moist. Mucous membranes are not dry. Pharynx: No posterior oropharyngeal erythema. Eyes:      General: No scleral icterus. Pupils: Pupils are equal, round, and reactive to light. Neck:      Trachea: No tracheal deviation. Cardiovascular:      Rate and Rhythm: Normal rate and regular rhythm. Heart sounds: Normal heart sounds. No murmur. Pulmonary:      Effort: Pulmonary effort is normal. No respiratory distress. Breath sounds: Normal breath sounds. No stridor. Abdominal:      General: There is no distension. Palpations: Abdomen is soft. Tenderness: There is abdominal tenderness (mild, upper abdomen). There is no guarding. Musculoskeletal:      Right lower leg: No edema. Left lower leg: No edema. Skin:     Coloration: Skin is not pale. Findings: No rash. Neurological:      Mental Status: She is alert and oriented to person, place, and time. Psychiatric:         Behavior: Behavior is cooperative. DIAGNOSTIC RESULTS     EKG: All EKG's areinterpreted by the Emergency Department Physician who either signs or Co-signs this chart in the absence of a cardiologist.    8197: Sinus tach @ 106, no ST elevation    RADIOLOGY:  Non-plain film images such as CT, Ultrasound and MRI are read by the radiologist. Plain radiographic images are visualized and preliminarily interpreted bythe emergency physician with the below findings:    CT HEAD:    IImpression: No acute intracranial process. Findings:  No acute intracranial hemorrhage, herniation, or hydrocephalus. No calvarial or skull base fractures. No vasogenic edema or mass-effect. Paranasal sinuses and mastoid air cells are clear.         CT HEAD WO CONTRAST    (Results Pending)           LABS:  Labs Reviewed   CBC WITH AUTO DIFFERENTIAL - Abnormal; Notable for the following components:       Result Value    RBC 4.19 (*)     MCH 31.3 (*)     MCHC 32.3 (*)     RDW 14.7 (*)     MPV 9.3 (*)     Eosinophils % 7.5 (*)     All other components within normal limits   COMPREHENSIVE METABOLIC PANEL - Abnormal; Notable for the following components: Glucose 125 (*)     BUN 24 (*)     Calcium 11.0 (*)     All other components within normal limits       All other labs were within normal range or not returned as of this dictation. EMERGENCY DEPARTMENT COURSE and DIFFERENTIAL DIAGNOSIS/MDM:   Vitals:    Vitals:    03/19/21 0210 03/19/21 0230 03/19/21 0315 03/19/21 0330   BP: (!) 160/71 (!) 171/87 (!) 148/71 134/70   Pulse: 107 108 104 104   Resp: 18 22 18 25   Temp:       TempSrc:       SpO2: 94% 91% 93% 91%   Weight:       Height:           MDM    Reassessment    CT brain unremarkable. Blood pressure has improved here in the ED. Overall her nausea is better and she is feeling improved after her ED course of care. Suspect that likely her symptoms are secondary to most recent Covid vaccine. Encouraged her to remain hydrated, rest and follow-up with her PMD.    PROCEDURES:  Unless otherwise noted below, none     Procedures    FINAL IMPRESSION      1. Acute nonintractable headache, unspecified headache type    2.  Essential hypertension          DISPOSITION/PLAN   DISPOSITION Decision To Discharge 03/19/2021 04:26:27 AM      PATIENT REFERRED TO:  Anisa Porter MD  [address] [address]  [phone]            DISCHARGE MEDICATIONS:  New Prescriptions    No medications on file          (Please note that portions of this note were completed with a voice recognition program.  Efforts were made to edit thedictations but occasionally words are mis-transcribed.)    Moy Ceballos MD (electronically signed)  Attending Emergency Physician         Moy Ceballos MD  03/19/21 3339

## 2021-03-21 LAB
EKG P AXIS: 64 DEGREES
EKG P-R INTERVAL: 188 MS
EKG Q-T INTERVAL: 334 MS
EKG QRS DURATION: 86 MS
EKG QTC CALCULATION (BAZETT): 414 MS
EKG T AXIS: 78 DEGREES

## 2021-03-21 PROCEDURE — 93010 ELECTROCARDIOGRAM REPORT: CPT | Performed by: INTERNAL MEDICINE

## 2021-03-26 ENCOUNTER — OFFICE VISIT (OUTPATIENT)
Dept: CARDIOLOGY CLINIC | Age: 76
End: 2021-03-26
Payer: MEDICARE

## 2021-03-26 VITALS
WEIGHT: 178 LBS | DIASTOLIC BLOOD PRESSURE: 80 MMHG | SYSTOLIC BLOOD PRESSURE: 130 MMHG | BODY MASS INDEX: 27.94 KG/M2 | HEART RATE: 62 BPM | HEIGHT: 67 IN

## 2021-03-26 DIAGNOSIS — I25.10 CORONARY ARTERY DISEASE INVOLVING NATIVE CORONARY ARTERY OF NATIVE HEART WITHOUT ANGINA PECTORIS: Primary | ICD-10-CM

## 2021-03-26 PROCEDURE — 1036F TOBACCO NON-USER: CPT | Performed by: INTERNAL MEDICINE

## 2021-03-26 PROCEDURE — G8484 FLU IMMUNIZE NO ADMIN: HCPCS | Performed by: INTERNAL MEDICINE

## 2021-03-26 PROCEDURE — G8427 DOCREV CUR MEDS BY ELIG CLIN: HCPCS | Performed by: INTERNAL MEDICINE

## 2021-03-26 PROCEDURE — 99214 OFFICE O/P EST MOD 30 MIN: CPT | Performed by: INTERNAL MEDICINE

## 2021-03-26 PROCEDURE — G8400 PT W/DXA NO RESULTS DOC: HCPCS | Performed by: INTERNAL MEDICINE

## 2021-03-26 PROCEDURE — 1090F PRES/ABSN URINE INCON ASSESS: CPT | Performed by: INTERNAL MEDICINE

## 2021-03-26 PROCEDURE — G8417 CALC BMI ABV UP PARAM F/U: HCPCS | Performed by: INTERNAL MEDICINE

## 2021-03-26 PROCEDURE — 4040F PNEUMOC VAC/ADMIN/RCVD: CPT | Performed by: INTERNAL MEDICINE

## 2021-03-26 PROCEDURE — 1123F ACP DISCUSS/DSCN MKR DOCD: CPT | Performed by: INTERNAL MEDICINE

## 2021-03-26 ASSESSMENT — ENCOUNTER SYMPTOMS
WHEEZING: 0
BACK PAIN: 0
COUGH: 0
DIARRHEA: 0
CONSTIPATION: 0
ABDOMINAL DISTENTION: 0
SHORTNESS OF BREATH: 0
VOMITING: 0
EYE DISCHARGE: 0
BLOOD IN STOOL: 0

## 2021-04-30 ENCOUNTER — TRANSCRIBE ORDERS (OUTPATIENT)
Dept: ADMINISTRATIVE | Facility: HOSPITAL | Age: 76
End: 2021-04-30

## 2021-04-30 DIAGNOSIS — Z12.31 ENCOUNTER FOR SCREENING MAMMOGRAM FOR MALIGNANT NEOPLASM OF BREAST: Primary | ICD-10-CM

## 2021-05-10 ENCOUNTER — HOSPITAL ENCOUNTER (OUTPATIENT)
Dept: ULTRASOUND IMAGING | Facility: HOSPITAL | Age: 76
Discharge: HOME | End: 2021-05-10
Admitting: NURSE PRACTITIONER

## 2021-05-10 ENCOUNTER — TRANSCRIBE ORDERS (OUTPATIENT)
Dept: ADMINISTRATIVE | Facility: HOSPITAL | Age: 76
End: 2021-05-10

## 2021-05-10 DIAGNOSIS — R22.1 LOCALIZED SWELLING, MASS AND LUMP, NECK: ICD-10-CM

## 2021-05-10 DIAGNOSIS — R51.9 NONINTRACTABLE HEADACHE, UNSPECIFIED CHRONICITY PATTERN, UNSPECIFIED HEADACHE TYPE: Primary | ICD-10-CM

## 2021-05-10 DIAGNOSIS — R51.9 INTRACTABLE HEADACHE, UNSPECIFIED CHRONICITY PATTERN, UNSPECIFIED HEADACHE TYPE: ICD-10-CM

## 2021-05-10 DIAGNOSIS — I77.89 OTHER SPECIFIED DISORDERS OF ARTERIES AND ARTERIOLES (HCC): ICD-10-CM

## 2021-05-10 DIAGNOSIS — M79.604 PAIN IN RIGHT LEG: Primary | ICD-10-CM

## 2021-05-10 PROCEDURE — 93971 EXTREMITY STUDY: CPT

## 2021-05-11 ENCOUNTER — HOSPITAL ENCOUNTER (OUTPATIENT)
Dept: GENERAL RADIOLOGY | Age: 76
Discharge: HOME | End: 2021-05-11
Payer: MEDICARE

## 2021-05-11 ENCOUNTER — OFFICE VISIT (OUTPATIENT)
Dept: UROLOGY | Age: 76
End: 2021-05-11
Payer: MEDICARE

## 2021-05-11 ENCOUNTER — TRANSCRIBE ORDERS (OUTPATIENT)
Dept: ADMINISTRATIVE | Facility: HOSPITAL | Age: 76
End: 2021-05-11

## 2021-05-11 VITALS — BODY MASS INDEX: 28.28 KG/M2 | TEMPERATURE: 97.9 F | WEIGHT: 180.2 LBS | HEIGHT: 67 IN

## 2021-05-11 DIAGNOSIS — I73.9 PERIPHERAL VASCULAR DISEASE, UNSPECIFIED (HCC): ICD-10-CM

## 2021-05-11 DIAGNOSIS — I77.89 OTHER SPECIFIED DISORDERS OF ARTERIES AND ARTERIOLES (HCC): ICD-10-CM

## 2021-05-11 DIAGNOSIS — R35.1 NOCTURIA: Primary | ICD-10-CM

## 2021-05-11 DIAGNOSIS — R39.89 POSSIBLE URINARY TRACT INFECTION: ICD-10-CM

## 2021-05-11 DIAGNOSIS — R39.89 POSSIBLE URINARY TRACT INFECTION: Primary | ICD-10-CM

## 2021-05-11 DIAGNOSIS — N39.41 URGE INCONTINENCE: ICD-10-CM

## 2021-05-11 DIAGNOSIS — R51.9 HEADACHE, UNSPECIFIED HEADACHE TYPE: Primary | ICD-10-CM

## 2021-05-11 DIAGNOSIS — R10.9 BILATERAL FLANK PAIN: ICD-10-CM

## 2021-05-11 DIAGNOSIS — R22.1 LOCALIZED SWELLING, MASS AND LUMP, NECK: ICD-10-CM

## 2021-05-11 LAB
BACTERIA URINE, POC: 0
BILIRUBIN URINE: 0 MG/DL
BLOOD, URINE: NEGATIVE
CASTS URINE, POC: 0
CLARITY: CLEAR
COLOR: YELLOW
CRYSTALS URINE, POC: 0
EPI CELLS URINE, POC: ABNORMAL
GLUCOSE URINE: ABNORMAL
KETONES, URINE: NEGATIVE
LEUKOCYTE EST, POC: ABNORMAL
NITRITE, URINE: NEGATIVE
PH UA: 7 (ref 4.5–8)
PROTEIN UA: NEGATIVE
RBC URINE, POC: 0
SPECIFIC GRAVITY UA: 1.02 (ref 1–1.03)
UROBILINOGEN, URINE: NORMAL
WBC URINE, POC: 4
YEAST URINE, POC: 0

## 2021-05-11 PROCEDURE — 74018 RADEX ABDOMEN 1 VIEW: CPT

## 2021-05-11 PROCEDURE — 99215 OFFICE O/P EST HI 40 MIN: CPT | Performed by: NURSE PRACTITIONER

## 2021-05-11 PROCEDURE — 81001 URINALYSIS AUTO W/SCOPE: CPT | Performed by: NURSE PRACTITIONER

## 2021-05-11 RX ORDER — ACETAMINOPHEN AND CODEINE PHOSPHATE 300; 30 MG/1; MG/1
TABLET ORAL NIGHTLY PRN
COMMUNITY
Start: 2021-03-29 | End: 2021-07-23

## 2021-05-11 ASSESSMENT — ENCOUNTER SYMPTOMS
VOMITING: 0
NAUSEA: 0
BACK PAIN: 0
ABDOMINAL PAIN: 0
ABDOMINAL DISTENTION: 0

## 2021-05-11 NOTE — PROGRESS NOTES
Sowmya Toussaint is a 66 y.o. female who presents today   Chief Complaint   Patient presents with    Follow-up     kidney pain, possible UTI     Patient is a 41-year-old female presents to the clinic today with bilateral complaints of flank pain, suprapubic pain. She states after her second Covid vaccine she started experiencing generalized aches including bilateral flanks. Pain in flanks is worse with movement. She presented to the ED with complaints of chills, generalized pain on 3/19/2021. She states she had a severe reaction to her second vaccine with hypertension, right arm swelling, generalized muscle weakness and pain. She states suprapubic pain with full bladder, pain is relieved with voiding. She denies any hematuria, dysuria, fever, nausea, vomiting. She states she has had some blood in her stool which she is seeing GI for. She denies any history of stones. KUB today does not indicate any obstructive uropathy. History of osteoarthritis, fibromyalgia, rheumatoid arthritis. Patient underwent UDS in June 2020 and was supposed to follow-up but had emergent open heart surgery and has not been able to follow-up. She has a history of frequency and urgency with urge incontinence and was unable to tolerate medication and failed PTNS. She states her symptoms have actually improved since her UDS in 2020. She does have current complaints of nocturia approximately 6-8 times per night. After further discussion she is actually taking her HCTZ at night. She does have mild urge incontinence and changes her pad approximately 2 times per day with dribbling. She denies any frequency throughout the day.       Past Medical History:   Diagnosis Date    Acid reflux     Anxiety     Arthritis     Back pain     CAD (coronary artery disease)     CAD (coronary artery disease)     Carotid artery occlusion     Chronic venous insufficiency 11/8/2012    Cough     Fibromyalgia     Goiter     Gout     Head ache headaches    Heart murmur     History of colon polyps     History of colon polyps     Hoarseness     Hypercholesterolemia     Hyperlipidemia     Hypertension     Insomnia     Irregular heart beat     Itching     Muscle cramp     Neck pain     Osteoarthritis     Osteoporosis     Palpitations     RA (rheumatoid arthritis) (HCC)     Rash     Rectal bleeding     SOB (shortness of breath)     Sore throat     Tympanic membrane perforation     Varicose veins 2012       Past Surgical History:   Procedure Laterality Date    APPENDECTOMY      BREAST SURGERY  2002    CARDIAC CATHETERIZATION  9/10/15  JDT    EF 50%    CARDIAC CATHETERIZATION  09/10/2020    LM disease- sent for bypass     CERVICAL FUSION  1999     SECTION  1968    x 1    CHOLECYSTECTOMY  2012    [place]    COLONOSCOPY  1-    Dr LUJAN    COLONOSCOPY  2014    Dr. Isaac Reyez  2018    Dr. Maryann Gray in [place]- Polyps-benign,internal Hemorrhoids, per patient.  CORONARY ANGIOPLASTY WITH STENT PLACEMENT      Dr LUJAN    CORONARY ARTERY BYPASS GRAFT N/A 2020    CORONARY ARTERY BYPASS GRAFT X2 WITH LEFT INTERNAL MAMMARY ARTERY WITH OPEN VEIN HARVESTING WITH PERFUSION TRANSESOPHAGEAL ECHOCARDIOGRAM performed by Jatinder Bhatt MD at [address]     partial    KNEE SURGERY      left knee surgery    PTCA      stent    TUBAL LIGATION      UPPER GASTROINTESTINAL ENDOSCOPY  ?  UPPER GASTROINTESTINAL ENDOSCOPY  1-    Dr LUJAN    UPPER GASTROINTESTINAL ENDOSCOPY  2014    Dr. Anthony Morgan  2018    Dr. Maryann Gray in [facility]-Gastritis per patient.     VARICOSE VEIN SURGERY   2013 S    Left Leg Ablation    VARICOSE VEIN SURGERY  3-  [facility]    right leg ablation       Current Outpatient Medications   Medication Sig Dispense Refill    acetaminophen-codeine (TYLENOL #3) 300-30 MG per tablet       ZINC PO Take by mouth      metoprolol succinate (TOPROL XL) 50 MG extended release tablet TAKE 1 TABLET TWICE DAILY 180 tablet 3    olmesartan-hydroCHLOROthiazide (BENICAR HCT) 40-25 MG per tablet Take 0.5 tablets by mouth daily Was holding due to low bp but now is taking randomly as bp is elevated. They are thinking needs daily again (Patient taking differently: Take 0.5 tablets by mouth nightly Was holding due to low bp but now is taking randomly as bp is elevated. They are thinking needs daily again) 30 tablet 5    Omega-3 Fatty Acids (FISH OIL) 1360 MG CAPS Take 1 capsule by mouth 2 times daily       Evolocumab (REPATHA) 140 MG/ML SOSY Inject 140 mg into the skin every 14 days Patient has not started      vitamin D (CHOLECALCIFEROL) 1000 UNIT TABS tablet 1,000 Units 2 times daily Take 3 tablets daily       allopurinol (ZYLOPRIM) 300 MG tablet Take 300 mg by mouth daily.  aspirin 81 MG EC tablet Take 81 mg by mouth daily.  loratadine (CLARITIN) 10 MG tablet Take 10 mg by mouth 2 times daily       Multiple Vitamin (MULTIVITAMIN PO) Take 1 tablet by mouth daily       bumetanide (BUMEX) 1 MG tablet Take 1 tablet by mouth daily as needed (swelling) (Patient not taking: Reported on 5/11/2021) 30 tablet 3     No current facility-administered medications for this visit. Allergies   Allergen Reactions    Demerol Nausea And Vomiting    Reclast [Zoledronic Acid] Other (See Comments)     Patient has a intolerance to this medication .  It makes her feel \"out of it\"     Ipratropium Bromide Hfa Palpitations    Abatacept Other (See Comments)     Heart palpitations    Buspirone     Celexa [Citalopram Hydrobromide] Nausea Only     Nausea and headache    Colchicine     Dye [Barium-Containing Compounds] Nausea Only    Dye [Iodides] Itching     CONTRAST DYE, CT DYE, IV CONTRAST     Evolocumab      Other reaction(s): Irritability  Repatha SureClick    Fenofibrate      Chest pain    Hydrocodone-Acetaminophen Other (See Comments)     Shaking, attacks nervous system    Lasix [Furosemide] Nausea Only    Levofloxacin      palpitations    Losartan      Nausea and dizziness    Methotrexate Other (See Comments)     Dizzy and fainted    Mirtazapine      Tremor, nausea, headache    Neurontin [Gabapentin] Nausea Only     Dizziness      Nitroglycerin Other (See Comments)     Passed out    Plaquenil [Hydroxychloroquine Sulfate] Other (See Comments)     nervousness    Trazodone      Muscle stiffness    Cozaar [Losartan Potassium] Palpitations    Esomeprazole Magnesium Nausea And Vomiting    Lisinopril Other (See Comments)     Muscle cramps in legs    Prilosec [Omeprazole] Nausea And Vomiting       Social History     Socioeconomic History    Marital status:      Spouse name: None    Number of children: None    Years of education: None    Highest education level: None   Occupational History    None   Social Needs    Financial resource strain: None    Food insecurity     Worry: None     Inability: None    Transportation needs     Medical: None     Non-medical: None   Tobacco Use    Smoking status: Never Smoker    Smokeless tobacco: Never Used   Substance and Sexual Activity    Alcohol use: No    Drug use: No    Sexual activity: None   Lifestyle    Physical activity     Days per week: None     Minutes per session: None    Stress: None   Relationships    Social connections     Talks on phone: None     Gets together: None     Attends Oriental orthodox service: None     Active member of club or organization: None     Attends meetings of clubs or organizations: None     Relationship status: None    Intimate partner violence     Fear of current or ex partner: None     Emotionally abused: None     Physically abused: None     Forced sexual activity: None   Other Topics Concern    None   Social History Narrative    None       Family History Problem Relation Age of Onset    Diabetes Mother     Heart Disease Mother     Heart Disease Father     Diabetes Father     High Blood Pressure Father     Esophageal Cancer Father     Colon Polyps Father     Crohn's Disease Brother     Colon Cancer Neg Hx     Liver Cancer Neg Hx     Liver Disease Neg Hx     Rectal Cancer Neg Hx     Stomach Cancer Neg Hx        REVIEW OF SYSTEMS:  Review of Systems   Constitutional: Negative for chills and fever. Gastrointestinal: Negative for abdominal distention, abdominal pain, nausea and vomiting. Genitourinary: Positive for flank pain, frequency and urgency. Negative for difficulty urinating, dysuria and hematuria. Musculoskeletal: Negative for back pain and gait problem. Psychiatric/Behavioral: Negative for agitation and confusion. PHYSICAL EXAM:  Temp 97.9 °F (36.6 °C) (Temporal)   Ht 5' 7\" (1.702 m)   Wt 180 lb 3.2 oz (81.7 kg)   BMI 28.22 kg/m²   Physical Exam  Constitutional:       General: She is not in acute distress. Appearance: Normal appearance. She is not toxic-appearing. HENT:      Head: Normocephalic. Neck:      Musculoskeletal: Normal range of motion. No neck rigidity. Cardiovascular:      Rate and Rhythm: Normal rate and regular rhythm. Pulmonary:      Effort: Pulmonary effort is normal. No respiratory distress. Breath sounds: No wheezing. Abdominal:      General: There is no distension. Palpations: Abdomen is soft. Tenderness: There is no abdominal tenderness. There is right CVA tenderness and left CVA tenderness. Musculoskeletal: Normal range of motion. Skin:     General: Skin is warm and dry. Neurological:      Mental Status: She is alert and oriented to person, place, and time. Sensory: Sensation is intact. Motor: Weakness present.       Gait: Gait abnormal.   Psychiatric:         Mood and Affect: Mood and affect normal.         Behavior: Behavior normal.       DATA:  CBC with Differential:    Lab Results   Component Value Date    WBC 8.6 03/19/2021    RBC 4.19 03/19/2021    HGB 13.1 03/19/2021    HCT 40.6 03/19/2021     03/19/2021    MCV 96.9 03/19/2021    MCH 31.3 03/19/2021    MCHC 32.3 03/19/2021    RDW 14.7 03/19/2021    LYMPHOPCT 22.0 03/19/2021    MONOPCT 7.8 03/19/2021    BASOPCT 0.5 03/19/2021    MONOSABS 0.70 03/19/2021    LYMPHSABS 1.9 03/19/2021    EOSABS 0.60 03/19/2021    BASOSABS 0.00 03/19/2021     CMP:    Lab Results   Component Value Date     03/19/2021    K 4.3 03/19/2021     03/19/2021    CO2 27 03/19/2021    BUN 24 03/19/2021    CREATININE 0.9 03/19/2021    GFRAA >59 03/19/2021    LABGLOM >60 03/19/2021    GLUCOSE 125 03/19/2021    PROT 6.7 03/19/2021    LABALBU 3.8 03/19/2021    CALCIUM 11.0 03/19/2021    BILITOT <0.2 03/19/2021    ALKPHOS 75 03/19/2021    AST 31 03/19/2021    ALT 24 03/19/2021     Results for orders placed or performed in visit on 05/11/21   POCT Urinalysis Dipstick w/ Micro (Auto)   Result Value Ref Range    Color, UA Yellow     Clarity, UA Clear Clear    Glucose, Ur NEG     Bilirubin Urine 0 mg/dL    Ketones, Urine Negative     Specific Gravity, UA 1.020 1.005 - 1.030    Blood, Urine Negative     pH, UA 7.0 4.5 - 8.0    Protein, UA Negative Negative    Nitrite, Urine Negative     Leukocytes, UA SMALL     Urobilinogen, Urine Normal     rbc urine, poc 0     wbc urine, poc 4     bacteria urine, poc 0     yeast urine, poc 0     casts urine, poc 0     epi cells urine, poc +     crystals urine, poc 0        IMAGING:  I reviewed her KUB from today. I am unable to visualize any stone in either kidney, ureter or bladder. 1. Nocturia  Discussed with her taking her HCTZ in the a.m. instead of at night. She will limit caffeine after 2 PM and limit fluid intake to 3 hours prior to bedtime. Her frequency at night is likely related to her HCTZ rather than overactive bladder. She does not experience any frequency throughout the day.   -

## 2021-05-12 ENCOUNTER — TRANSCRIBE ORDERS (OUTPATIENT)
Dept: ADMINISTRATIVE | Facility: HOSPITAL | Age: 76
End: 2021-05-12

## 2021-05-12 ENCOUNTER — OFFICE VISIT (OUTPATIENT)
Dept: GASTROENTEROLOGY | Age: 76
End: 2021-05-12
Payer: MEDICARE

## 2021-05-12 VITALS
OXYGEN SATURATION: 98 % | HEART RATE: 92 BPM | SYSTOLIC BLOOD PRESSURE: 132 MMHG | HEIGHT: 67 IN | DIASTOLIC BLOOD PRESSURE: 78 MMHG | BODY MASS INDEX: 28.85 KG/M2 | WEIGHT: 183.8 LBS

## 2021-05-12 DIAGNOSIS — R19.4 CHANGE IN BOWEL HABITS: ICD-10-CM

## 2021-05-12 DIAGNOSIS — K59.00 CONSTIPATION, UNSPECIFIED CONSTIPATION TYPE: ICD-10-CM

## 2021-05-12 DIAGNOSIS — R51.9 HEADACHE, UNSPECIFIED HEADACHE TYPE: Primary | ICD-10-CM

## 2021-05-12 DIAGNOSIS — R22.1 LOCALIZED SWELLING, MASS AND LUMP, NECK: ICD-10-CM

## 2021-05-12 DIAGNOSIS — R10.9 ABDOMINAL PAIN, UNSPECIFIED ABDOMINAL LOCATION: Primary | ICD-10-CM

## 2021-05-12 DIAGNOSIS — E78.2 MIXED HYPERLIPIDEMIA: ICD-10-CM

## 2021-05-12 DIAGNOSIS — K62.5 BRBPR (BRIGHT RED BLOOD PER RECTUM): ICD-10-CM

## 2021-05-12 DIAGNOSIS — I77.89 OTHER SPECIFIED DISORDERS OF ARTERIES AND ARTERIOLES (HCC): ICD-10-CM

## 2021-05-12 DIAGNOSIS — I73.9 PERIPHERAL VASCULAR DISEASE, UNSPECIFIED (HCC): ICD-10-CM

## 2021-05-12 PROCEDURE — 1123F ACP DISCUSS/DSCN MKR DOCD: CPT | Performed by: NURSE PRACTITIONER

## 2021-05-12 PROCEDURE — G8427 DOCREV CUR MEDS BY ELIG CLIN: HCPCS | Performed by: NURSE PRACTITIONER

## 2021-05-12 PROCEDURE — 4040F PNEUMOC VAC/ADMIN/RCVD: CPT | Performed by: NURSE PRACTITIONER

## 2021-05-12 PROCEDURE — 1036F TOBACCO NON-USER: CPT | Performed by: NURSE PRACTITIONER

## 2021-05-12 PROCEDURE — 1090F PRES/ABSN URINE INCON ASSESS: CPT | Performed by: NURSE PRACTITIONER

## 2021-05-12 PROCEDURE — G8400 PT W/DXA NO RESULTS DOC: HCPCS | Performed by: NURSE PRACTITIONER

## 2021-05-12 PROCEDURE — 99214 OFFICE O/P EST MOD 30 MIN: CPT | Performed by: NURSE PRACTITIONER

## 2021-05-12 PROCEDURE — G8417 CALC BMI ABV UP PARAM F/U: HCPCS | Performed by: NURSE PRACTITIONER

## 2021-05-12 ASSESSMENT — ENCOUNTER SYMPTOMS
SORE THROAT: 0
CONSTIPATION: 1
NAUSEA: 0
RECTAL PAIN: 0
BACK PAIN: 1
BLOOD IN STOOL: 0
COUGH: 0
ANAL BLEEDING: 1
TROUBLE SWALLOWING: 0
VOMITING: 0
VOICE CHANGE: 0
SHORTNESS OF BREATH: 0
ABDOMINAL DISTENTION: 0
ABDOMINAL PAIN: 1
DIARRHEA: 0

## 2021-05-12 NOTE — PROGRESS NOTES
Sheree Barrios  2018    Dr. Mariam Almanza in [place]- Polyps-benign,internal Hemorrhoids, per patient.  CORONARY ANGIOPLASTY WITH STENT PLACEMENT  2012    Dr LUJAN    CORONARY ARTERY BYPASS GRAFT N/A 9/11/2020    CORONARY ARTERY BYPASS GRAFT X2 WITH LEFT INTERNAL MAMMARY ARTERY WITH OPEN VEIN HARVESTING WITH PERFUSION TRANSESOPHAGEAL ECHOCARDIOGRAM performed by Meli Hernandez MD at [facility]    partial    KNEE SURGERY  2011    left knee surgery    PTCA      stent    TUBAL LIGATION      UPPER GASTROINTESTINAL ENDOSCOPY  2009?  UPPER GASTROINTESTINAL ENDOSCOPY  1-    Dr LUJAN    UPPER GASTROINTESTINAL ENDOSCOPY  05/06/2014    Dr. Kely Elizabeth  2018    Dr. Mariam Almanza in [facility]-Gastritis per patient.    [address] SURGERY   01- [facility]    Left Leg Ablation    VARICOSE VEIN SURGERY  3-  [facility]    right leg ablation       Social History     Socioeconomic History    Marital status:      Spouse name: None    Number of children: None    Years of education: None    Highest education level: None   Occupational History    None   Social Needs    Financial resource strain: None    Food insecurity     Worry: None     Inability: None    Transportation needs     Medical: None     Non-medical: None   Tobacco Use    Smoking status: Never Smoker    Smokeless tobacco: Never Used   Substance and Sexual Activity    Alcohol use: No    Drug use: No    Sexual activity: None   Lifestyle    Physical activity     Days per week: None     Minutes per session: None    Stress: None   Relationships    Social connections     Talks on phone: None     Gets together: None     Attends Christianity service: None     Active member of club or organization: None     Attends meetings of clubs or organizations: None     Relationship status: None    Intimate partner violence     Fear of current or ex partner: None     Emotionally abused: None     Physically abused: None     Forced sexual activity: None   Other Topics Concern    None   Social History Narrative    None       Allergies   Allergen Reactions    Demerol Nausea And Vomiting    Reclast [Zoledronic Acid] Other (See Comments)     Patient has a intolerance to this medication . It makes her feel \"out of it\"     Ipratropium Bromide Hfa Palpitations    Abatacept Other (See Comments)     Heart palpitations    Buspirone     Celexa [Citalopram Hydrobromide] Nausea Only     Nausea and headache    Colchicine     Dye [Barium-Containing Compounds] Nausea Only    Dye [Iodides] Itching     CONTRAST DYE, CT DYE, IV CONTRAST     Evolocumab      Other reaction(s): Irritability  Repatha SureClick    Fenofibrate      Chest pain    Hydrocodone-Acetaminophen Other (See Comments)     Shaking, attacks nervous system    Lasix [Furosemide] Nausea Only    Levofloxacin      palpitations    Losartan      Nausea and dizziness    Methotrexate Other (See Comments)     Dizzy and fainted    Mirtazapine      Tremor, nausea, headache    Neurontin [Gabapentin] Nausea Only     Dizziness      Nitroglycerin Other (See Comments)     Passed out    Plaquenil [Hydroxychloroquine Sulfate] Other (See Comments)     nervousness    Trazodone      Muscle stiffness    Cozaar [Losartan Potassium] Palpitations    Esomeprazole Magnesium Nausea And Vomiting    Lisinopril Other (See Comments)     Muscle cramps in legs    Prilosec [Omeprazole] Nausea And Vomiting       Current Outpatient Medications   Medication Sig Dispense Refill    ZINC PO Take by mouth daily      acetaminophen-codeine (TYLENOL #3) 300-30 MG per tablet nightly as needed.        metoprolol succinate (TOPROL XL) 50 MG extended release tablet TAKE 1 TABLET TWICE DAILY 180 tablet 3    olmesartan-hydroCHLOROthiazide (BENICAR HCT) 40-25 MG per tablet Take 0.5 tablets by mouth daily Was holding due to low bp but now is taking randomly as bp is elevated. They are thinking needs daily again (Patient taking differently: Take 0.5 tablets by mouth nightly Was holding due to low bp but now is taking randomly as bp is elevated. They are thinking needs daily again) 30 tablet 5    bumetanide (BUMEX) 1 MG tablet Take 1 tablet by mouth daily as needed (swelling) 30 tablet 3    Omega-3 Fatty Acids (FISH OIL) 1360 MG CAPS Take 1 capsule by mouth 2 times daily       Evolocumab (REPATHA) 140 MG/ML SOSY Inject 140 mg into the skin every 14 days Patient has not started      vitamin D (CHOLECALCIFEROL) 1000 UNIT TABS tablet 1,000 Units 2 times daily       allopurinol (ZYLOPRIM) 300 MG tablet Take 300 mg by mouth daily.  aspirin 81 MG EC tablet Take 81 mg by mouth daily.  loratadine (CLARITIN) 10 MG tablet Take 10 mg by mouth 2 times daily       Multiple Vitamin (MULTIVITAMIN PO) Take 1 tablet by mouth daily        No current facility-administered medications for this visit. Review of Systems   Constitutional: Positive for fatigue. Negative for appetite change, fever and unexpected weight change. HENT: Negative for sore throat, trouble swallowing and voice change. Respiratory: Negative for cough and shortness of breath. Cardiovascular: Negative for chest pain, palpitations and leg swelling. Gastrointestinal: Positive for abdominal pain, anal bleeding and constipation. Negative for abdominal distention, blood in stool, diarrhea, nausea, rectal pain and vomiting. Genitourinary: Negative for hematuria. Musculoskeletal: Positive for arthralgias, back pain and neck pain. Neurological: Negative for dizziness, weakness, light-headedness and headaches. Psychiatric/Behavioral: Negative for dysphoric mood and sleep disturbance. The patient is nervous/anxious. All other systems reviewed and are negative. Objective:     Physical Exam  Vitals signs and nursing note reviewed.    Constitutional: Appearance: She is well-developed. Comments: /78   Pulse 92   Ht 5' 7\" (1.702 m)   Wt 183 lb 12.8 oz (83.4 kg)   SpO2 98%   BMI 28.79 kg/m²    Eyes:      General: No scleral icterus. Conjunctiva/sclera: Conjunctivae normal.      Pupils: Pupils are equal, round, and reactive to light. Neck:      Musculoskeletal: Normal range of motion and neck supple. Thyroid: No thyromegaly. Cardiovascular:      Rate and Rhythm: Normal rate and regular rhythm. Heart sounds: Normal heart sounds. No murmur. No friction rub. No gallop. Pulmonary:      Effort: Pulmonary effort is normal. No respiratory distress. Breath sounds: Normal breath sounds. Abdominal:      General: Bowel sounds are normal. There is no distension. Palpations: Abdomen is soft. Tenderness: There is abdominal tenderness. There is no rebound. Comments: TTP on exam   Musculoskeletal: Normal range of motion. General: No deformity. Neurological:      Mental Status: She is alert and oriented to person, place, and time. Cranial Nerves: No cranial nerve deficit. Psychiatric:         Judgment: Judgment normal.           Assessment:       Diagnosis Orders   1. Abdominal pain, unspecified abdominal location  COLONOSCOPY W/ OR W/O BIOPSY   2. Change in bowel habits  COLONOSCOPY W/ OR W/O BIOPSY   3. Constipation, unspecified constipation type  COLONOSCOPY W/ OR W/O BIOPSY   4. BRBPR (bright red blood per rectum)  COLONOSCOPY W/ OR W/O BIOPSY         Plan:      1. miralax 1-2x (mixed in 8 ounces of liquid)daily for constipation. 2. Schedule outpatient colonoscopy. Patient advised no Aspirin, Fish Oil, Vit E or NSAIDs 5 (five) days before procedure. Follow-up Visit: per Dr Aide Morley  Pt education:  Risks, benefits, and alternatives to colonoscopy were discussed. Risks of colonoscopy include, but are not limited to, perforation, bleeding, and infection.   We discussed that the risk for perforation is 1-3 in 5,000  at the time of colonoscopy;   and 1-2% risk of bleeding post-polypectomy. All questions answered to the satisfaction of the patient. Pt is agreeable to proceed.   3. F/u after colonoscopy if miralax isnt effective

## 2021-05-12 NOTE — PATIENT INSTRUCTIONS
You are going to have a colonoscopy and here are some instructions: You will be given specific directions regarding restrictions to diet and bowel prep instructions including laxatives. Please read these instructions one week prior to your scheduled procedure to ensure that you are prepared. Follow prep instructions provided for bowel prep. Take all of the bowel prep as directed. If you are having problems with nausea, stop your prep for 30-45 min to allow the nausea to subside before resuming your prep. Nothing to eat or drink after midnight the day of the procedure EXCEPT:  PLEASE TAKE MEDICATION(S) FOR HIGH BLOOD PRESSURE, THYROID, SEIZURES, AND HEART THE MORNING OF THE PROCEDURE WITH A SIP OF WATER  AT LEAST 2 HOURS PRIOR TO ARRIVAL TIME.   YOU MAY ALSO TAKE ANY INHALERS YOU ARE PRESCRIBED. You will not be able to drive for 24 hours after the procedure due to sedation. Bring a  with you the day of the procedure. No aspirin, ibuprofen, naproxen, fish oil or vitamin E for 5 days before procedure. If you are on blood thinners, clearance from the prescribing physician will be obtained before your procedure is scheduled. [email or web address] may be associated with discontinuation of your blood thinner and include, but not limited to, stroke, TIA, or cardiac event. If polyps are removed during the procedure they will be sent to a pathologist for analysis. You will be notified by mail of the pathology results in 2-3 weeks. Your physician may also schedule a follow up appointment with the nurse practitioner to discuss pathology, symptoms or to check if you have had any problems related to your procedure. If you prefer not to return to the office after your procedure please discuss this with your physician on the day of your colonoscopy. Final recommendations are based on the pathologist report.      Your diet before a colonoscopy bowel preparation is very important to ensure a successful colon exam. It is recommended to consider certain changes to your diet three to four days prior to the procedure. Remember that your bowels need to be empty for the exam.    What foods are good to eat? Cut down on heavy solid foods three to four days before the procedure and start introducing lighter meals to your diet. The following food suggestions are a good part of your diet before a colonoscopy bowel preparation. Light meat that is easily digestible such as chicken (without the skin)   Potatoes without skin   Cheese   Eggs   A light meal of steamed white fish   Light clear soups    Foods and drinks to avoid  Avoid foods that contain too much fiber. Stay clear of dark colored beverages. They can stick to the walls of the digestive tract and make it difficult to differentiate from blood. Some of these foods are:  Red meat, rice, nuts and vegetables   Milk, other milk based fluids and cream   Most fruit and puddings   Whole grain pasta   Cereals, bran and seeds   Colored beverages, especially those that are red or purple in color   Red colored Jell-O   On the day before the colonoscopy, continue to drink plenty of clear fluids. It is important   to keep yourself hydrated before the exam.     Please follow all instructions as provided for cleansing the bowel. Failure to have an adequately prepped colon may cause you to have incomplete exam with further testing required.

## 2021-05-26 ENCOUNTER — APPOINTMENT (OUTPATIENT)
Dept: MAMMOGRAPHY | Facility: HOSPITAL | Age: 76
End: 2021-05-26

## 2021-05-27 ENCOUNTER — HOSPITAL ENCOUNTER (OUTPATIENT)
Dept: ULTRASOUND IMAGING | Facility: HOSPITAL | Age: 76
Discharge: HOME | End: 2021-05-27

## 2021-05-27 ENCOUNTER — HOSPITAL ENCOUNTER (OUTPATIENT)
Dept: CT IMAGING | Facility: HOSPITAL | Age: 76
Discharge: HOME | End: 2021-05-27

## 2021-05-27 ENCOUNTER — HOSPITAL ENCOUNTER (OUTPATIENT)
Dept: MAMMOGRAPHY | Facility: HOSPITAL | Age: 76
Discharge: HOME | End: 2021-05-27

## 2021-05-27 DIAGNOSIS — Z12.31 ENCOUNTER FOR SCREENING MAMMOGRAM FOR MALIGNANT NEOPLASM OF BREAST: ICD-10-CM

## 2021-05-27 DIAGNOSIS — R22.1 LOCALIZED SWELLING, MASS AND LUMP, NECK: ICD-10-CM

## 2021-05-27 DIAGNOSIS — R51.9 HEADACHE, UNSPECIFIED HEADACHE TYPE: ICD-10-CM

## 2021-05-27 PROCEDURE — 77067 SCR MAMMO BI INCL CAD: CPT

## 2021-05-27 PROCEDURE — 77063 BREAST TOMOSYNTHESIS BI: CPT

## 2021-05-27 PROCEDURE — 93880 EXTRACRANIAL BILAT STUDY: CPT

## 2021-05-27 PROCEDURE — 70450 CT HEAD/BRAIN W/O DYE: CPT

## 2021-05-27 PROCEDURE — 76536 US EXAM OF HEAD AND NECK: CPT

## 2021-05-27 PROCEDURE — 93880 EXTRACRANIAL BILAT STUDY: CPT | Performed by: SURGERY

## 2021-06-02 ENCOUNTER — ANESTHESIA (OUTPATIENT)
Dept: OPERATING ROOM | Age: 76
End: 2021-06-02

## 2021-06-02 ENCOUNTER — APPOINTMENT (OUTPATIENT)
Dept: OPERATING ROOM | Age: 76
End: 2021-06-02

## 2021-06-02 ENCOUNTER — HOSPITAL ENCOUNTER (OUTPATIENT)
Age: 76
Setting detail: OUTPATIENT SURGERY
Discharge: HOME | End: 2021-06-02
Attending: INTERNAL MEDICINE | Admitting: INTERNAL MEDICINE
Payer: MEDICARE

## 2021-06-02 ENCOUNTER — ANESTHESIA EVENT (OUTPATIENT)
Dept: OPERATING ROOM | Age: 76
End: 2021-06-02

## 2021-06-02 VITALS
HEIGHT: 67 IN | WEIGHT: 176 LBS | OXYGEN SATURATION: 98 % | BODY MASS INDEX: 27.62 KG/M2 | RESPIRATION RATE: 18 BRPM | HEART RATE: 79 BPM | TEMPERATURE: 98.8 F | SYSTOLIC BLOOD PRESSURE: 124 MMHG | DIASTOLIC BLOOD PRESSURE: 68 MMHG

## 2021-06-02 VITALS — SYSTOLIC BLOOD PRESSURE: 129 MMHG | DIASTOLIC BLOOD PRESSURE: 59 MMHG | OXYGEN SATURATION: 100 %

## 2021-06-02 PROCEDURE — G8907 PT DOC NO EVENTS ON DISCHARG: HCPCS

## 2021-06-02 PROCEDURE — G8918 PT W/O PREOP ORDER IV AB PRO: HCPCS

## 2021-06-02 PROCEDURE — 45378 DIAGNOSTIC COLONOSCOPY: CPT

## 2021-06-02 PROCEDURE — 45378 DIAGNOSTIC COLONOSCOPY: CPT | Performed by: INTERNAL MEDICINE

## 2021-06-02 RX ORDER — SODIUM CHLORIDE 9 MG/ML
INJECTION, SOLUTION INTRAVENOUS CONTINUOUS
Status: DISCONTINUED | OUTPATIENT
Start: 2021-06-02 | End: 2021-06-02 | Stop reason: HOSPADM

## 2021-06-02 RX ORDER — LIDOCAINE HYDROCHLORIDE 10 MG/ML
INJECTION, SOLUTION INFILTRATION; PERINEURAL PRN
Status: DISCONTINUED | OUTPATIENT
Start: 2021-06-02 | End: 2021-06-02 | Stop reason: SDUPTHER

## 2021-06-02 RX ORDER — PROPOFOL 10 MG/ML
INJECTION, EMULSION INTRAVENOUS PRN
Status: DISCONTINUED | OUTPATIENT
Start: 2021-06-02 | End: 2021-06-02 | Stop reason: SDUPTHER

## 2021-06-02 RX ADMIN — SODIUM CHLORIDE: 9 INJECTION, SOLUTION INTRAVENOUS at 09:24

## 2021-06-02 RX ADMIN — PROPOFOL 200 MG: 10 INJECTION, EMULSION INTRAVENOUS at 10:15

## 2021-06-02 RX ADMIN — LIDOCAINE HYDROCHLORIDE 40 MG: 10 INJECTION, SOLUTION INFILTRATION; PERINEURAL at 10:15

## 2021-06-02 ASSESSMENT — ENCOUNTER SYMPTOMS: SHORTNESS OF BREATH: 1

## 2021-06-02 NOTE — ANESTHESIA POSTPROCEDURE EVALUATION
Department of Anesthesiology  Postprocedure Note    Patient: Rosalina Fowler  MRN: [id number]  YOB: 1945  Date of evaluation: 6/2/2021  Time:  10:18 AM     Procedure Summary     Date: 06/02/21 Room / Location: [facility] ENDO 02 / [address] [place] [address]    Anesthesia Start: 7000 Anesthesia Stop:     Procedure: COLONOSCOPY DIAGNOSTIC (N/A Abdomen) Diagnosis: (RB, CHANGE IN BOWEL HABITS)    Surgeons: Eleazar Pressley MD Responsible Provider: ULISES Vergara CRNA    Anesthesia Type: general, TIVA ASA Status: 3          Anesthesia Type: No value filed. Sarah Phase I:      Sarah Phase II:      Last vitals: Reviewed and per EMR flowsheets.        Anesthesia Post Evaluation    Patient location during evaluation: bedside  Patient participation: complete - patient participated  Level of consciousness: sleepy but conscious  Pain score: 0  Airway patency: patent  Nausea & Vomiting: no nausea and no vomiting  Complications: no  Cardiovascular status: blood pressure returned to baseline  Respiratory status: acceptable, room air and spontaneous ventilation  Hydration status: euvolemic

## 2021-06-02 NOTE — PROGRESS NOTES
DR. Faulkner IN TO SEE PT/FAMILY TO DISCUSS RESULTS OF PROCEDURE. PT AND FAMILY VERBALIZED UNDERSTANDING OF DISCHARGE INSTRUCTIONS.

## 2021-06-02 NOTE — H&P
constipation type     4. BRBPR (bright red blood per rectum)          []Abdominal Pain   []Cancer- GI/Lung     []Fhx of colon CA/polyps  []History of Polyps  []Barretts            []Melena  []Abnormal Imaging              []Dysphagia              []Persistent Pneumonia   []Anemia                            []Food Impaction        []History of Polyps  [] GI Bleed             []Pulmonary nodule/Mass   []Change in bowel habits []Heartburn/Reflux  []Rectal Bleed (BRBPR)  []Chest Pain - Non Cardiac []Heme (+) Stool []Ulcers  []Constipation  []Hemoptysis  []Varices  []Diarrhea  []Hypoxemia    []Nausea/Vomiting   []Screening   []Crohns/Colitis  []Other:     Anesthesia:   [x] MAC [] Moderate Sedation   [] General   [] None     ROS: 12 pt Review of Symptoms was negative unless mentioned above    Medications:   Prior to Admission medications    Medication Sig Start Date End Date Taking? Authorizing Provider   ZINC PO Take by mouth daily   Yes Historical Provider, MD   metoprolol succinate (TOPROL XL) 50 MG extended release tablet TAKE 1 TABLET TWICE DAILY 2/8/21  Yes ULISES Farris - CNP   olmesartan-hydroCHLOROthiazide (BENICAR HCT) 40-25 MG per tablet Take 0.5 tablets by mouth daily Was holding due to low bp but now is taking randomly as bp is elevated. They are thinking needs daily again  Patient taking differently: Take 0.5 tablets by mouth nightly Was holding due to low bp but now is taking randomly as bp is elevated.  They are thinking needs daily again 10/23/20  Yes ULISES Vidal   bumetanide (BUMEX) 1 MG tablet Take 1 tablet by mouth daily as needed (swelling) 8/6/20  Yes ULISES Green   Omega-3 Fatty Acids (FISH OIL) 1360 MG CAPS Take 1 capsule by mouth 2 times daily    Yes Historical Provider, MD   Evolocumab (REPATHA) 140 MG/ML SOSY Inject 140 mg into the skin every 14 days Patient has not started   Yes Historical Provider, MD   vitamin D (CHOLECALCIFEROL) 1000 UNIT TABS tablet 1,000 Units 2 times daily    Yes Historical Provider, MD   allopurinol (ZYLOPRIM) 300 MG tablet Take 300 mg by mouth daily. Yes Historical Provider, MD   loratadine (CLARITIN) 10 MG tablet Take 10 mg by mouth 2 times daily    Yes Historical Provider, MD   Multiple Vitamin (MULTIVITAMIN PO) Take 1 tablet by mouth daily    Yes Historical Provider, MD   acetaminophen-codeine (TYLENOL #3) 300-30 MG per tablet nightly as needed. 3/29/21   Historical Provider, MD   aspirin 81 MG EC tablet Take 81 mg by mouth daily. Historical Provider, MD       Past Medical History:  Past Medical History:   Diagnosis Date    Acid reflux     Anxiety     Arthritis     Back pain     CAD (coronary artery disease)     CAD (coronary artery disease)     Carotid artery occlusion     Chronic venous insufficiency 2012    Cough     Fibromyalgia     Goiter     Gout     Head ache     headaches    Heart murmur     History of colon polyps     History of colon polyps     Hoarseness     Hypercholesterolemia     Hyperlipidemia     Hypertension     Insomnia     Irregular heart beat     Itching     Muscle cramp     Neck pain     Osteoarthritis     Osteoporosis     Palpitations     RA (rheumatoid arthritis) (HCC)     Rash     Rectal bleeding     SOB (shortness of breath)     Sore throat     Tympanic membrane perforation     Varicose veins 2012       Past Surgical History:  Past Surgical History:   Procedure Laterality Date    APPENDECTOMY      BREAST SURGERY  2002    CARDIAC CATHETERIZATION  9/10/15  JDT    EF 50%    CARDIAC CATHETERIZATION  09/10/2020    LM disease- sent for bypass     CERVICAL FUSION       SECTION  1968    x 1    CHOLECYSTECTOMY  2012    [place]    COLONOSCOPY  1-    Dr LUJAN    COLONOSCOPY  2014    Dr. Romel Neri  2018    Dr. Brian Arce in [place]- Polyps-benign,internal Hemorrhoids, per patient.    [address] 2012    Dr Khanh Barth N/A 9/11/2020    CORONARY ARTERY BYPASS GRAFT X2 WITH LEFT INTERNAL MAMMARY ARTERY WITH OPEN VEIN HARVESTING WITH PERFUSION TRANSESOPHAGEAL ECHOCARDIOGRAM performed by Cortney Sinclair MD at [facility]    partial    KNEE SURGERY  2011    left knee surgery    PTCA      stent    TUBAL LIGATION      UPPER GASTROINTESTINAL ENDOSCOPY  2009?  UPPER GASTROINTESTINAL ENDOSCOPY  1-    Dr LUJAN    UPPER GASTROINTESTINAL ENDOSCOPY  05/06/2014    Dr. Carlos Bosch  2018    Dr. Brian Arce in [facility]-Gastritis per patient.  VARICOSE VEIN SURGERY   01- [facility]    Left Leg Ablation    VARICOSE VEIN SURGERY  3-  [facility]    right leg ablation       Social History:  Social History     Tobacco Use    Smoking status: Never Smoker    Smokeless tobacco: Never Used   Vaping Use    Vaping Use: Never used   Substance Use Topics    Alcohol use: No    Drug use: No       Vital Signs:   Vitals:    06/02/21 0854   BP: 135/61   Pulse: 85   Resp: 18   Temp: 98.8 °F (37.1 °C)   SpO2: 100%        Physical Exam:  Cardiac:  [x]WNL  []Comments:  Pulmonary:  [x]WNL   []Comments:  Neuro/Mental Status:  [x]WNL  []Comments:  Abdominal:  [x]WNL    []Comments:  Other:   []WNL  []Comments:    Informed Consent:  The risks and benefits of the procedure have been discussed with either the patient or if they cannot consent, their representative. Assessment:  Patient examined and appropriate for planned sedation and procedure. Plan:  Proceed with planned sedation and procedure as above.          Minerva Lennox, MD

## 2021-06-02 NOTE — OP NOTE
Patient: Lewis Valentin: 1945  Med Rec#: [id number] Acc#: [id number]   Primary Care Provider Cristine Manjarrez MD    Date of Procedure:  6/2/2021    Endoscopist: Casper Hunter MD, MD    Referring Provider: Cristine Manjarrez MD,     Operation Performed: Colonoscopy up to the terminal ileum    Indications:   1. Abdominal pain, unspecified abdominal location     2. Change in bowel habits     3. Constipation, unspecified constipation type     4. BRBPR (bright red blood per rectum)         Anesthesia:  Sedation was administered by anesthesia who monitored the patient during the procedure. I met with [facility] prior to procedure. We discussed the procedure itself, and I have discussed the risks of endoscopy (including-- but not limited to-- pain, discomfort, bleeding potentially requiring second endoscopic procedure and/or blood transfusion, organ perforation requiring operative repair, damage to organs near the colon, infection, aspiration, cardiopulmonary/allergic reaction), benefits, indications to endoscopy. Additionally, we discussed options other than colonoscopy. The patient expressed understanding. All questions answered. The patient decided to proceed with the procedure. Signed informed consent was placed on the chart. Blood Loss: minimal    Withdrawal time: More than 6-minute  Bowel Prep: adequate and good    Complications: no immediate complications    DESCRIPTION OF PROCEDURE:     A time out was performed. After written informed consent was obtained, the patient was placed in the left lateral position. The perianal area was inspected, and a digital rectal exam was performed. A rectal exam was performed: normal tone, no palpable lesions. At this point, a forward viewing Olympus colonoscope was inserted into the anus and carefully advanced to the terminal ileum. The cecum was identified by the ileocecal valve and the appendiceal orifice.  The colonoscope was then slowly withdrawn with careful inspection of the mucosa in a linear and circumferential fashion. The scope was retroflexed in the rectum. Suction was utilized during the procedure to remove as much air as possible from the bowel. The colonoscope was removed from the patient, and the procedure was terminated. Findings are listed below. Findings:   Grade 2 internal hemorrhoids without any bleeding stigmata at this time but most likely source of her recent bright red blood per rectum. Otherwise, the mucosa appeared normal throughout the entire examined colon and the examined terminal ileum. NO large polyps or masses or strictures or colitis or evidence of overt IBD. Retroflexion in the rectum was otherwise normal and revealed no further abnormalities     Recommendations:  1. Repeat colonoscopy: No routine colonoscopies for colorectal cancer screening due to her age     3. Avoid constipation; may use MiraLAX 17 g p.o. once or twice daily for constipation. High-fiber diet. - Resume previous meds and diet  - GI clinic f/u 4 weeks with Ms. Celeste  - Keep scheduled f/u appts with other MDs     Findings and recommendations were discussed w/ the patient. A copy of the images was provided.     Casper Hunter MD, MD  6/2/2021  10:12 AM

## 2021-06-02 NOTE — ANESTHESIA PRE PROCEDURE
Department of Anesthesiology  Preprocedure Note       Name:  Vipul Chow   Age:  64 y.o.  :  1945                                          MRN:  [id number]         Date:  2021      Surgeon: Luis López):  Bridgette Dickey MD    Procedure: Procedure(s):  COLONOSCOPY DIAGNOSTIC    Medications prior to admission:   Prior to Admission medications    Medication Sig Start Date End Date Taking? Authorizing Provider   ZINC PO Take by mouth daily   Yes Historical Provider, MD   metoprolol succinate (TOPROL XL) 50 MG extended release tablet TAKE 1 TABLET TWICE DAILY 21  Yes ULISES Farris - CNP   olmesartan-hydroCHLOROthiazide (BENICAR HCT) 40-25 MG per tablet Take 0.5 tablets by mouth daily Was holding due to low bp but now is taking randomly as bp is elevated. They are thinking needs daily again  Patient taking differently: Take 0.5 tablets by mouth nightly Was holding due to low bp but now is taking randomly as bp is elevated. They are thinking needs daily again 10/23/20  Yes ULISES Beltrán   bumetanide (BUMEX) 1 MG tablet Take 1 tablet by mouth daily as needed (swelling) 20  Yes ULISES Silva   Omega-3 Fatty Acids (FISH OIL) 1360 MG CAPS Take 1 capsule by mouth 2 times daily    Yes Historical Provider, MD   Evolocumab (REPATHA) 140 MG/ML SOSY Inject 140 mg into the skin every 14 days Patient has not started   Yes Historical Provider, MD   vitamin D (CHOLECALCIFEROL) 1000 UNIT TABS tablet 1,000 Units 2 times daily    Yes Historical Provider, MD   allopurinol (ZYLOPRIM) 300 MG tablet Take 300 mg by mouth daily. Yes Historical Provider, MD   loratadine (CLARITIN) 10 MG tablet Take 10 mg by mouth 2 times daily    Yes Historical Provider, MD   Multiple Vitamin (MULTIVITAMIN PO) Take 1 tablet by mouth daily    Yes Historical Provider, MD   acetaminophen-codeine (TYLENOL #3) 300-30 MG per tablet nightly as needed.   3/29/21   Historical Provider, MD   aspirin 81 MG EC tablet Take 81 mg by mouth daily. Historical Provider, MD       Current medications:    Current Facility-Administered Medications   Medication Dose Route Frequency Provider Last Rate Last Admin    0.9 % sodium chloride infusion   Intravenous Continuous Savanna Davis  mL/hr at 06/02/21 0924 New Bag at 06/02/21 0924       Allergies: Allergies   Allergen Reactions    Demerol Nausea And Vomiting    Reclast [Zoledronic Acid] Other (See Comments)     Patient has a intolerance to this medication .  It makes her feel \"out of it\"     Ipratropium Bromide Hfa Palpitations    Abatacept Other (See Comments)     Heart palpitations    Buspirone     Celexa [Citalopram Hydrobromide] Nausea Only     Nausea and headache    Colchicine     Dye [Barium-Containing Compounds] Nausea Only    Dye [Iodides] Itching     CONTRAST DYE, CT DYE, IV CONTRAST     Evolocumab      Other reaction(s): Irritability  Repatha [employer]    Fenofibrate      Chest pain    Hydrocodone-Acetaminophen Other (See Comments)     Shaking, attacks nervous system    Lasix [Furosemide] Nausea Only    Levofloxacin      palpitations    Losartan      Nausea and dizziness    Methotrexate Other (See Comments)     Dizzy and fainted    Mirtazapine      Tremor, nausea, headache    Neurontin [Gabapentin] Nausea Only     Dizziness      Nitroglycerin Other (See Comments)     Passed out    Plaquenil [Hydroxychloroquine Sulfate] Other (See Comments)     nervousness    Trazodone      Muscle stiffness    Cozaar [Losartan Potassium] Palpitations    Esomeprazole Magnesium Nausea And Vomiting    Lisinopril Other (See Comments)     Muscle cramps in legs    Prilosec [Omeprazole] Nausea And Vomiting       Problem List:    Patient Active Problem List   Diagnosis Code    Coronary artery disease involving native coronary artery of native heart with angina pectoris with documented spasm ([facility] I25.111    Essential hypertension I10    Carotid artery stenosis I65.29    Leg pain M79.606    Leg swelling M79.89    Chronic venous insufficiency I87.2    Varicose veins I83.90    Spider veins I78.1    Gastroesophageal reflux disease without esophagitis K21.9    Internal hemorrhoids without complication I79.8    Bleeding internal hemorrhoids K64.8    Precordial pain R07.2    Irritable bowel syndrome with diarrhea K58.0    Internal hemorrhoids M96.1    Periumbilical abdominal pain R10.33    Mixed hyperlipidemia E78.2    H/O right coronary artery stent placement Z95.5    Overactive bladder N32.81    Urinary tract infection with hematuria N39.0, R31.9    Allergic reaction T78.40XA    Gout M10.9    Myalgia due to statin M79.10, T46.6X5A    Bloating R14.0    Abdominal pain R10.9    Change in bowel habits R19.4    Constipation K59.00    BRBPR (bright red blood per rectum) K62.5       Past Medical History:        Diagnosis Date    Acid reflux     Anxiety     Arthritis     Back pain     CAD (coronary artery disease)     CAD (coronary artery disease)     Carotid artery occlusion     Chronic venous insufficiency 11/8/2012    Cough     Fibromyalgia     Goiter     Gout     Head ache     headaches    Heart murmur     History of colon polyps     History of colon polyps     Hoarseness     Hypercholesterolemia     Hyperlipidemia     Hypertension     Insomnia     Irregular heart beat     Itching     Muscle cramp     Neck pain     Osteoarthritis     Osteoporosis     Palpitations     RA (rheumatoid arthritis) (HCC)     Rash     Rectal bleeding     SOB (shortness of breath)     Sore throat     Tympanic membrane perforation     Varicose veins 11/8/2012       Past Surgical History:        Procedure Laterality Date    APPENDECTOMY      BREAST SURGERY  2002    CARDIAC CATHETERIZATION  9/10/15  JDT    EF 50%    CARDIAC CATHETERIZATION  09/10/2020    LM disease- sent for bypass     CERVICAL FUSION  1999   [address] [place] [address] 231    x 1    06/02/21 176 lb (79.8 kg)   05/12/21 183 lb 12.8 oz (83.4 kg)   05/11/21 180 lb 3.2 oz (81.7 kg)     Body mass index is 27.57 kg/m². CBC:   Lab Results   Component Value Date    WBC 8.6 03/19/2021    RBC 4.19 03/19/2021    HGB 13.1 03/19/2021    HCT 40.6 03/19/2021    MCV 96.9 03/19/2021    RDW 14.7 03/19/2021     03/19/2021       CMP:   Lab Results   Component Value Date     03/19/2021    K 4.3 03/19/2021     03/19/2021    CO2 27 03/19/2021    BUN 24 03/19/2021    CREATININE 0.9 03/19/2021    GFRAA >59 03/19/2021    LABGLOM >60 03/19/2021    GLUCOSE 125 03/19/2021    PROT 6.7 03/19/2021    CALCIUM 11.0 03/19/2021    BILITOT <0.2 03/19/2021    ALKPHOS 75 03/19/2021    AST 31 03/19/2021    ALT 24 03/19/2021       POC Tests: No results for input(s): POCGLU, POCNA, POCK, POCCL, POCBUN, POCHEMO, POCHCT in the last 72 hours.     Coags:   Lab Results   Component Value Date    PROTIME 18.1 09/11/2020    INR 1.49 09/11/2020    APTT 28.9 09/11/2020       HCG (If Applicable): No results found for: PREGTESTUR, PREGSERUM, HCG, HCGQUANT     ABGs:   Lab Results   Component Value Date    PHART 7.340 09/12/2020    PO2ART 57.0 09/12/2020    XTM7MZB 44.0 09/12/2020    BNK3YTQ 23.7 09/12/2020    BEART -2.1 09/12/2020    I0ILWVDV 91.0 09/12/2020        Type & Screen (If Applicable):  No results found for: LABABO, LABRH    Drug/Infectious Status (If Applicable):  No results found for: HIV, HEPCAB    COVID-19 Screening (If Applicable):   Lab Results   Component Value Date    COVID19 NOT DETECTED 09/04/2020           Anesthesia Evaluation  Patient summary reviewed and Nursing notes reviewed  Airway: Mallampati: II  TM distance: >3 FB   Neck ROM: full  Mouth opening: > = 3 FB Dental: normal exam         Pulmonary:normal exam    (+) shortness of breath:                             Cardiovascular:    (+) hypertension:, angina:, CAD:, dysrhythmias:,          Beta Blocker:  Dose within 24 Hrs         Neuro/Psych:   (+) neuromuscular disease:, headaches:,             GI/Hepatic/Renal: Neg GI/Hepatic/Renal ROS  (+) GERD:,           Endo/Other:    (+) : arthritis: rheumatoid. , .                 Abdominal:           Vascular: negative vascular ROS. Anesthesia Plan      general and TIVA     ASA 3       Induction: intravenous. Anesthetic plan and risks discussed with patient. Plan discussed with CRNA.                   ULISES Stanton - CRNA   6/2/2021

## 2021-06-09 ENCOUNTER — TRANSCRIBE ORDERS (OUTPATIENT)
Dept: ADMINISTRATIVE | Facility: HOSPITAL | Age: 76
End: 2021-06-09

## 2021-06-09 ENCOUNTER — HOSPITAL ENCOUNTER (OUTPATIENT)
Dept: GENERAL RADIOLOGY | Facility: HOSPITAL | Age: 76
Discharge: HOME | End: 2021-06-09
Admitting: INTERNAL MEDICINE

## 2021-06-09 DIAGNOSIS — M17.11 PRIMARY OSTEOARTHRITIS OF RIGHT KNEE: ICD-10-CM

## 2021-06-09 DIAGNOSIS — M17.11 PRIMARY OSTEOARTHRITIS OF RIGHT KNEE: Primary | ICD-10-CM

## 2021-06-09 PROCEDURE — 73562 X-RAY EXAM OF KNEE 3: CPT

## 2021-06-09 PROCEDURE — 73502 X-RAY EXAM HIP UNI 2-3 VIEWS: CPT

## 2021-06-15 ENCOUNTER — TRANSCRIBE ORDERS (OUTPATIENT)
Dept: ADMINISTRATIVE | Facility: HOSPITAL | Age: 76
End: 2021-06-15

## 2021-06-15 DIAGNOSIS — M25.50 PAIN IN JOINT, MULTIPLE SITES: ICD-10-CM

## 2021-06-15 DIAGNOSIS — M25.561 RIGHT KNEE PAIN, UNSPECIFIED CHRONICITY: Primary | ICD-10-CM

## 2021-06-15 DIAGNOSIS — M25.511 RIGHT SHOULDER PAIN, UNSPECIFIED CHRONICITY: ICD-10-CM

## 2021-07-02 ENCOUNTER — HOSPITAL ENCOUNTER (OUTPATIENT)
Dept: GENERAL RADIOLOGY | Facility: HOSPITAL | Age: 76
Discharge: HOME | End: 2021-07-02

## 2021-07-02 ENCOUNTER — HOSPITAL ENCOUNTER (OUTPATIENT)
Dept: MRI IMAGING | Facility: HOSPITAL | Age: 76
Discharge: HOME | End: 2021-07-02

## 2021-07-02 DIAGNOSIS — M25.561 RIGHT KNEE PAIN, UNSPECIFIED CHRONICITY: ICD-10-CM

## 2021-07-02 DIAGNOSIS — M25.511 RIGHT SHOULDER PAIN, UNSPECIFIED CHRONICITY: ICD-10-CM

## 2021-07-02 DIAGNOSIS — M25.50 PAIN IN JOINT, MULTIPLE SITES: ICD-10-CM

## 2021-07-02 PROCEDURE — 73721 MRI JNT OF LWR EXTRE W/O DYE: CPT

## 2021-07-02 PROCEDURE — 73030 X-RAY EXAM OF SHOULDER: CPT

## 2021-07-16 ENCOUNTER — OFFICE VISIT (OUTPATIENT)
Dept: UROLOGY | Age: 76
End: 2021-07-16
Payer: MEDICARE

## 2021-07-16 DIAGNOSIS — N39.41 URGE INCONTINENCE: Primary | ICD-10-CM

## 2021-07-16 DIAGNOSIS — N32.81 OAB (OVERACTIVE BLADDER): ICD-10-CM

## 2021-07-16 LAB
BACTERIA URINE, POC: ABNORMAL
BILIRUBIN URINE: 0 MG/DL
BLOOD, URINE: NEGATIVE
CASTS URINE, POC: ABNORMAL
CLARITY: CLEAR
COLOR: YELLOW
CRYSTALS URINE, POC: ABNORMAL
EPI CELLS URINE, POC: ABNORMAL
GLUCOSE URINE: ABNORMAL
KETONES, URINE: NEGATIVE
LEUKOCYTE EST, POC: ABNORMAL
NITRITE, URINE: NEGATIVE
PH UA: 7 (ref 4.5–8)
PROTEIN UA: NEGATIVE
RBC URINE, POC: ABNORMAL
SPECIFIC GRAVITY UA: 1.02 (ref 1–1.03)
UROBILINOGEN, URINE: NORMAL
WBC URINE, POC: 2
YEAST URINE, POC: ABNORMAL

## 2021-07-16 PROCEDURE — 4040F PNEUMOC VAC/ADMIN/RCVD: CPT | Performed by: UROLOGY

## 2021-07-16 PROCEDURE — 99214 OFFICE O/P EST MOD 30 MIN: CPT | Performed by: UROLOGY

## 2021-07-16 PROCEDURE — G8400 PT W/DXA NO RESULTS DOC: HCPCS | Performed by: UROLOGY

## 2021-07-16 PROCEDURE — 1123F ACP DISCUSS/DSCN MKR DOCD: CPT | Performed by: UROLOGY

## 2021-07-16 PROCEDURE — 81000 URINALYSIS NONAUTO W/SCOPE: CPT | Performed by: UROLOGY

## 2021-07-16 PROCEDURE — 1090F PRES/ABSN URINE INCON ASSESS: CPT | Performed by: UROLOGY

## 2021-07-16 PROCEDURE — 0509F URINE INCON PLAN DOCD: CPT | Performed by: UROLOGY

## 2021-07-16 PROCEDURE — 1036F TOBACCO NON-USER: CPT | Performed by: UROLOGY

## 2021-07-16 PROCEDURE — G8417 CALC BMI ABV UP PARAM F/U: HCPCS | Performed by: UROLOGY

## 2021-07-16 PROCEDURE — G8427 DOCREV CUR MEDS BY ELIG CLIN: HCPCS | Performed by: UROLOGY

## 2021-07-16 RX ORDER — VIT C/B6/B5/MAGNESIUM/HERB 173 50-5-6-5MG
CAPSULE ORAL 2 TIMES DAILY
COMMUNITY

## 2021-07-16 ASSESSMENT — ENCOUNTER SYMPTOMS
NAUSEA: 0
VOMITING: 0
COUGH: 0
WHEEZING: 0
EYE PAIN: 0
BACK PAIN: 0
SORE THROAT: 0

## 2021-07-16 NOTE — PROGRESS NOTES
HPI:  Patient with symptoms urgency and urge incontinence here for UDS     INDICATION: Urge incontinence      URODYNAMIC PROCEDURE  Please see the complete urodynamic study scanned into the chart    The following studies were carried out :    UROFLOW (Complex): Uroflow study was carried out with spontaneous voiding. See the printed graph / data report  for details. Max Flow 10 mL/s. Average Flow 7 mL/s. Voided volume. 44 mL. PVR 30 mL. Flow Pattern was continuous bell-shaped. Findings are compatible with probably normal but low volume makes this an indeterminate study so no conclusions can be obtained. COMPLEX CYSTOMETROGRAM: with H2O rate 80 ml/min. Normal desire 214 ml. Strong Desire 237 ml.,  Bladder capacity 306 ml. Findings are compatible with normal sensation, low normal capacity, normal compliance. With detrusor overactivity with urge incontinence at 288 mL near capacity    Detrusor hyper-reflexia / over activity present Yes. See printed graph / data Report for details. LEAK POINT PRESSURE: Not performed. INTRA-ABDOMINAL VOIDING PRESSURE: (Pressure - Flow / Micturition Study):  See the graph / data report for details. Max flow 25 mL/s  Average flow 13 mL/s   Voided volume 403 mL  PVR 0 mL  Pressure at peak flow N/A cm H2O a negative member was recorded  Peak pressure 24.5 cm H2O  Mean pressure NA cm H2O a negative number was recorded  Pattern: Continuous bell-shaped  Abdominal straining present No    SPHINCTER EMG:    The findings are compatible with coordinated sphincter Yes      FINAL IMPRESSION: Detrusor overactivity with OAB and urge incontinence. PLAN: Follow-up as scheduled to discuss other management options for urge incontinence.

## 2021-07-16 NOTE — PROGRESS NOTES
Shruti Lawrence is a 64 y.o. female who presents today   Chief Complaint   Patient presents with    Follow-up     I am here to discuss my urodynamic results. I had my test done back in December 2020. Overactive Bladder:  Patient is here today for an overactive bladder which started  year(s) ago. Recently the OAB symptoms: are improving  Current medical Rx for OAB: none  Frequency: 2 hours  Nocturia x 4   Consumption of bladder irritants? no  Incontinence? Stress incontinence: Severity = essentially resolved. Urge Incontinence:  Severity = moderate, overall course: gradually improving. Patient had tried Myrbetriq in the past but could not tolerate this due to problems with her hypertension. She could not tolerate anticholinergic medication due to side effects. She failed posterior tibial nerve stimulation. She had UDS performed July 2020 but she did not follow-up with me because worsening heart condition. She now comes in for follow-up. She says her overall urgency is improved she complains it mostly at night which she describes as a severe cramp or pressure in her suprapubic region associated with urgency and urge incontinence. She does take a diuretic that confounds this.           Past Medical History:   Diagnosis Date    Acid reflux     Anxiety     Arthritis     Back pain     CAD (coronary artery disease)     CAD (coronary artery disease)     Carotid artery occlusion     Chronic venous insufficiency 11/8/2012    Cough     Fibromyalgia     Goiter     Gout     Head ache     headaches    Heart murmur     History of colon polyps     History of colon polyps     Hoarseness     Hypercholesterolemia     Hyperlipidemia     Hypertension     Insomnia     Irregular heart beat     Itching     Muscle cramp     Neck pain     Osteoarthritis     Osteoporosis     Palpitations     RA (rheumatoid arthritis) (HCC)     Rash     Rectal bleeding     SOB (shortness of breath)     Sore throat     Tympanic membrane perforation     Varicose veins 11/8/2012       Past Surgical History:   Procedure Laterality Date    APPENDECTOMY      BREAST SURGERY  2002    CARDIAC CATHETERIZATION  9/10/15  [facility]    EF 50%    CARDIAC CATHETERIZATION  09/10/2020    LM disease- sent for bypass    [address]    x 1    CHOLECYSTECTOMY  2012    [place]    COLONOSCOPY  01/10/2012    Dr LUJAN    COLONOSCOPY  05/06/2014    Dr. Mira Haas  2018    Dr. Susi Garcia in [place]- Polyps-benign,internal Hemorrhoids, per patient.  COLONOSCOPY N/A 06/02/2021    Dr Lauri Morin, Int hemorrhoids Grade 2 otherwise a NORMAL exam, no recall due to age   [address] 2012    Dr Heather Gallegos N/A 09/11/2020    CORONARY ARTERY BYPASS GRAFT X2 WITH LEFT INTERNAL MAMMARY ARTERY WITH OPEN VEIN HARVESTING WITH PERFUSION TRANSESOPHAGEAL ECHOCARDIOGRAM performed by Dina Taylor MD at [facility]    partial    KNEE SURGERY  2011    left knee surgery    PTCA      stent    TUBAL LIGATION      UPPER GASTROINTESTINAL ENDOSCOPY  2009?  UPPER GASTROINTESTINAL ENDOSCOPY  01/10/2012    Dr LUJAN    UPPER GASTROINTESTINAL ENDOSCOPY  05/06/2014    Dr. Latricia Hancock  2018    Dr. Susi Garcia in [facility]-Gastritis per patient.  VARICOSE VEIN SURGERY   01- [facility]    Left Leg Ablation    VARICOSE VEIN SURGERY  3-  [facility]    right leg ablation       Current Outpatient Medications   Medication Sig Dispense Refill    Turmeric 500 MG CAPS Take by mouth 2 times daily      Vibegron 75 MG TABS Take 75 mg by mouth daily 30 tablet 11    ZINC PO Take by mouth daily      acetaminophen-codeine (TYLENOL #3) 300-30 MG per tablet nightly as needed.        metoprolol succinate (TOPROL XL) 50 MG extended release tablet TAKE 1 TABLET TWICE DAILY 180 tablet 3    olmesartan-hydroCHLOROthiazide (BENICAR HCT) 40-25 MG per tablet Take 0.5 tablets by mouth daily Was holding due to low bp but now is taking randomly as bp is elevated. They are thinking needs daily again (Patient taking differently: Take 0.5 tablets by mouth nightly Was holding due to low bp but now is taking randomly as bp is elevated. They are thinking needs daily again) 30 tablet 5    bumetanide (BUMEX) 1 MG tablet Take 1 tablet by mouth daily as needed (swelling) 30 tablet 3    Omega-3 Fatty Acids (FISH OIL) 1360 MG CAPS Take 1 capsule by mouth 2 times daily       Evolocumab (REPATHA) 140 MG/ML SOSY Inject 140 mg into the skin every 14 days Patient has not started      vitamin D (CHOLECALCIFEROL) 1000 UNIT TABS tablet 1,000 Units 2 times daily       allopurinol (ZYLOPRIM) 300 MG tablet Take 300 mg by mouth daily.  aspirin 81 MG EC tablet Take 81 mg by mouth daily.  loratadine (CLARITIN) 10 MG tablet Take 10 mg by mouth 2 times daily       Multiple Vitamin (MULTIVITAMIN PO) Take 1 tablet by mouth daily        No current facility-administered medications for this visit. Allergies   Allergen Reactions    Demerol Nausea And Vomiting    Fenofibrate Other (See Comments)     Chest pain    Nitroglycerin Other (See Comments)     Passed out    Reclast [Zoledronic Acid] Other (See Comments)     Patient has a intolerance to this medication .  It makes her feel \"out of it\"     Abatacept Other (See Comments)     Heart palpitations    Ipratropium Bromide Hfa Palpitations    Levofloxacin Other (See Comments)     palpitations    Colchicine     Buspirone Other (See Comments)     Pt doesn't remember    Celexa [Citalopram Hydrobromide] Nausea Only and Other (See Comments)     Nausea and headache    Cozaar [Losartan Potassium] Palpitations    Dye [Barium-Containing Compounds] Nausea Only    Dye [Iodides] Itching     CONTRAST DYE, CT DYE, IV CONTRAST     Attends Club or Organization Meetings:     Marital Status:    Intimate Partner Violence:     Fear of Current or Ex-Partner:     Emotionally Abused:     Physically Abused:     Sexually Abused:        Family History   Problem Relation Age of Onset    Diabetes Mother     Heart Disease Mother     Heart Disease Father     Diabetes Father     High Blood Pressure Father     Esophageal Cancer Father     Colon Polyps Father     Crohn's Disease Brother     Colon Cancer Neg Hx     Liver Cancer Neg Hx     Liver Disease Neg Hx     Rectal Cancer Neg Hx     Stomach Cancer Neg Hx        REVIEW OF SYSTEMS:  Review of Systems   Constitutional: Negative for chills and fever. HENT: Negative for congestion and sore throat. Eyes: Negative for pain and visual disturbance. Respiratory: Negative for cough and wheezing. Cardiovascular: Negative for chest pain and palpitations. Gastrointestinal: Negative for nausea and vomiting. Endocrine: Negative for polyphagia and polyuria. Genitourinary: Positive for frequency. Negative for decreased urine volume, difficulty urinating, dyspareunia, dysuria, enuresis, flank pain, genital sores, hematuria, menstrual problem, pelvic pain, urgency, vaginal bleeding, vaginal discharge and vaginal pain. Musculoskeletal: Negative for back pain and neck pain. Skin: Negative for rash and wound. Allergic/Immunologic: Negative for environmental allergies and food allergies. Neurological: Negative for dizziness and headaches. Hematological: Negative for adenopathy. Does not bruise/bleed easily. Psychiatric/Behavioral: Negative for confusion and hallucinations. All other systems reviewed and are negative. PHYSICAL EXAM:  There were no vitals taken for this visit. Physical Exam  Vitals reviewed. Constitutional:       Appearance: She is well-developed. HENT:      Head: Normocephalic and atraumatic. Eyes:      General: No scleral icterus. Conjunctiva/sclera: Conjunctivae normal.      Pupils: Pupils are equal, round, and reactive to light. Cardiovascular:      Rate and Rhythm: Normal rate and regular rhythm. Pulmonary:      Effort: Pulmonary effort is normal. No respiratory distress. Breath sounds: Normal breath sounds. Chest:      Chest wall: No tenderness. Abdominal:      General: There is no distension. Palpations: Abdomen is soft. There is no mass. Tenderness: There is no abdominal tenderness. Musculoskeletal:         General: No tenderness. Normal range of motion. Cervical back: Normal range of motion and neck supple. Lymphadenopathy:      Cervical: No cervical adenopathy. Skin:     General: Skin is warm and dry. Neurological:      Mental Status: She is alert and oriented to person, place, and time.    Psychiatric:         Behavior: Behavior normal.             DATA:  CMP:    Lab Results   Component Value Date     03/19/2021    K 4.3 03/19/2021     03/19/2021    CO2 27 03/19/2021    BUN 24 03/19/2021    CREATININE 0.9 03/19/2021    GFRAA >59 03/19/2021    LABGLOM >60 03/19/2021    GLUCOSE 125 03/19/2021    PROT 6.7 03/19/2021    LABALBU 3.8 03/19/2021    CALCIUM 11.0 03/19/2021    BILITOT <0.2 03/19/2021    ALKPHOS 75 03/19/2021    AST 31 03/19/2021    ALT 24 03/19/2021     Results for orders placed or performed in visit on 07/16/21   POC URINE with Microscopic   Result Value Ref Range    Color, UA Yellow     Clarity, UA Clear Clear    Glucose, Ur neg     Bilirubin Urine 0 mg/dL    Ketones, Urine Negative     Specific Gravity, UA 1.020 1.005 - 1.030    Blood, Urine Negative     pH, UA 7.0 4.5 - 8.0    Protein, UA Negative Negative    Nitrite, Urine Negative     Leukocytes, UA small (A)     Urobilinogen, Urine Normal     rbc urine, poc      wbc urine, poc 2 (A)     bacteria urine, poc 4+ (A)     yeast urine, poc      casts urine, poc      epi cells urine, poc      crystals urine, poc         Her urodynamics from 7/14/2020 were reviewed. This basically shows overactive bladder with detrusor overactivity with urge incontinence at 288 mL otherwise normal capacity with normal compliance and sensation normal flow without obstruction with adequate emptying and bladder contractility. For what ever reason the urodynamic nurse did not do a leak point pressure       1. Urge incontinence  We will try her on Gemtesa since she has hypertension and there is less effect on hypertension compared to Myrbetriq. In fact she did not tolerate Myrbetriq in the past because of high blood pressure associated with this. In addition she is also on a beta-blocker that should be less interaction with her current medicine. If she does not respond going forward there would be limited options as she is essentially failed medical therapy she failed posterior tibial nerve stimulation but she would need to be referred for Botox or InterStim consideration.  - POC URINE with Microscopic  - Vibegron 75 MG TABS; Take 75 mg by mouth daily  Dispense: 30 tablet; Refill: 11    2. OAB (overactive bladder)  We discussed behavior modification timed voids biofeedback avoid bladder irritants  - Vibegron 75 MG TABS; Take 75 mg by mouth daily  Dispense: 30 tablet; Refill: 11      Orders Placed This Encounter   Procedures    POC URINE with Microscopic        Return in about 6 weeks (around 8/27/2021) for Follow-up with Tulio MONTGOMERY at  next visit. All information inputted into the note by the MA to include chief complaint, past medical history, past surgical history, medications, allergies, social and family history and review of systems has been reviewed and updated as needed by me. EMR Dragon/transcription disclaimer: Much of this documentt is electronic  transcription/translation of spoken language to printed text.  The  electronic translation of spoken language may be erroneous, or at times,  nonsensical words or phrases may be inadvertently transcribed.  Although I  have reviewed the document for such errors, some may still exist.

## 2021-07-23 ENCOUNTER — OFFICE VISIT (OUTPATIENT)
Dept: GASTROENTEROLOGY | Age: 76
End: 2021-07-23
Payer: MEDICARE

## 2021-07-23 VITALS
SYSTOLIC BLOOD PRESSURE: 120 MMHG | OXYGEN SATURATION: 99 % | BODY MASS INDEX: 27.78 KG/M2 | HEIGHT: 67 IN | HEART RATE: 77 BPM | DIASTOLIC BLOOD PRESSURE: 60 MMHG | WEIGHT: 177 LBS

## 2021-07-23 DIAGNOSIS — K64.8 INTERNAL HEMORRHOIDS WITHOUT COMPLICATION: Primary | ICD-10-CM

## 2021-07-23 PROCEDURE — 1123F ACP DISCUSS/DSCN MKR DOCD: CPT | Performed by: NURSE PRACTITIONER

## 2021-07-23 PROCEDURE — G8427 DOCREV CUR MEDS BY ELIG CLIN: HCPCS | Performed by: NURSE PRACTITIONER

## 2021-07-23 PROCEDURE — G8417 CALC BMI ABV UP PARAM F/U: HCPCS | Performed by: NURSE PRACTITIONER

## 2021-07-23 PROCEDURE — 1036F TOBACCO NON-USER: CPT | Performed by: NURSE PRACTITIONER

## 2021-07-23 PROCEDURE — 4040F PNEUMOC VAC/ADMIN/RCVD: CPT | Performed by: NURSE PRACTITIONER

## 2021-07-23 PROCEDURE — G8400 PT W/DXA NO RESULTS DOC: HCPCS | Performed by: NURSE PRACTITIONER

## 2021-07-23 PROCEDURE — 1090F PRES/ABSN URINE INCON ASSESS: CPT | Performed by: NURSE PRACTITIONER

## 2021-07-23 PROCEDURE — 99213 OFFICE O/P EST LOW 20 MIN: CPT | Performed by: NURSE PRACTITIONER

## 2021-07-23 ASSESSMENT — ENCOUNTER SYMPTOMS
CONSTIPATION: 1
ABDOMINAL DISTENTION: 0
VOMITING: 0
RECTAL PAIN: 0
NAUSEA: 0
BACK PAIN: 0
TROUBLE SWALLOWING: 0
BLOOD IN STOOL: 0
ANAL BLEEDING: 1
DIARRHEA: 0
SHORTNESS OF BREATH: 0
VOICE CHANGE: 0
COUGH: 0
ABDOMINAL PAIN: 0

## 2021-07-23 NOTE — PATIENT INSTRUCTIONS
Patient Education        Hemorrhoids: Care Instructions  Your Care Instructions     Hemorrhoids are enlarged veins that develop in the anal canal. Bleeding during bowel movements, itching, swelling, and rectal pain are the most common symptoms. They can be uncomfortable at times, but hemorrhoids rarely are a serious problem. You can treat most hemorrhoids with simple changes to your diet and bowel habits. These changes include eating more fiber and not straining to pass stools. Most hemorrhoids do not need surgery or other treatment unless they are very large and painful or bleed a lot. Follow-up care is a key part of your treatment and safety. Be sure to make and go to all appointments, and call your doctor if you are having problems. It's also a good idea to know your test results and keep a list of the medicines you take. How can you care for yourself at home? · Sit in a few inches of warm water (sitz bath) 3 times a day and after bowel movements. The warm water helps with pain and itching. · Put ice on your anal area several times a day for 10 minutes at a time. Put a thin cloth between the ice and your skin. Follow this by placing a warm, wet towel on the area for another 10 to 20 minutes. · Take pain medicines exactly as directed. ? If the doctor gave you a prescription medicine for pain, take it as prescribed. ? If you are not taking a prescription pain medicine, ask your doctor if you can take an over-the-counter medicine. · Keep the anal area clean, but be gentle. Use water and a fragrance-free soap, such as [place], or use baby wipes or medicated pads, such as Tucks. · Wear cotton underwear and loose clothing to decrease moisture in the anal area. · Eat more fiber. Include foods such as whole-grain breads and cereals, raw vegetables, raw and dried fruits, and beans. · Drink plenty of fluids.  If you have kidney, heart, or liver disease and have to limit fluids, talk with your doctor before you

## 2021-07-23 NOTE — PROGRESS NOTES
Subjective:      Ana Luisa Lilly is [age] y.o. female  Chief Complaint   Patient presents with    Follow-up       HPI  PCP: Charly Viramontes MD  Referring Provider: Dr Jose Carlos Pop MD  Pt made an appt s/p colonoscopy to discuss results. Denies post-procedural complications. Previous to the colonoscopy she c/o brbpr. The colonoscopy revealed internal hemorrhoids otherwise normal.  She is taking miralax as needed for constipation and this regimen works well. She has no further GI complaints today.       Family HX:  Father had esophageal cancer and colon polyps; brother has Crohns disease  Pt denies family hx of colon CA, gastric CA    Past Medical History:   Diagnosis Date    Acid reflux     Anxiety     Arthritis     Back pain     CAD (coronary artery disease)     CAD (coronary artery disease)     Carotid artery occlusion     Chronic venous insufficiency 11/8/2012    Cough     Fibromyalgia     Goiter     Gout     Head ache     headaches    Heart murmur     History of colon polyps     History of colon polyps     Hoarseness     Hypercholesterolemia     Hyperlipidemia     Hypertension     Insomnia     Irregular heart beat     Itching     Muscle cramp     Neck pain     Osteoarthritis     Osteoporosis     Palpitations     RA (rheumatoid arthritis) (HCC)     Rash     Rectal bleeding     SOB (shortness of breath)     Sore throat     Tympanic membrane perforation     Varicose veins 11/8/2012          Past Surgical History:   Procedure Laterality Date    APPENDECTOMY      BREAST SURGERY  2002    CARDIAC CATHETERIZATION  9/10/15  JDT    EF 50%    CARDIAC CATHETERIZATION  09/10/2020    LM disease- sent for bypass     CERVICAL FUSION  1999   [address]    x 1    CHOLECYSTECTOMY  2012    [place]    COLONOSCOPY  01/10/2012    Dr LUJAN    COLONOSCOPY  05/06/2014    Dr. Castro Stewart  2018    Dr. Neetu Michelle in [place]- Polyps-benign,internal Hemorrhoids, per patient.  COLONOSCOPY N/A 06/02/2021    Dr Zahra Bhandari, Int hemorrhoids Grade 2 otherwise a NORMAL exam, no recall due to age   [address]  2012    Dr Abigail Carrillo N/A 09/11/2020    CORONARY ARTERY BYPASS GRAFT X2 WITH LEFT INTERNAL MAMMARY ARTERY WITH OPEN VEIN HARVESTING WITH PERFUSION TRANSESOPHAGEAL ECHOCARDIOGRAM performed by Afshin Muller MD at [address] [facility]    partial    KNEE SURGERY  2011    left knee surgery    PTCA      stent    TUBAL LIGATION      UPPER GASTROINTESTINAL ENDOSCOPY  2009?  UPPER GASTROINTESTINAL ENDOSCOPY  01/10/2012    Dr LUJAN    UPPER GASTROINTESTINAL ENDOSCOPY  05/06/2014    Dr. Ned Navarro  2018    Dr. Yosef Roman in [facility]-Gastritis per patient. [address] SURGERY   01- [facility]    Left Leg Ablation    VARICOSE VEIN SURGERY  3-  [facility]    right leg ablation       Social History     Socioeconomic History    Marital status:      Spouse name: None    Number of children: None    Years of education: None    Highest education level: None   Occupational History    None   Tobacco Use    Smoking status: Never Smoker    Smokeless tobacco: Never Used   Vaping Use    Vaping Use: Never used   Substance and Sexual Activity    Alcohol use: No    Drug use: No    Sexual activity: None   Other Topics Concern    None   Social History Narrative    None     Social Determinants of Health     Financial Resource Strain:     Difficulty of Paying Living Expenses:    Food Insecurity:     Worried About Running Out of Food in the Last Year:     Ran Out of Food in the Last Year:    Transportation Needs:     Lack of Transportation (Medical):      Lack of Transportation (Non-Medical):    Physical Activity:     Days of Exercise per Week:     Minutes of Exercise per Session:    Stress:  Feeling of Stress :    Social Connections:     Frequency of Communication with Friends and Family:     Frequency of Social Gatherings with Friends and Family:     Attends [employer] Services:     Active Member of Clubs or Organizations:     Attends Club or Organization Meetings:     Marital Status:    Intimate Partner Violence:     Fear of Current or Ex-Partner:     Emotionally Abused:     Physically Abused:     Sexually Abused: Allergies   Allergen Reactions    Demerol Nausea And Vomiting    Fenofibrate Other (See Comments)     Chest pain    Nitroglycerin Other (See Comments)     Passed out    Reclast [Zoledronic Acid] Other (See Comments)     Patient has a intolerance to this medication .  It makes her feel \"out of it\"     Abatacept Other (See Comments)     Heart palpitations    Ipratropium Bromide Hfa Palpitations    Levofloxacin Other (See Comments)     palpitations    Colchicine     Buspirone Other (See Comments)     Pt doesn't remember    Celexa [Citalopram Hydrobromide] Nausea Only and Other (See Comments)     Nausea and headache    Cozaar [Losartan Potassium] Palpitations    Dye [Barium-Containing Compounds] Nausea Only    Dye [Iodides] Itching     CONTRAST DYE, CT DYE, IV CONTRAST     Esomeprazole Magnesium Nausea And Vomiting    Evolocumab Other (See Comments)      Irritability  [employer]    Hydrocodone-Acetaminophen Other (See Comments)     Shaking, attacks nervous system    Lasix [Furosemide] Nausea Only    Lisinopril Other (See Comments)     Muscle cramps in legs    Losartan Nausea Only and Other (See Comments)     Nausea and dizziness    Methotrexate Other (See Comments)     Dizzy and fainted    Mirtazapine Nausea Only and Other (See Comments)     Tremor, nausea, headache    Neurontin [Gabapentin] Nausea Only and Other (See Comments)     Dizziness      Plaquenil [Hydroxychloroquine Sulfate] Other (See Comments)     nervousness    Prilosec [Omeprazole] Nausea And Vomiting    Trazodone      Muscle stiffness       Current Outpatient Medications   Medication Sig Dispense Refill    Turmeric 500 MG CAPS Take by mouth 2 times daily      ZINC PO Take by mouth daily      metoprolol succinate (TOPROL XL) 50 MG extended release tablet TAKE 1 TABLET TWICE DAILY 180 tablet 3    olmesartan-hydroCHLOROthiazide (BENICAR HCT) 40-25 MG per tablet Take 0.5 tablets by mouth daily Was holding due to low bp but now is taking randomly as bp is elevated. They are thinking needs daily again (Patient taking differently: Take 0.5 tablets by mouth nightly Was holding due to low bp but now is taking randomly as bp is elevated. They are thinking needs daily again) 30 tablet 5    Omega-3 Fatty Acids (FISH OIL) 1360 MG CAPS Take 1 capsule by mouth 2 times daily       Evolocumab (REPATHA) 140 MG/ML SOSY Inject 140 mg into the skin every 14 days Patient has not started      vitamin D (CHOLECALCIFEROL) 1000 UNIT TABS tablet 1,000 Units 2 times daily       allopurinol (ZYLOPRIM) 300 MG tablet Take 300 mg by mouth daily.  aspirin 81 MG EC tablet Take 81 mg by mouth daily.  loratadine (CLARITIN) 10 MG tablet Take 10 mg by mouth 2 times daily       Multiple Vitamin (MULTIVITAMIN PO) Take 1 tablet by mouth daily       Vibegron 75 MG TABS Take 75 mg by mouth daily (Patient not taking: Reported on 7/23/2021) 30 tablet 11     No current facility-administered medications for this visit. Review of Systems   Constitutional: Negative for fatigue and unexpected weight change. HENT: Negative for trouble swallowing and voice change. Respiratory: Negative for cough and shortness of breath. Cardiovascular: Negative for chest pain and palpitations. Gastrointestinal: Positive for anal bleeding (at times) and constipation (intermittent). Negative for abdominal distention, abdominal pain, blood in stool, diarrhea, nausea, rectal pain and vomiting.    Genitourinary: Negative for hematuria. Musculoskeletal: Positive for arthralgias. Negative for back pain and neck pain. Neurological: Negative for weakness and headaches. Psychiatric/Behavioral: Negative for dysphoric mood. The patient is not nervous/anxious. Objective:     Physical Exam  Vitals and nursing note reviewed. Constitutional:       Appearance: She is well-developed. Comments: /60   Pulse 77   Ht 5' 7\" (1.702 m)   Wt 177 lb (80.3 kg)   SpO2 99%   BMI 27.72 kg/m²    Eyes:      General: No scleral icterus. Conjunctiva/sclera: Conjunctivae normal.      Pupils: Pupils are equal, round, and reactive to light. Cardiovascular:      Rate and Rhythm: Normal rate and regular rhythm. Heart sounds: Normal heart sounds. No murmur heard. No friction rub. No gallop. Pulmonary:      Effort: Pulmonary effort is normal. No respiratory distress. Breath sounds: Normal breath sounds. Abdominal:      General: Bowel sounds are normal. There is no distension. Palpations: Abdomen is soft. Tenderness: There is no abdominal tenderness. There is no rebound. Neurological:      Mental Status: She is alert and oriented to person, place, and time. Cranial Nerves: No cranial nerve deficit. Psychiatric:         Judgment: Judgment normal.           Assessment:       Diagnosis Orders   1. Internal hemorrhoids without complication  Use preparation H prn, use baby wipes for cleaning after BMs         Plan:      1.  F/u prn

## 2021-08-18 ENCOUNTER — HOSPITAL ENCOUNTER (OUTPATIENT)
Dept: VASCULAR LAB | Age: 76
Discharge: HOME | End: 2021-08-18
Payer: MEDICARE

## 2021-08-18 DIAGNOSIS — I65.23 BILATERAL CAROTID ARTERY STENOSIS: ICD-10-CM

## 2021-08-18 PROCEDURE — 93880 EXTRACRANIAL BILAT STUDY: CPT

## 2021-08-19 ENCOUNTER — HOSPITAL ENCOUNTER (OUTPATIENT)
Dept: VASCULAR LAB | Age: 76
Discharge: HOME | End: 2021-08-19
Payer: MEDICARE

## 2021-08-19 ENCOUNTER — OFFICE VISIT (OUTPATIENT)
Dept: VASCULAR SURGERY | Age: 76
End: 2021-08-19
Payer: MEDICARE

## 2021-08-19 VITALS
HEIGHT: 67 IN | WEIGHT: 172 LBS | RESPIRATION RATE: 16 BRPM | TEMPERATURE: 98.2 F | DIASTOLIC BLOOD PRESSURE: 62 MMHG | BODY MASS INDEX: 27 KG/M2 | HEART RATE: 73 BPM | OXYGEN SATURATION: 98 % | SYSTOLIC BLOOD PRESSURE: 121 MMHG

## 2021-08-19 DIAGNOSIS — I65.23 BILATERAL CAROTID ARTERY STENOSIS: ICD-10-CM

## 2021-08-19 DIAGNOSIS — M79.604 LEG PAIN, RIGHT: ICD-10-CM

## 2021-08-19 DIAGNOSIS — M79.89 LEG SWELLING: ICD-10-CM

## 2021-08-19 DIAGNOSIS — M79.604 LEG PAIN, RIGHT: Primary | ICD-10-CM

## 2021-08-19 PROCEDURE — G8417 CALC BMI ABV UP PARAM F/U: HCPCS | Performed by: NURSE PRACTITIONER

## 2021-08-19 PROCEDURE — 4040F PNEUMOC VAC/ADMIN/RCVD: CPT | Performed by: NURSE PRACTITIONER

## 2021-08-19 PROCEDURE — 1090F PRES/ABSN URINE INCON ASSESS: CPT | Performed by: NURSE PRACTITIONER

## 2021-08-19 PROCEDURE — G8400 PT W/DXA NO RESULTS DOC: HCPCS | Performed by: NURSE PRACTITIONER

## 2021-08-19 PROCEDURE — 93971 EXTREMITY STUDY: CPT

## 2021-08-19 PROCEDURE — 1123F ACP DISCUSS/DSCN MKR DOCD: CPT | Performed by: NURSE PRACTITIONER

## 2021-08-19 PROCEDURE — 1036F TOBACCO NON-USER: CPT | Performed by: NURSE PRACTITIONER

## 2021-08-19 PROCEDURE — G8427 DOCREV CUR MEDS BY ELIG CLIN: HCPCS | Performed by: NURSE PRACTITIONER

## 2021-08-19 PROCEDURE — 99214 OFFICE O/P EST MOD 30 MIN: CPT | Performed by: NURSE PRACTITIONER

## 2021-08-20 ENCOUNTER — TELEPHONE (OUTPATIENT)
Dept: VASCULAR SURGERY | Age: 76
End: 2021-08-20

## 2021-08-20 NOTE — PROGRESS NOTES
Ag Garcia (:  1945) is a 64 y.o. female,Established patient, here for evaluation of the following chief complaint(s):  Follow-up (carotid)            SUBJECTIVE/OBJECTIVE:  She presents for follow up of carotid artery stenosis. She has a known history of carotid artery stenosis for 1 - 5 years. Her current treatment includes ASA EC daily. She denies a history of CVA. She reports has not had TIA's, episodes of lateralizing weakness and episodes of amaurosis fugax.     Ag Garcia is a 64 y.o. female with the following history as recorded in [facility]:  Patient Active Problem List    Diagnosis Date Noted    Abdominal pain 2021    Change in bowel habits 2021    Constipation 2021    BRBPR (bright red blood per rectum) 2021    Bloating 2020    Myalgia due to statin 2019    Urinary tract infection with hematuria 2019    Overactive bladder 2017    Mixed hyperlipidemia 2017    H/O right coronary artery stent placement     Periumbilical abdominal pain 2016    Irritable bowel syndrome with diarrhea 06/10/2016    Internal hemorrhoids 06/10/2016    Allergic reaction 2015    Precordial pain 2015    Gout 2015    Bleeding internal hemorrhoids 07/15/2014    Gastroesophageal reflux disease without esophagitis 2014    Internal hemorrhoids without complication     Spider veins 2013    Carotid artery stenosis 2012    Leg pain 2012    Leg swelling 2012    Chronic venous insufficiency 2012    Varicose veins 2012    Coronary artery disease involving native coronary artery of native heart with angina pectoris with documented spasm (HCC)     Essential hypertension      Current Outpatient Medications   Medication Sig Dispense Refill    Turmeric 500 MG CAPS Take by mouth 2 times daily      ZINC PO Take by mouth daily      metoprolol succinate (TOPROL XL) 50 MG extended release tablet TAKE 1 TABLET TWICE DAILY 180 tablet 3    olmesartan-hydroCHLOROthiazide (BENICAR HCT) 40-25 MG per tablet Take 0.5 tablets by mouth daily Was holding due to low bp but now is taking randomly as bp is elevated. They are thinking needs daily again (Patient taking differently: Take 0.5 tablets by mouth nightly Was holding due to low bp but now is taking randomly as bp is elevated. They are thinking needs daily again) 30 tablet 5    Omega-3 Fatty Acids (FISH OIL) 1360 MG CAPS Take 1 capsule by mouth 2 times daily       Evolocumab (REPATHA) 140 MG/ML SOSY Inject 140 mg into the skin every 14 days Patient has not started      vitamin D (CHOLECALCIFEROL) 1000 UNIT TABS tablet 1,000 Units 2 times daily       allopurinol (ZYLOPRIM) 300 MG tablet Take 300 mg by mouth daily.  aspirin 81 MG EC tablet Take 81 mg by mouth daily.  loratadine (CLARITIN) 10 MG tablet Take 10 mg by mouth 2 times daily       Multiple Vitamin (MULTIVITAMIN PO) Take 1 tablet by mouth daily       Vibegron 75 MG TABS Take 75 mg by mouth daily (Patient not taking: Reported on 7/23/2021) 30 tablet 11     No current facility-administered medications for this visit.      Allergies: Demerol, Fenofibrate, Nitroglycerin, Reclast [zoledronic acid], Abatacept, Ipratropium bromide hfa, Levofloxacin, Colchicine, Buspirone, Celexa [citalopram hydrobromide], Cozaar [losartan potassium], Dye [barium-containing compounds], Dye [iodides], Esomeprazole magnesium, Evolocumab, Hydrocodone-acetaminophen, Lasix [furosemide], Lisinopril, Losartan, Methotrexate, Mirtazapine, Neurontin [gabapentin], Plaquenil [hydroxychloroquine sulfate], Prilosec [omeprazole], and Trazodone  Past Medical History:   Diagnosis Date    Acid reflux     Anxiety     Arthritis     Back pain     CAD (coronary artery disease)     CAD (coronary artery disease)     Carotid artery occlusion     Chronic venous insufficiency 11/8/2012    Cough     Fibromyalgia     Goiter     Gout     Head ache     headaches    Heart murmur     History of colon polyps     History of colon polyps     Hoarseness     Hypercholesterolemia     Hyperlipidemia     Hypertension     Insomnia     Irregular heart beat     Itching     Muscle cramp     Neck pain     Osteoarthritis     Osteoporosis     Palpitations     RA (rheumatoid arthritis) (HCC)     Rash     Rectal bleeding     SOB (shortness of breath)     Sore throat     Tympanic membrane perforation     Varicose veins 11/8/2012     Past Surgical History:   Procedure Laterality Date    APPENDECTOMY      BREAST SURGERY  2002    CARDIAC CATHETERIZATION  9/10/15  JDT    EF 50%    CARDIAC CATHETERIZATION  09/10/2020    LM disease- sent for bypass    [address]    x 1    CHOLECYSTECTOMY  2012    [place]    COLONOSCOPY  01/10/2012    Dr LUJAN    COLONOSCOPY  05/06/2014    Dr. Sola Borjas  2018    Dr. Sunny Wright in [place]- Polyps-benign,internal Hemorrhoids, per patient.  COLONOSCOPY N/A 06/02/2021    Dr Cindia Schlatter, Int hemorrhoids Grade 2 otherwise a NORMAL exam, no recall due to age   [address]  2012    Dr Richardson Ramirez N/A 09/11/2020    CORONARY ARTERY BYPASS GRAFT X2 WITH LEFT INTERNAL MAMMARY ARTERY WITH OPEN VEIN HARVESTING WITH PERFUSION TRANSESOPHAGEAL ECHOCARDIOGRAM performed by Jesús Christiansen MD at [facility]    partial    KNEE SURGERY  2011    left knee surgery    PTCA      stent    TUBAL LIGATION      UPPER GASTROINTESTINAL ENDOSCOPY  2009?  UPPER GASTROINTESTINAL ENDOSCOPY  01/10/2012    Dr LUJAN    UPPER GASTROINTESTINAL ENDOSCOPY  05/06/2014    Dr. Anna Kessler  2018    Dr. Sunny Wright in [facility]-Gastritis per patient.     VARICOSE VEIN SURGERY 01- [facility]    Left Leg Ablation    VARICOSE VEIN SURGERY  3-  [facility]    right leg ablation     Family History   Problem Relation Age of Onset    Diabetes Mother     Heart Disease Mother     Heart Disease Father     Diabetes Father     High Blood Pressure Father     Esophageal Cancer Father     Colon Polyps Father     Crohn's Disease Brother     Colon Cancer Neg Hx     Liver Cancer Neg Hx     Liver Disease Neg Hx     Rectal Cancer Neg Hx     Stomach Cancer Neg Hx      Social History     Tobacco Use    Smoking status: Never Smoker    Smokeless tobacco: Never Used   Substance Use Topics    Alcohol use: No         Eyes - no sudden vision change or amaurosis. Respiratory - no significant shortness of breath,  Cardiovascular - no chest pain or syncope.  has leg swelling. No claudication. Musculoskeletal - no gait disturbance  Skin - no new wound. Neurologic -  No speech difficulty or lateralizing weakness. All other review of systems are negative. Physical Exam    /62 (Site: Right Upper Arm)   Pulse 73   Temp 98.2 °F (36.8 °C) (Temporal)   Resp 16   Ht 5' 7\" (1.702 m)   Wt 172 lb (78 kg)   SpO2 98%   BMI 26.94 kg/m²       Neck- carotid pulses 2+ to palpation with no bruit  Cardiovascular - Regular rate and rhythm. Pulmonary - effort appears normal.  No respiratory distress. Lungs - Breath sounds normal. No wheezes or rales. Extremities - with edema   Neurologic - alert and oriented X 3. Physiologic. Face symmetric. Skin - warm, dry, and intact. no wound  Psychiatric - mood, affect, and behavior appear normal.  Judgment and thought processes appear normal.    Risk factors for atherosclerosis of all vascular beds have been reviewed with the patient including:  Family history, tobacco abuse in all forms, elevated cholesterol, hyperlipidemia, and diabetes.       Doppler results:    Right CCA/ICA <50% stenotic  Left CCA/ICA <50% stenotic  Right verterbral artery flow is antegrade  Left verterbral artery flow is antegrade  Individual velocities reviewed: Yes. Results were reviewed with the patient. Disease process is stable and chronic        Reviewed previous studies including: carotid u/s  Individual images were reviewed. I agree with the findings  Results were discussed with the patient. ASSESSMENT/PLAN:  1. Leg pain, right  -     VL Extremity Venous Right; Future  2. Leg swelling  -     VL Extremity Venous Right; Future  3. Bilateral carotid artery stenosis      Venous scan today for leg swelling - shows thrombus non -occlusive in the LSV  Discussed management of carotid u/s which includes:  continue asa to reduce risk of TIA/CVA, to reduce risk of arterial thrombosis and to decrease rate of plaque buildup  Strongly encourage start/continue statin therapy -   Recommend no smoking - discussed the effect tobacco has on illness;   Proceed with 12 months with cvls      Support hose  Warm moist heat  4 weeks with repeat venous scan  Patient instructed to keep leg elevated as needed due to the increased swelling that is associated with DVT. Call the office if pain, swelling, and tenderness extends beyond where it is today. Wear  support hose every day from the time they get up in the morning until they go to be at night. Use warm moist heat for comfort if needed. Take 1 325 mg asa three times daily for three days  Then take 1 twice daily for three days  Then once daily for three days  Then 81 mg asa daily  Immediately report any stomach upset or bleeding         Patient instructed to call or proceed to the emergency room with any symptoms of lateralizing weakness, loss of vision in one eye, or episodes slurred speech. An electronic signature was used to authenticate this note.     --ULISES Salgado

## 2021-08-20 NOTE — TELEPHONE ENCOUNTER
I have called her twice and left message for her to call me back. She has small svt. Use warm moist heat. Take 1 325 mg asa three times daily for three days  Then take 1 twice daily for three days  Then once daily for three days  Then 81 mg asa daily  Immediately report any stomach upset or bleeding  Call right away with increased swelling.   We will repeat venous scan in 4 weeks

## 2021-08-27 ENCOUNTER — OFFICE VISIT (OUTPATIENT)
Dept: UROLOGY | Age: 76
End: 2021-08-27
Payer: MEDICARE

## 2021-08-27 VITALS — WEIGHT: 177.2 LBS | HEIGHT: 67 IN | BODY MASS INDEX: 27.81 KG/M2 | TEMPERATURE: 97.7 F

## 2021-08-27 DIAGNOSIS — N32.81 OAB (OVERACTIVE BLADDER): Primary | ICD-10-CM

## 2021-08-27 DIAGNOSIS — N39.41 URGE INCONTINENCE: ICD-10-CM

## 2021-08-27 LAB
BACTERIA URINE, POC: 0
BILIRUBIN URINE: 0 MG/DL
BLOOD, URINE: NEGATIVE
CASTS URINE, POC: 0
CLARITY: CLEAR
COLOR: YELLOW
CRYSTALS URINE, POC: 0
EPI CELLS URINE, POC: 0
GLUCOSE URINE: NORMAL
KETONES, URINE: NEGATIVE
LEUKOCYTE EST, POC: NORMAL
NITRITE, URINE: NEGATIVE
PH UA: 6 (ref 4.5–8)
PROTEIN UA: NEGATIVE
RBC URINE, POC: 0
SPECIFIC GRAVITY UA: 1.02 (ref 1–1.03)
UROBILINOGEN, URINE: NORMAL
WBC URINE, POC: 9
YEAST URINE, POC: 0

## 2021-08-27 PROCEDURE — 51798 US URINE CAPACITY MEASURE: CPT | Performed by: NURSE PRACTITIONER

## 2021-08-27 PROCEDURE — 81001 URINALYSIS AUTO W/SCOPE: CPT | Performed by: NURSE PRACTITIONER

## 2021-08-27 PROCEDURE — 99213 OFFICE O/P EST LOW 20 MIN: CPT | Performed by: NURSE PRACTITIONER

## 2021-08-27 ASSESSMENT — ENCOUNTER SYMPTOMS
NAUSEA: 0
VOMITING: 0
ABDOMINAL DISTENTION: 0
ABDOMINAL PAIN: 0
BACK PAIN: 0

## 2021-08-27 NOTE — PROGRESS NOTES
Craig Talley is a 64 y.o. female who presents today   Chief Complaint   Patient presents with    Follow-up     I am here today for a 6 wk fu for OAB and urgency     Overactive bladder  Overactive Bladder:  Patient is here today for an overactive bladder which started years ago. Recently the OAB symptoms: are improving  Current medical Rx for OAB: None  Frequency: 3 hours  Nocturia x 3  Consumption of bladder irritants? no  Incontinence? Stress incontinence: Severity = essentially resolved, severe. Urge Incontinence:  Severity = moderate, overall course: gradually improving. The patient was last seen with Dr. Tabitha Porter on 7/16/2021. She has tried Myrbetriq in the past and was unable to tolerate due to hypertension. Nor she able to tolerate anticholinergics due to side effects. She failed placebo nursing relation. She did have a UDS performed on July 2020 which revealed overactive bladder with detrusor overactivity and urge incontinence at 288 mL otherwise normal capacity with normal compliance and sensation normal flow without obstruction with adequate emptying and bladder contractility. She was placed on Gemtesa at last visit although she has not started this medication. She feels as if her symptoms are overall improved. She has started taking her diuretic in the morning which is decreased her nocturia. She does continue to have urge incontinence as well as some bladder pressure but no pain.     Past Medical History:   Diagnosis Date    Acid reflux     Anxiety     Arthritis     Back pain     CAD (coronary artery disease)     CAD (coronary artery disease)     Carotid artery occlusion     Chronic venous insufficiency 11/8/2012    Cough     Fibromyalgia     Goiter     Gout     Head ache     headaches    Heart murmur     History of colon polyps     History of colon polyps     Hoarseness     Hypercholesterolemia     Hyperlipidemia     Hypertension     Insomnia     Irregular heart beat     Itching     Muscle cramp     Neck pain     Osteoarthritis     Osteoporosis     Palpitations     RA (rheumatoid arthritis) (HCC)     Rash     Rectal bleeding     SOB (shortness of breath)     Sore throat     Tympanic membrane perforation     Varicose veins 11/8/2012       Past Surgical History:   Procedure Laterality Date    APPENDECTOMY      BREAST SURGERY  2002    CARDIAC CATHETERIZATION  9/10/15  JDT    EF 50%    CARDIAC CATHETERIZATION  09/10/2020    LM disease- sent for bypass    [address]    x 1    CHOLECYSTECTOMY  2012    [place]    COLONOSCOPY  01/10/2012    Dr LUJAN    COLONOSCOPY  05/06/2014    Dr. Loni Larsen  2018    Dr. Aakash Miller in [place]- Polyps-benign,internal Hemorrhoids, per patient.  COLONOSCOPY N/A 06/02/2021    Dr Tawnya Russ, Int hemorrhoids Grade 2 otherwise a NORMAL exam, no recall due to age   [address]  2012    Dr Milly Fox N/A 09/11/2020    CORONARY ARTERY BYPASS GRAFT X2 WITH LEFT INTERNAL MAMMARY ARTERY WITH OPEN VEIN HARVESTING WITH PERFUSION TRANSESOPHAGEAL ECHOCARDIOGRAM performed by Timur Strauss MD at [facility]    partial    KNEE SURGERY  2011    left knee surgery    PTCA      stent    TUBAL LIGATION      UPPER GASTROINTESTINAL ENDOSCOPY  2009?  UPPER GASTROINTESTINAL ENDOSCOPY  01/10/2012    Dr LUJAN    UPPER GASTROINTESTINAL ENDOSCOPY  05/06/2014    Dr. Talon Black  2018    Dr. Aakash Miller in [facility]-Gastritis per patient.     VARICOSE VEIN SURGERY   01- [facility]    Left Leg Ablation    VARICOSE VEIN SURGERY  3-  [facility]    right leg ablation       Current Outpatient Medications   Medication Sig Dispense Refill    Turmeric 500 MG CAPS Take by mouth 2 times daily      Vibegron 75 MG TABS Take 75 mg by mouth daily 30 tablet 11    ZINC PO Take by mouth daily      metoprolol succinate (TOPROL XL) 50 MG extended release tablet TAKE 1 TABLET TWICE DAILY 180 tablet 3    olmesartan-hydroCHLOROthiazide (BENICAR HCT) 40-25 MG per tablet Take 0.5 tablets by mouth daily Was holding due to low bp but now is taking randomly as bp is elevated. They are thinking needs daily again (Patient taking differently: Take 0.5 tablets by mouth nightly Was holding due to low bp but now is taking randomly as bp is elevated. They are thinking needs daily again) 30 tablet 5    Omega-3 Fatty Acids (FISH OIL) 1360 MG CAPS Take 1 capsule by mouth 2 times daily       Evolocumab (REPATHA) 140 MG/ML SOSY Inject 140 mg into the skin every 14 days Patient has not started      vitamin D (CHOLECALCIFEROL) 1000 UNIT TABS tablet 1,000 Units 2 times daily       allopurinol (ZYLOPRIM) 300 MG tablet Take 300 mg by mouth daily.  aspirin 81 MG EC tablet Take 81 mg by mouth daily.  loratadine (CLARITIN) 10 MG tablet Take 10 mg by mouth 2 times daily       Multiple Vitamin (MULTIVITAMIN PO) Take 1 tablet by mouth daily        No current facility-administered medications for this visit. Allergies   Allergen Reactions    Demerol Nausea And Vomiting    Fenofibrate Other (See Comments)     Chest pain    Nitroglycerin Other (See Comments)     Passed out    Reclast [Zoledronic Acid] Other (See Comments)     Patient has a intolerance to this medication .  It makes her feel \"out of it\"     Abatacept Other (See Comments)     Heart palpitations    Ipratropium Bromide Hfa Palpitations    Levofloxacin Other (See Comments)     palpitations    Acetaminophen     Buspirone Hcl     Colchicine     Hydrocodone     Maltose     Other     Buspirone Other (See Comments)     Pt doesn't remember    Celexa [Citalopram Hydrobromide] Nausea Only and Other (See Comments)     Nausea and headache    Cozaar [Losartan Potassium] Palpitations    Dye [Barium-Containing Compounds] Nausea Only    Dye [Iodides] Itching     CONTRAST DYE, CT DYE, IV CONTRAST     Esomeprazole Magnesium Nausea And Vomiting    Evolocumab Other (See Comments)      Irritability  [employer]    Hydrocodone-Acetaminophen Other (See Comments)     Shaking, attacks nervous system    Lasix [Furosemide] Nausea Only    Lisinopril Other (See Comments)     Muscle cramps in legs    Losartan Nausea Only and Other (See Comments)     Nausea and dizziness    Methotrexate Other (See Comments)     Dizzy and fainted    Mirtazapine Nausea Only and Other (See Comments)     Tremor, nausea, headache    Neurontin [Gabapentin] Nausea Only and Other (See Comments)     Dizziness      Plaquenil [Hydroxychloroquine Sulfate] Other (See Comments)     nervousness    Prilosec [Omeprazole] Nausea And Vomiting    Trazodone      Muscle stiffness       Social History     Socioeconomic History    Marital status:      Spouse name: None    Number of children: None    Years of education: None    Highest education level: None   Occupational History    None   Tobacco Use    Smoking status: Never Smoker    Smokeless tobacco: Never Used   Vaping Use    Vaping Use: Never used   Substance and Sexual Activity    Alcohol use: No    Drug use: No    Sexual activity: None   Other Topics Concern    None   Social History Narrative    None     Social Determinants of Health     Financial Resource Strain:     Difficulty of Paying Living Expenses:    Food Insecurity:     Worried About Running Out of Food in the Last Year:     Ran Out of Food in the Last Year:    Transportation Needs:     Lack of Transportation (Medical):      Lack of Transportation (Non-Medical):    Physical Activity:     Days of Exercise per Week:     Minutes of Exercise per Session:    Stress:     Feeling of Stress :    Social Connections:     Frequency of Communication with Friends and Family:     Frequency of Social Gatherings with Friends and Family:     Attends [employer] Services:     Active Member of Clubs or Organizations:     Attends Club or Organization Meetings:     Marital Status:    Intimate Partner Violence:     Fear of Current or Ex-Partner:     Emotionally Abused:     Physically Abused:     Sexually Abused:        Family History   Problem Relation Age of Onset    Diabetes Mother     Heart Disease Mother     Heart Disease Father     Diabetes Father     High Blood Pressure Father     Esophageal Cancer Father     Colon Polyps Father     Crohn's Disease Brother     Colon Cancer Neg Hx     Liver Cancer Neg Hx     Liver Disease Neg Hx     Rectal Cancer Neg Hx     Stomach Cancer Neg Hx        REVIEW OF SYSTEMS:  Review of Systems   Constitutional: Negative for chills and fever. Gastrointestinal: Negative for abdominal distention, abdominal pain, nausea and vomiting. Genitourinary: Positive for frequency and urgency. Negative for difficulty urinating, dysuria, flank pain and hematuria. Musculoskeletal: Negative for back pain and gait problem. Psychiatric/Behavioral: Negative for agitation and confusion. Bladder Scan interpretation  Estimation of residual urine via abdominal ultrasound  Residual Urine: 0 ml  Indication: urgency  Position: Supine  Examination: Incremental scanning of the suprapubic area using 3 MHz transducer using copious amounts of acoustic gel. Findings: An anechoic area was demonstrated which represented the bladder, with measurement of residual urine as noted. PHYSICAL EXAM:  Temp 97.7 °F (36.5 °C) (Temporal)   Ht 5' 7\" (1.702 m)   Wt 177 lb 3.2 oz (80.4 kg)   BMI 27.75 kg/m²   Physical Exam  Vitals and nursing note reviewed. Constitutional:       General: She is not in acute distress. Appearance: Normal appearance. She is not ill-appearing. Pulmonary:      Effort: Pulmonary effort is normal. No respiratory distress.    Abdominal:      General: There is no distension. Tenderness: There is no abdominal tenderness. There is no right CVA tenderness or left CVA tenderness. Neurological:      Mental Status: She is alert and oriented to person, place, and time. Mental status is at baseline. Psychiatric:         Mood and Affect: Mood normal.         Behavior: Behavior normal.       DATA:    Results for orders placed or performed in visit on 08/27/21   POCT Urinalysis Dipstick w/ Micro (Auto)   Result Value Ref Range    Color, UA Yellow     Clarity, UA Clear Clear    Glucose, Ur NEG     Bilirubin Urine 0 mg/dL    Ketones, Urine Negative     Specific Gravity, UA 1.020 1.005 - 1.030    Blood, Urine Negative     pH, UA 6 4.5 - 8.0    Protein, UA Negative Negative    Nitrite, Urine Negative     Leukocytes, UA TRACE     Urobilinogen, Urine Normal     rbc urine, poc 0     wbc urine, poc 9     bacteria urine, poc 0     yeast urine, poc 0     casts urine, poc 0     epi cells urine, poc 0     crystals urine, poc 0          1. OAB (overactive bladder)  Discussed behavior modification, timed voids, avoiding bladder irritants. We will go ahead and give her Gemtesa 75 mg samples x6 weeks and have her follow-up. - RI Measure, post-void residual, US, non-imaging  - POCT Urinalysis Dipstick w/ Micro (Auto)    2. Urge incontinence  We will try her on  and she has had hypertension and there is less effect on hypertension compared to Myrbetriq. We will go ahead and give her 6 weeks of samples and have her follow-up for efficacy. - RI Measure, post-void residual, US, non-imaging  - POCT Urinalysis Dipstick w/ Micro (Auto)      Orders Placed This Encounter   Procedures    POCT Urinalysis Dipstick w/ Micro (Auto)    RI Measure, post-void residual, US, non-imaging        Return in about 6 weeks (around 10/8/2021).     All information inputted into the note by the MA to include chief complaint, past medical history, past surgical history, medications, allergies, social and family history and review of systems has been reviewed and updated as needed by me. EMR Dragon/transcription disclaimer: Much of this documentt is electronic  transcription/translation of spoken language to printed text. The  electronic translation of spoken language may be erroneous, or at times,  nonsensical words or phrases may be inadvertently transcribed.  Although I  have reviewed the document for such errors, some may still exist.

## 2021-09-01 ENCOUNTER — OFFICE VISIT (OUTPATIENT)
Dept: WOUND CARE | Facility: HOSPITAL | Age: 76
End: 2021-09-01

## 2021-09-01 ENCOUNTER — LAB REQUISITION (OUTPATIENT)
Dept: LAB | Facility: HOSPITAL | Age: 76
End: 2021-09-01

## 2021-09-01 DIAGNOSIS — Z00.00 ENCOUNTER FOR GENERAL ADULT MEDICAL EXAMINATION WITHOUT ABNORMAL FINDINGS: ICD-10-CM

## 2021-09-01 DIAGNOSIS — L92.1 NECROBIOSIS LIPOIDICA, NOT ELSEWHERE CLASSIFIED: ICD-10-CM

## 2021-09-01 DIAGNOSIS — L97.812 NON-PRESSURE CHRONIC ULCER OF OTHER PART OF RIGHT LOWER LEG WITH FAT LAYER EXPOSED (HCC): ICD-10-CM

## 2021-09-01 DIAGNOSIS — Z48.817 ENCOUNTER FOR SURGICAL AFTERCARE FOLLOWING SURGERY ON THE SKIN AND SUBCUTANEOUS TISSUE: ICD-10-CM

## 2021-09-01 DIAGNOSIS — G99.0 AUTONOMIC NEUROPATHY IN DISEASES CLASSIFIED ELSEWHERE: ICD-10-CM

## 2021-09-01 PROCEDURE — 87070 CULTURE OTHR SPECIMN AEROBIC: CPT | Performed by: NURSE PRACTITIONER

## 2021-09-01 PROCEDURE — 87205 SMEAR GRAM STAIN: CPT | Performed by: NURSE PRACTITIONER

## 2021-09-01 PROCEDURE — 97597 DBRDMT OPN WND 1ST 20 CM/<: CPT | Performed by: NURSE PRACTITIONER

## 2021-09-01 PROCEDURE — G0463 HOSPITAL OUTPT CLINIC VISIT: HCPCS

## 2021-09-01 PROCEDURE — 87075 CULTR BACTERIA EXCEPT BLOOD: CPT | Performed by: NURSE PRACTITIONER

## 2021-09-01 PROCEDURE — 99213 OFFICE O/P EST LOW 20 MIN: CPT | Performed by: NURSE PRACTITIONER

## 2021-09-01 PROCEDURE — 87176 TISSUE HOMOGENIZATION CULTR: CPT | Performed by: NURSE PRACTITIONER

## 2021-09-04 LAB
BACTERIA SPEC AEROBE CULT: NORMAL
GRAM STN SPEC: NORMAL

## 2021-09-06 LAB — BACTERIA SPEC ANAEROBE CULT: NORMAL

## 2021-09-08 ENCOUNTER — TRANSCRIBE ORDERS (OUTPATIENT)
Dept: ADMINISTRATIVE | Facility: HOSPITAL | Age: 76
End: 2021-09-08

## 2021-09-08 ENCOUNTER — OFFICE VISIT (OUTPATIENT)
Dept: WOUND CARE | Facility: HOSPITAL | Age: 76
End: 2021-09-08

## 2021-09-08 ENCOUNTER — HOSPITAL ENCOUNTER (OUTPATIENT)
Dept: GENERAL RADIOLOGY | Facility: HOSPITAL | Age: 76
Discharge: HOME | End: 2021-09-08

## 2021-09-08 DIAGNOSIS — G99.0 AUTONOMIC NEUROPATHY IN DISEASES CLASSIFIED ELSEWHERE: ICD-10-CM

## 2021-09-08 DIAGNOSIS — L92.1 NECROBIOSIS LIPOIDICA, NOT ELSEWHERE CLASSIFIED: ICD-10-CM

## 2021-09-08 DIAGNOSIS — Z48.817 ENCOUNTER FOR SURGICAL AFTERCARE FOLLOWING SURGERY ON THE SKIN AND SUBCUTANEOUS TISSUE: ICD-10-CM

## 2021-09-08 DIAGNOSIS — L97.812 NON-PRESSURE CHRONIC ULCER OF OTHER PART OF RIGHT LOWER LEG WITH FAT LAYER EXPOSED (HCC): ICD-10-CM

## 2021-09-08 DIAGNOSIS — M25.552 LEFT HIP PAIN: Primary | ICD-10-CM

## 2021-09-08 PROCEDURE — 11042 DBRDMT SUBQ TIS 1ST 20SQCM/<: CPT | Performed by: NURSE PRACTITIONER

## 2021-09-08 PROCEDURE — 73502 X-RAY EXAM HIP UNI 2-3 VIEWS: CPT

## 2021-09-08 PROCEDURE — 99213 OFFICE O/P EST LOW 20 MIN: CPT | Performed by: NURSE PRACTITIONER

## 2021-09-15 ENCOUNTER — OFFICE VISIT (OUTPATIENT)
Dept: WOUND CARE | Facility: HOSPITAL | Age: 76
End: 2021-09-15

## 2021-09-15 DIAGNOSIS — L92.1 NECROBIOSIS LIPOIDICA, NOT ELSEWHERE CLASSIFIED: ICD-10-CM

## 2021-09-15 DIAGNOSIS — Z48.817 ENCOUNTER FOR SURGICAL AFTERCARE FOLLOWING SURGERY ON THE SKIN AND SUBCUTANEOUS TISSUE: ICD-10-CM

## 2021-09-15 DIAGNOSIS — G99.0 AUTONOMIC NEUROPATHY IN DISEASES CLASSIFIED ELSEWHERE: ICD-10-CM

## 2021-09-15 DIAGNOSIS — L97.812 NON-PRESSURE CHRONIC ULCER OF OTHER PART OF RIGHT LOWER LEG WITH FAT LAYER EXPOSED (HCC): ICD-10-CM

## 2021-09-15 PROCEDURE — G0463 HOSPITAL OUTPT CLINIC VISIT: HCPCS

## 2021-09-15 PROCEDURE — 99212 OFFICE O/P EST SF 10 MIN: CPT | Performed by: NURSE PRACTITIONER

## 2021-09-28 ENCOUNTER — OFFICE VISIT (OUTPATIENT)
Dept: WOUND CARE | Facility: HOSPITAL | Age: 76
End: 2021-09-28

## 2021-09-28 DIAGNOSIS — Z48.817 ENCOUNTER FOR SURGICAL AFTERCARE FOLLOWING SURGERY ON THE SKIN AND SUBCUTANEOUS TISSUE: ICD-10-CM

## 2021-09-28 DIAGNOSIS — L97.812 NON-PRESSURE CHRONIC ULCER OF OTHER PART OF RIGHT LOWER LEG WITH FAT LAYER EXPOSED (HCC): ICD-10-CM

## 2021-09-28 DIAGNOSIS — G99.0 AUTONOMIC NEUROPATHY IN DISEASES CLASSIFIED ELSEWHERE: ICD-10-CM

## 2021-09-28 DIAGNOSIS — L92.1 NECROBIOSIS LIPOIDICA, NOT ELSEWHERE CLASSIFIED: ICD-10-CM

## 2021-09-28 PROCEDURE — G0463 HOSPITAL OUTPT CLINIC VISIT: HCPCS

## 2021-09-28 PROCEDURE — 99212 OFFICE O/P EST SF 10 MIN: CPT | Performed by: NURSE PRACTITIONER

## 2021-09-29 ENCOUNTER — OFFICE VISIT (OUTPATIENT)
Dept: VASCULAR SURGERY | Age: 76
End: 2021-09-29
Payer: MEDICARE

## 2021-09-29 ENCOUNTER — HOSPITAL ENCOUNTER (OUTPATIENT)
Dept: VASCULAR LAB | Age: 76
Discharge: HOME | End: 2021-09-29
Payer: MEDICARE

## 2021-09-29 VITALS
SYSTOLIC BLOOD PRESSURE: 158 MMHG | RESPIRATION RATE: 18 BRPM | HEART RATE: 74 BPM | DIASTOLIC BLOOD PRESSURE: 74 MMHG | OXYGEN SATURATION: 98 % | TEMPERATURE: 98.1 F

## 2021-09-29 DIAGNOSIS — M79.604 LEG PAIN, RIGHT: ICD-10-CM

## 2021-09-29 DIAGNOSIS — I65.23 BILATERAL CAROTID ARTERY STENOSIS: ICD-10-CM

## 2021-09-29 DIAGNOSIS — M79.89 LEG SWELLING: Primary | ICD-10-CM

## 2021-09-29 DIAGNOSIS — M79.89 LEG SWELLING: ICD-10-CM

## 2021-09-29 PROCEDURE — G8417 CALC BMI ABV UP PARAM F/U: HCPCS | Performed by: NURSE PRACTITIONER

## 2021-09-29 PROCEDURE — 99213 OFFICE O/P EST LOW 20 MIN: CPT | Performed by: NURSE PRACTITIONER

## 2021-09-29 PROCEDURE — 1090F PRES/ABSN URINE INCON ASSESS: CPT | Performed by: NURSE PRACTITIONER

## 2021-09-29 PROCEDURE — G8400 PT W/DXA NO RESULTS DOC: HCPCS | Performed by: NURSE PRACTITIONER

## 2021-09-29 PROCEDURE — 1036F TOBACCO NON-USER: CPT | Performed by: NURSE PRACTITIONER

## 2021-09-29 PROCEDURE — 93971 EXTREMITY STUDY: CPT

## 2021-09-29 PROCEDURE — 1123F ACP DISCUSS/DSCN MKR DOCD: CPT | Performed by: NURSE PRACTITIONER

## 2021-09-29 PROCEDURE — G8427 DOCREV CUR MEDS BY ELIG CLIN: HCPCS | Performed by: NURSE PRACTITIONER

## 2021-09-29 PROCEDURE — 4040F PNEUMOC VAC/ADMIN/RCVD: CPT | Performed by: NURSE PRACTITIONER

## 2021-09-30 NOTE — PROGRESS NOTES
Andrew Chan (:  1945) is a 64 y.o. female,Established patient, here for evaluation of the following chief complaint(s):  Follow-up (venous right)            SUBJECTIVE/OBJECTIVE:  She presents for follow-up of known svt. She has no known history of DVT. Her current treatment includes ASA 81 mg po qd. She does not have a family history of hypercoagulability. Risk factors for hypercoagulable state include: none known. She does not have an IVC filter. She has no symptoms of DVT. Differential diagnosis includes but is not limited to CHF, thyroid disease, venous disease, DVT, SVT, peripheral vascular disease.           Andrew Chan is a 64 y.o. female with the following history as recorded in [facility]:  Patient Active Problem List    Diagnosis Date Noted    Abdominal pain 2021    Change in bowel habits 2021    Constipation 2021    BRBPR (bright red blood per rectum) 2021    Bloating 2020    Myalgia due to statin 2019    Urinary tract infection with hematuria 2019    Overactive bladder 2017    Mixed hyperlipidemia 2017    H/O right coronary artery stent placement     Periumbilical abdominal pain 2016    Irritable bowel syndrome with diarrhea 06/10/2016    Internal hemorrhoids 06/10/2016    Allergic reaction 2015    Precordial pain 2015    Gout 2015    Bleeding internal hemorrhoids 07/15/2014    Gastroesophageal reflux disease without esophagitis 2014    Internal hemorrhoids without complication     Spider veins 2013    Carotid artery stenosis 2012    Leg pain 2012    Leg swelling 2012    Chronic venous insufficiency 2012    Varicose veins 2012    Coronary artery disease involving native coronary artery of native heart with angina pectoris with documented spasm [facility])     Essential hypertension      Current Outpatient Medications Medication Sig Dispense Refill    Turmeric 500 MG CAPS Take by mouth 2 times daily      Vibegron 75 MG TABS Take 75 mg by mouth daily 30 tablet 11    ZINC PO Take by mouth daily      metoprolol succinate (TOPROL XL) 50 MG extended release tablet TAKE 1 TABLET TWICE DAILY 180 tablet 3    olmesartan-hydroCHLOROthiazide (BENICAR HCT) 40-25 MG per tablet Take 0.5 tablets by mouth daily Was holding due to low bp but now is taking randomly as bp is elevated. They are thinking needs daily again (Patient taking differently: Take 0.5 tablets by mouth nightly Was holding due to low bp but now is taking randomly as bp is elevated. They are thinking needs daily again) 30 tablet 5    Omega-3 Fatty Acids (FISH OIL) 1360 MG CAPS Take 1 capsule by mouth 2 times daily       Evolocumab (REPATHA) 140 MG/ML SOSY Inject 140 mg into the skin every 14 days Patient has not started      vitamin D (CHOLECALCIFEROL) 1000 UNIT TABS tablet 1,000 Units 2 times daily       allopurinol (ZYLOPRIM) 300 MG tablet Take 300 mg by mouth daily.  aspirin 81 MG EC tablet Take 81 mg by mouth daily.  loratadine (CLARITIN) 10 MG tablet Take 10 mg by mouth 2 times daily       Multiple Vitamin (MULTIVITAMIN PO) Take 1 tablet by mouth daily        No current facility-administered medications for this visit.      Allergies: Demerol, Fenofibrate, Nitroglycerin, Reclast [zoledronic acid], Abatacept, Ipratropium bromide hfa, Levofloxacin, Acetaminophen, Buspirone hcl, Colchicine, Hydrocodone, Maltose, Other, Buspirone, Celexa [citalopram hydrobromide], Cozaar [losartan potassium], Dye [barium-containing compounds], Dye [iodides], Esomeprazole magnesium, Evolocumab, Hydrocodone-acetaminophen, Lasix [furosemide], Lisinopril, Losartan, Methotrexate, Mirtazapine, Neurontin [gabapentin], Plaquenil [hydroxychloroquine sulfate], Prilosec [omeprazole], and Trazodone  Past Medical History:   Diagnosis Date    Acid reflux     Anxiety     Arthritis     ENDOSCOPY  05/06/2014    Dr. Gerhard Lee  2018    Dr. Kurtis Ortega in [facility]-Gastritis per patient.  VARICOSE VEIN SURGERY   01- [facility]    Left Leg Ablation    VARICOSE VEIN SURGERY  3-  [facility]    right leg ablation     Family History   Problem Relation Age of Onset    Diabetes Mother     Heart Disease Mother     Heart Disease Father     Diabetes Father     High Blood Pressure Father     Esophageal Cancer Father     Colon Polyps Father     Crohn's Disease Brother     Colon Cancer Neg Hx     Liver Cancer Neg Hx     Liver Disease Neg Hx     Rectal Cancer Neg Hx     Stomach Cancer Neg Hx      Social History     Tobacco Use    Smoking status: Never Smoker    Smokeless tobacco: Never Used   Substance Use Topics    Alcohol use: No       ROS  Eyes - no sudden vision change or amaurosis. Respiratory - no significant shortness of breath,  Cardiovascular - no chest pain or syncope. no  significant leg swelling. No claudication. Musculoskeletal - no gait disturbance  Skin - no new wound. Neurologic -  No speech difficulty or lateralizing weakness. All other review of systems are negative. Physical Exam    BP (!) 158/74 (Site: Right Upper Arm, Position: Sitting, Cuff Size: Medium Adult)   Pulse 74   Temp 98.1 °F (36.7 °C) (Temporal)   Resp 18   SpO2 98%       Neck- carotid pulses 2+ to palpation with no bruit  Cardiovascular - Regular rate and rhythm. Pulmonary - effort appears normal.  No respiratory distress. Lungs - Breath sounds normal. No wheezes or rales. Extremities -  Radial and brachial pulses are 2+ to palpation bilaterally. Right femoral pulse: present 2+; Right popliteal pulse: absent Right DP: absent; Right PT absent; Left femoral pulse: present 2+; Left popliteal pulse: absent; Left DP: absent; Left PT: absent No cyanosis, clubbing,  no  significant edema. No signs atheroembolic event.  No palpable cord  Neurologic - alert and oriented X 3. Physiologic. Face symmetric. Skin - warm, dry, and intact. no  wound  Psychiatric - mood, affect, and behavior appear normal.  Judgment and thought processes appear normal.    Risk factors for atherosclerosis of all vascular beds have been reviewed with the patient including:  Family history, tobacco abuse in all forms, elevated cholesterol, hyperlipidemia, and diabetes. Venous Scan: SVT involving right leg involving the branch of GSV mid thigh vein(s). Individual films reviewed: Yes. Test results were reviewed with the patient  Disease process is stable and chronic        Reviewed previous studies including: venous scan  Individual images were reviewed. I agree with the findings  Results were discussed with the patient. ASSESSMENT/PLAN:  1. Leg swelling  2. Leg pain, right        Discussed management of venous scan which includes:  continue asa to reduce risk of venous thrombosis    Recommend no smoking - discussed the effect tobacco has on illness;   Proceed with 12 months with cvls       Patient instructed to keep leg elevated as much as possible due to the increased swelling that is associated with DVT. Call the office if pain, swelling, and tenderness extends beyond where it is today. Wear  support hose every day from the time they get up in the morning until they go to be at night. Use warm moist heat for comfort if needed. An electronic signature was used to authenticate this note.     --ULISES Lopes

## 2021-10-06 ENCOUNTER — OFFICE VISIT (OUTPATIENT)
Dept: WOUND CARE | Facility: HOSPITAL | Age: 76
End: 2021-10-06

## 2021-10-06 DIAGNOSIS — L92.1 NECROBIOSIS LIPOIDICA, NOT ELSEWHERE CLASSIFIED: ICD-10-CM

## 2021-10-06 DIAGNOSIS — G99.0 AUTONOMIC NEUROPATHY IN DISEASES CLASSIFIED ELSEWHERE: ICD-10-CM

## 2021-10-06 DIAGNOSIS — Z48.817 ENCOUNTER FOR SURGICAL AFTERCARE FOLLOWING SURGERY ON THE SKIN AND SUBCUTANEOUS TISSUE: ICD-10-CM

## 2021-10-06 DIAGNOSIS — L97.812 NON-PRESSURE CHRONIC ULCER OF OTHER PART OF RIGHT LOWER LEG WITH FAT LAYER EXPOSED (HCC): ICD-10-CM

## 2021-10-06 PROCEDURE — G0463 HOSPITAL OUTPT CLINIC VISIT: HCPCS

## 2021-10-06 PROCEDURE — 99212 OFFICE O/P EST SF 10 MIN: CPT | Performed by: NURSE PRACTITIONER

## 2021-11-11 ENCOUNTER — TELEPHONE (OUTPATIENT)
Dept: CARDIOLOGY CLINIC | Age: 76
End: 2021-11-11

## 2021-11-12 ENCOUNTER — OFFICE VISIT (OUTPATIENT)
Dept: CARDIOLOGY CLINIC | Age: 76
End: 2021-11-12
Payer: MEDICARE

## 2021-11-12 VITALS
DIASTOLIC BLOOD PRESSURE: 78 MMHG | WEIGHT: 182 LBS | HEIGHT: 67 IN | HEART RATE: 75 BPM | BODY MASS INDEX: 28.56 KG/M2 | SYSTOLIC BLOOD PRESSURE: 122 MMHG

## 2021-11-12 DIAGNOSIS — I25.10 CORONARY ARTERY DISEASE INVOLVING NATIVE CORONARY ARTERY OF NATIVE HEART WITHOUT ANGINA PECTORIS: Primary | ICD-10-CM

## 2021-11-12 PROCEDURE — G8484 FLU IMMUNIZE NO ADMIN: HCPCS | Performed by: INTERNAL MEDICINE

## 2021-11-12 PROCEDURE — 1090F PRES/ABSN URINE INCON ASSESS: CPT | Performed by: INTERNAL MEDICINE

## 2021-11-12 PROCEDURE — G8427 DOCREV CUR MEDS BY ELIG CLIN: HCPCS | Performed by: INTERNAL MEDICINE

## 2021-11-12 PROCEDURE — 1123F ACP DISCUSS/DSCN MKR DOCD: CPT | Performed by: INTERNAL MEDICINE

## 2021-11-12 PROCEDURE — G8417 CALC BMI ABV UP PARAM F/U: HCPCS | Performed by: INTERNAL MEDICINE

## 2021-11-12 PROCEDURE — 4040F PNEUMOC VAC/ADMIN/RCVD: CPT | Performed by: INTERNAL MEDICINE

## 2021-11-12 PROCEDURE — 1036F TOBACCO NON-USER: CPT | Performed by: INTERNAL MEDICINE

## 2021-11-12 PROCEDURE — 99214 OFFICE O/P EST MOD 30 MIN: CPT | Performed by: INTERNAL MEDICINE

## 2021-11-12 PROCEDURE — G8400 PT W/DXA NO RESULTS DOC: HCPCS | Performed by: INTERNAL MEDICINE

## 2021-11-12 ASSESSMENT — ENCOUNTER SYMPTOMS
EYE DISCHARGE: 0
ABDOMINAL DISTENTION: 0
BLOOD IN STOOL: 0
SHORTNESS OF BREATH: 0
COUGH: 0
WHEEZING: 0
DIARRHEA: 0
VOMITING: 0
CONSTIPATION: 0
BACK PAIN: 0

## 2021-11-12 NOTE — PROGRESS NOTES
[facility]  Cardiology Office Note  [facility] [place] [facility]  [id number]  Phone: [phone]  Fax: [phone]                            Date:  11/12/2021  Patient: Gamaliel Sanders  Age:  64 y.o., 1945    Referral: Dinorah Adan MD      PROBLEM LIST:    Patient Active Problem List    Diagnosis Date Noted    Abdominal pain 05/12/2021     Priority: Low    Change in bowel habits 05/12/2021     Priority: Low    Constipation 05/12/2021     Priority: Low    BRBPR (bright red blood per rectum) 05/12/2021     Priority: Low    Bloating 03/11/2020     Priority: Low    Myalgia due to statin 11/07/2019     Priority: Low    Urinary tract infection with hematuria 05/16/2019     Priority: Low    Overactive bladder 07/24/2017     Priority: Low    Mixed hyperlipidemia 04/18/2017     Priority: Low    H/O right coronary artery stent placement 04/18/2017     Priority: Low     Overview Note:     Stent placed in 2009 by Dr. Chayo Luna at [facility] abdominal pain 07/21/2016     Priority: Low    Irritable bowel syndrome with diarrhea 06/10/2016     Priority: [address] Internal hemorrhoids 06/10/2016     Priority: Low    Allergic reaction 09/18/2015     Priority: Low    Precordial pain 09/08/2015     Priority: Low    Gout 01/30/2015     Priority: Low    Bleeding internal hemorrhoids 07/15/2014     Priority: Low    Gastroesophageal reflux disease without esophagitis 05/20/2014     Priority: Low    Internal hemorrhoids without complication 06/67/6022     Priority: Low    Spider veins 03/04/2013     Priority: Low    Carotid artery stenosis 11/08/2012     Priority: Low    Leg pain 11/08/2012     Priority: Low    Leg swelling 11/08/2012     Priority: Low    Chronic venous insufficiency 11/08/2012     Priority: Low    Varicose veins 11/08/2012     Priority: Low    Coronary artery disease involving native coronary artery of native heart with angina pectoris with documented spasm [facility])      Priority: Low     Overview Note:     2012  PTCA /stent  Progressive angina; AUC indication 54, AUC score 7, [facility] 2012;[id number]  9/10/15  Cath  Mild CAD, normal LVFX        Essential hypertension      Priority: Low     1. Coronary artery disease, prior ostial RCA stent with obstructive left main disease by catheterization 9/10/2020, status post 2 vessel CABG 9/11/2020 with LIMA to LAD and SVG to OM, normal LV ejection fraction. 2. Hypertension. 3. Rheumatoid arthritis. PRESENTATION: Checo Ross is a 64y.o. year old female presents for follow-up evaluation. She has been doing fairly well. She did however have an episode of viral infection that put her down for a little while. She was unable to exercise but will restart. No chest pain or shortness of breath reported. Chronic leg swelling which is mild and she wears compression stockings. REVIEW OF SYSTEMS:  Review of Systems   Constitutional: Negative for activity change, fatigue and fever. HENT: Negative for ear pain, hearing loss and tinnitus. Eyes: Negative for discharge and visual disturbance. Respiratory: Negative for cough, shortness of breath and wheezing. Cardiovascular: Negative for chest pain, palpitations and leg swelling. Gastrointestinal: Negative for abdominal distention, blood in stool, constipation, diarrhea and vomiting. Endocrine: Negative for cold intolerance, heat intolerance, polydipsia and polyuria. Genitourinary: Negative for dysuria and hematuria. Musculoskeletal: Negative for arthralgias, back pain and myalgias. Skin: Negative for pallor and rash. Neurological: Negative for seizures, syncope, weakness and headaches. Psychiatric/Behavioral: Negative for behavioral problems and dysphoric mood.        Past Medical History:      Diagnosis Date    Acid reflux     Anxiety     Arthritis     Back pain     CAD (coronary artery disease)     CAD (coronary artery disease)     Carotid artery occlusion     Chronic venous insufficiency 11/8/2012    Cough     Fibromyalgia     Goiter     Gout     Head ache     headaches    Heart murmur     History of colon polyps     History of colon polyps     Hoarseness     Hypercholesterolemia     Hyperlipidemia     Hypertension     Insomnia     Irregular heart beat     Itching     Muscle cramp     Neck pain     Osteoarthritis     Osteoporosis     Palpitations     RA (rheumatoid arthritis) (HCC)     Rash     Rectal bleeding     SOB (shortness of breath)     Sore throat     Tympanic membrane perforation     Varicose veins 11/8/2012       Past Surgical History:      Procedure Laterality Date    APPENDECTOMY      BREAST SURGERY  2002    CARDIAC CATHETERIZATION  9/10/15  J[facility]    EF 50%    CARDIAC CATHETERIZATION  09/10/2020    LM disease- sent for bypass    [address]    x 1    CHOLECYSTECTOMY  2012    [place]    COLONOSCOPY  01/10/2012    Dr LUJAN    COLONOSCOPY  05/06/2014    Dr. Myrla Dance  2018    Dr. Bernie Little in [place]- Polyps-benign,internal Hemorrhoids, per patient.  COLONOSCOPY N/A 06/02/2021    Dr Gallardo Notice, Int hemorrhoids Grade 2 otherwise a NORMAL exam, no recall due to age   Carolyn Castro  2012    Dr Tyshawn Espana N/A 09/11/2020    CORONARY ARTERY BYPASS GRAFT X2 WITH LEFT INTERNAL MAMMARY ARTERY WITH OPEN VEIN HARVESTING WITH PERFUSION TRANSESOPHAGEAL ECHOCARDIOGRAM performed by Tawanna Johnson MD at [facility]    partial    KNEE SURGERY  2011    left knee surgery    PTCA      stent    TUBAL LIGATION      UPPER GASTROINTESTINAL ENDOSCOPY  2009?     UPPER GASTROINTESTINAL ENDOSCOPY  01/10/2012    Dr LUJAN    UPPER GASTROINTESTINAL ENDOSCOPY  05/06/2014    Dr. Lexx Rivera ENDOSCOPY  2018    Dr. Saverio Ganser in [place]-Gastritis per patient.  VARICOSE VEIN SURGERY   01- [facility]    Left Leg Ablation    VARICOSE VEIN SURGERY  3-  [facility]    right leg ablation       Medications:  Current Outpatient Medications   Medication Sig Dispense Refill    Turmeric 500 MG CAPS Take by mouth 2 times daily      ZINC PO Take by mouth daily      metoprolol succinate (TOPROL XL) 50 MG extended release tablet TAKE 1 TABLET TWICE DAILY 180 tablet 3    olmesartan-hydroCHLOROthiazide (BENICAR HCT) 40-25 MG per tablet Take 0.5 tablets by mouth daily Was holding due to low bp but now is taking randomly as bp is elevated. They are thinking needs daily again (Patient taking differently: Take 0.5 tablets by mouth nightly Was holding due to low bp but now is taking randomly as bp is elevated. They are thinking needs daily again) 30 tablet 5    Omega-3 Fatty Acids (FISH OIL) 1360 MG CAPS Take 1 capsule by mouth 2 times daily       Evolocumab (REPATHA) 140 MG/ML SOSY Inject 140 mg into the skin every 14 days Patient has not started      vitamin D (CHOLECALCIFEROL) 1000 UNIT TABS tablet 1,000 Units 2 times daily       allopurinol (ZYLOPRIM) 300 MG tablet Take 300 mg by mouth daily.  aspirin 81 MG EC tablet Take 81 mg by mouth daily.  loratadine (CLARITIN) 10 MG tablet Take 10 mg by mouth 2 times daily       Multiple Vitamin (MULTIVITAMIN PO) Take 1 tablet by mouth daily       Vibegron 75 MG TABS Take 75 mg by mouth daily (Patient not taking: Reported on 11/12/2021) 30 tablet 11     No current facility-administered medications for this visit.        Allergies:  Demerol, Fenofibrate, Nitroglycerin, Reclast [zoledronic acid], Abatacept, Ipratropium bromide hfa, Levofloxacin, Acetaminophen, Buspirone hcl, Colchicine, Hydrocodone, Maltose, Other, Buspirone, Celexa [citalopram hydrobromide], Cozaar [losartan potassium], Dye [barium-containing compounds], Dye [iodides], Esomeprazole magnesium, Evolocumab, Hydrocodone-acetaminophen, Lasix [furosemide], Lisinopril, Losartan, Methotrexate, Mirtazapine, Neurontin [gabapentin], Plaquenil [hydroxychloroquine sulfate], Prilosec [omeprazole], and Trazodone    Past Social History:  Social History     Socioeconomic History    Marital status:      Spouse name: Not on file    Number of children: Not on file    Years of education: Not on file    Highest education level: Not on file   Occupational History    Not on file   Tobacco Use    Smoking status: Never Smoker    Smokeless tobacco: Never Used   Vaping Use    Vaping Use: Never used   Substance and Sexual Activity    Alcohol use: No    Drug use: No    Sexual activity: Not on file   Other Topics Concern    Not on file   Social History Narrative    Not on file     Social Determinants of Health     Financial Resource Strain:     Difficulty of Paying Living Expenses: Not on file   Food Insecurity:     Worried About Running Out of Food in the Last Year: Not on file    Curtis of Food in the Last Year: Not on file   Transportation Needs:     Lack of Transportation (Medical): Not on file    Lack of Transportation (Non-Medical):  Not on file   Physical Activity:     Days of Exercise per Week: Not on file    Minutes of Exercise per Session: Not on file   Stress:     Feeling of Stress : Not on file   Social Connections:     Frequency of Communication with Friends and Family: Not on file    Frequency of Social Gatherings with Friends and Family: Not on file    Attends Faith Services: Not on file    Active Member of Clubs or Organizations: Not on file    Attends Club or Organization Meetings: Not on file    Marital Status: Not on file   Intimate Partner Violence:     Fear of Current or Ex-Partner: Not on file    Emotionally Abused: Not on file    Physically Abused: Not on file    Sexually Abused: Not on file   Housing Stability:     Unable to Pay for Housing in the Last Year: Not on file    Number of Places Lived in the Last Year: Not on file    Unstable Housing in the Last Year: Not on file       Family History:       Problem Relation Age of Onset    Diabetes Mother     Heart Disease Mother     Heart Disease Father     Diabetes Father     High Blood Pressure Father     Esophageal Cancer Father     Colon Polyps Father     Crohn's Disease Brother     Colon Cancer Neg Hx     Liver Cancer Neg Hx     Liver Disease Neg Hx     Rectal Cancer Neg Hx     Stomach Cancer Neg Hx          Physical Examination:  /78   Pulse 75   Ht 5' 7\" (1.702 m)   Wt 182 lb (82.6 kg)   BMI 28.51 kg/m²   Physical Exam  Constitutional:       General: She is not in acute distress. Appearance: She is not diaphoretic. Comments: Moderate truncal obesity  Blood pressure right arm sitting 150/80 mmHg, pulse 80 bpm regular   HENT:      Mouth/Throat:      Pharynx: No oropharyngeal exudate. Eyes:      General: No scleral icterus. Right eye: No discharge. Left eye: No discharge. Neck:      Thyroid: No thyromegaly. Vascular: No JVD. Cardiovascular:      Rate and Rhythm: Normal rate and regular rhythm. No extrasystoles are present. Heart sounds: Normal heart sounds, S1 normal and S2 normal. No murmur heard. No systolic murmur is present. No diastolic murmur is present. No friction rub. No gallop. No S3 or S4 sounds. Comments: No JVD  Trace pitting edema  No systolic or diastolic murmurs  Pulmonary:      Effort: Pulmonary effort is normal. No respiratory distress. Breath sounds: Normal breath sounds. No wheezing or rales. Chest:      Chest wall: No tenderness. Abdominal:      General: Bowel sounds are normal. There is no distension. Palpations: Abdomen is soft. There is no mass. Tenderness: There is no abdominal tenderness. There is no guarding or rebound. Hernia: No hernia is present.       Comments: No palpable organomegaly Musculoskeletal:         General: Normal range of motion. Skin:     General: Skin is warm. Coloration: Skin is not pale. Findings: No rash. Neurological:      Mental Status: She is alert and oriented to person, place, and time. Cranial Nerves: No cranial nerve deficit. Deep Tendon Reflexes: Reflexes normal.           Labs:   CBC: No results for input(s): WBC, HGB, HCT, PLT in the last 72 hours. BMP:No results for input(s): NA, K, CO2, BUN, CREATININE, LABGLOM, GLUCOSE in the last 72 hours. BNP: No results for input(s): BNP in the last 72 hours. PT/INR: No results for input(s): PROTIME, INR in the last 72 hours. APTT:No results for input(s): APTT in the last 72 hours. CARDIAC ENZYMES:No results for input(s): CKTOTAL, CKMB, CKMBINDEX, TROPONINI in the last 72 hours. FASTING LIPID PANEL:  Lab Results   Component Value Date    HDL 46 07/24/2018    HDL 49 07/24/2018    LDLCALC 196 07/24/2018    LDLCALC 189 07/24/2018    TRIG 259 07/24/2018    TRIG 291 07/24/2018     LIVER PROFILE:No results for input(s): AST, ALT, LABALBU in the last 72 hours. Imaging:          ASSESSMENT and PLAN:    68-year-old female patient with past medical history of hypertension, rheumatoid arthritis, coronary artery disease, prior PCI to the ostial RCA with catheterization showing obstructive left main disease 9/10/2020, status post CABG 9/11/2020 with two-vessel grafting, normal LV ejection fraction. 1.  Her blood pressure slightly elevated in the systolic number. She reports better blood pressures at home. She did cut down her olmesartan/hydrochlorothiazide and is taking only half a tablet once daily. She was previously taking half a tablet twice a day. Have asked her to monitor her pressures. If they are elevated would go back to a full dose either once daily or half tablet twice daily. 2.  Continue other medications unchanged. Advised to restart exercise and pay attention to her diet.   3. Follow-up with nurse practitioner in 5 months and with me in 10 months. Orders:  No orders of the defined types were placed in this encounter. No orders of the defined types were placed in this encounter. Return for NP 5 mths; me 10 mths. Electronically signed by Victor M Rodriguez MD on 11/12/2021 at 12:08 PM    [facility]      Thisdictation was generated by voice recognition computer software. Although all attempts are made to edit the dictation for accuracy, there may be errors in the transcription that are not intended.

## 2021-11-15 RX ORDER — METOPROLOL SUCCINATE 50 MG/1
TABLET, EXTENDED RELEASE ORAL
Qty: 180 TABLET | Refills: 1 | Status: SHIPPED | OUTPATIENT
Start: 2021-11-15

## 2022-04-11 ENCOUNTER — TELEPHONE (OUTPATIENT)
Dept: CARDIOLOGY CLINIC | Age: 77
End: 2022-04-11

## 2022-04-12 ENCOUNTER — OFFICE VISIT (OUTPATIENT)
Dept: CARDIOLOGY CLINIC | Age: 77
End: 2022-04-12
Payer: MEDICARE

## 2022-04-12 VITALS
HEART RATE: 68 BPM | SYSTOLIC BLOOD PRESSURE: 134 MMHG | BODY MASS INDEX: 29.98 KG/M2 | HEIGHT: 67 IN | WEIGHT: 191 LBS | DIASTOLIC BLOOD PRESSURE: 78 MMHG

## 2022-04-12 DIAGNOSIS — I10 ESSENTIAL HYPERTENSION: ICD-10-CM

## 2022-04-12 DIAGNOSIS — E78.2 MIXED HYPERLIPIDEMIA: ICD-10-CM

## 2022-04-12 DIAGNOSIS — I25.10 CORONARY ARTERY DISEASE INVOLVING NATIVE CORONARY ARTERY OF NATIVE HEART WITHOUT ANGINA PECTORIS: Primary | ICD-10-CM

## 2022-04-12 DIAGNOSIS — R60.0 EDEMA OF BOTH LOWER EXTREMITIES: ICD-10-CM

## 2022-04-12 PROCEDURE — G8427 DOCREV CUR MEDS BY ELIG CLIN: HCPCS | Performed by: CLINICAL NURSE SPECIALIST

## 2022-04-12 PROCEDURE — 1090F PRES/ABSN URINE INCON ASSESS: CPT | Performed by: CLINICAL NURSE SPECIALIST

## 2022-04-12 PROCEDURE — 4040F PNEUMOC VAC/ADMIN/RCVD: CPT | Performed by: CLINICAL NURSE SPECIALIST

## 2022-04-12 PROCEDURE — G8400 PT W/DXA NO RESULTS DOC: HCPCS | Performed by: CLINICAL NURSE SPECIALIST

## 2022-04-12 PROCEDURE — 1123F ACP DISCUSS/DSCN MKR DOCD: CPT | Performed by: CLINICAL NURSE SPECIALIST

## 2022-04-12 PROCEDURE — G8417 CALC BMI ABV UP PARAM F/U: HCPCS | Performed by: CLINICAL NURSE SPECIALIST

## 2022-04-12 PROCEDURE — 1036F TOBACCO NON-USER: CPT | Performed by: CLINICAL NURSE SPECIALIST

## 2022-04-12 PROCEDURE — 99214 OFFICE O/P EST MOD 30 MIN: CPT | Performed by: CLINICAL NURSE SPECIALIST

## 2022-04-12 PROCEDURE — 93000 ELECTROCARDIOGRAM COMPLETE: CPT | Performed by: CLINICAL NURSE SPECIALIST

## 2022-04-12 RX ORDER — OLMESARTAN MEDOXOMIL 20 MG/1
20 TABLET ORAL NIGHTLY
Qty: 90 TABLET | Refills: 3 | Status: SHIPPED | OUTPATIENT
Start: 2022-04-12

## 2022-04-12 RX ORDER — HYDROCHLOROTHIAZIDE 25 MG/1
25 TABLET ORAL EVERY MORNING
Qty: 90 TABLET | Refills: 3 | Status: SHIPPED | OUTPATIENT
Start: 2022-04-12

## 2022-04-12 ASSESSMENT — ENCOUNTER SYMPTOMS
VOMITING: 0
CHEST TIGHTNESS: 0
WHEEZING: 0
FACIAL SWELLING: 0
ABDOMINAL PAIN: 0
SHORTNESS OF BREATH: 1
NAUSEA: 0
COUGH: 0
EYE REDNESS: 0

## 2022-04-12 NOTE — PROGRESS NOTES
[facility], [address] [place] [address], [address] [id number]  Phone: [phone]  Fax: [phone]    OFFICE VISIT:  2022    Rod Melendez - : 1945    Reason For Visit:  Nelda Price is a 64 y.o. female who is here for Follow-up (Patient c/o fatigue and edema) and Coronary Artery Disease       Diagnosis Orders   1. Coronary artery disease involving native coronary artery of native heart without angina pectoris     2. Essential hypertension     3. Mixed hyperlipidemia     4. Edema of both lower extremities           HPI  1. Coronary artery disease, prior ostial RCA stent with obstructive left main disease by catheterization 9/10/2020, status post 2 vessel CABG 2020 with LIMA to LAD and SVG to OM, normal LV ejection fraction. 2. Hypertension. 3. Rheumatoid arthritis. 4.  Hyperlipidemia  5. History of Covid in 2021    Patient denies chest pain, unusual dyspnea, orthopnea, PND, palpitations. She is concerned about some lower extremity edema and edema in her hands and arms. She has gained about 9 pounds over the last 6 months.     She is on a combination of olmesartan/HCTZ and can only tolerate a half a pill. She tries to take more than this. She feels weak throughout the day even if she takes medication at bedtime     Jay Soto MD is PCP.   Rod Melendez has the following history as recorded in [facility]:    Patient Active Problem List    Diagnosis Date Noted    Abdominal pain 2021    Change in bowel habits 2021    Constipation 2021    BRBPR (bright red blood per rectum) 2021    Bloating 2020    Myalgia due to statin 2019    Urinary tract infection with hematuria 2019    Overactive bladder 2017    Mixed hyperlipidemia 2017    H/O right coronary artery stent placement     Periumbilical abdominal pain 2016    Irritable bowel syndrome with diarrhea 06/10/2016    Internal hemorrhoids 06/10/2016    Allergic reaction 09/18/2015    Precordial pain 09/08/2015    Gout 01/30/2015    Bleeding internal hemorrhoids 07/15/2014    Gastroesophageal reflux disease without esophagitis 05/20/2014    Internal hemorrhoids without complication 42/86/9831    Spider veins 03/04/2013    Carotid artery stenosis 11/08/2012    Leg pain 11/08/2012    Leg swelling 11/08/2012    Chronic venous insufficiency 11/08/2012    Varicose veins 11/08/2012    Coronary artery disease involving native coronary artery of native heart with angina pectoris with documented spasm (HCC)     Essential hypertension      Past Medical History:   Diagnosis Date    Acid reflux     Anxiety     Arthritis     Back pain     CAD (coronary artery disease)     CAD (coronary artery disease)     Carotid artery occlusion     Chronic venous insufficiency 11/8/2012    Cough     Fibromyalgia     Goiter     Gout     Head ache     headaches    Heart murmur     History of colon polyps     History of colon polyps     Hoarseness     Hypercholesterolemia     Hyperlipidemia     Hypertension     Insomnia     Irregular heart beat     Itching     Muscle cramp     Neck pain     Osteoarthritis     Osteoporosis     Palpitations     RA (rheumatoid arthritis) (HCC)     Rash     Rectal bleeding     SOB (shortness of breath)     Sore throat     Tympanic membrane perforation     Varicose veins 11/8/2012     Past Surgical History:   Procedure Laterality Date    APPENDECTOMY      BREAST SURGERY  2002    CARDIAC CATHETERIZATION  9/10/15  JDT    EF 50%    CARDIAC CATHETERIZATION  09/10/2020    LM disease- sent for bypass    [address]    x 1    CHOLECYSTECTOMY  2012    [place]    COLONOSCOPY  01/10/2012    Dr LUJAN    COLONOSCOPY  05/06/2014    Dr. Silvia Mccormick  2018    Dr. Israel Jefferson in [place]- Polyps-benign,internal Hemorrhoids, per patient.     COLONOSCOPY N/A 06/02/2021    Dr Heber Beauchamp, Int hemorrhoids Grade 2 otherwise a NORMAL exam, no recall due to age   [address] 2012    Dr Lonnie Pollard N/A 09/11/2020    CORONARY ARTERY BYPASS GRAFT X2 WITH LEFT INTERNAL MAMMARY ARTERY WITH OPEN VEIN HARVESTING WITH PERFUSION TRANSESOPHAGEAL ECHOCARDIOGRAM performed by Lance Burk MD at [address]    partial    KNEE SURGERY  2011    left knee surgery    PTCA      stent    TUBAL LIGATION      UPPER GASTROINTESTINAL ENDOSCOPY  2009?  UPPER GASTROINTESTINAL ENDOSCOPY  01/10/2012    Dr LUJAN    UPPER GASTROINTESTINAL ENDOSCOPY  05/06/2014    Dr. Marck Phelps  2018    Dr. Tee Boyd in [facility]-Gastritis per patient.     VARICOSE VEIN SURGERY   01- [facility]    Left Leg Ablation    VARICOSE VEIN SURGERY  3-  [facility]    right leg ablation     Family History   Problem Relation Age of Onset    Diabetes Mother     Heart Disease Mother     Heart Disease Father     Diabetes Father     High Blood Pressure Father     Esophageal Cancer Father     Colon Polyps Father     Crohn's Disease Brother     Colon Cancer Neg Hx     Liver Cancer Neg Hx     Liver Disease Neg Hx     Rectal Cancer Neg Hx     Stomach Cancer Neg Hx      Social History     Tobacco Use    Smoking status: Never Smoker    Smokeless tobacco: Never Used   Substance Use Topics    Alcohol use: No      Current Outpatient Medications   Medication Sig Dispense Refill    olmesartan (BENICAR) 20 MG tablet Take 1 tablet by mouth nightly 90 tablet 3    hydroCHLOROthiazide (HYDRODIURIL) 25 MG tablet Take 1 tablet by mouth every morning 90 tablet 3    metoprolol succinate (TOPROL XL) 50 MG extended release tablet TAKE 1 TABLET TWICE DAILY 180 tablet 1    Turmeric 500 MG CAPS Take by mouth 2 times daily      Vibegron 75 MG TABS Take 75 mg by mouth daily 30 tablet 11    ZINC PO Take by mouth daily      Omega-3 Fatty Acids (FISH OIL) 1360 MG CAPS Take 1 capsule by mouth 2 times daily       Evolocumab (REPATHA) 140 MG/ML SOSY Inject 140 mg into the skin every 14 days Patient has not started      vitamin D (CHOLECALCIFEROL) 1000 UNIT TABS tablet 1,000 Units 2 times daily       allopurinol (ZYLOPRIM) 300 MG tablet Take 300 mg by mouth daily.  aspirin 81 MG EC tablet Take 81 mg by mouth daily.  loratadine (CLARITIN) 10 MG tablet Take 10 mg by mouth 2 times daily       Multiple Vitamin (MULTIVITAMIN PO) Take 1 tablet by mouth daily        No current facility-administered medications for this visit. Allergies: Demerol, Fenofibrate, Nitroglycerin, Reclast [zoledronic acid], Abatacept, Ipratropium bromide hfa, Levofloxacin, Acetaminophen, Buspirone hcl, Colchicine, Hydrocodone, Maltose, Other, Buspirone, Celexa [citalopram hydrobromide], Cozaar [losartan potassium], Dye [barium-containing compounds], Dye [iodides], Esomeprazole magnesium, Evolocumab, Hydrocodone-acetaminophen, Lasix [furosemide], Lisinopril, Losartan, Methotrexate, Mirtazapine, Neurontin [gabapentin], Plaquenil [hydroxychloroquine sulfate], Prilosec [omeprazole], and Trazodone    Review of Systems  Review of Systems   Constitutional: Negative for activity change, diaphoresis, fatigue, fever and unexpected weight change. HENT: Negative for facial swelling and nosebleeds. Eyes: Negative for redness and visual disturbance. Respiratory: Positive for shortness of breath. Negative for cough, chest tightness and wheezing. Cardiovascular: Positive for leg swelling. Negative for chest pain and palpitations. Gastrointestinal: Negative for abdominal pain, nausea and vomiting. Endocrine: Negative for cold intolerance and heat intolerance. Genitourinary: Negative for dysuria and hematuria. Musculoskeletal: Negative for arthralgias and myalgias. Complains of generalized arthritis pain   Skin: Negative for pallor and rash. Neurological: Negative for dizziness, seizures, syncope, weakness and light-headedness. Hematological: Does not bruise/bleed easily. Psychiatric/Behavioral: Negative for agitation. The patient is not nervous/anxious. Objective  Vital Signs - /78   Pulse 68   Ht 5' 7\" (1.702 m)   Wt 191 lb (86.6 kg)   BMI 29.91 kg/m²    Wt Readings from Last 3 Encounters:   04/12/22 191 lb (86.6 kg)   11/12/21 182 lb (82.6 kg)   08/27/21 177 lb 3.2 oz (80.4 kg)      Physical Exam  Vitals and nursing note reviewed. Constitutional:       General: She is not in acute distress. Appearance: She is well-developed. She is obese. She is not diaphoretic. HENT:      Head: Normocephalic and atraumatic. Right Ear: Hearing and external ear normal.      Left Ear: Hearing and external ear normal.      Nose: Nose normal.   Eyes:      General:         Right eye: No discharge. Left eye: No discharge. Pupils: Pupils are equal, round, and reactive to light. Neck:      Thyroid: No thyromegaly. Vascular: No carotid bruit or JVD. Trachea: No tracheal deviation. Cardiovascular:      Rate and Rhythm: Normal rate and regular rhythm. Heart sounds: Normal heart sounds. No murmur heard. No friction rub. No gallop. Pulmonary:      Effort: Pulmonary effort is normal. No respiratory distress. Breath sounds: Normal breath sounds. No wheezing or rales. Abdominal:      Palpations: Abdomen is soft. Tenderness: There is no abdominal tenderness. Musculoskeletal:         General: No swelling or deformity. Cervical back: Neck supple. No muscular tenderness. Right lower leg: Edema (trace) present. Left lower leg: Edema (trace) present. Skin:     General: Skin is warm and dry. Findings: No rash. Neurological:      General: No focal deficit present.       Mental Status: She is alert and oriented to person, place, and time. Cranial Nerves: No cranial nerve deficit. Psychiatric:         Mood and Affect: Mood normal.         Behavior: Behavior normal.         Judgment: Judgment normal.         Data:  Lab Results   Component Value Date    WBC 8.6 03/19/2021    RBC 4.19 03/19/2021    HGB 13.1 03/19/2021    HCT 40.6 03/19/2021     03/19/2021      Lab Results   Component Value Date    CHOL 294 07/24/2018    CHOL 296 07/24/2018    TRIG 259 07/24/2018    TRIG 291 07/24/2018    HDL 46 07/24/2018    HDL 49 07/24/2018    LDLCALC 196 07/24/2018    LDLCALC 189 07/24/2018     Lab Results   Component Value Date     03/19/2021    K 4.3 03/19/2021     03/19/2021    CO2 27 03/19/2021    GLUCOSE 125 03/19/2021    BUN 24 03/19/2021    CREATININE 0.9 03/19/2021    CALCIUM 11.0 03/19/2021    ALT 24 03/19/2021    AST 31 03/19/2021     Lab Results   Component Value Date    TSH 1.620 07/23/2018     EKG shows normal sinus rhythm rate 68    Assessment:     Diagnosis Orders   1. Coronary artery disease involving native coronary artery of native heart without angina pectoris     2. Essential hypertension     3. Mixed hyperlipidemia     4. Edema of both lower extremities         CAD-stable without symptoms of angina. Continue aspirin, metoprolol, Repatha    Hypertension-BP up and down. She has problems  tolerating higher doses of olmesartan HCTZ and currently can only take half of a tablet. However, she is having some issues with lower extremity edema. Plan will be to separate the combination medication. Patient will instead take olmesartan 20 mg at bedtime and HCTZ 25 mg every morning    Hyperlipidemia-stable per patient report on Repatha. She will have labs next month with her PCP and have asked her to forward a copy of those results to our office    Edema lower extremities-mild.  olmesartan/HCTZ. In this way she will get a full 25 mg of HCTZ every morning.   She will call for further issues    Stable cardiovascular status. No evidence of overt heart failure,angina or dysrhythmia. Plan    Orders Placed This Encounter   Procedures    EKG 12 lead     Order Specific Question:   Reason for Exam?     Answer: Other     Return in about 6 months (around 10/12/2022) for Dr. Jun Busch. Stop Omesartan/ HCTZ combo  Start Olmesartan 20mg at bedtime  Start HCTZ 25mg in a.m  Fax cholesterol labs after drawn    Call with any questionsor concerns  Follow up with Yobany Rosales MD for non cardiac problems  Report any new problems  Cardiovascular Fitness-Exercise as tolerated. Strive for 15 minutes of exercise most days of the week. Cardiac / HealthyDiet  Continue current medications as directed  Continue plan of treatment  It is always recommended that you bring your medicationsbottles with you to each visit - this is for your safety!        ULISES Hoskins

## 2022-04-12 NOTE — PATIENT INSTRUCTIONS
Return in about 6 months (around 10/12/2022) for Dr. Apple Hairston.    Stop Omesartan/ HCTZ combo  Start Olmesartan 20mg at bedtime  Start HCTZ 25mg in a.m  Fax cholesterol labs after drawn

## 2022-06-13 ENCOUNTER — TRANSCRIBE ORDERS (OUTPATIENT)
Dept: ADMINISTRATIVE | Facility: HOSPITAL | Age: 77
End: 2022-06-13

## 2022-06-13 DIAGNOSIS — Z12.31 ENCOUNTER FOR SCREENING MAMMOGRAM FOR MALIGNANT NEOPLASM OF BREAST: Primary | ICD-10-CM

## 2022-06-17 ENCOUNTER — HOSPITAL ENCOUNTER (OUTPATIENT)
Dept: MAMMOGRAPHY | Facility: HOSPITAL | Age: 77
Discharge: HOME | End: 2022-06-17
Admitting: INTERNAL MEDICINE

## 2022-06-17 DIAGNOSIS — Z12.31 ENCOUNTER FOR SCREENING MAMMOGRAM FOR MALIGNANT NEOPLASM OF BREAST: ICD-10-CM

## 2022-06-17 PROCEDURE — 77063 BREAST TOMOSYNTHESIS BI: CPT

## 2022-06-17 PROCEDURE — 77067 SCR MAMMO BI INCL CAD: CPT

## 2022-06-21 ENCOUNTER — HOSPITAL ENCOUNTER (OUTPATIENT)
Dept: ULTRASOUND IMAGING | Facility: HOSPITAL | Age: 77
Discharge: HOME | End: 2022-06-21

## 2022-06-21 ENCOUNTER — HOSPITAL ENCOUNTER (OUTPATIENT)
Dept: MAMMOGRAPHY | Facility: HOSPITAL | Age: 77
Discharge: HOME | End: 2022-06-21

## 2022-06-21 DIAGNOSIS — R92.8 ABNORMAL MAMMOGRAM: ICD-10-CM

## 2022-06-21 PROCEDURE — 77065 DX MAMMO INCL CAD UNI: CPT

## 2022-06-21 PROCEDURE — 76642 ULTRASOUND BREAST LIMITED: CPT

## 2022-06-21 PROCEDURE — G0279 TOMOSYNTHESIS, MAMMO: HCPCS

## 2022-06-22 ENCOUNTER — TRANSCRIBE ORDERS (OUTPATIENT)
Dept: ADMINISTRATIVE | Facility: HOSPITAL | Age: 77
End: 2022-06-22

## 2022-06-22 ENCOUNTER — HOSPITAL ENCOUNTER (OUTPATIENT)
Dept: ULTRASOUND IMAGING | Facility: HOSPITAL | Age: 77
Discharge: HOME | End: 2022-06-22
Admitting: PHYSICIAN ASSISTANT

## 2022-06-22 DIAGNOSIS — R60.0 LOCALIZED EDEMA: ICD-10-CM

## 2022-06-22 DIAGNOSIS — R60.0 LOCALIZED EDEMA: Primary | ICD-10-CM

## 2022-06-22 PROCEDURE — 93970 EXTREMITY STUDY: CPT

## 2022-06-24 ENCOUNTER — OFFICE VISIT (OUTPATIENT)
Dept: UROLOGY | Age: 77
End: 2022-06-24
Payer: MEDICARE

## 2022-06-24 VITALS
WEIGHT: 196.2 LBS | TEMPERATURE: 97.2 F | SYSTOLIC BLOOD PRESSURE: 143 MMHG | DIASTOLIC BLOOD PRESSURE: 81 MMHG | BODY MASS INDEX: 30.79 KG/M2 | HEIGHT: 67 IN

## 2022-06-24 DIAGNOSIS — M79.89 SWELLING OF BOTH LOWER EXTREMITIES: ICD-10-CM

## 2022-06-24 DIAGNOSIS — N39.41 URGE INCONTINENCE: ICD-10-CM

## 2022-06-24 DIAGNOSIS — N32.81 OAB (OVERACTIVE BLADDER): Primary | ICD-10-CM

## 2022-06-24 PROCEDURE — 0509F URINE INCON PLAN DOCD: CPT | Performed by: NURSE PRACTITIONER

## 2022-06-24 PROCEDURE — 51798 US URINE CAPACITY MEASURE: CPT | Performed by: NURSE PRACTITIONER

## 2022-06-24 PROCEDURE — G8417 CALC BMI ABV UP PARAM F/U: HCPCS | Performed by: NURSE PRACTITIONER

## 2022-06-24 PROCEDURE — G8400 PT W/DXA NO RESULTS DOC: HCPCS | Performed by: NURSE PRACTITIONER

## 2022-06-24 PROCEDURE — 1123F ACP DISCUSS/DSCN MKR DOCD: CPT | Performed by: NURSE PRACTITIONER

## 2022-06-24 PROCEDURE — 99214 OFFICE O/P EST MOD 30 MIN: CPT | Performed by: NURSE PRACTITIONER

## 2022-06-24 PROCEDURE — G8427 DOCREV CUR MEDS BY ELIG CLIN: HCPCS | Performed by: NURSE PRACTITIONER

## 2022-06-24 PROCEDURE — 1090F PRES/ABSN URINE INCON ASSESS: CPT | Performed by: NURSE PRACTITIONER

## 2022-06-24 PROCEDURE — 1036F TOBACCO NON-USER: CPT | Performed by: NURSE PRACTITIONER

## 2022-06-24 ASSESSMENT — ENCOUNTER SYMPTOMS
BACK PAIN: 0
NAUSEA: 0
VOMITING: 0
ABDOMINAL DISTENTION: 0
ABDOMINAL PAIN: 0

## 2022-06-24 NOTE — PROGRESS NOTES
Allyson Arredondo is a 76 y.o. female who presents today   Chief Complaint   Patient presents with    Follow-up     I am here today for my fu for OAB       Patient is a 64-year-old female who presents clinic today for follow-up. Has a history of overactive bladder. She has failed anticholinergics in the past.  At last visit I placed her on Gemtesa although she states she was unable to tolerate this therefore she is on current bladder medications. She continues to have frequency and urgency main complaints of nocturia x3 although she states these are tolerable voiding symptoms and does not want a pursue any intervention at this time. She does utilize timed voiding which is helped her significantly throughout the day. She did have a UDS performed in July 2020 which revealed overactive bladder with detrusor overactivity and urge incontinence at 288 mL otherwise normal capacity with normal compliance and sensation normal flow without obstruction and adequate emptying of bladder contractility. She is here today to discuss possible renal function. She just saw her PCP yesterday and they decided that her bilateral lower extremity swelling is likely secondary to vascular and not renal function. The only CMP I have is from 1 year ago which revealed renal function within normal limits. She is emptying her bladder well.       Past Medical History:   Diagnosis Date    Acid reflux     Anxiety     Arthritis     Back pain     CAD (coronary artery disease)     CAD (coronary artery disease)     Carotid artery occlusion     Chronic venous insufficiency 11/8/2012    Cough     Fibromyalgia     Goiter     Gout     Head ache     headaches    Heart murmur     History of colon polyps     History of colon polyps     Hoarseness     Hypercholesterolemia     Hyperlipidemia     Hypertension     Insomnia     Irregular heart beat     Itching     Muscle cramp     Neck pain     Osteoarthritis     Osteoporosis     Palpitations     RA (rheumatoid arthritis) (HCC)     Rash     Rectal bleeding     SOB (shortness of breath)     Sore throat     Tympanic membrane perforation     Varicose veins 11/8/2012       Past Surgical History:   Procedure Laterality Date    APPENDECTOMY      BREAST SURGERY  2002    CARDIAC CATHETERIZATION  9/10/15  JDT    EF 50%    CARDIAC CATHETERIZATION  09/10/2020    LM disease- sent for bypass    [address]    x 1    CHOLECYSTECTOMY  2012    [place]    COLONOSCOPY  01/10/2012    Dr LUJAN    COLONOSCOPY  05/06/2014    Dr. Sola Borjas  2018    Dr. Sunny Wright in [place]- Polyps-benign,internal Hemorrhoids, per patient.  COLONOSCOPY N/A 06/02/2021    Dr Cindia Schlatter, Int hemorrhoids Grade 2 otherwise a NORMAL exam, no recall due to age   [address]  2012    Dr Richardson Ramirez N/A 09/11/2020    CORONARY ARTERY BYPASS GRAFT X2 WITH LEFT INTERNAL MAMMARY ARTERY WITH OPEN VEIN HARVESTING WITH PERFUSION TRANSESOPHAGEAL ECHOCARDIOGRAM performed by Jesús Christiansen MD at [facility]    partial    KNEE SURGERY  2011    left knee surgery    PTCA      stent    TUBAL LIGATION      UPPER GASTROINTESTINAL ENDOSCOPY  2009?  UPPER GASTROINTESTINAL ENDOSCOPY  01/10/2012    Dr LUJAN    UPPER GASTROINTESTINAL ENDOSCOPY  05/06/2014    Dr. Anna Kessler  2018    Dr. Sunny Wright in [facility]-Gastritis per patient.     VARICOSE VEIN SURGERY   01- [facility]    Left Leg Ablation    VARICOSE VEIN SURGERY  3-  [facility]    right leg ablation       Current Outpatient Medications   Medication Sig Dispense Refill    metroNIDAZOLE (METROCREAM) 0.75 % cream APPLY CREAM TOPICALLY TO CHEEKS TWICE DAILY FOR ROSACEA WHEN FLARED      olmesartan (BENICAR) 20 MG tablet Take 1 tablet by mouth nightly 90 tablet 3    hydroCHLOROthiazide (HYDRODIURIL) 25 MG tablet Take 1 tablet by mouth every morning 90 tablet 3    metoprolol succinate (TOPROL XL) 50 MG extended release tablet TAKE 1 TABLET TWICE DAILY 180 tablet 1    Turmeric 500 MG CAPS Take by mouth 2 times daily      ZINC PO Take by mouth daily      Omega-3 Fatty Acids (FISH OIL) 1360 MG CAPS Take 1 capsule by mouth 2 times daily       Evolocumab (REPATHA) 140 MG/ML SOSY Inject 140 mg into the skin every 14 days Patient has not started      vitamin D (CHOLECALCIFEROL) 1000 UNIT TABS tablet 1,000 Units 2 times daily       allopurinol (ZYLOPRIM) 300 MG tablet Take 300 mg by mouth daily.  aspirin 81 MG EC tablet Take 81 mg by mouth daily.  loratadine (CLARITIN) 10 MG tablet Take 10 mg by mouth 2 times daily       Multiple Vitamin (MULTIVITAMIN PO) Take 1 tablet by mouth daily        No current facility-administered medications for this visit. Allergies   Allergen Reactions    Demerol Nausea And Vomiting    Fenofibrate Other (See Comments)     Chest pain    Nitroglycerin Other (See Comments)     Passed out    Reclast [Zoledronic Acid] Other (See Comments)     Patient has a intolerance to this medication .  It makes her feel \"out of it\"     Abatacept Other (See Comments)     Heart palpitations    Ipratropium Bromide Hfa Palpitations    Levofloxacin Other (See Comments)     palpitations    Acetaminophen     Buspirone Hcl     Colchicine     Hydrocodone     Maltose     Other     Buspirone Other (See Comments)     Pt doesn't remember    Celexa [Citalopram Hydrobromide] Nausea Only and Other (See Comments)     Nausea and headache    Cozaar [Losartan Potassium] Palpitations    Dye [Barium-Containing Compounds] Nausea Only    Dye [Iodides] Itching     CONTRAST DYE, CT DYE, IV CONTRAST     Esomeprazole Magnesium Nausea And Vomiting    Evolocumab Other (See Comments)      Irritability  Repatha [employer]    Hydrocodone-Acetaminophen Other (See Comments)     Shaking, attacks nervous system    Lasix [Furosemide] Nausea Only    Lisinopril Other (See Comments)     Muscle cramps in legs    Losartan Nausea Only and Other (See Comments)     Nausea and dizziness    Methotrexate Other (See Comments)     Dizzy and fainted    Mirtazapine Nausea Only and Other (See Comments)     Tremor, nausea, headache    Neurontin [Gabapentin] Nausea Only and Other (See Comments)     Dizziness      Plaquenil [Hydroxychloroquine Sulfate] Other (See Comments)     nervousness    Prilosec [Omeprazole] Nausea And Vomiting    Trazodone      Muscle stiffness       Social History     Socioeconomic History    Marital status:      Spouse name: Not on file    Number of children: Not on file    Years of education: Not on file    Highest education level: Not on file   Occupational History    Not on file   Tobacco Use    Smoking status: Never Smoker    Smokeless tobacco: Never Used   Vaping Use    Vaping Use: Never used   Substance and Sexual Activity    Alcohol use: No    Drug use: No    Sexual activity: Not on file   Other Topics Concern    Not on file   Social History Narrative    Not on file     Social Determinants of Health     Financial Resource Strain:     Difficulty of Paying Living Expenses: Not on file   Food Insecurity:     Worried About Running Out of Food in the Last Year: Not on file    Curtis of Food in the Last Year: Not on file   Transportation Needs:     Lack of Transportation (Medical): Not on file    Lack of Transportation (Non-Medical):  Not on file   Physical Activity:     Days of Exercise per Week: Not on file    Minutes of Exercise per Session: Not on file   Stress:     Feeling of Stress : Not on file   Social Connections:     Frequency of Communication with Friends and Family: Not on file    Frequency of Social Gatherings with Friends and Family: Not on file    Attends [employer] Services: Not on file   [facility] [employer] or Organizations: Not on file    Attends Club or Organization Meetings: Not on file    Marital Status: Not on file   Intimate Partner Violence:     Fear of Current or Ex-Partner: Not on file    Emotionally Abused: Not on file    Physically Abused: Not on file    Sexually Abused: Not on file   Housing Stability:     Unable to Pay for Housing in the Last Year: Not on file    Number of [place] in the Last Year: Not on file    Unstable Housing in the Last Year: Not on file       Family History   Problem Relation Age of Onset    Diabetes Mother     Heart Disease Mother     Heart Disease Father     Diabetes Father     High Blood Pressure Father     Esophageal Cancer Father     Colon Polyps Father     Crohn's Disease Brother     Colon Cancer Neg Hx     Liver Cancer Neg Hx     Liver Disease Neg Hx     Rectal Cancer Neg Hx     Stomach Cancer Neg Hx        REVIEW OF SYSTEMS:  Review of Systems   Constitutional: Negative for chills and fever. Gastrointestinal: Negative for abdominal distention, abdominal pain, nausea and vomiting. Genitourinary: Negative for difficulty urinating, dysuria, flank pain, frequency, hematuria and urgency. Musculoskeletal: Negative for back pain and gait problem. Bilateral lower extremity swelling. Psychiatric/Behavioral: Negative for agitation and confusion. PHYSICAL EXAM:  BP (!) 143/81   Temp 97.2 °F (36.2 °C) (Temporal)   Ht 5' 7\" (1.702 m)   Wt 196 lb 3.2 oz (89 kg)   BMI 30.73 kg/m²   Physical Exam  Vitals and nursing note reviewed. Constitutional:       General: She is not in acute distress. Appearance: Normal appearance. She is not ill-appearing. Pulmonary:      Effort: Pulmonary effort is normal. No respiratory distress. Abdominal:      General: There is no distension. Tenderness: There is no abdominal tenderness. There is no right CVA tenderness or left CVA tenderness.    Musculoskeletal: Right lower leg: Edema present. Left lower leg: Edema present. Neurological:      Mental Status: She is alert and oriented to person, place, and time. Mental status is at baseline. Psychiatric:         Mood and Affect: Mood normal.         Behavior: Behavior normal.       DATA:  CMP:    Lab Results   Component Value Date     03/19/2021    K 4.3 03/19/2021     03/19/2021    CO2 27 03/19/2021    BUN 24 03/19/2021    CREATININE 0.9 03/19/2021    GFRAA >59 03/19/2021    LABGLOM >60 03/19/2021    GLUCOSE 125 03/19/2021    PROT 6.7 03/19/2021    LABALBU 3.8 03/19/2021    CALCIUM 11.0 03/19/2021    BILITOT <0.2 03/19/2021    ALKPHOS 75 03/19/2021    AST 31 03/19/2021    ALT 24 03/19/2021     Unable to give urine specimen today    IMAGING:  Bladder Scan interpretation  Estimation of residual urine via abdominal ultrasound  Residual Urine: 0 ml  Indication: Frequency  Position: Supine  Examination: Incremental scanning of the suprapubic area using 3 MHz transducer using copious amounts of acoustic gel. Findings: An anechoic area was demonstrated which represented the bladder, with measurement of residual urine as noted. 1. OAB (overactive bladder)  2. Urge incontinence  History of overactive bladder and urge incontinence. Has failed anticholinergics as well as Gemtesa. She is currently satisfied with his voiding symptoms and does not want to pursue any further intervention. We will have her follow-up as needed. Bladder scan reveals she is emptying her bladder well with 0 mill PVR. - TN Measure, post-void residual, US, non-imaging    3. Swelling of both lower extremities  Complaints of bilateral lower extremity swelling. Previous CMP from 1 year ago was unremarkable. She is following with her PCP as well as possible vascular. I do not think this is secondary to her bladder or kidneys. She is emptying her bladder well. She is to have labs obtained today from PCP.       Orders Placed This Encounter   Procedures    NH Measure, post-void residual, US, non-imaging        Return if symptoms worsen or fail to improve. At least 32 minutes were spent on the day of the visit reviewing the patient's past medical records/imaging, speaking face to face with the patient, and charting in the post visit period. All information inputted into the note by the MA to include chief complaint, past medical history, past surgical history, medications, allergies, social and family history and review of systems has been reviewed and updated as needed by me. EMR Dragon/transcription disclaimer: Much of this documentt is electronic  transcription/translation of spoken language to printed text. The  electronic translation of spoken language may be erroneous, or at times,  nonsensical words or phrases may be inadvertently transcribed.  Although I  have reviewed the document for such errors, some may still exist.

## 2022-07-04 ENCOUNTER — APPOINTMENT (OUTPATIENT)
Dept: GENERAL RADIOLOGY | Age: 77
End: 2022-07-04
Payer: MEDICARE

## 2022-07-04 ENCOUNTER — HOSPITAL ENCOUNTER (EMERGENCY)
Age: 77
Discharge: HOME | End: 2022-07-04
Attending: EMERGENCY MEDICINE
Payer: MEDICARE

## 2022-07-04 VITALS
HEART RATE: 84 BPM | OXYGEN SATURATION: 98 % | SYSTOLIC BLOOD PRESSURE: 127 MMHG | RESPIRATION RATE: 18 BRPM | TEMPERATURE: 98 F | DIASTOLIC BLOOD PRESSURE: 50 MMHG

## 2022-07-04 DIAGNOSIS — M79.661 PAIN AND SWELLING OF RIGHT LOWER LEG: ICD-10-CM

## 2022-07-04 DIAGNOSIS — R60.0 BILATERAL LOWER EXTREMITY EDEMA: Primary | ICD-10-CM

## 2022-07-04 DIAGNOSIS — M79.89 PAIN AND SWELLING OF RIGHT LOWER LEG: ICD-10-CM

## 2022-07-04 LAB
ALBUMIN SERPL-MCNC: 4.1 G/DL (ref 3.5–5.2)
ALP BLD-CCNC: 65 U/L (ref 35–104)
ALT SERPL-CCNC: 37 U/L (ref 5–33)
ANION GAP SERPL CALCULATED.3IONS-SCNC: 9 MMOL/L (ref 7–19)
AST SERPL-CCNC: 29 U/L (ref 5–32)
BASOPHILS ABSOLUTE: 0.1 K/UL (ref 0–0.2)
BASOPHILS RELATIVE PERCENT: 0.8 % (ref 0–1)
BILIRUB SERPL-MCNC: 0.4 MG/DL (ref 0.2–1.2)
BUN BLDV-MCNC: 15 MG/DL (ref 8–23)
C-REACTIVE PROTEIN: <0.3 MG/DL (ref 0–0.5)
CALCIUM SERPL-MCNC: 9.9 MG/DL (ref 8.8–10.2)
CHLORIDE BLD-SCNC: 103 MMOL/L (ref 98–111)
CO2: 26 MMOL/L (ref 22–29)
CREAT SERPL-MCNC: 0.7 MG/DL (ref 0.5–0.9)
D DIMER: 0.77 UG/ML FEU (ref 0–0.48)
EOSINOPHILS ABSOLUTE: 0.2 K/UL (ref 0–0.6)
EOSINOPHILS RELATIVE PERCENT: 3.3 % (ref 0–5)
GFR AFRICAN AMERICAN: >59
GFR NON-AFRICAN AMERICAN: >60
GLUCOSE BLD-MCNC: 99 MG/DL (ref 74–109)
HCT VFR BLD CALC: 44.3 % (ref 37–47)
HEMOGLOBIN: 14.1 G/DL (ref 12–16)
IMMATURE GRANULOCYTES #: 0 K/UL
LYMPHOCYTES ABSOLUTE: 2.4 K/UL (ref 1.1–4.5)
LYMPHOCYTES RELATIVE PERCENT: 39.8 % (ref 20–40)
MCH RBC QN AUTO: 31.5 PG (ref 27–31)
MCHC RBC AUTO-ENTMCNC: 31.8 G/DL (ref 33–37)
MCV RBC AUTO: 98.9 FL (ref 81–99)
MONOCYTES ABSOLUTE: 0.5 K/UL (ref 0–0.9)
MONOCYTES RELATIVE PERCENT: 7.9 % (ref 0–10)
NEUTROPHILS ABSOLUTE: 2.9 K/UL (ref 1.5–7.5)
NEUTROPHILS RELATIVE PERCENT: 47.5 % (ref 50–65)
PDW BLD-RTO: 14.2 % (ref 11.5–14.5)
PLATELET # BLD: 184 K/UL (ref 130–400)
PMV BLD AUTO: 8.6 FL (ref 9.4–12.3)
POTASSIUM REFLEX MAGNESIUM: 3.8 MMOL/L (ref 3.5–5)
RBC # BLD: 4.48 M/UL (ref 4.2–5.4)
SODIUM BLD-SCNC: 138 MMOL/L (ref 136–145)
TOTAL PROTEIN: 6.4 G/DL (ref 6.6–8.7)
WBC # BLD: 6.1 K/UL (ref 4.8–10.8)

## 2022-07-04 PROCEDURE — 86140 C-REACTIVE PROTEIN: CPT

## 2022-07-04 PROCEDURE — 73630 X-RAY EXAM OF FOOT: CPT | Performed by: RADIOLOGY

## 2022-07-04 PROCEDURE — 73630 X-RAY EXAM OF FOOT: CPT

## 2022-07-04 PROCEDURE — 36415 COLL VENOUS BLD VENIPUNCTURE: CPT

## 2022-07-04 PROCEDURE — 99285 EMERGENCY DEPT VISIT HI MDM: CPT

## 2022-07-04 PROCEDURE — 80053 COMPREHEN METABOLIC PANEL: CPT

## 2022-07-04 PROCEDURE — 85379 FIBRIN DEGRADATION QUANT: CPT

## 2022-07-04 PROCEDURE — 85025 COMPLETE CBC W/AUTO DIFF WBC: CPT

## 2022-07-04 PROCEDURE — 93922 UPR/L XTREMITY ART 2 LEVELS: CPT

## 2022-07-04 PROCEDURE — 93005 ELECTROCARDIOGRAM TRACING: CPT | Performed by: EMERGENCY MEDICINE

## 2022-07-04 PROCEDURE — 93970 EXTREMITY STUDY: CPT

## 2022-07-04 ASSESSMENT — ENCOUNTER SYMPTOMS
BLOOD IN STOOL: 0
COUGH: 0
CONSTIPATION: 0
ABDOMINAL PAIN: 0
NAUSEA: 0
SORE THROAT: 0
APNEA: 0
VOICE CHANGE: 0
EYE DISCHARGE: 0
FACIAL SWELLING: 0
CHOKING: 0
SINUS PRESSURE: 0
SHORTNESS OF BREATH: 0
DIARRHEA: 0

## 2022-07-04 NOTE — PROGRESS NOTES
Vascular lab preliminary. Bilateral L/E venous duplex scan and RAMOS's completed. No evidence for DVT, SVT, or reflux noted at this time. Right RAMOS:  1.14  Left RAMOS:  1.23  Good arterial flow to feet. Final report pending.

## 2022-07-04 NOTE — ED PROVIDER NOTES
[facility]  eMERGENCY dEPARTMENT eNCOUnter      Pt Name: Yolanda Campbell  MRN: [id number]  [place] 1945  Date of evaluation: 7/4/2022  Provider: Karyna Win MD    CHIEF COMPLAINT       Chief Complaint   Patient presents with    Leg Swelling     Right leg, states it has been throbbing pain that is continually worsening Sent by Dr Oralia Newton   (Location/Symptom, Timing/Onset,Context/Setting, Quality, Duration, Modifying Factors, Severity)  Note limiting factors. Yolanda Campbell is a 76 y.o. female who presents to the emergency department bilateral leg pain and swelling    78-year-old female with ongoing problem with bilateral leg pain and swelling. She is seeing the vascular surgeon and is scheduling a vein mapping or some vascular study that is going to take an hour or so she tells me. She had SVT in 2021. She is wearing compression stockings thigh-high's her clinician and vascular surgeon told her that if she had any problems to come to the emergency room. Last night her right leg hurt more. They described her first toe as being black. It is not black now but there is some slight redness. She seems hypersensitive to touch on examination. No fever chills or infectious symptoms no injury or trauma. The history is provided by the patient, the spouse and medical records. NursingNotes were reviewed. REVIEW OF SYSTEMS    (2-9 systems for level 4, 10 or more for level 5)     Review of Systems   Constitutional: Negative for chills and fever. HENT: Negative for congestion, drooling, facial swelling, nosebleeds, sinus pressure, sore throat and voice change. Eyes: Negative for discharge. Respiratory: Negative for apnea, cough, choking and shortness of breath. Cardiovascular: Positive for leg swelling. Negative for chest pain. Gastrointestinal: Negative for abdominal pain, blood in stool, constipation, diarrhea and nausea.    Genitourinary: Negative for dysuria and enuresis. Musculoskeletal: Negative for joint swelling. Leg pain and swelling   Skin: Negative for rash and wound. Neurological: Negative for seizures and syncope. Psychiatric/Behavioral: Negative for behavioral problems, hallucinations and suicidal ideas. All other systems reviewed and are negative. A complete review of systems was performed and is negative except as noted above in the HPI. PAST MEDICAL HISTORY     Past Medical History:   Diagnosis Date    Acid reflux     Anxiety     Arthritis     Back pain     CAD (coronary artery disease)     CAD (coronary artery disease)     Carotid artery occlusion     Chronic venous insufficiency 11/8/2012    Cough     Fibromyalgia     Goiter     Gout     Head ache     headaches    Heart murmur     History of colon polyps     History of colon polyps     Hoarseness     Hypercholesterolemia     Hyperlipidemia     Hypertension     Insomnia     Irregular heart beat     Itching     Muscle cramp     Neck pain     Osteoarthritis     Osteoporosis     Palpitations     RA (rheumatoid arthritis) (HCC)     Rash     Rectal bleeding     SOB (shortness of breath)     Sore throat     Tympanic membrane perforation     Varicose veins 11/8/2012         SURGICAL HISTORY       Past Surgical History:   Procedure Laterality Date    APPENDECTOMY      BREAST SURGERY  2002    CARDIAC CATHETERIZATION  9/10/15  [facility]    EF 50%    CARDIAC CATHETERIZATION  09/10/2020    LM disease- sent for bypass    [address]    x 1    CHOLECYSTECTOMY  2012    [place]    COLONOSCOPY  01/10/2012    Dr LUJAN    COLONOSCOPY  05/06/2014    Dr. Myriam Killian  2018    Dr. Slade Noel in [place]- Polyps-benign,internal Hemorrhoids, per patient.     COLONOSCOPY N/A 06/02/2021    Dr Atiya Stroud, Int hemorrhoids Grade 2 otherwise a NORMAL exam, no recall due to age   [employer] CORONARY ANGIOPLASTY WITH STENT PLACEMENT  2012    Dr LUJAN    CORONARY ARTERY BYPASS GRAFT N/A 09/11/2020    CORONARY ARTERY BYPASS GRAFT X2 WITH LEFT INTERNAL MAMMARY ARTERY WITH OPEN VEIN HARVESTING WITH PERFUSION TRANSESOPHAGEAL ECHOCARDIOGRAM performed by Alejandra Maria MD at [place] [address] AND [facility] OF UTERUS      HYSTERECTOMY (CERVIX STATUS UNKNOWN)  1976    partial    KNEE SURGERY  2011    left knee surgery    PTCA      stent    TUBAL LIGATION      UPPER GASTROINTESTINAL ENDOSCOPY  2009?  UPPER GASTROINTESTINAL ENDOSCOPY  01/10/2012    Dr LUJAN    UPPER GASTROINTESTINAL ENDOSCOPY  05/06/2014    Dr. Anthony Clemente  2018    Dr. Slade Noel in [facility]-Gastritis per patient.  VARICOSE VEIN SURGERY   01- [facility]    Left Leg Ablation    VARICOSE VEIN SURGERY  3-  [facility]    right leg ablation         CURRENT MEDICATIONS       Previous Medications    ALLOPURINOL (ZYLOPRIM) 300 MG TABLET    Take 300 mg by mouth daily. ASPIRIN 81 MG EC TABLET    Take 81 mg by mouth daily.     EVOLOCUMAB (REPATHA) 140 MG/ML SOSY    Inject 140 mg into the skin every 14 days Patient has not started    HYDROCHLOROTHIAZIDE (HYDRODIURIL) 25 MG TABLET    Take 1 tablet by mouth every morning    LORATADINE (CLARITIN) 10 MG TABLET    Take 10 mg by mouth 2 times daily     METOPROLOL SUCCINATE (TOPROL XL) 50 MG EXTENDED RELEASE TABLET    TAKE 1 TABLET TWICE DAILY    METRONIDAZOLE (METROCREAM) 0.75 % CREAM    APPLY CREAM TOPICALLY TO CHEEKS TWICE DAILY FOR ROSACEA WHEN FLARED    MULTIPLE VITAMIN (MULTIVITAMIN PO)    Take 1 tablet by mouth daily     OLMESARTAN (BENICAR) 20 MG TABLET    Take 1 tablet by mouth nightly    OMEGA-3 FATTY ACIDS (FISH OIL) 1360 MG CAPS    Take 1 capsule by mouth 2 times daily     TURMERIC 500 MG CAPS    Take by mouth 2 times daily    VITAMIN D (CHOLECALCIFEROL) 1000 UNIT TABS TABLET    1,000 Units 2 times daily     ZINC PO    Take by mouth daily ALLERGIES     Demerol, Fenofibrate, Nitroglycerin, Reclast [zoledronic acid], Abatacept, Ipratropium bromide hfa, Levofloxacin, Acetaminophen, Buspirone hcl, Colchicine, Hydrocodone, Maltose, Other, Buspirone, Celexa [citalopram hydrobromide], Cozaar [losartan potassium], Dye [barium-containing compounds], Dye [iodides], Esomeprazole magnesium, Evolocumab, Hydrocodone-acetaminophen, Lasix [furosemide], Lisinopril, Losartan, Methotrexate, Mirtazapine, Neurontin [gabapentin], Plaquenil [hydroxychloroquine sulfate], Prilosec [omeprazole], and Trazodone    FAMILY HISTORY       Family History   Problem Relation Age of Onset    Diabetes Mother     Heart Disease Mother     Heart Disease Father     Diabetes Father     High Blood Pressure Father     Esophageal Cancer Father     Colon Polyps Father     Crohn's Disease Brother     Colon Cancer Neg Hx     Liver Cancer Neg Hx     Liver Disease Neg Hx     Rectal Cancer Neg Hx     Stomach Cancer Neg Hx           SOCIAL HISTORY       Social History     Socioeconomic History    Marital status:      Spouse name: None    Number of children: None    Years of education: None    Highest education level: None   Occupational History    None   Tobacco Use    Smoking status: Never Smoker    Smokeless tobacco: Never Used   Vaping Use    Vaping Use: Never used   Substance and Sexual Activity    Alcohol use: No    Drug use: No    Sexual activity: None   Other Topics Concern    None   Social History Narrative    None     Social Determinants of Health     Financial Resource Strain:     Difficulty of Paying Living Expenses: Not on file   Food Insecurity:     Worried About Running Out of Food in the Last Year: Not on file    Curtis of Food in the Last Year: Not on file   Transportation Needs:     Lack of Transportation (Medical): Not on file    Lack of Transportation (Non-Medical):  Not on file   Physical Activity:     Days of Exercise per Week: Not on file   [employer] of Exercise per Session: Not on file   Stress:     Feeling of Stress : Not on file   Social Connections:     Frequency of Communication with Friends and Family: Not on file    Frequency of Social Gatherings with Friends and Family: Not on file    Attends Muslim Services: Not on file    Active Member of [address] [place] [address] or Organizations: Not on file    Attends Club or Organization Meetings: Not on file    Marital Status: Not on file   Intimate Partner Violence:     Fear of Current or Ex-Partner: Not on file    Emotionally Abused: Not on file    Physically Abused: Not on file    Sexually Abused: Not on file   Housing Stability:     Unable to Pay for Housing in the Last Year: Not on file    Number of [place] in the Last Year: Not on file    Unstable Housing in the Last Year: Not on file       SCREENINGS    [place] Coma Scale  Eye Opening: Spontaneous  Best Verbal Response: Oriented  Best Motor Response: Obeys commands  [place] Coma Scale Score: 15        PHYSICAL EXAM    (up to 7 for level 4, 8 or more for level 5)     ED Triage Vitals [07/04/22 1330]   BP Temp Temp src Heart Rate Resp SpO2 Height Weight   (!) 140/70 98 °F (36.7 °C) -- 82 18 100 % -- --       Physical Exam  Vitals and nursing note reviewed. Constitutional:       General: She is not in acute distress. Appearance: She is well-developed. HENT:      Head: Normocephalic and atraumatic. Right Ear: External ear normal.      Left Ear: External ear normal.   Eyes:      General: No scleral icterus. Conjunctiva/sclera: Conjunctivae normal.      Pupils: Pupils are equal, round, and reactive to light. Cardiovascular:      Rate and Rhythm: Normal rate and regular rhythm. Pulses: Normal pulses. Heart sounds: Normal heart sounds. No murmur heard. Pulmonary:      Effort: Pulmonary effort is normal.      Breath sounds: Normal breath sounds.    Abdominal:      General: Bowel sounds are normal.      Palpations: Abdomen is soft. Musculoskeletal:         General: Normal range of motion. Cervical back: Normal range of motion and neck supple. Right lower leg: Edema present. Left lower leg: Edema present. Comments: The legs are warm there is capillary refill at the toes. She seems hypersensitive to touch. Slight pitting edema   Skin:     General: Skin is warm and dry. Coloration: Skin is not jaundiced or pale. Findings: Erythema (Slight redness around the nail of the first toe on the right) present. Neurological:      General: No focal deficit present. Mental Status: She is alert and oriented to person, place, and time. Psychiatric:         Mood and Affect: Mood normal.         Behavior: Behavior normal.         DIAGNOSTIC RESULTS     EKG: All EKG's are interpreted by the Emergency Department Physician who either signs or Co-signs this chart in the absence of a cardiologist.    Sinus rhythm. Rate 83. [place] interval 199. QTc 447. No ST abnormality to suggest ischemia    RADIOLOGY:   Non-plain film images such as CT, Ultrasound and MRI are read by the radiologist. Plainradiographic images are visualized and preliminarily interpreted by the emergency physician with the below findings: The images and see no fractures. There is degenerative change of the foot. The vascular studies are pending at the time of this dictation.     Interpretation per the Radiologist below, if available at the time of this note:    XR FOOT RIGHT (MIN 3 VIEWS)    (Results Pending)   VL DUP LOWER EXTREMITY VENOUS BILATERAL    (Results Pending)   VL RAMOS BILATERAL LIMITED 1-2 LEVELS    (Results Pending)         ED BEDSIDE ULTRASOUND:   Performed by ED Physician - none    LABS:  Labs Reviewed   CBC WITH AUTO DIFFERENTIAL - Abnormal; Notable for the following components:       Result Value    MCH 31.5 (*)     MCHC 31.8 (*)     MPV 8.6 (*)     Neutrophils % 47.5 (*)     All other components within normal limits COMPREHENSIVE METABOLIC PANEL W/ REFLEX TO MG FOR LOW K - Abnormal; Notable for the following components: Total Protein 6.4 (*)     ALT 37 (*)     All other components within normal limits   D-DIMER, QUANTITATIVE - Abnormal; Notable for the following components:    D-Dimer, Quant 0.77 (*)     All other components within normal limits   C-REACTIVE PROTEIN       All other labs were within normal range or not returned as of this dictation. EMERGENCY DEPARTMENT COURSE and DIFFERENTIALDIAGNOSIS/MDM:   Vitals:    Vitals:    07/04/22 1330 07/04/22 1430 07/04/22 1451   BP: (!) 140/70 136/67    Pulse: 82  84   Resp: 18     Temp: 98 °F (36.7 °C)     SpO2: 100%         MDM  Number of Diagnoses or Management Options  Bilateral lower extremity edema  Pain and swelling of right lower leg  Diagnosis management comments: I do not see an acute vascular emergency such as an arterial insufficiency problem the toes not black now. I do not discount that she could have been having some arterial spasm. But she really clinically seems to be more likely neuropathy. Her D-dimer is slightly elevated so I am going to go ahead and do DVT study and RAMOS on both legs to rule out an urgent vascular problem. If these are negative or do not show an acute vascular problem and I will discharge her to continue follow-up with the vascular surgeon and her clinician. She is not diabetic so the neuropathy may be from a different source. My shift is ending. I will discussed the case with the evening clinician will follow up on the vascular studies. As long as there is not an acute emergency the patient may be discharged to follow-up as an outpatient.  stated that they had vascular studies at the vascular office. I do not have access to those. He said that arterial studies were fine and sound like they were going to do vein mapping.   As long as there is no DVT or any finding that requires immediate intervention the patient should be stable and safely discharged. CONSULTS:  None    PROCEDURES:  Unless otherwise notedbelow, none     Procedures    FINAL IMPRESSION     1. Bilateral lower extremity edema    2.  Pain and swelling of right lower leg          DISPOSITION/PLAN   DISPOSITION        PATIENT REFERRED TO:  @FUP@    DISCHARGE MEDICATIONS:  New Prescriptions    No medications on file          (Please note that portions of this note were completed with a voice recognition program.  Efforts were made to edit the dictations butoccasionally words are mis-transcribed.)    Alma Beatty MD (electronically signed)  AttendingEmergency Physician          Sean Maynard MD  07/04/22 [id number]

## 2022-07-04 NOTE — ED PROVIDER NOTES
47.5 (*)     All other components within normal limits   COMPREHENSIVE METABOLIC PANEL W/ REFLEX TO MG FOR LOW K - Abnormal; Notable for the following components: Total Protein 6.4 (*)     ALT 37 (*)     All other components within normal limits   D-DIMER, QUANTITATIVE - Abnormal; Notable for the following components:    D-Dimer, Quant 0.77 (*)     All other components within normal limits   C-REACTIVE PROTEIN       All other labs were within normal range or not returned as of this dictation. EMERGENCY DEPARTMENT COURSE and DIFFERENTIAL DIAGNOSIS/MDM:   Vitals:    Vitals:    07/04/22 1430 07/04/22 1451 07/04/22 1500 07/04/22 1731   BP: 136/67  (!) 143/56 (!) 127/50   Pulse:  84     Resp:       Temp:       SpO2:           MDM    Reassessment    Venous ultrasound without evidence of DVT. ABIs look okay. Patient will be following up as an outpatient with Dr. Idalia Snell. PROCEDURES:  Unless otherwise noted below, none     Procedures    FINAL IMPRESSION      1. Bilateral lower extremity edema    2.  Pain and swelling of right lower leg          DISPOSITION/PLAN   DISPOSITION Decision To Discharge    PATIENT REFERRED TO:  Juliocesar Cruz MD  [address] [place] [id number]  [phone]            DISCHARGE MEDICATIONS:  New Prescriptions    No medications on file          (Please note that portions ofthis note were completed with a voice recognition program.  Efforts were made to edit the dictations but occasionally words are mis-transcribed.)    Talat Funes MD(electronically signed)  Attending Emergency Physician         Talat Funes MD  07/04/22 7004

## 2022-07-04 NOTE — ED NOTES
Per pt she had ultrasound done last week @ [facility] imaging to check for blood clots which was negative     Sussy Levine RN  07/04/22 7396

## 2022-07-05 LAB
EKG P AXIS: 73 DEGREES
EKG P-R INTERVAL: 198 MS
EKG Q-T INTERVAL: 380 MS
EKG QRS DURATION: 82 MS
EKG QTC CALCULATION (BAZETT): 420 MS
EKG T AXIS: 65 DEGREES

## 2022-07-05 PROCEDURE — 93010 ELECTROCARDIOGRAM REPORT: CPT | Performed by: INTERNAL MEDICINE

## 2022-07-15 ENCOUNTER — HOSPITAL ENCOUNTER (OUTPATIENT)
Dept: VASCULAR LAB | Age: 77
Discharge: HOME | End: 2022-07-15
Payer: MEDICARE

## 2022-07-15 DIAGNOSIS — M79.605 PAIN OF LEFT LOWER EXTREMITY: Primary | ICD-10-CM

## 2022-07-15 DIAGNOSIS — M79.604 PAIN OF RIGHT LOWER EXTREMITY: ICD-10-CM

## 2022-07-15 DIAGNOSIS — I65.23: ICD-10-CM

## 2022-07-15 PROCEDURE — 93970 EXTREMITY STUDY: CPT

## 2022-07-15 PROCEDURE — 93880 EXTRACRANIAL BILAT STUDY: CPT

## 2022-07-19 ENCOUNTER — HOSPITAL ENCOUNTER (OUTPATIENT)
Dept: GENERAL RADIOLOGY | Facility: HOSPITAL | Age: 77
Discharge: HOME | End: 2022-07-19
Admitting: INTERNAL MEDICINE

## 2022-07-19 ENCOUNTER — TRANSCRIBE ORDERS (OUTPATIENT)
Dept: ADMINISTRATIVE | Facility: HOSPITAL | Age: 77
End: 2022-07-19

## 2022-07-19 DIAGNOSIS — R07.81 RIB PAIN ON RIGHT SIDE: ICD-10-CM

## 2022-07-19 DIAGNOSIS — R07.82 INTERCOSTAL PAIN: Primary | ICD-10-CM

## 2022-07-19 DIAGNOSIS — R07.82 INTERCOSTAL PAIN: ICD-10-CM

## 2022-07-19 PROCEDURE — 71046 X-RAY EXAM CHEST 2 VIEWS: CPT

## 2022-07-19 PROCEDURE — 71100 X-RAY EXAM RIBS UNI 2 VIEWS: CPT

## 2022-09-22 ENCOUNTER — HOSPITAL ENCOUNTER (OUTPATIENT)
Dept: GENERAL RADIOLOGY | Facility: HOSPITAL | Age: 77
Discharge: HOME | End: 2022-09-22
Admitting: PHYSICIAN ASSISTANT

## 2022-09-22 ENCOUNTER — TRANSCRIBE ORDERS (OUTPATIENT)
Dept: ADMINISTRATIVE | Facility: HOSPITAL | Age: 77
End: 2022-09-22

## 2022-09-22 DIAGNOSIS — M79.642 PAIN IN LEFT HAND: ICD-10-CM

## 2022-09-22 DIAGNOSIS — M54.2 CERVICALGIA: Primary | ICD-10-CM

## 2022-09-22 DIAGNOSIS — M25.532 PAIN IN LEFT WRIST: ICD-10-CM

## 2022-09-22 DIAGNOSIS — M54.2 CERVICALGIA: ICD-10-CM

## 2022-09-22 DIAGNOSIS — M25.522 PAIN IN LEFT ELBOW: ICD-10-CM

## 2022-09-22 DIAGNOSIS — M54.6 PAIN IN THORACIC SPINE: ICD-10-CM

## 2022-09-22 PROCEDURE — 73110 X-RAY EXAM OF WRIST: CPT

## 2022-09-22 PROCEDURE — 72050 X-RAY EXAM NECK SPINE 4/5VWS: CPT

## 2022-09-22 PROCEDURE — 73130 X-RAY EXAM OF HAND: CPT

## 2022-09-22 PROCEDURE — 73080 X-RAY EXAM OF ELBOW: CPT

## 2022-09-22 PROCEDURE — 72072 X-RAY EXAM THORAC SPINE 3VWS: CPT

## 2022-09-26 ENCOUNTER — HOSPITAL ENCOUNTER (OUTPATIENT)
Dept: VASCULAR LAB | Age: 77
Discharge: HOME | End: 2022-09-26
Payer: MEDICARE

## 2022-09-26 DIAGNOSIS — I65.23 BILATERAL CAROTID ARTERY STENOSIS: ICD-10-CM

## 2022-09-26 PROCEDURE — 93880 EXTRACRANIAL BILAT STUDY: CPT

## 2022-11-09 ENCOUNTER — OFFICE VISIT (OUTPATIENT)
Dept: CARDIOLOGY CLINIC | Age: 77
End: 2022-11-09
Payer: MEDICARE

## 2022-11-09 VITALS
DIASTOLIC BLOOD PRESSURE: 86 MMHG | BODY MASS INDEX: 29.82 KG/M2 | SYSTOLIC BLOOD PRESSURE: 124 MMHG | HEART RATE: 79 BPM | OXYGEN SATURATION: 99 % | HEIGHT: 67 IN | WEIGHT: 190 LBS

## 2022-11-09 DIAGNOSIS — R06.02 SHORTNESS OF BREATH: ICD-10-CM

## 2022-11-09 DIAGNOSIS — E78.2 MIXED HYPERLIPIDEMIA: ICD-10-CM

## 2022-11-09 DIAGNOSIS — I10 ESSENTIAL HYPERTENSION: ICD-10-CM

## 2022-11-09 DIAGNOSIS — I51.89 DIASTOLIC DYSFUNCTION: ICD-10-CM

## 2022-11-09 DIAGNOSIS — I25.119 CORONARY ARTERY DISEASE INVOLVING NATIVE CORONARY ARTERY OF NATIVE HEART WITH ANGINA PECTORIS (HCC): ICD-10-CM

## 2022-11-09 DIAGNOSIS — I65.23 BILATERAL CAROTID ARTERY STENOSIS: ICD-10-CM

## 2022-11-09 DIAGNOSIS — R60.0 EDEMA OF BOTH LOWER EXTREMITIES: ICD-10-CM

## 2022-11-09 DIAGNOSIS — R07.89 CHEST TIGHTNESS: Primary | ICD-10-CM

## 2022-11-09 DIAGNOSIS — I87.2 CHRONIC VENOUS INSUFFICIENCY: Chronic | ICD-10-CM

## 2022-11-09 PROCEDURE — G8400 PT W/DXA NO RESULTS DOC: HCPCS | Performed by: CLINICAL NURSE SPECIALIST

## 2022-11-09 PROCEDURE — G8417 CALC BMI ABV UP PARAM F/U: HCPCS | Performed by: CLINICAL NURSE SPECIALIST

## 2022-11-09 PROCEDURE — 3078F DIAST BP <80 MM HG: CPT | Performed by: CLINICAL NURSE SPECIALIST

## 2022-11-09 PROCEDURE — G8484 FLU IMMUNIZE NO ADMIN: HCPCS | Performed by: CLINICAL NURSE SPECIALIST

## 2022-11-09 PROCEDURE — 99214 OFFICE O/P EST MOD 30 MIN: CPT | Performed by: CLINICAL NURSE SPECIALIST

## 2022-11-09 PROCEDURE — 1090F PRES/ABSN URINE INCON ASSESS: CPT | Performed by: CLINICAL NURSE SPECIALIST

## 2022-11-09 PROCEDURE — 3074F SYST BP LT 130 MM HG: CPT | Performed by: CLINICAL NURSE SPECIALIST

## 2022-11-09 PROCEDURE — G8427 DOCREV CUR MEDS BY ELIG CLIN: HCPCS | Performed by: CLINICAL NURSE SPECIALIST

## 2022-11-09 PROCEDURE — 1036F TOBACCO NON-USER: CPT | Performed by: CLINICAL NURSE SPECIALIST

## 2022-11-09 PROCEDURE — 1123F ACP DISCUSS/DSCN MKR DOCD: CPT | Performed by: CLINICAL NURSE SPECIALIST

## 2022-11-09 ASSESSMENT — ENCOUNTER SYMPTOMS
VOMITING: 0
EYE REDNESS: 0
COUGH: 0
NAUSEA: 0
ABDOMINAL PAIN: 0
FACIAL SWELLING: 0
CHEST TIGHTNESS: 0
WHEEZING: 0
SHORTNESS OF BREATH: 1

## 2022-11-09 NOTE — PROGRESS NOTES
[facility], [address] [place] [address], [address] [id number]  Phone: [phone]  Fax: [phone]    OFFICE VISIT:  2022    Bunny Donato - : 1945    Reason For Visit:  Gabrielle Mujica is a 76 y.o. female who is here for 6 Month Follow-Up, Coronary Artery Disease, and Shortness of Breath       Diagnosis Orders   1. Chest tightness  ECHO Pharmacological Stress Test      2. Shortness of breath  ECHO Pharmacological Stress Test      3. Coronary artery disease involving native coronary artery of native heart with angina pectoris ([address]        4. Essential hypertension        5. Mixed hyperlipidemia        6. Edema of both lower extremities        7. Diastolic dysfunction        8. Chronic venous insufficiency        9. Bilateral carotid artery stenosis              HPI  1. Coronary artery disease, prior ostial RCA stent with obstructive left main disease by catheterization 9/10/2020, status post 2 vessel CABG 2020 with LIMA to LAD and SVG to OM, normal LV ejection fraction. 2. Hypertension. 3. Rheumatoid arthritis. 4.  Hyperlipidemia  5. History of Covid in 2021  6. Carotid Stenosis    She has recently more problems with lower extremity edema over the past year. She had some significant edema over the summer which led to an ER visit. She was ruled out for DVT. She is wearing compression stockings and elevates her legs. She has had some improvement in symptoms. She states edema has been ongoing for many years and she has always related to heart or arthritis. She is concerned because she is noticing a change in symptoms over the last month. She states when she walks up the stairs she gets tightness in her chest, and feels very short of breath. She states to the point that she has to sit down and rest immediately following. She does not generally use nitroglycerin. She does not notice chest pain doing her normal activities around her home.        Salvador Brown Seun Womack MD is PCP.   Mimi Saunders has the following history as recorded in [facility]:    Patient Active Problem List    Diagnosis Date Noted    Abdominal pain 05/12/2021    Change in bowel habits 05/12/2021    Constipation 05/12/2021    BRBPR (bright red blood per rectum) 05/12/2021    Bloating 03/11/2020    Myalgia due to statin 11/07/2019    Urinary tract infection with hematuria 05/16/2019    Overactive bladder 07/24/2017    Mixed hyperlipidemia 04/18/2017    H/O right coronary artery stent placement 92/96/9137    Periumbilical abdominal pain 07/21/2016    Irritable bowel syndrome with diarrhea 06/10/2016    Internal hemorrhoids 06/10/2016    Allergic reaction 09/18/2015    Precordial pain 09/08/2015    Gout 01/30/2015    Bleeding internal hemorrhoids 07/15/2014    Gastroesophageal reflux disease without esophagitis 05/20/2014    Internal hemorrhoids without complication 36/38/6112    Spider veins 03/04/2013    Carotid artery stenosis 11/08/2012    Leg pain 11/08/2012    Leg swelling 11/08/2012    Chronic venous insufficiency 11/08/2012    Varicose veins 11/08/2012    Coronary artery disease involving native coronary artery of native heart with angina pectoris with documented spasm [facility])     Essential hypertension      Past Medical History:   Diagnosis Date    Acid reflux     Anxiety     Arthritis     Back pain     CAD (coronary artery disease)     CAD (coronary artery disease)     Carotid artery occlusion     Chronic venous insufficiency 11/8/2012    Cough     Fibromyalgia     Goiter     Gout     Head ache     headaches    Heart murmur     History of colon polyps     History of colon polyps     Hoarseness     Hypercholesterolemia     Hyperlipidemia     Hypertension     Insomnia     Irregular heart beat     Itching     Muscle cramp     Neck pain     Osteoarthritis     Osteoporosis     Palpitations     RA (rheumatoid arthritis) (HCC)     Rash     Rectal bleeding     SOB (shortness of breath)     Sore throat Tympanic membrane perforation     Varicose veins 11/8/2012     Past Surgical History:   Procedure Laterality Date    APPENDECTOMY      BREAST SURGERY  2002    CARDIAC CATHETERIZATION  9/10/15  JDT    EF 50%    CARDIAC CATHETERIZATION  09/10/2020    LM disease- sent for bypass     [address]    x 1    CHOLECYSTECTOMY  2012    [place]    COLONOSCOPY  01/10/2012    Dr LUJAN    COLONOSCOPY  05/06/2014    Dr. Sivakumar Wallace  2018    Dr. Kings Chawla in [place]- Polyps-benign,internal Hemorrhoids, per patient. COLONOSCOPY N/A 06/02/2021    Dr Karri Soni, Int hemorrhoids Grade 2 otherwise a NORMAL exam, no recall due to age    [address] [facility]  2012    Dr Olivas Fix N/A 09/11/2020    CORONARY ARTERY BYPASS GRAFT X2 WITH LEFT INTERNAL MAMMARY ARTERY WITH OPEN VEIN HARVESTING WITH PERFUSION TRANSESOPHAGEAL ECHOCARDIOGRAM performed by Tray Gardner MD at [facility] (CERVIX STATUS UNKNOWN)  1976    partial    KNEE SURGERY  2011    left knee surgery    PTCA      stent    TUBAL LIGATION      UPPER GASTROINTESTINAL ENDOSCOPY  2009? UPPER GASTROINTESTINAL ENDOSCOPY  01/10/2012    Dr Baeza [facility]  05/06/2014    Dr. Charmaine Toth  2018    Dr. Kings Chawla in [facility]-Gastritis per patient.     VARICOSE VEIN SURGERY   01- [facility]    Left Leg Ablation    VARICOSE VEIN SURGERY  3-  [facility]    right leg ablation     Family History   Problem Relation Age of Onset    Diabetes Mother     Heart Disease Mother     Heart Disease Father     Diabetes Father     High Blood Pressure Father     Esophageal Cancer Father     Colon Polyps Father     Crohn's Disease Brother     Colon Cancer Neg Hx     Liver Cancer Neg Hx     Liver Disease Neg Hx     Rectal Cancer Neg Hx     Stomach Cancer Neg Hx      Social History     Tobacco Use    Smoking status: Never    Smokeless tobacco: Never   Substance Use Topics    Alcohol use: No      Current Outpatient Medications   Medication Sig Dispense Refill    olmesartan (BENICAR) 20 MG tablet Take 1 tablet by mouth nightly 90 tablet 3    hydroCHLOROthiazide (HYDRODIURIL) 25 MG tablet Take 1 tablet by mouth every morning 90 tablet 3    metoprolol succinate (TOPROL XL) 50 MG extended release tablet TAKE 1 TABLET TWICE DAILY 180 tablet 1    Turmeric 500 MG CAPS Take by mouth 2 times daily      ZINC PO Take by mouth daily      Omega-3 Fatty Acids (FISH OIL) 1360 MG CAPS Take 1 capsule by mouth 2 times daily       Evolocumab 140 MG/ML SOSY Inject 140 mg into the skin every 14 days Patient has not started      vitamin D (CHOLECALCIFEROL) 1000 UNIT TABS tablet 1,000 Units 2 times daily       allopurinol (ZYLOPRIM) 300 MG tablet Take 300 mg by mouth daily. aspirin 81 MG EC tablet Take 81 mg by mouth daily. loratadine (CLARITIN) 10 MG tablet Take 10 mg by mouth 2 times daily       Multiple Vitamin (MULTIVITAMIN PO) Take 1 tablet by mouth daily       metroNIDAZOLE (METROCREAM) 0.75 % cream APPLY CREAM TOPICALLY TO CHEEKS TWICE DAILY FOR ROSACEA WHEN FLARED (Patient not taking: Reported on 11/9/2022)       No current facility-administered medications for this visit.      Allergies: Demerol, Fenofibrate, Nitroglycerin, Reclast [zoledronic acid], Abatacept, Ipratropium bromide hfa, Levofloxacin, Acetaminophen, Buspirone hcl, Colchicine, Hydrocodone, Maltose, Other, Buspirone, Celexa [citalopram hydrobromide], Cozaar [losartan potassium], Dye [barium-containing compounds], Dye [iodides], Esomeprazole magnesium, Evolocumab, Hydrocodone-acetaminophen, Lasix [furosemide], Lisinopril, Losartan, Methotrexate, Mirtazapine, Neurontin [gabapentin], Plaquenil [hydroxychloroquine sulfate], Prilosec [omeprazole], and Trazodone    Review of Systems  Review of Systems   Constitutional:  Negative for activity change, diaphoresis, fatigue, fever and unexpected weight change. HENT:  Negative for facial swelling and nosebleeds. Eyes:  Negative for redness and visual disturbance. Respiratory:  Positive for shortness of breath. Negative for cough, chest tightness and wheezing. Cardiovascular:  Positive for leg swelling. Negative for chest pain and palpitations. Gastrointestinal:  Negative for abdominal pain, nausea and vomiting. Endocrine: Negative for cold intolerance and heat intolerance. Genitourinary:  Negative for dysuria and hematuria. Musculoskeletal:  Negative for arthralgias and myalgias. Complains of generalized arthritis pain   Skin:  Negative for pallor and rash. Neurological:  Negative for dizziness, seizures, syncope, weakness and light-headedness. Hematological:  Does not bruise/bleed easily. Psychiatric/Behavioral:  Negative for agitation. The patient is not nervous/anxious. Objective  Vital Signs - /86   Pulse 79   Ht 5' 7\" (1.702 m)   Wt 190 lb (86.2 kg)   SpO2 99%   BMI 29.76 kg/m²    Wt Readings from Last 3 Encounters:   11/09/22 190 lb (86.2 kg)   06/24/22 196 lb 3.2 oz (89 kg)   04/12/22 191 lb (86.6 kg)      Physical Exam  Vitals and nursing note reviewed. Constitutional:       General: She is not in acute distress. Appearance: She is well-developed. She is obese. She is not diaphoretic. HENT:      Head: Normocephalic and atraumatic. Right Ear: Hearing and external ear normal.      Left Ear: Hearing and external ear normal.      Nose: Nose normal.   Eyes:      General:         Right eye: No discharge. Left eye: No discharge. Pupils: Pupils are equal, round, and reactive to light. Neck:      Thyroid: No thyromegaly. Vascular: No carotid bruit or JVD. Trachea: No tracheal deviation. Cardiovascular:      Rate and Rhythm: Normal rate and regular rhythm. Heart sounds: Normal heart sounds. No murmur heard. No friction rub. No gallop. Pulmonary:      Effort: Pulmonary effort is normal. No respiratory distress. Breath sounds: Normal breath sounds. No wheezing or rales. Abdominal:      Palpations: Abdomen is soft. Tenderness: There is no abdominal tenderness. Musculoskeletal:         General: No swelling or deformity. Cervical back: Neck supple. No muscular tenderness. Right lower leg: Edema (1+) present. Left lower leg: Edema (1+) present. Skin:     General: Skin is warm and dry. Findings: No rash. Neurological:      General: No focal deficit present. Mental Status: She is alert and oriented to person, place, and time. Cranial Nerves: No cranial nerve deficit. Psychiatric:         Mood and Affect: Mood normal.         Behavior: Behavior normal.         Judgment: Judgment normal.       Data:  Lab Results   Component Value Date/Time    WBC 6.1 07/04/2022 02:30 PM    RBC 4.48 07/04/2022 02:30 PM    HGB 14.1 07/04/2022 02:30 PM    HCT 44.3 07/04/2022 02:30 PM     07/04/2022 02:30 PM      Lab Results   Component Value Date/Time    CHOL 294 07/24/2018 05:30 AM    CHOL 296 07/24/2018 05:30 AM    TRIG 259 07/24/2018 05:30 AM    TRIG 291 07/24/2018 05:30 AM    HDL 46 07/24/2018 05:30 AM    HDL 49 07/24/2018 05:30 AM    LDLCALC 196 07/24/2018 05:30 AM    LDLCALC 189 07/24/2018 05:30 AM     Lab Results   Component Value Date/Time     07/04/2022 02:30 PM    K 3.8 07/04/2022 02:30 PM     07/04/2022 02:30 PM    CO2 26 07/04/2022 02:30 PM    GLUCOSE 99 07/04/2022 02:30 PM    BUN 15 07/04/2022 02:30 PM    CREATININE 0.7 07/04/2022 02:30 PM    CALCIUM 9.9 07/04/2022 02:30 PM    ALT 37 07/04/2022 02:30 PM    AST 29 07/04/2022 02:30 PM     Lab Results   Component Value Date/Time    TSH 1.620 07/23/2018 06:35 PM     EKG shows normal sinus rhythm rate 68    Assessment:     Diagnosis Orders   1. Chest tightness  ECHO Pharmacological Stress Test      2.  Shortness of breath ECHO Pharmacological Stress Test      3. Coronary artery disease involving native coronary artery of native heart with angina pectoris ([address]        4. Essential hypertension        5. Mixed hyperlipidemia        6. Edema of both lower extremities        7. Diastolic dysfunction        8. Chronic venous insufficiency        9. Bilateral carotid artery stenosis            Chest pain/shortness of breath CAD-having symptoms of exertional chest tightness and dyspnea on walking up steps in the last month. She states she has to stop and rest before doing anything else. We will check a dobutamine stress echo to rule out myocardial ischemia    Hypertension-stable on current regimen with metoprolol succinate, olmesartan, HCTZ    Hyperlipidemia-stable per patient report on Repatha. History of statin intolerance. We will request most recent labs from PCP office     Edema lower extremities/diastolic dysfunction, chronic venous insufficiency-edema of lower extremities likely multifactorial.  Continue to wear compression stockings, elevate legs when sitting, low-sodium diet    Carotid stenosis-newer diagnosis since last visit here. She is following with vascular surgery. Continue aspirin, Repatha    Stable cardiovascular status. No evidence of overt heart failure,angina or dysrhythmia. Plan    Orders Placed This Encounter   Procedures    ECHO Pharmacological Stress Test     Standing Status:   Future     Standing Expiration Date:   11/9/2023     Order Specific Question:   Reason for exam:     Answer:   chest tightness, shortness of breath     Return in about 6 months (around 5/9/2023) for APRN. Dobutamine Stress Echo- hold Metoprolol night before and morning of test  Elevate legs when sitting, compression stockings, low-sodium diet    Call with any questionsor concerns  Follow up with Oswaldo Mcmahan MD for non cardiac problems  Report any new problems  Cardiovascular Fitness-Exercise as tolerated.   Strive for 15 minutes of exercise most days of the week. Cardiac / HealthyDiet  Continue current medications as directed  Continue plan of treatment  It is always recommended that you bring your medicationsbottles with you to each visit - this is for your safety!        Gunnar Spatz, APRN

## 2022-11-09 NOTE — PATIENT INSTRUCTIONS
[place] at the [address] [facility] and [address] [facility] located on the first floor of [place] [address] through hospital main entrance and turn immediately to your left. Patient's contact number:  [phone] (home)     Date/Time:     Dobutamine Stress Test    A chemical stress test uses chemical agents injected into the body through the vein. These chemicals make the heart function as if it were under stress. A chemical stress test is used when a traditional stress test (called a cardiac stress test) cannot be done. Testing will take approximately one hour. Dobutamine Stress Echocardiogram Instructions:   No caffeine 24 hours prior to the testing. This includes: coffee, pop/soda, chocolate, cold medications, etc.  Any product that might contain caffeine. Do not eat or drink anything, except water, eight (8) hours before the test.   No alcohol or nicotine twelve (12) hours prior to your test.   Wear comfortable clothing. If you are taking metoprolol, hold night before and morning of test    If you need to change this appointment, please call outpatient scheduling at [phone].

## 2022-11-15 ENCOUNTER — HOSPITAL ENCOUNTER (OUTPATIENT)
Dept: NON INVASIVE DIAGNOSTICS | Age: 77
Discharge: HOME | End: 2022-11-15
Payer: MEDICARE

## 2022-11-15 VITALS — BODY MASS INDEX: 29.76 KG/M2 | WEIGHT: 190 LBS

## 2022-11-15 DIAGNOSIS — R06.02 SHORTNESS OF BREATH: ICD-10-CM

## 2022-11-15 DIAGNOSIS — R07.89 CHEST TIGHTNESS: ICD-10-CM

## 2022-11-15 PROCEDURE — 6360000002 HC RX W HCPCS

## 2022-11-15 PROCEDURE — C8929 TTE W OR WO FOL WCON,DOPPLER: HCPCS

## 2022-11-15 PROCEDURE — 2580000003 HC RX 258: Performed by: CLINICAL NURSE SPECIALIST

## 2022-11-15 PROCEDURE — 6360000004 HC RX CONTRAST MEDICATION: Performed by: CLINICAL NURSE SPECIALIST

## 2022-11-15 RX ORDER — SODIUM CHLORIDE 9 MG/ML
INJECTION, SOLUTION INTRAVENOUS
Status: COMPLETED | OUTPATIENT
Start: 2022-11-15 | End: 2022-11-15

## 2022-11-15 RX ORDER — DOBUTAMINE HYDROCHLORIDE 100 MG/100ML
INJECTION INTRAVENOUS
Status: DISCONTINUED
Start: 2022-11-15 | End: 2022-11-15 | Stop reason: WASHOUT

## 2022-11-15 RX ORDER — METOPROLOL TARTRATE 5 MG/5ML
5 INJECTION INTRAVENOUS PRN
Status: DISCONTINUED | OUTPATIENT
Start: 2022-11-15 | End: 2022-11-16 | Stop reason: HOSPADM

## 2022-11-15 RX ORDER — DOBUTAMINE HYDROCHLORIDE 100 MG/100ML
10 INJECTION INTRAVENOUS CONTINUOUS PRN
Status: DISCONTINUED | OUTPATIENT
Start: 2022-11-15 | End: 2022-11-16 | Stop reason: HOSPADM

## 2022-11-15 RX ADMIN — PERFLUTREN 1.5 ML: 6.52 INJECTION, SUSPENSION INTRAVENOUS at 12:05

## 2022-11-15 RX ADMIN — DOBUTAMINE HYDROCHLORIDE 10 MCG/KG/MIN: 100 INJECTION INTRAVENOUS at 12:06

## 2022-11-15 RX ADMIN — SODIUM CHLORIDE: 9 INJECTION, SOLUTION INTRAVENOUS at 12:06

## 2023-01-31 ENCOUNTER — NURSE TRIAGE (OUTPATIENT)
Dept: CALL CENTER | Facility: HOSPITAL | Age: 78
End: 2023-01-31
Payer: MEDICARE

## 2023-01-31 NOTE — TELEPHONE ENCOUNTER
Coughed couple 3  days ago, back popped and his having some back pain, asking for appt. Told DrCarmen Is closed, he will call and make her appt. Tomorrow, for appt. No deficits he states just having back pain, wanted to be seen by PCP. Triage done. Told if any worsening of symptoms be seen in ER    Reason for Disposition  • [1] After 3 days (72 hours) AND [2] back pain not improving    Additional Information  • Negative: Dangerous mechanism of injury (e.g., MVA, contact sports, trampoline, diving, fall > 10 feet or 3 meters)  (Exception: back pain began > 1 hour after injury)  • Negative: [1] Weakness (i.e., paralysis, loss of muscle strength) of the leg(s) or foot AND [2] sudden onset after back injury  • Negative: [1] Numbness (i.e., loss of sensation) of the leg(s) or foot AND [2] sudden onset after back injury  • Negative: [1] Major bleeding (e.g., actively dripping or spurting) AND [2] can't be stopped  • Negative: Bullet wound, knife wound, or other penetrating object  • Negative: Shock suspected (e.g., cold/pale/clammy skin, too weak to stand, low BP, rapid pulse)  • Negative: Sounds like a life-threatening emergency to the triager  • Negative: [1] Injuries at more than 1 site AND [2] unsure which guideline to use  • Negative: Injury to the neck  • Negative: Injury to the tailbone  • Negative: Back pain not from an injury  • Negative: Back pain from overuse (work, exercise, gardening) OR from twisting, lifting, or bending injury  • Negative: [1] SEVERE PAIN in kidney area (flank) AND [2] follows direct blow to that site  • Negative: Blood in urine (red, pink, or tea-colored)  • Negative: [1] Unable to urinate (or only a few drops) > 4 hours AND [2] bladder feels very full (e.g., palpable bladder or strong urge to urinate)  • Negative: [1] Loss of bladder or bowel control (urine or bowel incontinence; wetting self, leaking stool) AND [2] new-onset  • Negative: Numbness (loss of sensation) in groin or rectal area  •  "Negative: Skin is split open or gaping  (or length > 1/2 inch or 12 mm)  • Negative: Puncture wound of back  • Negative: [1] Bleeding AND [2] won't stop after 10 minutes of direct pressure (using correct technique)  • Negative: Sounds like a serious injury to the triager  • Negative: Weakness of a leg or foot (e.g., unable to bear weight, dragging foot)  • Negative: Numbness of a leg or foot (i.e.., loss of sensation)  • Negative: [1] SEVERE pain (e.g., excruciating) AND [2] not improved 2 hours after pain medicine/ice packs  • Negative: Pain radiates into the thigh or further down the leg now  • Negative: [1] Landed hard on feet or buttocks AND [2] pain over spine  • Negative: Suspicious history for the injury  • Negative: Patient is confused or is an unreliable provider of information (e.g., dementia, severe intellectual disability, alcohol intoxication)  • Negative: [1] High-risk adult (e.g., age > 60 years, osteoporosis, chronic steroid use) AND [2] still hurts  • Negative: Large swelling or bruise > 2 inches (5 cm)  • Negative: [1] No prior tetanus shots (or is not fully vaccinated) AND [2] any wound (e.g., cut, scrape)  • Negative: [1] HIV positive or severe immunodeficiency (severely weak immune system) AND [2] DIRTY cut or scrape  • Negative: [1] Last tetanus shot > 5 years ago AND [2] DIRTY cut or scrape  • Negative: [1] Last tetanus shot > 10 years ago AND [2] CLEAN cut or scrape (e.g., object AND skin were clean)  • Negative: [1] After 1 week (7 days) AND [2] still painful or swollen    Answer Assessment - Initial Assessment Questions  1. MECHANISM: \"How did the injury happen?\" (Consider the possibility of domestic violence or elder abuse)      Coughed and back popped few days ago  2. ONSET: \"When did the injury happen?\" (Minutes or hours ago)      Few days ago  3. LOCATION: \"What part of the back is injured?\"      Lower back  4. SEVERITY: \"Can you move the back normally?\"      Yes jsut hurts at tiems  5. " "PAIN: \"Is there any pain?\" If Yes, ask: \"How bad is the pain?\"   (Scale 1-10; or mild, moderate, severe)      Rated 5  6. CORD SYMPTOMS: Any weakness or numbness of the arms or legs?\"      no  7. SIZE: For cuts, bruises, or swelling, ask: \"How large is it?\" (e.g., inches or centimeters)      no  8. TETANUS: For any breaks in the skin, ask: \"When was the last tetanus booster?\"      no  9. OTHER SYMPTOMS: \"Do you have any other symptoms?\" (e.g., abdominal pain, blood in urine)      no  10. PREGNANCY: \"Is there any chance you are pregnant?\" \"When was your last menstrual period?\"        no    Protocols used: BACK INJURY-ADULT-AH      "

## 2023-02-06 ENCOUNTER — TRANSCRIBE ORDERS (OUTPATIENT)
Dept: ADMINISTRATIVE | Facility: HOSPITAL | Age: 78
End: 2023-02-06
Payer: MEDICARE

## 2023-02-06 DIAGNOSIS — Z78.0 POSTMENOPAUSAL STATE: Primary | ICD-10-CM

## 2023-02-06 DIAGNOSIS — R51.9 NONINTRACTABLE HEADACHE, UNSPECIFIED CHRONICITY PATTERN, UNSPECIFIED HEADACHE TYPE: Primary | ICD-10-CM

## 2023-02-13 ENCOUNTER — TRANSCRIBE ORDERS (OUTPATIENT)
Dept: ADMINISTRATIVE | Facility: HOSPITAL | Age: 78
End: 2023-02-13
Payer: MEDICARE

## 2023-02-13 DIAGNOSIS — R51.9 GENERALIZED HEADACHES: Primary | ICD-10-CM

## 2023-02-15 ENCOUNTER — TRANSCRIBE ORDERS (OUTPATIENT)
Dept: ADMINISTRATIVE | Facility: HOSPITAL | Age: 78
End: 2023-02-15
Payer: MEDICARE

## 2023-02-15 DIAGNOSIS — Z78.0 POST-MENOPAUSAL: Primary | ICD-10-CM

## 2023-02-15 DIAGNOSIS — D32.9 BENIGN NEOPLASM OF MENINGES: ICD-10-CM

## 2023-02-15 DIAGNOSIS — R51.9 NONINTRACTABLE HEADACHE, UNSPECIFIED CHRONICITY PATTERN, UNSPECIFIED HEADACHE TYPE: ICD-10-CM

## 2023-02-22 ENCOUNTER — HOSPITAL ENCOUNTER (OUTPATIENT)
Dept: CT IMAGING | Facility: HOSPITAL | Age: 78
Discharge: HOME | End: 2023-02-22
Payer: MEDICARE

## 2023-02-22 ENCOUNTER — HOSPITAL ENCOUNTER (OUTPATIENT)
Dept: BONE DENSITY | Facility: HOSPITAL | Age: 78
Discharge: HOME | End: 2023-02-22
Payer: MEDICARE

## 2023-02-22 DIAGNOSIS — R51.9 NONINTRACTABLE HEADACHE, UNSPECIFIED CHRONICITY PATTERN, UNSPECIFIED HEADACHE TYPE: ICD-10-CM

## 2023-02-22 DIAGNOSIS — D32.9 BENIGN NEOPLASM OF MENINGES: ICD-10-CM

## 2023-02-22 DIAGNOSIS — Z78.0 POST-MENOPAUSAL: ICD-10-CM

## 2023-02-22 PROCEDURE — 77080 DXA BONE DENSITY AXIAL: CPT

## 2023-02-22 PROCEDURE — 70450 CT HEAD/BRAIN W/O DYE: CPT

## 2023-03-21 ENCOUNTER — TRANSCRIBE ORDERS (OUTPATIENT)
Dept: ADMINISTRATIVE | Facility: HOSPITAL | Age: 78
End: 2023-03-21
Payer: MEDICARE

## 2023-03-21 ENCOUNTER — HOSPITAL ENCOUNTER (OUTPATIENT)
Dept: GENERAL RADIOLOGY | Facility: HOSPITAL | Age: 78
Discharge: HOME | End: 2023-03-21
Admitting: PHYSICIAN ASSISTANT
Payer: MEDICARE

## 2023-03-21 DIAGNOSIS — M46.1 SACROILIITIS, NOT ELSEWHERE CLASSIFIED: Primary | ICD-10-CM

## 2023-03-21 PROCEDURE — 72110 X-RAY EXAM L-2 SPINE 4/>VWS: CPT

## 2023-04-03 ENCOUNTER — HOSPITAL ENCOUNTER (OUTPATIENT)
Dept: PHYSICAL THERAPY | Age: 78
Setting detail: THERAPIES SERIES
Discharge: HOME | End: 2023-04-03
Payer: MEDICARE

## 2023-04-03 PROCEDURE — 97162 PT EVAL MOD COMPLEX 30 MIN: CPT

## 2023-04-03 PROCEDURE — 97110 THERAPEUTIC EXERCISES: CPT

## 2023-04-03 ASSESSMENT — PAIN DESCRIPTION - LOCATION: LOCATION: SACRUM;BACK;BUTTOCKS

## 2023-04-03 ASSESSMENT — PAIN DESCRIPTION - PAIN TYPE: TYPE: CHRONIC PAIN

## 2023-04-03 ASSESSMENT — PAIN SCALES - GENERAL: PAINLEVEL_OUTOF10: 5

## 2023-04-06 ENCOUNTER — HOSPITAL ENCOUNTER (OUTPATIENT)
Dept: PHYSICAL THERAPY | Age: 78
Setting detail: THERAPIES SERIES
Discharge: HOME | End: 2023-04-06
Payer: MEDICARE

## 2023-04-06 PROCEDURE — 97110 THERAPEUTIC EXERCISES: CPT

## 2023-04-06 ASSESSMENT — PAIN DESCRIPTION - PAIN TYPE: TYPE: CHRONIC PAIN

## 2023-04-06 ASSESSMENT — PAIN SCALES - GENERAL: PAINLEVEL_OUTOF10: 3

## 2023-04-06 ASSESSMENT — PAIN DESCRIPTION - DESCRIPTORS: DESCRIPTORS: ACHING;SORE

## 2023-04-06 ASSESSMENT — PAIN DESCRIPTION - ORIENTATION: ORIENTATION: RIGHT

## 2023-04-06 ASSESSMENT — PAIN DESCRIPTION - LOCATION: LOCATION: BACK;BUTTOCKS;SACRUM

## 2023-04-07 ASSESSMENT — PAIN DESCRIPTION - LOCATION: LOCATION: BACK;BUTTOCKS

## 2023-04-07 ASSESSMENT — PAIN DESCRIPTION - DESCRIPTORS: DESCRIPTORS: ACHING

## 2023-04-07 ASSESSMENT — PAIN DESCRIPTION - PAIN TYPE: TYPE: CHRONIC PAIN

## 2023-04-07 ASSESSMENT — PAIN DESCRIPTION - ORIENTATION: ORIENTATION: RIGHT

## 2023-04-07 ASSESSMENT — PAIN SCALES - GENERAL: PAINLEVEL_OUTOF10: 2

## 2023-04-07 NOTE — PROGRESS NOTES
(neutral, to R, to L) 1 X 5 EACH  Exercise 14: Sitting R hip retraction, L hip protraction 5 x 5 sec-poor quality  Exercise 15: Repeated sit to stand with TA contraction 1 x 10  Exercise 16: Multifidus row/Paloff press-NOT TODAY  Exercise 17: Standing march/hip abd/ext-NOT TODAY  Exercise 18: Sidestepping on line-NOT TODAY  Exercise 19: Monster walks-NOT TODAY  Exercise 20: IFC + MH PRN for elevated pain                               ASSESSMENT     Assessment: Assessment: Patient did fairly well wtih session. She had a skirt on but had leggings on under them. She required mod cueing to perform ex with good form. She reported pain at 2/10 pre and post session. Body Structures, Functions, Activity Limitations Requiring Skilled Therapeutic Intervention: Decreased functional mobility , Decreased ROM, Decreased tolerance to work activity, Decreased strength, Decreased high-level IADLs, Decreased posture, Increased pain    Post-Treatment Pain Level: Activity Tolerance: Patient tolerated evaluation without incident    Therapy Prognosis: Good       GOALS   Patient Goals : reduce R hip/low back pain  Short Term Goals Completed by 3-4 weeks Current Status Goal Status   Independent with HEP   New   Improve lumbar ROM to at leats 50 degrees flexion and 25 degrees L SB   New   Perform good quality TA contraction with UE/LE involvement   New   Trial heel lift if continued leg length discrepancy                                                           Long Term Goals Completed by 4-6 weeks Current Status Goal Status   No further radiating pains to R anterior thigh and groin.    New   Report no further pain with ascending/descending stairs   New   Improve Oswestry score to 18% impairment or better   New                                                                TREATMENT PLAN   Plan Frequency: 2x  Plan weeks: 4-6 weeks  Current Treatment Recommendations: Strengthening, ROM, Pain management, Positioning, Modalities,

## 2023-04-07 NOTE — PROGRESS NOTES
5/5  R Hip ADduction: 5/5  R Knee Flexion: 5/5  R Knee Extension: 5/5  R Ankle Dorsiflexion: 5/5     Lumbar Assessment   AROM Lumbar Spine   Measured as: Degrees  Flexion: 40 degrees  Extension: 25 degrees  Lateral Flexion Right: 35 degrees  Lateral Flexion Left: 15 degrees (painful)  Right Rotation: 100% rotation  Left Rotation: 100% rotation       Trunk Strength     Trunk Strength  Upper abdominals: Poor  Lower abdominals: Poor     Muscle Length/Flexibility: Muscle Length LE  Right Hamstrings: Tight (R hip ER tight)  Left Hamstrings: Tight    ASSESSMENT     Impression: Assessment: Pt presents with c/o SI joint pain and demonstrates decreased core strength and flexibility. Will benefit from skilled PT intervention to address listed impairments. Body Structures, Functions, Activity Limitations Requiring Skilled Therapeutic Intervention: Decreased functional mobility , Decreased ROM, Decreased tolerance to work activity, Decreased strength, Decreased high-level IADLs, Decreased posture, Increased pain    Statement of Medical Necessity: Physical Therapy is both indicated and medically necessary as outlined in the POC to increase the likelihood of meeting the functionally related goals stated below. Patient's Activity Tolerance: Patient tolerated evaluation without incident      Patient's rehabilitation potential/prognosis is considered to be: Good    Factors which may impact rehabilitation potential include:          GOALS   Patient Goal(s): reduce R hip/low back pain  Short Term Goals Completed by 3-4 weeks Goal Status   Independent with HEP New   Improve lumbar ROM to at leats 50 degrees flexion and 25 degrees L SB New   Perform good quality TA contraction with UE/LE involvement New   Trial heel lift if continued leg length discrepancy                                             Long Term Goals Completed by 4-6 weeks Goal Status   No further radiating pains to R anterior thigh and groin.  New   Report no

## 2023-04-18 ENCOUNTER — HOSPITAL ENCOUNTER (OUTPATIENT)
Dept: PHYSICAL THERAPY | Age: 78
Setting detail: THERAPIES SERIES
Discharge: HOME | End: 2023-04-18
Payer: MEDICARE

## 2023-04-18 PROCEDURE — 97110 THERAPEUTIC EXERCISES: CPT

## 2023-04-18 ASSESSMENT — PAIN DESCRIPTION - LOCATION: LOCATION: BACK;SACRUM

## 2023-04-18 ASSESSMENT — PAIN DESCRIPTION - PAIN TYPE: TYPE: CHRONIC PAIN

## 2023-04-18 ASSESSMENT — PAIN DESCRIPTION - DESCRIPTORS: DESCRIPTORS: TIGHTNESS;NAGGING

## 2023-04-18 ASSESSMENT — PAIN DESCRIPTION - ORIENTATION: ORIENTATION: RIGHT

## 2023-04-18 ASSESSMENT — PAIN SCALES - GENERAL: PAINLEVEL_OUTOF10: 3

## 2023-04-18 NOTE — PROGRESS NOTES
better                                                                    TREATMENT PLAN   Plan Frequency: 2x  Plan weeks: 4-6 weeks  Current Treatment Recommendations: Strengthening, ROM, Pain management, Positioning, Modalities, Equipment evaluation, education, & procurement, Patient/Caregiver education & training, Safety education & training, Manual, Home exercise program  Modalities: Heat/Cold, E-stim - unattended   Requires PT Follow-Up: Yes     Therapy Time  Individual Time In: 1310       Individual Time Out: 2962  Minutes: 39  Timed Code Treatment Minutes: 39 Minutes     Electronically signed by Jacy Mazariegos PT  on 4/18/2023 at 1:54 PM

## 2023-04-20 ENCOUNTER — HOSPITAL ENCOUNTER (OUTPATIENT)
Dept: PHYSICAL THERAPY | Age: 78
Setting detail: THERAPIES SERIES
Discharge: HOME | End: 2023-04-20
Payer: MEDICARE

## 2023-04-20 PROCEDURE — 97110 THERAPEUTIC EXERCISES: CPT

## 2023-04-20 NOTE — PROGRESS NOTES
Code Treatment Minutes: 34 Minutes     Electronically signed by Teresa Connor PTA on 4/20/2023 at 2:07 PM

## 2023-04-25 ENCOUNTER — HOSPITAL ENCOUNTER (OUTPATIENT)
Dept: PHYSICAL THERAPY | Age: 78
Setting detail: THERAPIES SERIES
Discharge: HOME | End: 2023-04-25
Payer: MEDICARE

## 2023-04-25 PROCEDURE — 97110 THERAPEUTIC EXERCISES: CPT

## 2023-04-25 NOTE — PROGRESS NOTES
Physical Therapy: Daily Note   Patient: Elie Harden (77 y.o. female)   Examination Date:   Plan of Care/Certification Expiration Date: 23    No data recorded   :  1945 # of Visits since [facility]:   7   MRN: [id number]  CSN: [id number] Start of Care Date:   2023   Insurance: Payor: MEDICARE / Plan: MEDICARE PART A AND B / Product Type: *No Product type* /   Insurance ID: [id number] - (Medicare) Secondary Insurance (if applicable): HUMANA   Referring Physician: CORINA Shaikh PA   PCP: Geovani Quintanilla MD Visits to Date/Visits Approved:     No Show/Cancelled Appts:   /       Medical Diagnosis: Sacroiliitis, not elsewhere classified [M46.1]    Treatment Diagnosis: Sacroiliitis        SUBJECTIVE EXAMINATION   Pain Level: Pain Screening  Patient Currently in Pain: No    Patient Comments: Subjective: I am working on ex at home. I feel like therapy is helping.             OBJECTIVE EXAMINATION       TREATMENT     Exercises:  Therapeutic exercise (CPT 65171)   Treatment Reasoning    Exercise 1: Assess leg length; equal 4/20  Exercise 2: Isometric L hip extension from SKTC (manual) 5 x 5\"  -- not needed today  Exercise 3: Isometric R hip flexion from 90/90 (manual) 5 x 5\" -- not needed today  Exercise 4: TA contraction (alone 10 x 10 sec , UE 1 x 10, LE 1 x 10, UE/LE 1 x 10)--min cueing for proper tech  Exercise 5: Pelvic tilt (on towel if needed)1 x 15  Exercise 6: Hooklying lumbar rotation to left 4 x 15 sec  Exercise 7: Supine right quadratus stretch 4 x 15 sec  Exercise 8: Supine right  piriformis stretch -  knee to opp chest only-- 4 x 15 sec  Exercise 9: SKTC 5 x 5 sec/DKTC 5 x 5 sec  Exercise 10: 90/90 HS stretch bilat 3 x 15 sec  Exercise 11: Bridging 1 x 10/Single leg R 1 x 10  Exercise 12: Axial traction R LE 4 x 15 sec-not today  Exercise 13: Sitting fwd reach (neutral, to R, to L) 1 X 5 EACH  Exercise 14: Sitting R hip retraction, L hip protraction 5 x 5

## 2023-04-27 ENCOUNTER — APPOINTMENT (OUTPATIENT)
Dept: PHYSICAL THERAPY | Age: 78
End: 2023-04-27
Payer: MEDICARE

## 2023-05-02 ENCOUNTER — HOSPITAL ENCOUNTER (OUTPATIENT)
Dept: PHYSICAL THERAPY | Age: 78
Setting detail: THERAPIES SERIES
Discharge: HOME | End: 2023-05-02
Payer: MEDICARE

## 2023-05-02 PROCEDURE — 97110 THERAPEUTIC EXERCISES: CPT

## 2023-05-02 NOTE — PROGRESS NOTES
Goals Completed by 3-4 weeks Current Status Goal Status   Independent with HEP 5/2: HEP issued and pt reports she is performing frequently. Met   Improve lumbar ROM to at least 50 degrees flexion and 25 degrees L SB 5/2: 50 degrees flexion (reports \"stiffness\" after that, no pain) and 30 degrees L SB Met   Perform good quality TA contraction with UE/LE involvement 5/2: Good TA contraction with UE and LE engagement. Met   Trial heel lift if continued leg length discrepancy 5/2: Trialed but pt has removed for now due to bruising on R foot. Met                                                       Long Term Goals Completed by 4-6 weeks Current Status Goal Status   No further radiating pains to R anterior thigh and groin. 5/2: \"It's much better. \"  States still occasionally pain in SI area, but none radiating. Met   Report no further pain with ascending/descending stairs 5/2: Holds rail for safety, but no pain.  Met   Improve Oswestry score to 18% impairment or better 5/2: 16% impairment Met                                                                TREATMENT PLAN       Requires PT Follow-Up: No  Additional Comments: Discharge from formal services     Therapy Time  Individual Time In: 1308       Individual Time Out: 1335  Minutes: 27  Timed Code Treatment Minutes: 27 Minutes     Electronically signed by Jorje Lopez PT  on 5/2/2023 at 1:59 PM

## 2023-05-04 ENCOUNTER — APPOINTMENT (OUTPATIENT)
Dept: PHYSICAL THERAPY | Age: 78
End: 2023-05-04
Payer: MEDICARE

## 2023-05-18 ENCOUNTER — OFFICE VISIT (OUTPATIENT)
Dept: CARDIOLOGY CLINIC | Age: 78
End: 2023-05-18
Payer: MEDICARE

## 2023-05-18 VITALS
HEIGHT: 67 IN | DIASTOLIC BLOOD PRESSURE: 62 MMHG | WEIGHT: 183 LBS | SYSTOLIC BLOOD PRESSURE: 114 MMHG | OXYGEN SATURATION: 97 % | BODY MASS INDEX: 28.72 KG/M2 | HEART RATE: 70 BPM

## 2023-05-18 DIAGNOSIS — I10 ESSENTIAL HYPERTENSION: ICD-10-CM

## 2023-05-18 DIAGNOSIS — I25.119 CORONARY ARTERY DISEASE INVOLVING NATIVE CORONARY ARTERY OF NATIVE HEART WITH ANGINA PECTORIS (HCC): Primary | ICD-10-CM

## 2023-05-18 DIAGNOSIS — I87.2 CHRONIC VENOUS INSUFFICIENCY: ICD-10-CM

## 2023-05-18 DIAGNOSIS — R60.0 EDEMA OF BOTH LOWER EXTREMITIES: ICD-10-CM

## 2023-05-18 DIAGNOSIS — E78.2 MIXED HYPERLIPIDEMIA: ICD-10-CM

## 2023-05-18 DIAGNOSIS — I51.89 DIASTOLIC DYSFUNCTION: ICD-10-CM

## 2023-05-18 PROCEDURE — G8417 CALC BMI ABV UP PARAM F/U: HCPCS | Performed by: CLINICAL NURSE SPECIALIST

## 2023-05-18 PROCEDURE — 1123F ACP DISCUSS/DSCN MKR DOCD: CPT | Performed by: CLINICAL NURSE SPECIALIST

## 2023-05-18 PROCEDURE — 99214 OFFICE O/P EST MOD 30 MIN: CPT | Performed by: CLINICAL NURSE SPECIALIST

## 2023-05-18 PROCEDURE — G8400 PT W/DXA NO RESULTS DOC: HCPCS | Performed by: CLINICAL NURSE SPECIALIST

## 2023-05-18 PROCEDURE — 3074F SYST BP LT 130 MM HG: CPT | Performed by: CLINICAL NURSE SPECIALIST

## 2023-05-18 PROCEDURE — 1090F PRES/ABSN URINE INCON ASSESS: CPT | Performed by: CLINICAL NURSE SPECIALIST

## 2023-05-18 PROCEDURE — 1036F TOBACCO NON-USER: CPT | Performed by: CLINICAL NURSE SPECIALIST

## 2023-05-18 PROCEDURE — 3078F DIAST BP <80 MM HG: CPT | Performed by: CLINICAL NURSE SPECIALIST

## 2023-05-18 PROCEDURE — G8427 DOCREV CUR MEDS BY ELIG CLIN: HCPCS | Performed by: CLINICAL NURSE SPECIALIST

## 2023-05-18 ASSESSMENT — ENCOUNTER SYMPTOMS
VOMITING: 0
NAUSEA: 0
SHORTNESS OF BREATH: 1
WHEEZING: 0
ABDOMINAL PAIN: 0
CHEST TIGHTNESS: 0
COUGH: 0
EYE REDNESS: 0
FACIAL SWELLING: 0

## 2023-05-18 NOTE — PROGRESS NOTES
[facility], [address], [address] [address]  Phone: [phone]  Fax: [phone]    OFFICE VISIT:  2023    Emiliano Hoffman - : 1945    Reason For Visit:  Kirk Toribio is a 76 y.o. female who is here for 6 Month Follow-Up and Coronary Artery Disease       Diagnosis Orders   1. Coronary artery disease involving native coronary artery of native heart with angina pectoris ([address]        2. Essential hypertension        3. Mixed hyperlipidemia        4. Edema of both lower extremities        5. Chronic venous insufficiency        6. Diastolic dysfunction                HPI  1. Coronary artery disease, prior ostial RCA stent with obstructive left main disease by catheterization 9/10/2020, status post 2 vessel CABG 2020 with LIMA to LAD and SVG to OM, normal LV ejection fraction. Negative DSE   2. Hypertension. 3. Rheumatoid arthritis. 4.  Hyperlipidemia  5. History of Covid in 2021  6. Carotid Stenosis    Patient denies chest pain. She has a mild, chronic dyspnea which unchanged. She denies orthopnea, PND or palpitations. She has some chronic lower extremity recent change     Brie Perez MD is PCP.   Emiliano Hoffman has the following history as [facility] in [facility]:    Patient Active Problem List    Diagnosis Date Noted    Abdominal pain 2021    Change in bowel habits 2021    Constipation 2021    BRBPR (bright red blood per rectum) 2021    Bloating 2020    Myalgia due to statin 2019    Urinary tract infection with hematuria 2019    Overactive bladder 2017    Mixed hyperlipidemia 2017    H/O right coronary artery stent placement     Periumbilical abdominal pain 2016    Irritable bowel syndrome with diarrhea 06/10/2016    Internal hemorrhoids 06/10/2016    Allergic reaction 2015    Precordial pain 2015    Gout 2015    Bleeding internal hemorrhoids

## 2023-05-23 ENCOUNTER — TRANSCRIBE ORDERS (OUTPATIENT)
Dept: ADMINISTRATIVE | Facility: HOSPITAL | Age: 78
End: 2023-05-23
Payer: MEDICARE

## 2023-05-23 DIAGNOSIS — R22.1 NECK MASS: Primary | ICD-10-CM

## 2023-05-31 ENCOUNTER — TELEPHONE (OUTPATIENT)
Dept: CARDIOLOGY CLINIC | Age: 78
End: 2023-05-31

## 2023-05-31 NOTE — TELEPHONE ENCOUNTER
Date: TBD    Cardiologist: Dr. Margi Sheriff    Procedure: bilateral upper eyelid repair and lower lid     Surgeon: Dr. Fredi Morrell Office Visit: 5-18-23    Reason for office visit and medical concerns addressed at this office visit: CAD, HTN, hyperlipidemia     Testing Performed and Date of Service:  Echo 11-15-22    RCRI = 1 pt, low, 0.9%   METs 4     Current Medications: benicar, hydrodiuril, toprol xl, turmeric, zinc, evolocumab, vit D, xyloprim, ASA, claritin, multiple vitamin     Is the patient currently taking an anticoagulant? If so, what is the diagnosis the patient has been given to warrant the need for the anticoagulant?  ASA     Additional Notes: Cardiac Risk Request and med hold on ASA

## 2023-05-31 NOTE — TELEPHONE ENCOUNTER
Okay to send letter for cardiac risk stratification and may hold aspirin 5 to 7 days prior to procedure and resume thereafter

## 2023-06-02 ENCOUNTER — HOSPITAL ENCOUNTER (OUTPATIENT)
Dept: CT IMAGING | Facility: HOSPITAL | Age: 78
Discharge: HOME | End: 2023-06-02

## 2023-06-02 DIAGNOSIS — R22.1 NECK MASS: ICD-10-CM

## 2023-06-02 PROCEDURE — 70490 CT SOFT TISSUE NECK W/O DYE: CPT

## 2023-06-23 ENCOUNTER — APPOINTMENT (OUTPATIENT)
Dept: CT IMAGING | Age: 78
End: 2023-06-23
Payer: MEDICARE

## 2023-06-23 ENCOUNTER — HOSPITAL ENCOUNTER (EMERGENCY)
Age: 78
Discharge: HOME | End: 2023-06-23
Attending: EMERGENCY MEDICINE
Payer: MEDICARE

## 2023-06-23 ENCOUNTER — APPOINTMENT (OUTPATIENT)
Dept: GENERAL RADIOLOGY | Age: 78
End: 2023-06-23
Payer: MEDICARE

## 2023-06-23 VITALS
TEMPERATURE: 98.4 F | RESPIRATION RATE: 17 BRPM | DIASTOLIC BLOOD PRESSURE: 58 MMHG | SYSTOLIC BLOOD PRESSURE: 119 MMHG | WEIGHT: 180 LBS | OXYGEN SATURATION: 98 % | BODY MASS INDEX: 28.19 KG/M2 | HEART RATE: 87 BPM

## 2023-06-23 DIAGNOSIS — R51.9 ACUTE NONINTRACTABLE HEADACHE, UNSPECIFIED HEADACHE TYPE: ICD-10-CM

## 2023-06-23 DIAGNOSIS — R20.2 PARESTHESIA: Primary | ICD-10-CM

## 2023-06-23 DIAGNOSIS — M43.9 COMPRESSION DEFORMITY OF VERTEBRA: ICD-10-CM

## 2023-06-23 LAB
ALBUMIN SERPL-MCNC: 4.3 G/DL (ref 3.5–5.2)
ALP SERPL-CCNC: 70 U/L (ref 35–104)
ALT SERPL-CCNC: 31 U/L (ref 5–33)
ANION GAP SERPL CALCULATED.3IONS-SCNC: 9 MMOL/L (ref 7–19)
AST SERPL-CCNC: 27 U/L (ref 5–32)
BASOPHILS # BLD: 0 K/UL (ref 0–0.2)
BASOPHILS NFR BLD: 0.4 % (ref 0–1)
BILIRUB SERPL-MCNC: 0.3 MG/DL (ref 0.2–1.2)
BILIRUB UR QL STRIP: NEGATIVE
BUN SERPL-MCNC: 16 MG/DL (ref 8–23)
CALCIUM SERPL-MCNC: 10.2 MG/DL (ref 8.8–10.2)
CHLORIDE SERPL-SCNC: 100 MMOL/L (ref 98–111)
CLARITY UR: CLEAR
CO2 SERPL-SCNC: 30 MMOL/L (ref 22–29)
COLOR UR: YELLOW
CREAT SERPL-MCNC: 0.7 MG/DL (ref 0.5–0.9)
EOSINOPHIL # BLD: 0.3 K/UL (ref 0–0.6)
EOSINOPHIL NFR BLD: 4 % (ref 0–5)
ERYTHROCYTE [DISTWIDTH] IN BLOOD BY AUTOMATED COUNT: 13.7 % (ref 11.5–14.5)
GLUCOSE SERPL-MCNC: 107 MG/DL (ref 74–109)
GLUCOSE UR STRIP.AUTO-MCNC: NEGATIVE MG/DL
HCT VFR BLD AUTO: 41.2 % (ref 37–47)
HGB BLD-MCNC: 13.7 G/DL (ref 12–16)
HGB UR STRIP.AUTO-MCNC: NEGATIVE MG/L
IMM GRANULOCYTES # BLD: 0 K/UL
KETONES UR STRIP.AUTO-MCNC: NEGATIVE MG/DL
LEUKOCYTE ESTERASE UR QL STRIP.AUTO: NEGATIVE
LYMPHOCYTES # BLD: 2.3 K/UL (ref 1.1–4.5)
LYMPHOCYTES NFR BLD: 31.6 % (ref 20–40)
MCH RBC QN AUTO: 32.9 PG (ref 27–31)
MCHC RBC AUTO-ENTMCNC: 33.3 G/DL (ref 33–37)
MCV RBC AUTO: 98.8 FL (ref 81–99)
MONOCYTES # BLD: 0.5 K/UL (ref 0–0.9)
MONOCYTES NFR BLD: 6.4 % (ref 0–10)
NEUTROPHILS # BLD: 4.2 K/UL (ref 1.5–7.5)
NEUTS SEG NFR BLD: 57.3 % (ref 50–65)
NITRITE UR QL STRIP.AUTO: NEGATIVE
PH UR STRIP.AUTO: 7 [PH] (ref 5–8)
PLATELET # BLD AUTO: 210 K/UL (ref 130–400)
PMV BLD AUTO: 9 FL (ref 9.4–12.3)
POTASSIUM SERPL-SCNC: 3.8 MMOL/L (ref 3.5–5)
PROT SERPL-MCNC: 6.8 G/DL (ref 6.6–8.7)
PROT UR STRIP.AUTO-MCNC: NEGATIVE MG/DL
RBC # BLD AUTO: 4.17 M/UL (ref 4.2–5.4)
SODIUM SERPL-SCNC: 139 MMOL/L (ref 136–145)
SP GR UR STRIP.AUTO: 1.01 (ref 1–1.03)
TROPONIN T SERPL-MCNC: <0.01 NG/ML (ref 0–0.03)
UROBILINOGEN UR STRIP.AUTO-MCNC: 0.2 E.U./DL
WBC # BLD AUTO: 7.3 K/UL (ref 4.8–10.8)

## 2023-06-23 PROCEDURE — 6370000000 HC RX 637 (ALT 250 FOR IP): Performed by: EMERGENCY MEDICINE

## 2023-06-23 PROCEDURE — 84484 ASSAY OF TROPONIN QUANT: CPT

## 2023-06-23 PROCEDURE — 85025 COMPLETE CBC W/AUTO DIFF WBC: CPT

## 2023-06-23 PROCEDURE — 81003 URINALYSIS AUTO W/O SCOPE: CPT

## 2023-06-23 PROCEDURE — 70450 CT HEAD/BRAIN W/O DYE: CPT

## 2023-06-23 PROCEDURE — 36415 COLL VENOUS BLD VENIPUNCTURE: CPT

## 2023-06-23 PROCEDURE — 99284 EMERGENCY DEPT VISIT MOD MDM: CPT

## 2023-06-23 PROCEDURE — 80053 COMPREHEN METABOLIC PANEL: CPT

## 2023-06-23 PROCEDURE — 72100 X-RAY EXAM L-S SPINE 2/3 VWS: CPT

## 2023-06-23 RX ORDER — METHOCARBAMOL 750 MG/1
750 TABLET, FILM COATED ORAL 4 TIMES DAILY PRN
Qty: 12 TABLET | Refills: 0 | Status: SHIPPED | OUTPATIENT
Start: 2023-06-23

## 2023-06-23 RX ORDER — METHOCARBAMOL 750 MG/1
750 TABLET, FILM COATED ORAL ONCE
Status: COMPLETED | OUTPATIENT
Start: 2023-06-23 | End: 2023-06-23

## 2023-06-23 RX ADMIN — METHOCARBAMOL 750 MG: 750 TABLET ORAL at 20:52

## 2023-06-23 ASSESSMENT — ENCOUNTER SYMPTOMS
VOMITING: 0
TROUBLE SWALLOWING: 0
ABDOMINAL PAIN: 0
SHORTNESS OF BREATH: 0
BACK PAIN: 1

## 2023-06-23 NOTE — ED PROVIDER NOTES
TO CULTURE       All other labs were within normal range or not returned as of this dictation. EMERGENCY DEPARTMENT COURSE and DIFFERENTIAL DIAGNOSIS/MDM:   Vitals:    Vitals:    06/23/23 1733 06/23/23 1900 06/23/23 1930 06/23/23 2000   BP: (!) 111/91 (!) 140/69 (!) 118/48 (!) 119/58   Pulse: 85 87     Resp: 18 17     Temp: 98.4 °F (36.9 °C)      SpO2: 98% 99% 98% 98%   Weight: 180 lb (81.6 kg)              Medical Decision Making  Differential diagnosis includes but not limited to: Radiculopathy, degenerative disc disease, electrolyte derangement, UTI    This is an 70-year-old female presenting to the emergency department secondary to lumbar discomfort, headache, and paresthesias. After the Robaxin, the patient reports significant improvement in symptoms where the paresthesias are resolving and her back discomfort is improving. She has been able to ambulate in the ED without distress. Due to the improvement, patient and spouse would like to go home. No clinical suspicion at this time for CVA/TIA or cord compression and no fever or changes in bowel or bladder habits. Test results discussed and they voiced understanding. She is stable for discharge. Amount and/or Complexity of Data Reviewed  Labs: ordered. Radiology: ordered. Risk  Prescription drug management. REASSESSMENT          CRITICAL CARE TIME       CONSULTS:  None    PROCEDURES:  Unless otherwise noted below, none     Procedures        FINAL IMPRESSION      1. Paresthesia    2. Acute nonintractable headache, unspecified headache type    3.  Compression deformity of vertebra          DISPOSITION/PLAN   DISPOSITION Decision To Discharge 06/23/2023 09:55:42 PM      PATIENT REFERRED TO:  [facility] EMERGENCY DEPT  Marcus Fink  [phone]    If symptoms worsen    Radha Moon MD  [address] ,[facility] [address] [facility]  [phone]    Schedule an appointment as soon as possible for a visit in 1

## 2023-06-24 NOTE — DISCHARGE INSTRUCTIONS
Please return for fever, worsening symptoms, weakness, changes in bowel or bladder habits, or any other concerns.

## 2023-08-02 ENCOUNTER — HOSPITAL ENCOUNTER (OUTPATIENT)
Dept: PHYSICAL THERAPY | Age: 78
Setting detail: THERAPIES SERIES
End: 2023-08-02
Payer: MEDICARE

## 2023-09-06 ENCOUNTER — HOSPITAL ENCOUNTER (OUTPATIENT)
Dept: PHYSICAL THERAPY | Age: 78
Setting detail: THERAPIES SERIES
Discharge: HOME | End: 2023-09-06
Payer: MEDICARE

## 2023-09-06 VITALS — HEART RATE: 80 BPM | SYSTOLIC BLOOD PRESSURE: 109 MMHG | DIASTOLIC BLOOD PRESSURE: 55 MMHG | OXYGEN SATURATION: 100 %

## 2023-09-06 PROCEDURE — 97530 THERAPEUTIC ACTIVITIES: CPT

## 2023-09-06 PROCEDURE — 97162 PT EVAL MOD COMPLEX 30 MIN: CPT

## 2023-09-06 ASSESSMENT — PAIN SCALES - GENERAL: PAINLEVEL_OUTOF10: 5

## 2023-09-06 ASSESSMENT — 10 METER WALK TEST (10METWT)
AVERAGE: 9.9
TEST DIRECTIONS: SELF-SELECTED OR FAST-VELOCITY: SELF
SCORE (M/S): 1.01
TRIAL 1: TIME TO WALK 10 METERS: 9.94

## 2023-09-06 ASSESSMENT — PAIN DESCRIPTION - DESCRIPTORS: DESCRIPTORS: ACHING

## 2023-09-06 ASSESSMENT — PAIN DESCRIPTION - ORIENTATION: ORIENTATION: RIGHT

## 2023-09-07 NOTE — PROGRESS NOTES
Physical Therapy: Initial Evaluation    Patient: Tin Noel (30 y.o. female)   Examination Date:   Plan of Care Certification Period: 2023 to 23      :  1945 ;    Confirmed: Yes MRN: [id number]  CSN: [id number]   Insurance: Payor: MEDICARE / Plan: MEDICARE PART A AND B / Product Type: *No Product type* /   Insurance ID: [id number] - (Medicare) Secondary Insurance (if applicable):  HUMANA   Referring Physician: CORINA Mckenzie PA   PCP: Stephen Eduardo MD Visits to Date/Visits Approved: 1 /      No Show/Cancelled Appts:   /       Medical Diagnosis: Intervertebral disc disorders with radiculopathy, lumbar region [M51.16] Low back pain  Treatment Diagnosis:       PERTINENT MEDICAL HISTORY      Self reported health status[de-identified] Good    Medical History:     Past Medical History:   Diagnosis Date    Acid reflux     Anxiety     Arthritis     Back pain     CAD (coronary artery disease)     CAD (coronary artery disease)     Carotid artery occlusion     Chronic venous insufficiency 2012    Cough     Fibromyalgia     Goiter     Gout     Head ache     headaches    Heart murmur     History of colon polyps     History of colon polyps     Hoarseness     Hypercholesterolemia     Hyperlipidemia     Hypertension     Insomnia     Irregular heart beat     Itching     Muscle cramp     Neck pain     Osteoarthritis     Osteoporosis     Palpitations     RA (rheumatoid arthritis) (HCC)     Rash     Rectal bleeding     SOB (shortness of breath)     Sore throat     Tympanic membrane perforation     Varicose veins 2012     Surgical History:   Past Surgical History:   Procedure Laterality Date    APPENDECTOMY      BREAST SURGERY  2002    CARDIAC CATHETERIZATION  9/10/15  JDT    EF 50%    CARDIAC CATHETERIZATION  09/10/2020    LM disease- sent for bypass     [address] [facility],     x 1    CHOLECYSTECTOMY  2012    [place]    COLONOSCOPY  01/10/2012

## 2023-09-11 ENCOUNTER — HOSPITAL ENCOUNTER (OUTPATIENT)
Dept: PHYSICAL THERAPY | Age: 78
Setting detail: THERAPIES SERIES
Discharge: HOME | End: 2023-09-11
Payer: MEDICARE

## 2023-09-11 PROCEDURE — 97110 THERAPEUTIC EXERCISES: CPT

## 2023-09-11 PROCEDURE — G0283 ELEC STIM OTHER THAN WOUND: HCPCS

## 2023-09-11 ASSESSMENT — PAIN DESCRIPTION - DESCRIPTORS: DESCRIPTORS: ACHING

## 2023-09-11 ASSESSMENT — PAIN DESCRIPTION - LOCATION: LOCATION: BACK;SACRUM

## 2023-09-11 ASSESSMENT — PAIN SCALES - GENERAL: PAINLEVEL_OUTOF10: 7

## 2023-09-11 ASSESSMENT — PAIN DESCRIPTION - PAIN TYPE: TYPE: CHRONIC PAIN

## 2023-09-11 ASSESSMENT — PAIN DESCRIPTION - ORIENTATION: ORIENTATION: RIGHT;MID;LOWER;LEFT

## 2023-09-11 NOTE — PROGRESS NOTES
today  Exercise 19: Monster walks-NOT TODAY  Exercise 20: IFC + MH PRN for elevated pain; 20 min in sitting     Assessment:   Conditions Requiring Skilled Therapeutic Intervention  Body Structures, Functions, Activity Limitations Requiring Skilled Therapeutic Intervention: Decreased functional mobility ; Decreased ADL status; Decreased ROM; Decreased strength;Decreased high-level IADLs;Decreased balance;Decreased endurance; Increased pain;Decreased posture  Assessment: Initiation of exercises per eval this date with patient able to make it througho majority of routine without c/o increased pain. She requires cues for technique to ensure correct mm engagement for max benefit. Modalities applied to patient's low back with her reporting improvement in her symptoms post application. Will progress routine as she tolerates. Goals:Short Term Goals  Time Frame for Short Term Goals: 3-4 weeks  Short Term Goal 1: Patient will report reduction of constant LBP from 5/10 to 4/10. Short Term Goal 2: Patient will improve functional LE strength to reduce fall risk as demonstrated by improvement of 5 Times Sit to Stand from 17.3 sec to 14 sec. Short Term Goal 3: Patient will improve gait/transfer ability as demonstrated by improvement of TUG from 13 sec to 11 sec. Short Term Goal 4: Patient will perform HEP with min cuing. Long Term Goals  Time Frame for Long Term Goals : 6-8 weeks  Long Term Goal 1: Patient will report reduction of constant LBP from 5/10 to 3/10. Long Term Goal 2: Patient will improve functional LE strength to reduce fall risk as demonstrated by improvement of 5 Times Sit to Stand from 17.3 sec to 11 sec. Long Term Goal 3: Patient will improve gait/transfer ability as demonstrated by improvement of TUG from 13 sec to <11 sec. Long Term Goal 4: Patient will perform HEP with no cuing.   Patient Goals   Patient Goals : reduce R hip/low back pain    Plan:    Physcial Therapy Plan  Plan weeks: 6-8 weeks  Current

## 2023-09-15 ENCOUNTER — HOSPITAL ENCOUNTER (OUTPATIENT)
Dept: PHYSICAL THERAPY | Age: 78
Setting detail: THERAPIES SERIES
Discharge: HOME | End: 2023-09-15
Payer: MEDICARE

## 2023-09-15 PROCEDURE — 97110 THERAPEUTIC EXERCISES: CPT

## 2023-09-15 PROCEDURE — G0283 ELEC STIM OTHER THAN WOUND: HCPCS

## 2023-09-15 ASSESSMENT — PAIN DESCRIPTION - DESCRIPTORS: DESCRIPTORS: ACHING

## 2023-09-15 ASSESSMENT — PAIN DESCRIPTION - LOCATION: LOCATION: BACK;HIP

## 2023-09-15 ASSESSMENT — PAIN DESCRIPTION - ORIENTATION: ORIENTATION: RIGHT

## 2023-09-15 ASSESSMENT — PAIN SCALES - GENERAL: PAINLEVEL_OUTOF10: 6

## 2023-09-15 NOTE — PROGRESS NOTES
Physical Therapy  Daily Treatment Note  Date: 9/15/2023  Patient Name: Monae Rodriguez  MRN: [id number]     :   1945      Subjective:   General  Additional Pertinent Hx: 78year old female has been referred for PT eval and treatment of low back pain and gait disturbance. She relates onset of back pain after sleeping in recliner X 2 weeks following eye surgery. She had low back pain and difficulty walking afterward. She denies specific injury. X-ray taken 2023:  HISTORY:  Low back pain. FINDINGS: There is normal alignment of the lumbar vertebral bodies and facets. There are anterior wedge compression deformities of the T11 through L3 vertebral bodies with up to 25% loss of vertebral body height, age indeterminate. The intervertebral   disc spaces are maintained. There is multilevel facet arthropathy. There is anterior subluxation/dislocation of the distal coccyx, age indeterminate. Impression: Anterior wedge compression deformities of the T11-L3 vertebral bodies as described, age indeterminate. Anterior subluxation/dislocation of the distal coccyx, age indeterminate. Normal alignment of the lumbar spine with degenerative changes as described. PMH: Sacrolitis with PT at this facility  to . CABG 9/10/2020, ADA. PT Visit Information  PT Insurance Information: Medicare (Primary) and [employer] Medicare (Supplemental)  Total # of Visits Approved: 12 (12-16)  Total # of Visits to Date: 3  Plan of Care/Certification Expiration Date: 23  Progress Note Due Date: 10/04/23  Referring Provider (secondary): CORINA TAMEZ  Subjective: I have been limping some. I think it is out. Pain Screening  Patient Currently in Pain: Yes  Pain Assessment: 0-10  Pain Level: 6  Pain Location: Back; Hip  Pain Orientation: Right  Pain Descriptors: Aching         Treatment Activities:    Exercises  Exercise 1: Assess leg length; equal  Exercise 2: Isometric L hip extension from SKTC (manual) 5 x

## 2023-09-18 ENCOUNTER — HOSPITAL ENCOUNTER (OUTPATIENT)
Dept: PHYSICAL THERAPY | Age: 78
Setting detail: THERAPIES SERIES
Discharge: HOME | End: 2023-09-18
Payer: MEDICARE

## 2023-09-18 PROCEDURE — 97110 THERAPEUTIC EXERCISES: CPT

## 2023-09-18 PROCEDURE — G0283 ELEC STIM OTHER THAN WOUND: HCPCS

## 2023-09-18 ASSESSMENT — PAIN DESCRIPTION - DESCRIPTORS: DESCRIPTORS: ACHING

## 2023-09-18 ASSESSMENT — PAIN DESCRIPTION - ORIENTATION: ORIENTATION: RIGHT

## 2023-09-18 ASSESSMENT — PAIN DESCRIPTION - LOCATION: LOCATION: BACK;HIP;LEG

## 2023-09-18 ASSESSMENT — PAIN SCALES - GENERAL: PAINLEVEL_OUTOF10: 5

## 2023-09-18 ASSESSMENT — PAIN DESCRIPTION - PAIN TYPE: TYPE: CHRONIC PAIN

## 2023-09-18 NOTE — PROGRESS NOTES
Physical Therapy: Daily Note   Patient: Juhi Moulton (38 y.o. female)   Examination Date:   Plan of Care/Certification Expiration Date: 23    No data recorded   :  1945 # of Visits since [facility]:   9   MRN: [id number]  CSN: [id number] Start of Care Date:   2023   Insurance: Payor: MEDICARE / Plan: MEDICARE PART A AND B / Product Type: *No Product type* /   Insurance ID: [id number] - (Medicare) Secondary Insurance (if applicable): HUMANA   Referring Physician: CORINA Gastelum PA   PCP: Rachel Mars MD Visits to Date/Visits Approved:  (-16)    No Show/Cancelled Appts:   /       Medical Diagnosis: Intervertebral disc disorders with radiculopathy, lumbar region [M51.16] Low back pain  Treatment Diagnosis: Low back pain        SUBJECTIVE EXAMINATION   Pain Level: Pain Screening  Patient Currently in Pain: Yes  Pain Assessment: 0-10  Pain Level: 5  Pain Type: Chronic pain  Pain Location: Back, Hip, Leg  Pain Orientation: Right  Pain Descriptors: Aching    Patient Comments: Subjective: I am feeling okay today.                 TREATMENT     Exercises:  Therapeutic exercise (CPT 07939)   Treatment Reasoning    Exercise 1: Assess leg length; left leg longer  Exercise 2: Isometric L hip extension from SKTC (manual) 5 x 5\"  Exercise 3: Isometric R hip flexion from 90/90 (manual) 5 x 5\"  Exercise 4: TA contraction (alone 10 x 5 sec , UE 1 x 10, LE 1 x 10, UE/LE 1 x 10)  Exercise 5: Pelvic tilt (on towel if needed)1 x 10  Exercise 6: Hooklying lumbar rotation to left 4 x 15 sec  Exercise 7: Supine right quadratus stretch 4 x 15 sec  Exercise 8: Supine right  piriformis stretch -  knee to opp chest only-- 4 x 15 sec  Exercise 9: SKTC 5 x 5 sec/DKTC 5 x 5 sec  Exercise 10: 90/90 HS stretch bilat 4 x 15 sec  Exercise 11: Bridging 1 x 10/Single leg R 1 x 10  Exercise 12: Axial traction R LE 4 x 15 sec  Exercise 13: Sitting fwd reach (neutral, to R, to L) 1 X 5 EACH  Exercise 14:

## 2023-09-22 ENCOUNTER — APPOINTMENT (OUTPATIENT)
Dept: PHYSICAL THERAPY | Age: 78
End: 2023-09-22
Payer: MEDICARE

## 2023-09-25 ENCOUNTER — HOSPITAL ENCOUNTER (OUTPATIENT)
Dept: PHYSICAL THERAPY | Age: 78
Setting detail: THERAPIES SERIES
Discharge: HOME | End: 2023-09-25
Payer: MEDICARE

## 2023-09-25 PROCEDURE — G0283 ELEC STIM OTHER THAN WOUND: HCPCS

## 2023-09-25 PROCEDURE — 97110 THERAPEUTIC EXERCISES: CPT

## 2023-09-25 ASSESSMENT — PAIN DESCRIPTION - LOCATION: LOCATION: BACK;HIP;LEG

## 2023-09-25 ASSESSMENT — PAIN DESCRIPTION - PAIN TYPE: TYPE: CHRONIC PAIN

## 2023-09-25 ASSESSMENT — PAIN DESCRIPTION - ORIENTATION: ORIENTATION: RIGHT

## 2023-09-25 ASSESSMENT — PAIN DESCRIPTION - DESCRIPTORS: DESCRIPTORS: ACHING

## 2023-09-25 ASSESSMENT — PAIN SCALES - GENERAL: PAINLEVEL_OUTOF10: 4

## 2023-09-25 NOTE — PROGRESS NOTES
Physical Therapy  Daily Treatment Note  Date: 2023  Patient Name: Tamara Del Rio  MRN: [id number]     :   1945    Subjective:      PT Visit Information  PT Insurance Information: Medicare (Primary) and [employer] Medicare (Supplemental)  Total # of Visits Approved: 12 (12-16)  Total # of Visits to Date: 5  Plan of Care/Certification Expiration Date: 23  Progress Note Due Date: 10/04/23  Referring Provider (secondary): CORINA Cotto  Subjective: Patient reports her pain is about a 4/10 today and denies any issues since previous session.     Pain Screening  Patient Currently in Pain: Yes  Pain Assessment: 0-10  Pain Level: 4  Pain Type: Chronic pain  Pain Location: Back;Hip;Leg  Pain Orientation: Right  Pain Descriptors: Aching       Treatment Activities:   Exercises  Exercise 1: Assess leg length; even today  Exercise 2: Isometric L hip extension from SKTC (manual) 5 x 5\"; not today  Exercise 3: Isometric R hip flexion from 90/90 (manual) 5 x 5\"; not today  Exercise 4: TA contraction (alone 10 x 5 sec , UE 1 x 10, LE 1 x 10, UE/LE 1 x 10)  Exercise 5: Pelvic tilt (on towel if needed)1 x 10  Exercise 6: Hooklying lumbar rotation to left 4 x 15 sec  Exercise 7: Supine right quadratus stretch 4 x 15 sec  Exercise 8: Supine right  piriformis stretch -  knee to opp chest only-- 4 x 15 sec  Exercise 9: SKTC 5 x 5 sec/DKTC 5 x 5 sec  Exercise 10: 90/90 HS stretch bilat 4 x 15 sec  Exercise 11: Bridging 1 x 10/Single leg R 1 x 10  Exercise 12: Axial traction R LE 4 x 15 sec  Exercise 13: Sitting fwd reach (neutral, to R, to L) 1 X 5 EACH  Exercise 14: Sitting R hip retraction, L hip protraction 5 x 5 sec  Exercise 15: Repeated sit to stand with TA contraction 1 x 10-from chair  Exercise 16: Multifidus row/Paloff press red T-band  x 15  Exercise 17: Standing march/hip abd/ext  x 10 each  Exercise 18: Sidestepping on line  x 3,   fwd/bwd walking x 3,             box step 5 x cw/ccw,             cone weaves x

## 2023-09-28 ENCOUNTER — HOSPITAL ENCOUNTER (OUTPATIENT)
Dept: PHYSICAL THERAPY | Age: 78
Setting detail: THERAPIES SERIES
Discharge: HOME | End: 2023-09-28
Payer: MEDICARE

## 2023-09-28 PROCEDURE — G0283 ELEC STIM OTHER THAN WOUND: HCPCS

## 2023-09-28 PROCEDURE — 97110 THERAPEUTIC EXERCISES: CPT

## 2023-09-28 ASSESSMENT — PAIN DESCRIPTION - PAIN TYPE: TYPE: CHRONIC PAIN

## 2023-09-28 ASSESSMENT — PAIN DESCRIPTION - LOCATION: LOCATION: BACK;HIP;LEG

## 2023-09-28 ASSESSMENT — PAIN SCALES - GENERAL: PAINLEVEL_OUTOF10: 4

## 2023-09-28 ASSESSMENT — PAIN DESCRIPTION - DESCRIPTORS: DESCRIPTORS: ACHING

## 2023-09-28 ASSESSMENT — PAIN DESCRIPTION - ORIENTATION: ORIENTATION: RIGHT

## 2023-09-28 NOTE — PROGRESS NOTES
Term Goals Completed by 6-8 weeks Current Status Goal Status   Patient will report reduction of constant LBP from 5/10 to 3/10. Patient will improve functional LE strength to reduce fall risk as demonstrated by improvement of 5 Times Sit to Stand from 17.3 sec to 11 sec. Patient will improve gait/transfer ability as demonstrated by improvement of TUG from 13 sec to <11 sec. Patient will perform HEP with no cuing.                                                             TREATMENT PLAN   Plan Frequency: 2x  Plan weeks: 6-8 weeks  Current Treatment Recommendations: Strengthening, ROM, Balance training, Functional mobility training, Transfer training, ADL/Self-care training, IADL training, Endurance training, Pain management, Manual, Neuromuscular re-education, Gait training, Home exercise program, Modalities, Therapeutic activities  Modalities: Heat/Cold, Mechanical Traction, E-stim - unattended           Therapy Time  Individual Time In: 1508       Individual Time Out: 1610  Minutes: 62  Timed Code Treatment Minutes: 42 Minutes (20 mins estim)     Electronically signed by Roxie Feng PTA  on 9/28/2023 at 4:58 PM   POC NOTE  Electronically signed by Roxie Feng PTA on 9/28/2023 at 4:59 PM

## 2023-10-02 ENCOUNTER — HOSPITAL ENCOUNTER (OUTPATIENT)
Dept: PHYSICAL THERAPY | Age: 78
Setting detail: THERAPIES SERIES
Discharge: HOME | End: 2023-10-02
Payer: MEDICARE

## 2023-10-02 PROCEDURE — 97110 THERAPEUTIC EXERCISES: CPT

## 2023-10-02 NOTE — PROGRESS NOTES
Physical Therapy: Daily Note   Patient: Manisha Kelly (7 y.o. female)   Examination Date:   Plan of Care/Certification Expiration Date: 23    No data recorded   :  1945 # of Visits since [facility]:   12   MRN: [id number]  CSN: [id number] Start of Care Date:   2023   Insurance: Payor: MEDICARE / Plan: MEDICARE PART A AND B / Product Type: *No Product type* /   Insurance ID: [id number] - (Medicare) Secondary Insurance (if applicable): HUMANA   Referring Physician: CORINA Mascorro PA   PCP: Kacie Keith MD Visits to Date/Visits Approved:  (-)    No Show/Cancelled Appts:   /       Medical Diagnosis: Intervertebral disc disorders with radiculopathy, lumbar region [M51.16]    Treatment Diagnosis: Low back pain        SUBJECTIVE EXAMINATION   Pain Level: Pain Screening  Patient Currently in Pain: No    Patient Comments: Subjective: I am feeling okay today. Not bad at all.                 TREATMENT     Exercises:  Therapeutic exercise (CPT 88292)   Treatment Reasoning    Exercise 1: Assess leg length; even today  Exercise 2: Isometric L hip extension from SKTC (manual) 5 x 5\"; not today  Exercise 3: Isometric R hip flexion from 90/90 (manual) 5 x 5\"; not today  Exercise 4: TA contraction (alone 10 x 5 sec , UE 1 x 10, LE 1 x 10, UE/LE 1 x 10)  Exercise 5: Pelvic tilt (on towel if needed)1 x 10  Exercise 6: Hooklying lumbar rotation to left 4 x 15 sec  Exercise 7: Supine right quadratus stretch 4 x 15 sec  Exercise 8: Supine right  piriformis stretch -  knee to opp chest only-- 4 x 15 sec  Exercise 9: SKTC 5 x 5 sec/DKTC 5 x 5 sec  Exercise 10: 90/90 HS stretch bilat 4 x 15 sec  Exercise 11: Bridging 1 x 10/Single leg R 1 x 10  Exercise 12: Axial traction R LE 4 x 15 sec  Exercise 13: Sitting fwd reach (neutral, to R, to L) 1 X 5 EACH  Exercise 14: Sitting R hip retraction, L hip protraction 5 x 5 sec  Exercise 15: Repeated sit to stand with TA contraction 1 x 10-from

## 2023-10-05 ENCOUNTER — HOSPITAL ENCOUNTER (OUTPATIENT)
Dept: PHYSICAL THERAPY | Age: 78
Setting detail: THERAPIES SERIES
Discharge: HOME | End: 2023-10-05
Payer: MEDICARE

## 2023-10-05 PROCEDURE — 97530 THERAPEUTIC ACTIVITIES: CPT

## 2023-10-05 ASSESSMENT — 10 METER WALK TEST (10METWT)
SCORE (M/S): 1.09
TRIAL 1: TIME TO WALK 10 METERS: 9.16
TEST DIRECTIONS: SELF-SELECTED OR FAST-VELOCITY: SELF
AVERAGE: 9.2

## 2023-10-05 ASSESSMENT — PAIN SCALES - GENERAL: PAINLEVEL_OUTOF10: 2

## 2023-10-05 ASSESSMENT — PAIN DESCRIPTION - LOCATION: LOCATION: BACK;SHOULDER

## 2023-10-05 ASSESSMENT — PAIN DESCRIPTION - ORIENTATION: ORIENTATION: RIGHT

## 2023-10-05 NOTE — PROGRESS NOTES
Physical Therapy: Daily Note/Reassessment/Discharge Summary   Patient: Frances Simpson (67 y.o. female)   Examination Date:   Plan of Care/Certification Expiration Date: 23    No data recorded   :  1945 # of Visits since [facility]:   13   MRN: [id number]  CSN: [id number] Start of Care Date:   2023   Insurance: Payor: MEDICARE / Plan: MEDICARE PART A AND B / Product Type: *No Product type* /   Insurance ID: [id number] - (Medicare) Secondary Insurance (if applicable): HUMANA   Referring Physician: CORINA Lott PA   PCP: Francine Bowman MD Visits to Date/Visits Approved:  (-)    No Show/Cancelled Appts:   /       Medical Diagnosis: Intervertebral disc disorders with radiculopathy, lumbar region [M51.16]    Treatment Diagnosis: Low back pain        SUBJECTIVE EXAMINATION   Pain Level: Pain Screening  Patient Currently in Pain: Yes  Pain Assessment: 0-10  Pain Level: 2  Best Pain Level: 2  Worst Pain Level: 10  Pain Location: Back, Shoulder  Pain Orientation: Right    Patient Comments: Subjective: She rates pain at 2/10. She has pain at 10/10 when cleaning. She feels her condition has improved with therapy. HEP Compliance: Good  Previous treatments prior to current episode?: Medications     OBJECTIVE EXAMINATION   Restrictions:  No data recorded No data recorded No data recorded           Balance/Gait Assessment(s) Performed:   5 Times Sit to Stand   Current Score: 12.08 seconds (Date: 10/5/2023)    Interpretation of Score: The 5 Times Sit to Stand Test (FTSST) measures functional lower limb muscle strength and may be useful in quantifying functional change of transitional movements. Greater than 16.0 seconds indicates increased risk of falls. Cut-off scores of 16 seconds discriminates fallers from non-fallers.  < 61years old: 11 seconds = normal; 79-80 years old: 13 seconds = normal; 8090 years old: 15 seconds = normal.  Timed Up and Go (TUG)  Current Score: 9.16

## 2023-11-15 ENCOUNTER — OFFICE VISIT (OUTPATIENT)
Dept: CARDIOLOGY CLINIC | Age: 78
End: 2023-11-15
Payer: MEDICARE

## 2023-11-15 VITALS
SYSTOLIC BLOOD PRESSURE: 112 MMHG | HEART RATE: 78 BPM | WEIGHT: 181 LBS | HEIGHT: 67 IN | DIASTOLIC BLOOD PRESSURE: 72 MMHG | BODY MASS INDEX: 28.41 KG/M2

## 2023-11-15 DIAGNOSIS — I10 ESSENTIAL HYPERTENSION: Primary | ICD-10-CM

## 2023-11-15 DIAGNOSIS — E78.2 MIXED HYPERLIPIDEMIA: ICD-10-CM

## 2023-11-15 DIAGNOSIS — I25.10 CORONARY ARTERY DISEASE INVOLVING NATIVE CORONARY ARTERY OF NATIVE HEART WITHOUT ANGINA PECTORIS: ICD-10-CM

## 2023-11-15 PROCEDURE — 3078F DIAST BP <80 MM HG: CPT | Performed by: INTERNAL MEDICINE

## 2023-11-15 PROCEDURE — 1090F PRES/ABSN URINE INCON ASSESS: CPT | Performed by: INTERNAL MEDICINE

## 2023-11-15 PROCEDURE — 3074F SYST BP LT 130 MM HG: CPT | Performed by: INTERNAL MEDICINE

## 2023-11-15 PROCEDURE — 99214 OFFICE O/P EST MOD 30 MIN: CPT | Performed by: INTERNAL MEDICINE

## 2023-11-15 PROCEDURE — 1036F TOBACCO NON-USER: CPT | Performed by: INTERNAL MEDICINE

## 2023-11-15 PROCEDURE — G8417 CALC BMI ABV UP PARAM F/U: HCPCS | Performed by: INTERNAL MEDICINE

## 2023-11-15 PROCEDURE — G8399 PT W/DXA RESULTS DOCUMENT: HCPCS | Performed by: INTERNAL MEDICINE

## 2023-11-15 PROCEDURE — 1123F ACP DISCUSS/DSCN MKR DOCD: CPT | Performed by: INTERNAL MEDICINE

## 2023-11-15 PROCEDURE — G8427 DOCREV CUR MEDS BY ELIG CLIN: HCPCS | Performed by: INTERNAL MEDICINE

## 2023-11-15 PROCEDURE — G8484 FLU IMMUNIZE NO ADMIN: HCPCS | Performed by: INTERNAL MEDICINE

## 2023-11-15 ASSESSMENT — ENCOUNTER SYMPTOMS
COUGH: 0
SHORTNESS OF BREATH: 0
BLOOD IN STOOL: 0
CONSTIPATION: 0
BACK PAIN: 0
WHEEZING: 0
ABDOMINAL DISTENTION: 0
VOMITING: 0
DIARRHEA: 0
EYE DISCHARGE: 0

## 2023-11-15 NOTE — PROGRESS NOTES
[facility]  Cardiology Office Note  [address] [place], [address]  [address]  Phone: [phone]  Fax: [phone]                            Date:  11/15/2023  Patient: Mancil Kussmaul  Age:  78 y.o., 1945    Referral: No ref.  provider found      PROBLEM LIST:    Patient Active Problem List    Diagnosis Date Noted    Abdominal pain 05/12/2021     Priority: Low    Change in bowel habits 05/12/2021     Priority: Low    Constipation 05/12/2021     Priority: Low    BRBPR (bright red blood per rectum) 05/12/2021     Priority: Low    Bloating 03/11/2020     Priority: Low    Myalgia due to statin 11/07/2019     Priority: Low    Urinary tract infection with hematuria 05/16/2019     Priority: Low    Overactive bladder 07/24/2017     Priority: Low    Mixed hyperlipidemia 04/18/2017     Priority: Low    H/O right coronary artery stent placement 04/18/2017     Priority: Low     Overview Note:     Stent placed in 2009 by Dr. Mariam Macias at [facility]      Periumbilical abdominal pain 07/21/2016     Priority: Low    Irritable bowel syndrome with diarrhea 06/10/2016     Priority: Low    Internal hemorrhoids 06/10/2016     Priority: Low    Allergic reaction 09/18/2015     Priority: Low    Precordial pain 09/08/2015     Priority: Low    Gout 01/30/2015     Priority: Low    Bleeding internal hemorrhoids 07/15/2014     Priority: Low    Gastroesophageal reflux disease without esophagitis 05/20/2014     Priority: Low    Internal hemorrhoids without complication 07/23/3036     Priority: Low    Spider veins 03/04/2013     Priority: Low    Carotid artery stenosis 11/08/2012     Priority: Low    Leg pain 11/08/2012     Priority: Low    Leg swelling 11/08/2012     Priority: Low    Chronic venous insufficiency 11/08/2012     Priority: Low    Varicose veins 11/08/2012     Priority: Low    Coronary artery disease involving native coronary artery of native heart with angina pectoris with documented spasm

## 2023-12-15 ENCOUNTER — NURSE TRIAGE (OUTPATIENT)
Dept: CALL CENTER | Facility: HOSPITAL | Age: 78
End: 2023-12-15
Payer: MEDICARE

## 2023-12-15 NOTE — TELEPHONE ENCOUNTER
"Reason for Disposition  • Can't stand (bear weight) or walk    Additional Information  • Negative: Serious injury with multiple fractures (broken bones)  • Negative: [1] Major bleeding (e.g., actively dripping or spurting) AND [2] can't be stopped  • Negative: Looks like a dislocated joint or knee cap (crooked or deformed)  • Negative: Bullet wound, stabbed by knife, or other serious penetrating wound  • Negative: Sounds like a life-threatening emergency to the triager  • Negative: Wound looks infected  • Negative: Knee pain from overuse (e.g., sports, running, physical work)  • Negative: Knee pain not from an injury  • Negative: Skin is split open or gaping (or length > 1/2 inch or 12 mm)  • Negative: [1] Bleeding AND [2] won't stop after 10 minutes of direct pressure (using correct technique)  • Negative: [1] Dirt in the wound AND [2] not removed with 15 minutes of scrubbing  • Negative: Sounds like a serious injury to the triager    Answer Assessment - Initial Assessment Questions  1. MECHANISM: \"How did the injury happen?\" (e.g., twisting injury, direct blow)       Twisted knee going down the steps.  2. ONSET: \"When did the injury happen?\" (Minutes or hours ago)       Wednesday  3. LOCATION: \"Where is the injury located?\"       Left knee  4. APPEARANCE of INJURY: \"What does the injury look like?\"       swollen  5. SEVERITY: \"Can you put weight on that leg?\" \"Can you walk?\"       Barely able to walk .  6. SIZE: For cuts, bruises, or swelling, ask: \"How large is it?\" (e.g., inches or centimeters;  entire joint)       Large amount  7. PAIN: \"Is there pain?\" If Yes, ask: \"How bad is the pain?\"  \"What does it keep you from doing?\" (e.g., Scale 1-10; or mild, moderate, severe)    -  NONE: (0): no pain    -  MILD (1-3): doesn't interfere with normal activities     -  MODERATE (4-7): interferes with normal activities (e.g., work or school) or awakens from sleep, limping     -  SEVERE (8-10): excruciating pain, unable to " "do any normal activities, unable to walk      Mod to severe when trying to bear weight.  8. TETANUS: For any breaks in the skin, ask: \"When was the last tetanus booster?\"      na  9. OTHER SYMPTOMS: \"Do you have any other symptoms?\"  (e.g., \"pop\" when knee injured, swelling, locking, buckling)       no  10. PREGNANCY: \"Is there any chance you are pregnant?\" \"When was your last menstrual period?\"        na    Protocols used: Knee Injury-ADULT-    "

## 2023-12-15 NOTE — TELEPHONE ENCOUNTER
Caller states that wife twisted knee 3 days ago going down the steps and she has swelling in the left leg going up to her left hip.  Caller states that she has arthritis and always has some swelling but it is much worse since she twisted her knee.  She is resisting going to the ED at this time.  He was calling for the office to see if she could be seen on Monday if she continues to refuse to go to the ED.

## 2023-12-18 ENCOUNTER — TRANSCRIBE ORDERS (OUTPATIENT)
Dept: ADMINISTRATIVE | Facility: HOSPITAL | Age: 78
End: 2023-12-18
Payer: MEDICARE

## 2023-12-18 ENCOUNTER — HOSPITAL ENCOUNTER (OUTPATIENT)
Dept: GENERAL RADIOLOGY | Facility: HOSPITAL | Age: 78
Discharge: HOME | End: 2023-12-18
Admitting: PHYSICIAN ASSISTANT
Payer: MEDICARE

## 2023-12-18 DIAGNOSIS — M25.562 LEFT KNEE PAIN, UNSPECIFIED CHRONICITY: ICD-10-CM

## 2023-12-18 DIAGNOSIS — M25.559 ARTHRALGIA OF HIP, UNSPECIFIED LATERALITY: ICD-10-CM

## 2023-12-18 DIAGNOSIS — M25.562 LEFT KNEE PAIN, UNSPECIFIED CHRONICITY: Primary | ICD-10-CM

## 2023-12-18 PROCEDURE — 73562 X-RAY EXAM OF KNEE 3: CPT

## 2023-12-18 PROCEDURE — 73502 X-RAY EXAM HIP UNI 2-3 VIEWS: CPT

## 2024-01-08 ENCOUNTER — TRANSCRIBE ORDERS (OUTPATIENT)
Dept: ADMINISTRATIVE | Facility: HOSPITAL | Age: 79
End: 2024-01-08
Payer: MEDICARE

## 2024-01-08 DIAGNOSIS — M25.562 LEFT KNEE PAIN, UNSPECIFIED CHRONICITY: Primary | ICD-10-CM

## 2024-01-24 ENCOUNTER — HOSPITAL ENCOUNTER (OUTPATIENT)
Dept: MRI IMAGING | Facility: HOSPITAL | Age: 79
Discharge: HOME | End: 2024-01-24
Admitting: PHYSICIAN ASSISTANT
Payer: MEDICARE

## 2024-01-24 DIAGNOSIS — M25.562 LEFT KNEE PAIN, UNSPECIFIED CHRONICITY: ICD-10-CM

## 2024-01-24 PROCEDURE — 73721 MRI JNT OF LWR EXTRE W/O DYE: CPT

## 2024-02-23 ENCOUNTER — HOSPITAL ENCOUNTER (OUTPATIENT)
Dept: MRI IMAGING | Age: 79
Discharge: HOME | End: 2024-02-23
Payer: MEDICARE

## 2024-02-23 DIAGNOSIS — M25.511 RIGHT SHOULDER PAIN, UNSPECIFIED CHRONICITY: ICD-10-CM

## 2024-02-23 PROCEDURE — 73221 MRI JOINT UPR EXTREM W/O DYE: CPT

## 2024-02-26 ENCOUNTER — TRANSCRIBE ORDERS (OUTPATIENT)
Dept: ADMINISTRATIVE | Facility: HOSPITAL | Age: 79
End: 2024-02-26
Payer: MEDICARE

## 2024-02-26 DIAGNOSIS — Z12.31 SCREENING MAMMOGRAM FOR BREAST CANCER: Primary | ICD-10-CM

## 2024-03-04 LAB
NCCN CRITERIA FLAG: NORMAL
TYRER CUZICK SCORE: 1.6

## 2024-03-07 ENCOUNTER — HOSPITAL ENCOUNTER (OUTPATIENT)
Dept: MAMMOGRAPHY | Facility: HOSPITAL | Age: 79
Discharge: HOME | End: 2024-03-07
Admitting: PHYSICIAN ASSISTANT
Payer: MEDICARE

## 2024-03-07 DIAGNOSIS — Z12.31 SCREENING MAMMOGRAM FOR BREAST CANCER: ICD-10-CM

## 2024-03-07 PROCEDURE — 77067 SCR MAMMO BI INCL CAD: CPT

## 2024-03-07 PROCEDURE — 77063 BREAST TOMOSYNTHESIS BI: CPT

## 2024-03-21 ENCOUNTER — TRANSCRIBE ORDERS (OUTPATIENT)
Dept: ADMINISTRATIVE | Age: 79
End: 2024-03-21

## 2024-03-21 DIAGNOSIS — M25.511 RIGHT SHOULDER PAIN, UNSPECIFIED CHRONICITY: ICD-10-CM

## 2024-03-21 DIAGNOSIS — M54.12 CERVICAL RADICULOPATHY: Primary | ICD-10-CM

## 2024-03-22 ENCOUNTER — TELEPHONE (OUTPATIENT)
Dept: NEUROSURGERY | Age: 79
End: 2024-03-22

## 2024-03-22 NOTE — TELEPHONE ENCOUNTER
1st attempt to contact patient. I left a voicemail instructing patient to call back at [phone] to schedule their new patient appointment     MRI SCHEDULED [facility]

## 2024-04-03 ENCOUNTER — HOSPITAL ENCOUNTER (OUTPATIENT)
Dept: MRI IMAGING | Age: 79
Discharge: HOME | End: 2024-04-03
Payer: MEDICARE

## 2024-04-03 DIAGNOSIS — M54.12 CERVICAL RADICULOPATHY: ICD-10-CM

## 2024-04-03 PROCEDURE — 72141 MRI NECK SPINE W/O DYE: CPT

## 2024-04-09 ENCOUNTER — HOSPITAL ENCOUNTER (OUTPATIENT)
Dept: NEUROLOGY | Age: 79
Discharge: HOME | End: 2024-04-09
Attending: ORTHOPAEDIC SURGERY
Payer: MEDICARE

## 2024-04-09 DIAGNOSIS — M25.511 RIGHT SHOULDER PAIN, UNSPECIFIED CHRONICITY: ICD-10-CM

## 2024-04-09 DIAGNOSIS — M54.12 CERVICAL RADICULOPATHY: ICD-10-CM

## 2024-04-09 PROCEDURE — 95911 NRV CNDJ TEST 9-10 STUDIES: CPT | Performed by: PSYCHIATRY & NEUROLOGY

## 2024-04-09 PROCEDURE — 95886 MUSC TEST DONE W/N TEST COMP: CPT | Performed by: PSYCHIATRY & NEUROLOGY

## 2024-04-09 PROCEDURE — 95911 NRV CNDJ TEST 9-10 STUDIES: CPT

## 2024-04-09 PROCEDURE — 95886 MUSC TEST DONE W/N TEST COMP: CPT

## 2024-04-09 NOTE — PROCEDURES
[place] [address] [place] [address]                             ELECTROMYOGRAM      PATIENT NAME: BELKIS NATARAJAN              : 1945  MED REC NO: [id number]                          ROOM:   ACCOUNT NO: [id number]                       ADMIT DATE: 2024  PROVIDER: June Hinson MD      DATE OF EM2024    REFERRING PHYSICIAN:  FREDRICK CONWAY    STUDY:  EMG nerve study, bilateral upper extremities.    INDICATION FOR TEST:  Hand pain and cramps.    SUMMARY:  Nerve conduction studies of the bilateral upper extremities show an absent left radial SNAP.  Two of the SNAPs have prolonged latencies and 3 have low amplitudes.  The bilateral median motor amplitudes are low.  The left ulnar motor study has a prolonged latency across the wrist.  Needle exam of the bilateral upper extremities is unremarkable.    IMPRESSION:  Findings are consistent with a generalized acquired sensorimotor polyneuropathy.  No sign of radiculopathy was noted.  Correlate clinically.          JUNE HINSON MD      D:  2024 10:56:53     T:  2024 11:43:57     RANDALL/CROW  Job #:  [id number]     Doc#:  [id number]

## 2024-06-04 ENCOUNTER — OFFICE VISIT (OUTPATIENT)
Dept: CARDIOLOGY CLINIC | Age: 79
End: 2024-06-04
Payer: MEDICARE

## 2024-06-04 VITALS
WEIGHT: 186 LBS | HEIGHT: 67 IN | BODY MASS INDEX: 29.19 KG/M2 | SYSTOLIC BLOOD PRESSURE: 138 MMHG | DIASTOLIC BLOOD PRESSURE: 68 MMHG

## 2024-06-04 DIAGNOSIS — R60.0 EDEMA OF BOTH LOWER EXTREMITIES: ICD-10-CM

## 2024-06-04 DIAGNOSIS — E78.2 MIXED HYPERLIPIDEMIA: Chronic | ICD-10-CM

## 2024-06-04 DIAGNOSIS — I25.111 CORONARY ARTERY DISEASE INVOLVING NATIVE CORONARY ARTERY OF NATIVE HEART WITH ANGINA PECTORIS WITH DOCUMENTED SPASM (HCC): Primary | Chronic | ICD-10-CM

## 2024-06-04 DIAGNOSIS — I87.2 CHRONIC VENOUS INSUFFICIENCY: Chronic | ICD-10-CM

## 2024-06-04 DIAGNOSIS — I10 ESSENTIAL HYPERTENSION: Chronic | ICD-10-CM

## 2024-06-04 PROCEDURE — 93000 ELECTROCARDIOGRAM COMPLETE: CPT | Performed by: CLINICAL NURSE SPECIALIST

## 2024-06-04 PROCEDURE — G8417 CALC BMI ABV UP PARAM F/U: HCPCS | Performed by: CLINICAL NURSE SPECIALIST

## 2024-06-04 PROCEDURE — 1123F ACP DISCUSS/DSCN MKR DOCD: CPT | Performed by: CLINICAL NURSE SPECIALIST

## 2024-06-04 PROCEDURE — G8399 PT W/DXA RESULTS DOCUMENT: HCPCS | Performed by: CLINICAL NURSE SPECIALIST

## 2024-06-04 PROCEDURE — 3075F SYST BP GE 130 - 139MM HG: CPT | Performed by: CLINICAL NURSE SPECIALIST

## 2024-06-04 PROCEDURE — 3078F DIAST BP <80 MM HG: CPT | Performed by: CLINICAL NURSE SPECIALIST

## 2024-06-04 PROCEDURE — 1036F TOBACCO NON-USER: CPT | Performed by: CLINICAL NURSE SPECIALIST

## 2024-06-04 PROCEDURE — 99214 OFFICE O/P EST MOD 30 MIN: CPT | Performed by: CLINICAL NURSE SPECIALIST

## 2024-06-04 PROCEDURE — 1090F PRES/ABSN URINE INCON ASSESS: CPT | Performed by: CLINICAL NURSE SPECIALIST

## 2024-06-04 PROCEDURE — G8427 DOCREV CUR MEDS BY ELIG CLIN: HCPCS | Performed by: CLINICAL NURSE SPECIALIST

## 2024-06-04 ASSESSMENT — ENCOUNTER SYMPTOMS
NAUSEA: 0
VOMITING: 0
FACIAL SWELLING: 0
EYE REDNESS: 0
COUGH: 0
SHORTNESS OF BREATH: 1
CHEST TIGHTNESS: 0
WHEEZING: 0
ABDOMINAL PAIN: 0

## 2024-06-04 NOTE — PROGRESS NOTES
present.      Mental Status: She is alert and oriented to person, place, and time.      Cranial Nerves: No cranial nerve deficit.   Psychiatric:         Mood and Affect: Mood normal.         Behavior: Behavior normal.         Judgment: Judgment normal.         Data:  Lab Results   Component Value Date/Time    WBC 7.3 06/23/2023 06:10 PM    RBC 4.17 06/23/2023 06:10 PM    HGB 13.7 06/23/2023 06:10 PM    HCT 41.2 06/23/2023 06:10 PM     06/23/2023 06:10 PM      Lab Results   Component Value Date/Time    CHOL 294 07/24/2018 05:30 AM    CHOL 296 07/24/2018 05:30 AM    TRIG 259 07/24/2018 05:30 AM    TRIG 291 07/24/2018 05:30 AM    HDL 46 07/24/2018 05:30 AM    HDL 49 07/24/2018 05:30 AM     Lab Results   Component Value Date/Time     06/23/2023 06:10 PM    K 3.8 06/23/2023 06:10 PM    K 3.8 07/04/2022 02:30 PM     06/23/2023 06:10 PM    CO2 30 06/23/2023 06:10 PM    GLUCOSE 107 06/23/2023 06:10 PM    BUN 16 06/23/2023 06:10 PM    CREATININE 0.7 06/23/2023 06:10 PM    CALCIUM 10.2 06/23/2023 06:10 PM    ALT 31 06/23/2023 06:10 PM    AST 27 06/23/2023 06:10 PM     Lab Results   Component Value Date/Time    TSH 1.620 07/23/2018 06:35 PM     DSE 11/9/22  Conclusions      Summary   Dobutamine stress echocardiogram without clinical, electrocardiographic,   or echocardiographic evidence of myocardial ischemia. Atypical chest   discomfort with dobutamine unlike presenting symptoms.      Signature      ----------------------------------------------------------------   Electronically signed by Berry Lee MD(Interpreting physician)   on 11/15/2022 04:27 PM   ----------------------------------------------------------------    EKG shows sinus rhythm rate 87    Assessment:     Diagnosis Orders   1. Coronary artery disease involving native coronary artery of native heart with angina pectoris with documented spasm (HCC)        2. Essential hypertension  EKG 12 lead      3. Mixed hyperlipidemia        4. Chronic venous

## 2024-06-05 ENCOUNTER — OFFICE VISIT (OUTPATIENT)
Dept: UROLOGY | Age: 79
End: 2024-06-05
Payer: MEDICARE

## 2024-06-05 VITALS — HEIGHT: 67 IN | TEMPERATURE: 97.3 F | WEIGHT: 183 LBS | BODY MASS INDEX: 28.72 KG/M2

## 2024-06-05 DIAGNOSIS — R10.9 FLANK PAIN: ICD-10-CM

## 2024-06-05 DIAGNOSIS — R10.2 PELVIC PRESSURE IN FEMALE: ICD-10-CM

## 2024-06-05 DIAGNOSIS — N32.81 OAB (OVERACTIVE BLADDER): Primary | ICD-10-CM

## 2024-06-05 DIAGNOSIS — R30.0 DYSURIA: ICD-10-CM

## 2024-06-05 LAB
BACTERIA URINE, POC: NORMAL
BILIRUBIN URINE: 0 MG/DL
BLOOD, URINE: NEGATIVE
CASTS URINE, POC: NORMAL
CLARITY: CLEAR
COLOR: YELLOW
CRYSTALS URINE, POC: NORMAL
EPI CELLS URINE, POC: NORMAL
GLUCOSE URINE: NORMAL
KETONES, URINE: NEGATIVE
LEUKOCYTE EST, POC: NORMAL
NITRITE, URINE: NEGATIVE
PH UA: 7 (ref 4.5–8)
PROTEIN UA: NEGATIVE
RBC URINE, POC: NORMAL
SPECIFIC GRAVITY UA: 1.01 (ref 1–1.03)
UROBILINOGEN, URINE: NORMAL
WBC URINE, POC: NORMAL
YEAST URINE, POC: NORMAL

## 2024-06-05 PROCEDURE — G8399 PT W/DXA RESULTS DOCUMENT: HCPCS | Performed by: NURSE PRACTITIONER

## 2024-06-05 PROCEDURE — 1090F PRES/ABSN URINE INCON ASSESS: CPT | Performed by: NURSE PRACTITIONER

## 2024-06-05 PROCEDURE — 81001 URINALYSIS AUTO W/SCOPE: CPT | Performed by: NURSE PRACTITIONER

## 2024-06-05 PROCEDURE — G8417 CALC BMI ABV UP PARAM F/U: HCPCS | Performed by: NURSE PRACTITIONER

## 2024-06-05 PROCEDURE — 1036F TOBACCO NON-USER: CPT | Performed by: NURSE PRACTITIONER

## 2024-06-05 PROCEDURE — G8427 DOCREV CUR MEDS BY ELIG CLIN: HCPCS | Performed by: NURSE PRACTITIONER

## 2024-06-05 PROCEDURE — 1123F ACP DISCUSS/DSCN MKR DOCD: CPT | Performed by: NURSE PRACTITIONER

## 2024-06-05 PROCEDURE — 99214 OFFICE O/P EST MOD 30 MIN: CPT | Performed by: NURSE PRACTITIONER

## 2024-06-05 ASSESSMENT — ENCOUNTER SYMPTOMS
BACK PAIN: 0
ABDOMINAL PAIN: 0
ABDOMINAL DISTENTION: 0
NAUSEA: 0
VOMITING: 0

## 2024-06-05 NOTE — PROGRESS NOTES
CVA tenderness.   Genitourinary:     Comments: Vaginal atrophy present, cystocele grade 2  Musculoskeletal:      Right lower leg: Edema present.      Left lower leg: Edema present.   Neurological:      Mental Status: She is alert and oriented to person, place, and time. Mental status is at baseline.   Psychiatric:         Mood and Affect: Mood normal.         Behavior: Behavior normal.       DATA:    Results for orders placed or performed in visit on 06/05/24   Culture, Urine    Specimen: Urine, straight catheter   Result Value Ref Range    Urine Culture, Routine No growth    POCT Urinalysis Dipstick w/ Micro (Auto)   Result Value Ref Range    Color, UA Yellow     Clarity, UA Clear Clear    Glucose, Ur -     Bilirubin Urine 0 mg/dL    Ketones, Urine Negative     Specific Gravity, UA 1.015 1.005 - 1.030    Blood, Urine Negative     pH, UA 7 4.5 - 8.0    Protein, UA Negative Negative    Nitrite, Urine Negative     Leukocytes, UA -     Urobilinogen, Urine 0.2 mg/dL     RBC Urine, POC -     WBC Urine, POC -     Bacteria Urine, POC -     yeast urine, poc -     Casts Urine, POC -     Epi Cells Urine, POC -     crystals urine, poc -            1. OAB (overactive bladder)  Patient does have some mild OAB at baseline although this is usually controlled.  Is not on any current medications.  She has tried Gemtesa in the past although it was too expensive.  Voids adequate amounts no incontinence    - Culture, Urine  - POCT Urinalysis Dipstick w/ Micro (Auto)    2. Dysuria  Acute onset of dysuria over the last 3 weeks and pelvic pressure feels she may have a UTI we will go ahead and catheter today and treat accordingly.  If this is negative may benefit from vaginal estrogen.    - Culture, Urine    3. Flank pain  Patient with bilateral flank pain.  No history of kidney stones no microscopic hematuria.  This is bilateral I do think this is secondary to her recent fall.  Follow-up with her PCP    - Culture, Urine    4. Pelvic

## 2024-06-07 LAB — BACTERIA UR CULT: NORMAL

## 2024-06-10 ENCOUNTER — TELEPHONE (OUTPATIENT)
Dept: UROLOGY | Age: 79
End: 2024-06-10

## 2024-06-10 NOTE — TELEPHONE ENCOUNTER
Patient  contacted office for results over Urine Culture. They were made aware that urine culture is negative, no antibiotics needed and that the provider stated she can start her on vaginal estrogen if she wants to try that.    I let them know the results and they stated they would hold off on the vaginal estrogen for now and if she continues to have issues, they will give us a call and schedule a follow up. At that time, she will try the vaginal estrogen.

## 2024-06-19 ENCOUNTER — TELEPHONE (OUTPATIENT)
Dept: CARDIOLOGY CLINIC | Age: 79
End: 2024-06-19

## 2024-06-19 ENCOUNTER — OFFICE VISIT (OUTPATIENT)
Dept: CARDIOLOGY CLINIC | Age: 79
End: 2024-06-19
Payer: MEDICARE

## 2024-06-19 VITALS
OXYGEN SATURATION: 98 % | HEART RATE: 63 BPM | HEIGHT: 67 IN | DIASTOLIC BLOOD PRESSURE: 82 MMHG | SYSTOLIC BLOOD PRESSURE: 142 MMHG | BODY MASS INDEX: 27.47 KG/M2 | WEIGHT: 175 LBS

## 2024-06-19 DIAGNOSIS — R60.0 EDEMA OF BOTH LOWER EXTREMITIES: ICD-10-CM

## 2024-06-19 DIAGNOSIS — I87.2 VENOUS INSUFFICIENCY: ICD-10-CM

## 2024-06-19 DIAGNOSIS — M35.3 POLYMYALGIA RHEUMATICA (HCC): ICD-10-CM

## 2024-06-19 DIAGNOSIS — I10 PRIMARY HYPERTENSION: Primary | ICD-10-CM

## 2024-06-19 PROCEDURE — 3079F DIAST BP 80-89 MM HG: CPT | Performed by: CLINICAL NURSE SPECIALIST

## 2024-06-19 PROCEDURE — 1123F ACP DISCUSS/DSCN MKR DOCD: CPT | Performed by: CLINICAL NURSE SPECIALIST

## 2024-06-19 PROCEDURE — 99213 OFFICE O/P EST LOW 20 MIN: CPT | Performed by: CLINICAL NURSE SPECIALIST

## 2024-06-19 PROCEDURE — G8417 CALC BMI ABV UP PARAM F/U: HCPCS | Performed by: CLINICAL NURSE SPECIALIST

## 2024-06-19 PROCEDURE — G8427 DOCREV CUR MEDS BY ELIG CLIN: HCPCS | Performed by: CLINICAL NURSE SPECIALIST

## 2024-06-19 PROCEDURE — G8399 PT W/DXA RESULTS DOCUMENT: HCPCS | Performed by: CLINICAL NURSE SPECIALIST

## 2024-06-19 PROCEDURE — 3077F SYST BP >= 140 MM HG: CPT | Performed by: CLINICAL NURSE SPECIALIST

## 2024-06-19 PROCEDURE — 1090F PRES/ABSN URINE INCON ASSESS: CPT | Performed by: CLINICAL NURSE SPECIALIST

## 2024-06-19 PROCEDURE — 1036F TOBACCO NON-USER: CPT | Performed by: CLINICAL NURSE SPECIALIST

## 2024-06-19 ASSESSMENT — ENCOUNTER SYMPTOMS
CHEST TIGHTNESS: 0
FACIAL SWELLING: 0
WHEEZING: 0
ABDOMINAL PAIN: 0
VOMITING: 0
COUGH: 0
SHORTNESS OF BREATH: 1
NAUSEA: 0
EYE REDNESS: 0

## 2024-06-19 NOTE — TELEPHONE ENCOUNTER
Makenzie Gardner/Dalton left message on nurse line that patient is requesting a call back requesting appt for bp issues going up and down. PCP advised she call cardio for same day appt. She was offered an appt tomorrow with pre-service but patient has an eye appt. Called number provided [phone] and had to leave message. Advised to call back to discuss further

## 2024-06-19 NOTE — PROGRESS NOTES
[facility], [facility]  [address] [facility], [place] [address]  Phone: [phone]  Fax: [phone]    OFFICE VISIT:  2024    Stephanie Coleman - : 1945    Reason For Visit:  Stephanie is a 79 y.o. female who is here for Follow-up (Patient states she is having issues with low BP and swelling) and Coronary artery disease involving native coronary artery of       Diagnosis Orders   1. Primary hypertension        2. Polymyalgia rheumatica (HCC)        3. Edema of both lower extremities        4. Venous insufficiency                  HPI  1. Coronary artery disease, prior ostial RCA stent with obstructive left main disease by catheterization 9/10/2020, status post 2 vessel CABG 2020 with LIMA to LAD and SVG to OM, normal LV ejection fraction.  Negative DSE   2. Hypertension.  3. Rheumatoid arthritis.  4.  Hyperlipidemia  5.  History of Covid in 2021  6.  Carotid Stenosis  7.  Polymyalgia Rheumatica    Patient returns for follow-up.  She had a flareup of her polymyalgia rheumatica last week, legs were swollen, significant back pain.  She had to take a round of steroids.  At her follow-up appointment at PCP office, she was told her blood pressure was \"low.\"  They are unsure really how low it was.  She was told to decrease her metoprolol to just once a day.  When she did this, she awakened the next morning with her blood pressure being somewhat elevated.  She is concerned and unsure about what to do.    She reports Dr. Eastman will be doing a vein procedure on her legs to help with swelling    Sandoval Ludwig MD is PCP.  Stephanie Coleman has the following history as recorded in [facility]:    Patient Active Problem List    Diagnosis Date Noted    Cervical radiculopathy 2024    Abdominal pain 2021    Change in bowel habits 2021    Constipation 2021    BRBPR (bright red blood per rectum) 2021    Bloating 2020    Myalgia due to statin

## 2024-07-14 ENCOUNTER — APPOINTMENT (OUTPATIENT)
Dept: CT IMAGING | Age: 79
End: 2024-07-14
Payer: MEDICARE

## 2024-07-14 ENCOUNTER — HOSPITAL ENCOUNTER (EMERGENCY)
Age: 79
Discharge: HOME | End: 2024-07-14
Attending: EMERGENCY MEDICINE
Payer: MEDICARE

## 2024-07-14 ENCOUNTER — APPOINTMENT (OUTPATIENT)
Dept: GENERAL RADIOLOGY | Age: 79
End: 2024-07-14
Payer: MEDICARE

## 2024-07-14 VITALS
RESPIRATION RATE: 16 BRPM | TEMPERATURE: 98 F | DIASTOLIC BLOOD PRESSURE: 54 MMHG | OXYGEN SATURATION: 92 % | HEART RATE: 82 BPM | SYSTOLIC BLOOD PRESSURE: 117 MMHG | BODY MASS INDEX: 26.63 KG/M2 | WEIGHT: 170 LBS

## 2024-07-14 DIAGNOSIS — S09.90XA CLOSED HEAD INJURY, INITIAL ENCOUNTER: ICD-10-CM

## 2024-07-14 DIAGNOSIS — S16.1XXA ACUTE STRAIN OF NECK MUSCLE, INITIAL ENCOUNTER: ICD-10-CM
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
## 2024-07-14 DIAGNOSIS — M54.50 ACUTE BILATERAL LOW BACK PAIN WITHOUT SCIATICA: ICD-10-CM

## 2024-07-14 DIAGNOSIS — S70.01XA CONTUSION OF RIGHT HIP, INITIAL ENCOUNTER: ICD-10-CM

## 2024-07-14 PROCEDURE — 72128 CT CHEST SPINE W/O DYE: CPT

## 2024-07-14 PROCEDURE — 99284 EMERGENCY DEPT VISIT MOD MDM: CPT

## 2024-07-14 PROCEDURE — 73502 X-RAY EXAM HIP UNI 2-3 VIEWS: CPT

## 2024-07-14 PROCEDURE — 72192 CT PELVIS W/O DYE: CPT

## 2024-07-14 PROCEDURE — 71045 X-RAY EXAM CHEST 1 VIEW: CPT

## 2024-07-14 PROCEDURE — 70450 CT HEAD/BRAIN W/O DYE: CPT

## 2024-07-14 PROCEDURE — 72131 CT LUMBAR SPINE W/O DYE: CPT

## 2024-07-14 PROCEDURE — 72125 CT NECK SPINE W/O DYE: CPT

## 2024-07-14 RX ORDER — ONDANSETRON 4 MG/1
4 TABLET, ORALLY DISINTEGRATING ORAL ONCE
Status: DISCONTINUED | OUTPATIENT
Start: 2024-07-14 | End: 2024-07-14 | Stop reason: HOSPADM

## 2024-07-14 RX ORDER — OXYCODONE HYDROCHLORIDE 5 MG/1
5 TABLET ORAL ONCE
Status: DISCONTINUED | OUTPATIENT
Start: 2024-07-14 | End: 2024-07-14

## 2024-07-14 NOTE — ED PROVIDER NOTES
patient.    VARICOSE VEIN SURGERY   01- SJS    Left Leg Ablation    VARICOSE VEIN SURGERY  3-  SJS    right leg ablation         CURRENT MEDICATIONS       Previous Medications    ALLOPURINOL (ZYLOPRIM) 300 MG TABLET    Take 1 tablet by mouth daily    ASPIRIN 81 MG EC TABLET    Take 1 tablet by mouth daily    EVOLOCUMAB 140 MG/ML SOSY    Inject 1 mL into the skin every 14 days Patient has not started    METOPROLOL SUCCINATE (TOPROL XL) 50 MG EXTENDED RELEASE TABLET    TAKE 1 TABLET TWICE DAILY    MULTIPLE VITAMIN (MULTIVITAMIN PO)    Take 1 tablet by mouth daily     OLMESARTAN (BENICAR) 20 MG TABLET    Take 1 tablet by mouth nightly    OMEGA-3 FATTY ACIDS (FISH OIL) 1360 MG CAPS    Take 1 capsule by mouth 2 times daily     TURMERIC 500 MG CAPS    Take by mouth 2 times daily    VITAMIN D (CHOLECALCIFEROL) 1000 UNIT TABS TABLET    1 tablet 2 times daily    ZINC PO    Take by mouth daily       ALLERGIES     Demerol, Fenofibrate, Nitroglycerin, Reclast [zoledronic acid], Abatacept, Ipratropium bromide hfa, Levofloxacin, Acetaminophen, Buspirone hcl, Colchicine, Hydrocodone, Maltose, Other, Robaxin [methocarbamol], Buspirone, Celexa [citalopram hydrobromide], Cozaar [losartan potassium], Dye [barium-containing compounds], Dye [iodides], Esomeprazole magnesium, Evolocumab, Hydrocodone-acetaminophen, Lasix [furosemide], Lisinopril, Losartan, Methotrexate, Mirtazapine, Neurontin [gabapentin], Plaquenil [hydroxychloroquine sulfate], Prilosec [omeprazole], and Trazodone    FAMILY HISTORY       Family History   Problem Relation Age of Onset    Diabetes Mother     Heart Disease Mother     Heart Disease Father     Diabetes Father     High Blood Pressure Father     Esophageal Cancer Father     Colon Polyps Father     Crohn's Disease Brother     Colon Cancer Neg Hx     Liver Cancer Neg Hx     Liver Disease Neg Hx     Rectal Cancer Neg Hx     Stomach Cancer Neg Hx           SOCIAL HISTORY       Social History

## 2024-07-15 NOTE — DISCHARGE INSTR - COC
Continuity of Care Form    Patient Name: Stephanie Coleman   :  1945  MRN:  [id number]    Admit date:  2024  Discharge date:  ***    Code Status Order: Prior   Advance Directives:     Admitting Physician:  No admitting provider for patient encounter.  PCP: Sandoval Ludwig MD    Discharging Nurse: ***  Discharging Hospital Unit/Room#:   Discharging Unit Phone Number: ***    Emergency Contact:   Extended Emergency Contact Information  Primary Emergency Contact: JaredLyle  Address: [address]  Home Phone: [phone]  Relation: Spouse  Secondary Emergency Contact: Tika Lee  [facility]  Home Phone: [phone]  Relation: Child    Past Surgical History:  Past Surgical History:   Procedure Laterality Date    APPENDECTOMY      BREAST SURGERY      CARDIAC CATHETERIZATION  9/10/15  JDT    EF 50%    CARDIAC CATHETERIZATION  09/10/2020    LM disease- sent for bypass     CERVICAL FUSION       SECTION  1968    x 1    CHOLECYSTECTOMY  2012    [place]    COLONOSCOPY  01/10/2012    Dr LUJAN    COLONOSCOPY  2014    Dr. Hooper    COLONOSCOPY  2018    Dr. Zamora in [place], [place]- Polyps-benign,internal Hemorrhoids, per patient.    COLONOSCOPY N/A 2021    Dr Lowry, Int hemorrhoids Grade 2 otherwise a NORMAL exam, no recall due to age    CORONARY ANGIOPLASTY WITH STENT PLACEMENT      Dr LUJAN    CORONARY ARTERY BYPASS GRAFT N/A 2020    CORONARY ARTERY BYPASS GRAFT X2 WITH LEFT INTERNAL MAMMARY ARTERY WITH OPEN VEIN HARVESTING WITH PERFUSION TRANSESOPHAGEAL ECHOCARDIOGRAM performed by Ramon Curry MD at [facility] OR    CYST REMOVAL      DILATION AND CURETTAGE OF UTERUS      HYSTERECTOMY (CERVIX STATUS UNKNOWN)  1976    partial    KNEE SURGERY  2011    left knee surgery    PTCA      stent    TUBAL LIGATION      UPPER GASTROINTESTINAL ENDOSCOPY  ?    UPPER GASTROINTESTINAL ENDOSCOPY

## 2024-07-26 ENCOUNTER — APPOINTMENT (OUTPATIENT)
Dept: CT IMAGING | Age: 79
End: 2024-07-26
Payer: MEDICARE

## 2024-07-26 ENCOUNTER — HOSPITAL ENCOUNTER (INPATIENT)
Age: 79
LOS: 6 days | Discharge: NURSING, REHAB OR LONG-TERM CARE | End: 2024-08-01
Attending: EMERGENCY MEDICINE | Admitting: INTERNAL MEDICINE
Payer: MEDICARE

## 2024-07-26 DIAGNOSIS — N17.9 AKI (ACUTE KIDNEY INJURY) (HCC): Primary | ICD-10-CM

## 2024-07-26 DIAGNOSIS — R26.2 AMBULATORY DYSFUNCTION: ICD-10-CM

## 2024-07-26 DIAGNOSIS — E83.52 HYPERCALCEMIA: ICD-10-CM

## 2024-07-26 PROBLEM — N18.2 STAGE 2 CHRONIC KIDNEY DISEASE: Status: ACTIVE | Noted: 2023-05-22

## 2024-07-26 PROBLEM — M1A.09X0 IDIOPATHIC CHRONIC GOUT OF MULTIPLE SITES WITHOUT TOPHUS: Status: ACTIVE | Noted: 2024-02-20

## 2024-07-26 PROBLEM — M79.7 FIBROMYALGIA: Status: ACTIVE | Noted: 2023-05-22

## 2024-07-26 PROBLEM — M15.0 PRIMARY GENERALIZED (OSTEO)ARTHRITIS: Status: ACTIVE | Noted: 2024-07-26

## 2024-07-26 PROBLEM — M81.0 OSTEOPOROSIS: Status: ACTIVE | Noted: 2023-05-22

## 2024-07-26 PROBLEM — I73.9 PERIPHERAL VASCULAR DISEASE (HCC): Status: ACTIVE | Noted: 2023-05-22

## 2024-07-26 PROBLEM — M06.9 RHEUMATOID ARTHRITIS (HCC): Status: ACTIVE | Noted: 2023-05-22

## 2024-07-26 LAB
25(OH)D3 SERPL-MCNC: 50.5 NG/ML
ALBUMIN SERPL-MCNC: 3.9 G/DL (ref 3.5–5.2)
ALP SERPL-CCNC: 61 U/L (ref 35–104)
ALT SERPL-CCNC: 16 U/L (ref 5–33)
ANION GAP SERPL CALCULATED.3IONS-SCNC: 11 MMOL/L (ref 7–19)
ANION GAP SERPL CALCULATED.3IONS-SCNC: 12 MMOL/L (ref 7–19)
AST SERPL-CCNC: 20 U/L (ref 5–32)
BASOPHILS # BLD: 0.1 K/UL (ref 0–0.2)
BASOPHILS NFR BLD: 0.7 % (ref 0–1)
BILIRUB SERPL-MCNC: 0.3 MG/DL (ref 0.2–1.2)
BILIRUB UR QL STRIP: NEGATIVE
BUN SERPL-MCNC: 53 MG/DL (ref 8–23)
BUN SERPL-MCNC: 53 MG/DL (ref 8–23)
CA-I BLD-SCNC: 2.14 MMOL/L (ref 1.12–1.32)
CALCIUM SERPL-MCNC: 15.5 MG/DL (ref 8.8–10.2)
CALCIUM SERPL-MCNC: 16.7 MG/DL (ref 8.8–10.2)
CHLORIDE SERPL-SCNC: 101 MMOL/L (ref 98–111)
CHLORIDE SERPL-SCNC: 96 MMOL/L (ref 98–111)
CLARITY UR: CLEAR
CO2 SERPL-SCNC: 28 MMOL/L (ref 22–29)
CO2 SERPL-SCNC: 31 MMOL/L (ref 22–29)
COLOR UR: YELLOW
CREAT SERPL-MCNC: 1.4 MG/DL (ref 0.5–0.9)
CREAT SERPL-MCNC: 1.4 MG/DL (ref 0.5–0.9)
EOSINOPHIL # BLD: 0.1 K/UL (ref 0–0.6)
EOSINOPHIL NFR BLD: 1.8 % (ref 0–5)
ERYTHROCYTE [DISTWIDTH] IN BLOOD BY AUTOMATED COUNT: 13.8 % (ref 11.5–14.5)
GLUCOSE SERPL-MCNC: 109 MG/DL (ref 74–109)
GLUCOSE SERPL-MCNC: 116 MG/DL (ref 74–109)
GLUCOSE UR STRIP.AUTO-MCNC: NEGATIVE MG/DL
HCT VFR BLD AUTO: 41.3 % (ref 37–47)
HGB BLD-MCNC: 13.6 G/DL (ref 12–16)
HGB UR STRIP.AUTO-MCNC: NEGATIVE MG/L
IMM GRANULOCYTES # BLD: 0 K/UL
KETONES UR STRIP.AUTO-MCNC: NEGATIVE MG/DL
LEUKOCYTE ESTERASE UR QL STRIP.AUTO: NEGATIVE
LYMPHOCYTES # BLD: 2.1 K/UL (ref 1.1–4.5)
LYMPHOCYTES NFR BLD: 28.6 % (ref 20–40)
MAGNESIUM SERPL-MCNC: 2 MG/DL (ref 1.6–2.4)
MCH RBC QN AUTO: 31.6 PG (ref 27–31)
MCHC RBC AUTO-ENTMCNC: 32.9 G/DL (ref 33–37)
MCV RBC AUTO: 96 FL (ref 81–99)
MONOCYTES # BLD: 0.7 K/UL (ref 0–0.9)
MONOCYTES NFR BLD: 10.1 % (ref 0–10)
NEUTROPHILS # BLD: 4.2 K/UL (ref 1.5–7.5)
NEUTS SEG NFR BLD: 58.4 % (ref 50–65)
NITRITE UR QL STRIP.AUTO: NEGATIVE
PH UR STRIP.AUTO: 5.5 [PH] (ref 5–8)
PHOSPHATE SERPL-MCNC: 3.4 MG/DL (ref 2.5–4.5)
PLATELET # BLD AUTO: 213 K/UL (ref 130–400)
PMV BLD AUTO: 9 FL (ref 9.4–12.3)
POTASSIUM SERPL-SCNC: 3.3 MMOL/L (ref 3.5–5)
POTASSIUM SERPL-SCNC: 3.5 MMOL/L (ref 3.5–5)
PROT SERPL-MCNC: 6.7 G/DL (ref 6.6–8.7)
PROT UR STRIP.AUTO-MCNC: NEGATIVE MG/DL
PTH-INTACT SERPL-MCNC: 10.2 PG/ML (ref 15–65)
RBC # BLD AUTO: 4.3 M/UL (ref 4.2–5.4)
SODIUM SERPL-SCNC: 138 MMOL/L (ref 136–145)
SODIUM SERPL-SCNC: 141 MMOL/L (ref 136–145)
SP GR UR STRIP.AUTO: 1.01 (ref 1–1.03)
TROPONIN, HIGH SENSITIVITY: 30 NG/L (ref 0–14)
TROPONIN, HIGH SENSITIVITY: 32 NG/L (ref 0–14)
UROBILINOGEN UR STRIP.AUTO-MCNC: 0.2 E.U./DL
WBC # BLD AUTO: 7.2 K/UL (ref 4.8–10.8)

## 2024-07-26 PROCEDURE — 85025 COMPLETE CBC W/AUTO DIFF WBC: CPT

## 2024-07-26 PROCEDURE — 2580000003 HC RX 258: Performed by: EMERGENCY MEDICINE

## 2024-07-26 PROCEDURE — 82306 VITAMIN D 25 HYDROXY: CPT

## 2024-07-26 PROCEDURE — 84484 ASSAY OF TROPONIN QUANT: CPT

## 2024-07-26 PROCEDURE — 2580000003 HC RX 258: Performed by: INTERNAL MEDICINE

## 2024-07-26 PROCEDURE — 6360000002 HC RX W HCPCS

## 2024-07-26 PROCEDURE — 81003 URINALYSIS AUTO W/O SCOPE: CPT

## 2024-07-26 PROCEDURE — 80053 COMPREHEN METABOLIC PANEL: CPT

## 2024-07-26 PROCEDURE — 6370000000 HC RX 637 (ALT 250 FOR IP)

## 2024-07-26 PROCEDURE — 36415 COLL VENOUS BLD VENIPUNCTURE: CPT

## 2024-07-26 PROCEDURE — 6360000002 HC RX W HCPCS: Performed by: INTERNAL MEDICINE

## 2024-07-26 PROCEDURE — 1200000000 HC SEMI PRIVATE

## 2024-07-26 PROCEDURE — 70450 CT HEAD/BRAIN W/O DYE: CPT

## 2024-07-26 PROCEDURE — 83970 ASSAY OF PARATHORMONE: CPT

## 2024-07-26 PROCEDURE — 93005 ELECTROCARDIOGRAM TRACING: CPT | Performed by: EMERGENCY MEDICINE

## 2024-07-26 PROCEDURE — 99285 EMERGENCY DEPT VISIT HI MDM: CPT

## 2024-07-26 PROCEDURE — 82542 COL CHROMOTOGRAPHY QUAL/QUAN: CPT

## 2024-07-26 PROCEDURE — 84100 ASSAY OF PHOSPHORUS: CPT

## 2024-07-26 PROCEDURE — 83735 ASSAY OF MAGNESIUM: CPT

## 2024-07-26 PROCEDURE — 82652 VIT D 1 25-DIHYDROXY: CPT

## 2024-07-26 PROCEDURE — 82330 ASSAY OF CALCIUM: CPT

## 2024-07-26 PROCEDURE — P9612 CATHETERIZE FOR URINE SPEC: HCPCS

## 2024-07-26 RX ORDER — POTASSIUM CHLORIDE 7.45 MG/ML
10 INJECTION INTRAVENOUS PRN
Status: DISCONTINUED | OUTPATIENT
Start: 2024-07-26 | End: 2024-08-01 | Stop reason: HOSPADM

## 2024-07-26 RX ORDER — SODIUM CHLORIDE 0.9 % (FLUSH) 0.9 %
5-40 SYRINGE (ML) INJECTION EVERY 12 HOURS SCHEDULED
Status: DISCONTINUED | OUTPATIENT
Start: 2024-07-26 | End: 2024-07-31 | Stop reason: SDUPTHER

## 2024-07-26 RX ORDER — ASPIRIN 81 MG/1
81 TABLET ORAL DAILY
Status: DISCONTINUED | OUTPATIENT
Start: 2024-07-26 | End: 2024-08-01 | Stop reason: HOSPADM

## 2024-07-26 RX ORDER — ENOXAPARIN SODIUM 100 MG/ML
40 INJECTION SUBCUTANEOUS DAILY
Status: DISCONTINUED | OUTPATIENT
Start: 2024-07-26 | End: 2024-07-28 | Stop reason: DRUGHIGH

## 2024-07-26 RX ORDER — CALCITONIN SALMON 200 [USP'U]/ML
200 INJECTION, SOLUTION INTRAMUSCULAR; SUBCUTANEOUS EVERY 12 HOURS
Status: COMPLETED | OUTPATIENT
Start: 2024-07-26 | End: 2024-07-27

## 2024-07-26 RX ORDER — ONDANSETRON 4 MG/1
4 TABLET, ORALLY DISINTEGRATING ORAL EVERY 8 HOURS PRN
Status: DISCONTINUED | OUTPATIENT
Start: 2024-07-26 | End: 2024-08-01 | Stop reason: HOSPADM

## 2024-07-26 RX ORDER — 0.9 % SODIUM CHLORIDE 0.9 %
1000 INTRAVENOUS SOLUTION INTRAVENOUS ONCE
Status: COMPLETED | OUTPATIENT
Start: 2024-07-26 | End: 2024-07-26

## 2024-07-26 RX ORDER — POLYETHYLENE GLYCOL 3350 17 G/17G
17 POWDER, FOR SOLUTION ORAL DAILY PRN
Status: DISCONTINUED | OUTPATIENT
Start: 2024-07-26 | End: 2024-08-01 | Stop reason: HOSPADM

## 2024-07-26 RX ORDER — SODIUM CHLORIDE 9 MG/ML
INJECTION, SOLUTION INTRAVENOUS CONTINUOUS
Status: DISCONTINUED | OUTPATIENT
Start: 2024-07-26 | End: 2024-07-26

## 2024-07-26 RX ORDER — 0.9 % SODIUM CHLORIDE 0.9 %
1000 INTRAVENOUS SOLUTION INTRAVENOUS ONCE
Status: DISCONTINUED | OUTPATIENT
Start: 2024-07-26 | End: 2024-08-01 | Stop reason: HOSPADM

## 2024-07-26 RX ORDER — POTASSIUM CHLORIDE 20 MEQ/1
40 TABLET, EXTENDED RELEASE ORAL PRN
Status: DISCONTINUED | OUTPATIENT
Start: 2024-07-26 | End: 2024-08-01 | Stop reason: HOSPADM

## 2024-07-26 RX ORDER — SODIUM CHLORIDE 0.9 % (FLUSH) 0.9 %
5-40 SYRINGE (ML) INJECTION PRN
Status: DISCONTINUED | OUTPATIENT
Start: 2024-07-26 | End: 2024-07-31 | Stop reason: SDUPTHER

## 2024-07-26 RX ORDER — SODIUM CHLORIDE 9 MG/ML
INJECTION, SOLUTION INTRAVENOUS PRN
Status: DISCONTINUED | OUTPATIENT
Start: 2024-07-26 | End: 2024-07-31 | Stop reason: SDUPTHER

## 2024-07-26 RX ORDER — MAGNESIUM SULFATE IN WATER 40 MG/ML
2000 INJECTION, SOLUTION INTRAVENOUS PRN
Status: DISCONTINUED | OUTPATIENT
Start: 2024-07-26 | End: 2024-08-01

## 2024-07-26 RX ORDER — SODIUM CHLORIDE 9 MG/ML
INJECTION, SOLUTION INTRAVENOUS CONTINUOUS
Status: DISCONTINUED | OUTPATIENT
Start: 2024-07-26 | End: 2024-07-28

## 2024-07-26 RX ORDER — LOSARTAN POTASSIUM 50 MG/1
50 TABLET ORAL DAILY
Status: DISCONTINUED | OUTPATIENT
Start: 2024-07-26 | End: 2024-07-27

## 2024-07-26 RX ORDER — METOPROLOL SUCCINATE 50 MG/1
50 TABLET, EXTENDED RELEASE ORAL 2 TIMES DAILY
Status: DISCONTINUED | OUTPATIENT
Start: 2024-07-26 | End: 2024-08-01 | Stop reason: HOSPADM

## 2024-07-26 RX ORDER — ONDANSETRON 2 MG/ML
4 INJECTION INTRAMUSCULAR; INTRAVENOUS EVERY 6 HOURS PRN
Status: DISCONTINUED | OUTPATIENT
Start: 2024-07-26 | End: 2024-08-01 | Stop reason: HOSPADM

## 2024-07-26 RX ADMIN — SODIUM CHLORIDE 150 ML/HR: 9 INJECTION, SOLUTION INTRAVENOUS at 17:41

## 2024-07-26 RX ADMIN — ENOXAPARIN SODIUM 40 MG: 100 INJECTION SUBCUTANEOUS at 17:41

## 2024-07-26 RX ADMIN — METOPROLOL SUCCINATE 50 MG: 50 TABLET, EXTENDED RELEASE ORAL at 19:45

## 2024-07-26 RX ADMIN — ONDANSETRON 4 MG: 2 INJECTION INTRAMUSCULAR; INTRAVENOUS at 21:08

## 2024-07-26 RX ADMIN — ASPIRIN 81 MG: 81 TABLET, COATED ORAL at 17:41

## 2024-07-26 RX ADMIN — SODIUM CHLORIDE 1000 ML: 9 INJECTION, SOLUTION INTRAVENOUS at 13:11

## 2024-07-26 RX ADMIN — ZOLEDRONIC ACID 4 MG: 4 INJECTION INTRAVENOUS at 19:44

## 2024-07-26 RX ADMIN — CALCITONIN SALMON 200 UNITS: 200 INJECTION, SOLUTION INTRAMUSCULAR; SUBCUTANEOUS at 19:44

## 2024-07-26 ASSESSMENT — LIFESTYLE VARIABLES
HOW MANY STANDARD DRINKS CONTAINING ALCOHOL DO YOU HAVE ON A TYPICAL DAY: PATIENT DOES NOT DRINK
HOW OFTEN DO YOU HAVE A DRINK CONTAINING ALCOHOL: NEVER

## 2024-07-26 ASSESSMENT — PAIN - FUNCTIONAL ASSESSMENT: PAIN_FUNCTIONAL_ASSESSMENT: NONE - DENIES PAIN

## 2024-07-26 ASSESSMENT — PAIN SCALES - GENERAL: PAINLEVEL_OUTOF10: 0

## 2024-07-26 NOTE — ED NOTES
ED TO INPATIENT SBAR HANDOFF    Patient Name: Stephanie Coleman   : 1945  79 y.o.   Family/Caregiver Present: Yes  Code Status Order: Prior    C-SSRS: Risk of Suicide: No Risk  Sitter No  Restraints:         Situation  Chief Complaint:   Chief Complaint   Patient presents with    Fall     Patient Diagnosis: Hypercalcemia [E83.52]     Brief Description of Patient's Condition: Pt to ER after falling this morning onto walker, down on floor.  Denies loc or head injury.  Pt has had multiple falls over the past several weeks and has been very weak.  Pt is slightly confused.  Lives at home with .  Elevated Ca levels and troponin.    Mental Status: oriented, alert, coherent, logical, thought processes intact, and able to concentrate and follow conversation  Arrived from: home    Imaging:   CT HEAD WO CONTRAST   Final Result   1.  No acute intracranial process.        All CT scans are performed using dose optimization techniques as appropriate to the performed exam and include    at least one of the following: Automated exposure control, adjustment of the mA and/or kV according to size, and the use of iterative reconstruction technique.        ______________________________________    Electronically signed by: RHETT MALIN M.D.   Date:     2024   Time:    12:12         COVID-19 Results:   Internal Administration   First Dose COVID-19, MODERNA BLUE border, Primary or Immunocompromised, (age 12y+), IM, 100 mcg/0.5mL  2021   Second Dose COVID-19, MODERNA BLUE border, Primary or Immunocompromised, (age 12y+), IM, 100 mcg/0.5mL   2021       Last COVID Lab SARS-CoV-2, DONY (no units)   Date Value   2020 NOT DETECTED           Abnormal labs:   Abnormal Labs Reviewed   COMPREHENSIVE METABOLIC PANEL - Abnormal; Notable for the following components:       Result Value    Potassium 3.3 (*)     Chloride 96 (*)     CO2 31 (*)     Glucose 116 (*)     BUN 53 (*)     Creatinine 1.4 (*)     Est, Glom

## 2024-07-26 NOTE — ED TRIAGE NOTES
Pt arrives to the ED with a cc of increased falls at home. The  reports that over the last few weeks the patient has fell multiple times. Pt also fell today. Denies hitting her head or LOC. Pt c/o bilateral knee pain and back pain.  reports pt's oral intake has been poor and he has noticed she is more forgetful. Pt also reports consistent headaches in her forehead that are throbbing in nature, denies headache at this time.

## 2024-07-26 NOTE — H&P
[facility]      Hospitalist - History & Physical      PCP: Rosalino Martínez PA    Date of Admission: 7/26/2024    Date of Service: 7/26/2024    Chief Complaint:  Weakness    History Of Present Illness:   The patient is a 79 y.o. female with Anxiety, arthritis, CAD, fibromyalgia, HTN, hyperlipidemia, RA, osteoarthritis, and SOB comes to ED complaining of weakness for the past few months. She actually fell a couple of weeks ago and has more progressive weakness since that time. She was seen in the ER and imaging showed no injury at that time and no labs were done. Today, she stated she fell in the bathroom and was unable to get up. Her  has been helping her get around the house since the last fall and they decided to come be seen today. She does admit to weakness, tremors, fatigue, headache, nausea, shortness of breath, and decreased appetite. Denies any chest pain, vomiting, numbness/tingling.     In ED,  CT head with no acute intracranial process; sodium 141, creatinine 1.4, BUN 53, GFR 38, calcium 15.5, ionized calcium 2.14, troponin 32, PTH 10.2, vitamin d 25-hydroxy 50.5. Will admit inpatient to hospitalist with nephrology and hem/onc consults.     Past Medical History:        Diagnosis Date    Acid reflux     Anxiety     Arthritis     Back pain     CAD (coronary artery disease)     CAD (coronary artery disease)     Carotid artery occlusion     Chronic venous insufficiency 11/8/2012    Cough     Fibromyalgia     Goiter     Gout     Head ache     headaches    Heart murmur     History of colon polyps     History of colon polyps     Hoarseness     Hypercholesterolemia     Hyperlipidemia     Hypertension     Insomnia     Irregular heart beat     Itching     Muscle cramp     Neck pain     Osteoarthritis     Osteoporosis     Palpitations     RA (rheumatoid arthritis) (HCC)     Rash     Rectal bleeding     SOB (shortness of breath)     Sore throat     Tympanic membrane perforation     Varicose veins  12:12       Assessment/Plan:  Principal Problem:    Hypercalcemia   - Unsure of etiology: possible malignancy   - Calcium 16.7   - IVF bolus x2 ordered   - Repeat after boluses are given 15.5   - Hem/onc consult   - Nephro consult   - PTH 10.2   - Vit D-hydroxy 50.5   - Calcitonin IM q12h x2 doses   - zoledronic acid IV x1   - IVF NS @ 150 cc/hr   - BMP w/ reflex to mag q6h   - CMP w/ reflex to mag daily   - ionized calcium daily   - CBC w/ diff daily   - magnesium daily   - phosphorus daily   - Diet with renal restrictions low potassium low phos   - Daily weights   - PT/OT eval and treat   - Up with assistance   - Vital signs q4h   - Telemetry     Active Problems:    RAFFY (acute kidney injury)    - Monitor labs   - IVF's NS @ 150 cc/hr   - Nephro consult   - Diet with low potassium/low phos    Essential hypertension   - Continue home losartan   - Continue metoprolol    Mixed hyperlipidemia   - Continue ASA    Resolved Problems:    * No resolved hospital problems. *       DVT prophylaxis: lovenox 40 mg SQ daily  GI prophylaxis: not indicated    Signed:  ULISES Thompson - CNP, 7/26/2024 2:24 PM

## 2024-07-26 NOTE — CARE COORDINATION
Case Management Assessment  Initial Evaluation    Date/Time of Evaluation: 7/26/2024 3:06 PM  Assessment Completed by: Bunny Otero    If patient is discharged prior to next notation, then this note serves as note for discharge by case management.    Patient Name: Stephanie Coleman                   YOB: 1945  Diagnosis: Hypercalcemia [E83.52]                   Date / Time: 7/26/2024 11:17 AM    Patient Admission Status: Inpatient   Readmission Risk (Low < 19, Mod (19-27), High > 27): Readmission Risk Score: 12.5    Current PCP: Rosalino Martínez PA  PCP verified by CM? Yes    Chart Reviewed: Yes      History Provided by: Patient, Spouse  Patient Orientation: Alert and Oriented    Patient Cognition: Alert    Hospitalization in the last 30 days (Readmission):  No    If yes, Readmission Assessment in  Navigator will be completed.    Advance Directives:      Code Status: Prior   Patient's Primary Decision Maker is: Legal Next of Kin      Discharge Planning:    Patient lives with: Spouse/Significant Other Type of Home: House  Primary Care Giver: Spouse  Patient Support Systems include: Spouse/Significant Other, Friends/Neighbors   Current Financial resources: Medicare  Current community resources: None  Current services prior to admission: None            Current DME:              Type of Home Care services:  None    ADLS  Prior functional level: Assistance with the following:, Mobility, Feeding, Shopping, Housework, Cooking  Current functional level: Mobility, Assistance with the following:, Feeding, Shopping, Housework, Cooking    PT AM-PAC:   /24  OT AM-PAC:   /24    Family can provide assistance at DC: No  Would you like Case Management to discuss the discharge plan with any other family members/significant others, and if so, who? Yes (Spouse)  Plans to Return to Present Housing: Unknown at present    Potential Assistance needed at discharge: N/A            Potential DME:    Patient expects to

## 2024-07-26 NOTE — ED PROVIDER NOTES
[facility] EMERGENCY DEPT  eMERGENCY dEPARTMENT eNCOUnter      Pt Name: Stephanie Coleman  MRN: [id number]  Birthdate 1945  Date of evaluation: 7/26/2024  Provider: Quique Cardenas MD    CHIEF COMPLAINT       Chief Complaint   Patient presents with    Fall         HISTORY OF PRESENT ILLNESS   (Location/Symptom, Timing/Onset,Context/Setting, Quality, Duration, Modifying Factors, Severity)  Note limiting factors.   Stephanie Coleman is a 79 y.o. female who presents to the emergency department for evaluation regarding unsteadiness of gait and frequent falls over the last few weeks.  Patient presents here to the ED with her  who states that she has become so weak he has had to assist her with walking at home.  She had another fall again the day in which she denies striking her head or sustaining loss of consciousness.  Her  notes that she has had decreased oral intake and she has had some difficulty with her memory.  She was seen here in the emergency department 12 days ago after evaluation for a fall.  She underwent CT imaging at that time that was unremarkable for acute traumatic injury of the brain.   states that she is gone progressively downhill since that time.    HPI    NursingNotes were reviewed.    REVIEW OF SYSTEMS    (2-9 systems for level 4, 10 or more for level 5)     Review of Systems   Constitutional:  Positive for activity change, appetite change and fatigue.   Respiratory:  Negative for cough and shortness of breath.    Cardiovascular:  Negative for chest pain and palpitations.   Gastrointestinal:  Negative for abdominal pain, diarrhea and vomiting.   Genitourinary:  Negative for dysuria.   Musculoskeletal:  Positive for gait problem. Negative for back pain and neck pain.   Neurological:  Positive for dizziness, weakness and headaches. Negative for numbness.   All other systems reviewed and are negative.           PAST MEDICALHISTORY     Past Medical History:   Diagnosis Date    Acid reflux      PM      (Please note that portions of this note were completed with a voice recognition program.  Efforts were made to edit thedictations but occasionally words are mis-transcribed.)    Quique Cardenas MD (electronically signed)  Attending Emergency Physician          Quique Cardenas MD  07/26/24 [id number]

## 2024-07-27 ENCOUNTER — APPOINTMENT (OUTPATIENT)
Dept: CT IMAGING | Age: 79
End: 2024-07-27
Payer: MEDICARE

## 2024-07-27 LAB
ALBUMIN SERPL-MCNC: 3.6 G/DL (ref 3.5–5.2)
ALP SERPL-CCNC: 57 U/L (ref 35–104)
ALT SERPL-CCNC: 15 U/L (ref 5–33)
ANION GAP SERPL CALCULATED.3IONS-SCNC: 12 MMOL/L (ref 7–19)
ANION GAP SERPL CALCULATED.3IONS-SCNC: 13 MMOL/L (ref 7–19)
ANION GAP SERPL CALCULATED.3IONS-SCNC: 8 MMOL/L (ref 7–19)
ANION GAP SERPL CALCULATED.3IONS-SCNC: 9 MMOL/L (ref 7–19)
AST SERPL-CCNC: 20 U/L (ref 5–32)
BASOPHILS # BLD: 0 K/UL (ref 0–0.2)
BASOPHILS NFR BLD: 0.4 % (ref 0–1)
BILIRUB SERPL-MCNC: 0.3 MG/DL (ref 0.2–1.2)
BUN SERPL-MCNC: 32 MG/DL (ref 8–23)
BUN SERPL-MCNC: 37 MG/DL (ref 8–23)
BUN SERPL-MCNC: 44 MG/DL (ref 8–23)
BUN SERPL-MCNC: 47 MG/DL (ref 8–23)
CA-I BLD-SCNC: 1.81 MMOL/L (ref 1.12–1.32)
CALCIUM SERPL-MCNC: 12.3 MG/DL (ref 8.8–10.2)
CALCIUM SERPL-MCNC: 12.9 MG/DL (ref 8.8–10.2)
CALCIUM SERPL-MCNC: 14.2 MG/DL (ref 8.8–10.2)
CALCIUM SERPL-MCNC: 8.9 MG/DL (ref 8.8–10.2)
CANCER AG19-9 SERPL-ACNC: 9 U/ML (ref 0–35)
CEA SERPL-MCNC: 0.7 NG/ML (ref 0–4.7)
CHLORIDE SERPL-SCNC: 100 MMOL/L (ref 98–111)
CHLORIDE SERPL-SCNC: 102 MMOL/L (ref 98–111)
CHLORIDE SERPL-SCNC: 110 MMOL/L (ref 98–111)
CHLORIDE SERPL-SCNC: 98 MMOL/L (ref 98–111)
CK SERPL-CCNC: 55 U/L (ref 26–192)
CO2 SERPL-SCNC: 21 MMOL/L (ref 22–29)
CO2 SERPL-SCNC: 26 MMOL/L (ref 22–29)
CO2 SERPL-SCNC: 28 MMOL/L (ref 22–29)
CO2 SERPL-SCNC: 29 MMOL/L (ref 22–29)
CREAT SERPL-MCNC: 0.9 MG/DL (ref 0.5–0.9)
CREAT SERPL-MCNC: 1.4 MG/DL (ref 0.5–0.9)
EOSINOPHIL # BLD: 0.1 K/UL (ref 0–0.6)
EOSINOPHIL NFR BLD: 0.6 % (ref 0–5)
ERYTHROCYTE [DISTWIDTH] IN BLOOD BY AUTOMATED COUNT: 13.6 % (ref 11.5–14.5)
GLUCOSE SERPL-MCNC: 108 MG/DL (ref 74–109)
GLUCOSE SERPL-MCNC: 120 MG/DL (ref 74–109)
GLUCOSE SERPL-MCNC: 152 MG/DL (ref 74–109)
GLUCOSE SERPL-MCNC: 90 MG/DL (ref 74–109)
HCT VFR BLD AUTO: 35.1 % (ref 37–47)
HGB BLD-MCNC: 11.6 G/DL (ref 12–16)
IGA SERPL-MCNC: 136 MG/DL (ref 70–400)
IGG SERPL-MCNC: 508 MG/DL (ref 700–1600)
IGM SERPL-MCNC: 25 MG/DL (ref 40–230)
IMM GRANULOCYTES # BLD: 0 K/UL
LDH SERPL-CCNC: 123 U/L (ref 91–215)
LYMPHOCYTES # BLD: 1.9 K/UL (ref 1.1–4.5)
LYMPHOCYTES NFR BLD: 17.8 % (ref 20–40)
MAGNESIUM SERPL-MCNC: 1.2 MG/DL (ref 1.6–2.4)
MAGNESIUM SERPL-MCNC: 1.7 MG/DL (ref 1.6–2.4)
MAGNESIUM SERPL-MCNC: 1.8 MG/DL (ref 1.6–2.4)
MCH RBC QN AUTO: 31.1 PG (ref 27–31)
MCHC RBC AUTO-ENTMCNC: 33 G/DL (ref 33–37)
MCV RBC AUTO: 94.1 FL (ref 81–99)
MONOCYTES # BLD: 0.8 K/UL (ref 0–0.9)
MONOCYTES NFR BLD: 7.6 % (ref 0–10)
NEUTROPHILS # BLD: 7.6 K/UL (ref 1.5–7.5)
NEUTS SEG NFR BLD: 73.2 % (ref 50–65)
PHOSPHATE SERPL-MCNC: 3 MG/DL (ref 2.5–4.5)
PLATELET # BLD AUTO: 217 K/UL (ref 130–400)
PMV BLD AUTO: 8.8 FL (ref 9.4–12.3)
POTASSIUM SERPL-SCNC: 3 MMOL/L (ref 3.5–5)
POTASSIUM SERPL-SCNC: 3 MMOL/L (ref 3.5–5)
POTASSIUM SERPL-SCNC: 3.3 MMOL/L (ref 3.5–5)
POTASSIUM SERPL-SCNC: 3.7 MMOL/L (ref 3.5–5)
PROT SERPL-MCNC: 5.5 G/DL (ref 6.6–8.7)
RBC # BLD AUTO: 3.73 M/UL (ref 4.2–5.4)
SODIUM SERPL-SCNC: 137 MMOL/L (ref 136–145)
SODIUM SERPL-SCNC: 139 MMOL/L (ref 136–145)
SODIUM SERPL-SCNC: 140 MMOL/L (ref 136–145)
SODIUM SERPL-SCNC: 140 MMOL/L (ref 136–145)
T4 FREE SERPL-MCNC: 1.24 NG/DL (ref 0.93–1.7)
TSH SERPL DL<=0.005 MIU/L-ACNC: 0.83 UIU/ML (ref 0.27–4.2)
WBC # BLD AUTO: 10.4 K/UL (ref 4.8–10.8)

## 2024-07-27 PROCEDURE — 84155 ASSAY OF PROTEIN SERUM: CPT

## 2024-07-27 PROCEDURE — 99223 1ST HOSP IP/OBS HIGH 75: CPT | Performed by: INTERNAL MEDICINE

## 2024-07-27 PROCEDURE — 86301 IMMUNOASSAY TUMOR CA 19-9: CPT

## 2024-07-27 PROCEDURE — 86304 IMMUNOASSAY TUMOR CA 125: CPT

## 2024-07-27 PROCEDURE — 6370000000 HC RX 637 (ALT 250 FOR IP)

## 2024-07-27 PROCEDURE — 80053 COMPREHEN METABOLIC PANEL: CPT

## 2024-07-27 PROCEDURE — 82330 ASSAY OF CALCIUM: CPT

## 2024-07-27 PROCEDURE — 84165 PROTEIN E-PHORESIS SERUM: CPT

## 2024-07-27 PROCEDURE — 1200000000 HC SEMI PRIVATE

## 2024-07-27 PROCEDURE — 82550 ASSAY OF CK (CPK): CPT

## 2024-07-27 PROCEDURE — 6370000000 HC RX 637 (ALT 250 FOR IP): Performed by: HOSPITALIST

## 2024-07-27 PROCEDURE — 82378 CARCINOEMBRYONIC ANTIGEN: CPT

## 2024-07-27 PROCEDURE — 84443 ASSAY THYROID STIM HORMONE: CPT

## 2024-07-27 PROCEDURE — 83735 ASSAY OF MAGNESIUM: CPT

## 2024-07-27 PROCEDURE — 2580000003 HC RX 258

## 2024-07-27 PROCEDURE — 84100 ASSAY OF PHOSPHORUS: CPT

## 2024-07-27 PROCEDURE — 6360000002 HC RX W HCPCS: Performed by: INTERNAL MEDICINE

## 2024-07-27 PROCEDURE — 82232 ASSAY OF BETA-2 PROTEIN: CPT

## 2024-07-27 PROCEDURE — 71250 CT THORAX DX C-: CPT

## 2024-07-27 PROCEDURE — 85025 COMPLETE CBC W/AUTO DIFF WBC: CPT

## 2024-07-27 PROCEDURE — 82784 ASSAY IGA/IGD/IGG/IGM EACH: CPT

## 2024-07-27 PROCEDURE — 86300 IMMUNOASSAY TUMOR CA 15-3: CPT

## 2024-07-27 PROCEDURE — 36415 COLL VENOUS BLD VENIPUNCTURE: CPT

## 2024-07-27 PROCEDURE — 84439 ASSAY OF FREE THYROXINE: CPT

## 2024-07-27 PROCEDURE — 83615 LACTATE (LD) (LDH) ENZYME: CPT

## 2024-07-27 PROCEDURE — 83883 ASSAY NEPHELOMETRY NOT SPEC: CPT

## 2024-07-27 PROCEDURE — 70490 CT SOFT TISSUE NECK W/O DYE: CPT

## 2024-07-27 PROCEDURE — 74176 CT ABD & PELVIS W/O CONTRAST: CPT

## 2024-07-27 PROCEDURE — 6360000002 HC RX W HCPCS

## 2024-07-27 RX ORDER — CALCIUM CARBONATE 500 MG/1
500 TABLET, CHEWABLE ORAL 3 TIMES DAILY PRN
Status: DISCONTINUED | OUTPATIENT
Start: 2024-07-27 | End: 2024-07-28

## 2024-07-27 RX ORDER — POTASSIUM CHLORIDE 20 MEQ/1
40 TABLET, EXTENDED RELEASE ORAL ONCE
Status: COMPLETED | OUTPATIENT
Start: 2024-07-27 | End: 2024-07-27

## 2024-07-27 RX ORDER — OLMESARTAN MEDOXOMIL 5 MG/1
10 TABLET ORAL 2 TIMES DAILY
Status: DISCONTINUED | OUTPATIENT
Start: 2024-07-27 | End: 2024-08-01 | Stop reason: HOSPADM

## 2024-07-27 RX ORDER — MECOBALAMIN 5000 MCG
5 TABLET,DISINTEGRATING ORAL NIGHTLY PRN
Status: DISCONTINUED | OUTPATIENT
Start: 2024-07-28 | End: 2024-08-01 | Stop reason: HOSPADM

## 2024-07-27 RX ORDER — DIPHENHYDRAMINE HCL 25 MG
25 TABLET ORAL ONCE
Status: COMPLETED | OUTPATIENT
Start: 2024-07-27 | End: 2024-07-27

## 2024-07-27 RX ORDER — CALCIUM CARBONATE 500 MG/1
TABLET, CHEWABLE ORAL
Status: DISCONTINUED
Start: 2024-07-27 | End: 2024-07-28

## 2024-07-27 RX ORDER — BUMETANIDE 0.25 MG/ML
1 INJECTION INTRAMUSCULAR; INTRAVENOUS 2 TIMES DAILY
Status: DISCONTINUED | OUTPATIENT
Start: 2024-07-27 | End: 2024-08-01 | Stop reason: HOSPADM

## 2024-07-27 RX ADMIN — DIPHENHYDRAMINE HYDROCHLORIDE 25 MG: 25 TABLET ORAL at 22:40

## 2024-07-27 RX ADMIN — SODIUM CHLORIDE, PRESERVATIVE FREE 10 ML: 5 INJECTION INTRAVENOUS at 09:22

## 2024-07-27 RX ADMIN — CALCITONIN SALMON 200 UNITS: 200 INJECTION, SOLUTION INTRAMUSCULAR; SUBCUTANEOUS at 09:22

## 2024-07-27 RX ADMIN — POTASSIUM CHLORIDE 40 MEQ: 1500 TABLET, EXTENDED RELEASE ORAL at 09:22

## 2024-07-27 RX ADMIN — ONDANSETRON 4 MG: 2 INJECTION INTRAMUSCULAR; INTRAVENOUS at 14:30

## 2024-07-27 RX ADMIN — METOPROLOL SUCCINATE 50 MG: 50 TABLET, EXTENDED RELEASE ORAL at 21:17

## 2024-07-27 RX ADMIN — ENOXAPARIN SODIUM 40 MG: 100 INJECTION SUBCUTANEOUS at 09:21

## 2024-07-27 RX ADMIN — METOPROLOL SUCCINATE 50 MG: 50 TABLET, EXTENDED RELEASE ORAL at 09:22

## 2024-07-27 RX ADMIN — SODIUM CHLORIDE, PRESERVATIVE FREE 10 ML: 5 INJECTION INTRAVENOUS at 21:20

## 2024-07-27 RX ADMIN — BUMETANIDE 1 MG: 0.25 INJECTION INTRAMUSCULAR; INTRAVENOUS at 18:24

## 2024-07-27 RX ADMIN — ASPIRIN 81 MG: 81 TABLET, COATED ORAL at 09:21

## 2024-07-27 RX ADMIN — ONDANSETRON 4 MG: 2 INJECTION INTRAMUSCULAR; INTRAVENOUS at 21:17

## 2024-07-27 NOTE — CONSULTS
Insecurity: No Food Insecurity (7/26/2024)    Hunger Vital Sign     Worried About Running Out of Food in the Last Year: Never true     Ran Out of Food in the Last Year: Never true   Transportation Needs: No Transportation Needs (7/26/2024)    PRAPARE - Transportation     Lack of Transportation (Medical): No     Lack of Transportation (Non-Medical): No   Housing Stability: Low Risk  (7/26/2024)    Housing Stability Vital Sign     Unable to Pay for Housing in the Last Year: No     Number of Places Lived in the Last Year: 1     Unstable Housing in the Last Year: No       Physical Exam     Blood pressure (!) 126/46, pulse 82, temperature 98.1 °F (36.7 °C), temperature source Oral, resp. rate 16, height 1.702 m (5' 7\"), weight 71.2 kg (157 lb), SpO2 95 %, not currently breastfeeding.    General:  NAD, A+Ox3, ill-appearing, normal body habitus  HEENT: Atraumatic, normocephalic  Neck: No masses, no JVD  Chest:  CTAB, good respiratory effort, good air movement  CV:  RRR, soft systolic murmur  Abdomen:  NTND, soft, +BS, no hepatosplenomegaly  Extremities: Varicose veins with trace peripheral edema  Neurological:  Moving all four extremities   Lymphatics:  No palpable lymph nodes   Skin:  No rash, no jaundice  Psychiatric:  Normal insight and judgement, good recall    Data     Recent Labs     07/26/24  1209 07/27/24  0146   WBC 7.2 10.4   HGB 13.6 11.6*   HCT 41.3 35.1*   MCV 96.0 94.1    217     Recent Labs     07/26/24  1430 07/26/24  1706 07/27/24  0146 07/27/24  0758   NA  --  141 137 140   K  --  3.5 3.3* 3.0*   CL  --  101 98 102   CO2  --  28 26 29   GLUCOSE  --  109 152* 108   PHOS 3.4  --  3.0  --    MG  --  2.0 1.8 1.7   BUN  --  53* 47* 44*   CREATININE  --  1.4* 1.4* 1.4*   LABGLOM  --  38* 38* 38*       Assessment:  Chronic kidney disease stage IIIb  Severe hypercalcemia  Hypokalemia  Hypertension  Generalized weakness      Plan:  At this time we will continue with saline infusions.  Will also add loop

## 2024-07-27 NOTE — PROGRESS NOTES
are moist.      Pharynx: Oropharynx is clear.   Eyes:      General: No scleral icterus.     Extraocular Movements: Extraocular movements intact.      Conjunctiva/sclera: Conjunctivae normal.   Cardiovascular:      Rate and Rhythm: Normal rate and regular rhythm.      Pulses: Normal pulses.      Heart sounds: Normal heart sounds. No murmur heard.     No friction rub. No gallop.   Pulmonary:      Effort: Pulmonary effort is normal. No respiratory distress.      Breath sounds: Normal breath sounds. No wheezing, rhonchi or rales.   Abdominal:      General: Abdomen is flat. Bowel sounds are normal. There is no distension.      Palpations: Abdomen is soft.      Tenderness: There is no abdominal tenderness.   Musculoskeletal:         General: Swelling present. Normal range of motion.      Cervical back: Normal range of motion and neck supple.      Right lower leg: No edema.      Left lower leg: No edema.      Comments: Trace swelling to BLE    Skin:     General: Skin is warm and dry.      Coloration: Skin is not jaundiced.      Findings: No erythema, lesion or rash.   Neurological:      General: No focal deficit present.      Mental Status: She is alert and oriented to person, place, and time. Mental status is at baseline.      Cranial Nerves: No cranial nerve deficit.      Sensory: No sensory deficit.      Motor: No weakness.   Psychiatric:         Mood and Affect: Mood normal.         Behavior: Behavior normal.         Thought Content: Thought content normal.         Judgment: Judgment normal.            Medications:      sodium chloride      sodium chloride 150 mL/hr (07/26/24 0355)      bumetanide  1 mg IntraVENous BID    sodium chloride  1,000 mL IntraVENous Once    aspirin  81 mg Oral Daily    metoprolol succinate  50 mg Oral BID    [Held by provider] losartan  50 mg Oral Daily    sodium chloride flush  5-40 mL IntraVENous 2 times per day    enoxaparin  40 mg SubCUTAneous Daily     sodium chloride flush, sodium  Nephrology suggested continuing IVFs, add IV Bumex for better results on Calcium, avoid thiazide diuretics as well as calcium based antacids, hold all vitamin D derivatives   - PTH 10.5   - TSH WNL   - Monitor labs closely    - PT/ OT         Active Problems:   RAFFY (acute kidney injury) (HCC) on CKD3a-  - Nephrology following               - Creatinine 1.4 today               - Continue IVFs               - Monitor I's and O's closely              - Monitor labs closely              - Avoid hypotension              - Avoid nephrotoxic agents       Essential hypertension   - stable at this time    - Continue current medications    - Monitor BP closely           DVT Prophylaxis: Lovenox      Discharge planning: TBD       Further Orders per Clinical course/attending.     Electronically signed by ULISES Steen CNP on 7/27/2024 at 12:51 PM       EMR Dragon/Transcription disclaimer:   Much of this encounter note is an electronic transcription/translation of spoken language to printed text. The electronic translation of spoken language may permit erroneous, or at times, nonsensical words or phrases to be inadvertently transcribed; although attempts have made to review the note for such errors, some may still exist.

## 2024-07-27 NOTE — CONSULTS
ONCOLOGY CONSULTATION    Patient:  Stephanie Coleman  YOB: 1945  Date of Service: 7/27/2024  MRN: [id number]   Primary Care Physician: Rosalino Martínez PA  Advance Directive: Full Code      Reason for Consult: Hypercalcemia    Requesting Physician: Dr. Palafox    CHIEF COMPLAINT:    Chief Complaint   Patient presents with    Fall         History Obtained From: Patient, her , daughter Olena and EMR      HISTORY OF PRESENT ILLNESS:      Stephanie Coleman is an 79-year-old  female who presents to the ED at [facility] for evaluation regarding increasing fatigue and weakness requiring assistance, unsteadiness of gait, frequent falls over the previous few weeks.  Additionally she has had decreased oral intake, constipation and increasing difficulty with her memory.  She was evaluated in the ED 12 days ago after a fall.  CT brain was unremarkable for acute traumatic injury of the brain.  Chemistry was not done at that time.  The  states that she has continued to progressively get worse since that time.    CT scans of the cervical, thoracic, lumbar spine and pelvis 7/14/2024 did not reveal evidence of bony metastatic disease    Chemistry serology on 7/26/2024  Calcium 16.7  Potassium 3.3  Creatinine 1.4  Alkaline phosphatase 61  Total protein 6.7  Albumin 3.9  PTH 10.2 (15-65)  PTH related peptide-pending  Phosphorus 3.4  Magnesium 2.0    CBC on 7/26/2024 a WBC of 7.2, hemoglobin 13.6 and a platelet count of 213,000 with slight increase in monocytes of 10.1, but otherwise normal differential    Hematology consultation requested due to PTH independent hypercalcemia        Past Medical History:    Past Medical History:   Diagnosis Date    Acid reflux     Anxiety     Arthritis     Back pain     CAD (coronary artery disease)     CAD (coronary artery disease)     Carotid artery occlusion     Chronic venous insufficiency 11/8/2012

## 2024-07-28 LAB
ALBUMIN SERPL-MCNC: 3.5 G/DL (ref 3.5–5.2)
ALP SERPL-CCNC: 51 U/L (ref 35–104)
ALT SERPL-CCNC: 14 U/L (ref 5–33)
ANION GAP SERPL CALCULATED.3IONS-SCNC: 10 MMOL/L (ref 7–19)
ANION GAP SERPL CALCULATED.3IONS-SCNC: 12 MMOL/L (ref 7–19)
ANION GAP SERPL CALCULATED.3IONS-SCNC: 9 MMOL/L (ref 7–19)
AST SERPL-CCNC: 22 U/L (ref 5–32)
BASOPHILS # BLD: 0 K/UL (ref 0–0.2)
BASOPHILS NFR BLD: 0.3 % (ref 0–1)
BILIRUB SERPL-MCNC: 0.2 MG/DL (ref 0.2–1.2)
BUN SERPL-MCNC: 33 MG/DL (ref 8–23)
BUN SERPL-MCNC: 35 MG/DL (ref 8–23)
BUN SERPL-MCNC: 35 MG/DL (ref 8–23)
CA-I BLD-SCNC: 1.53 MMOL/L (ref 1.12–1.32)
CALCIUM SERPL-MCNC: 11.3 MG/DL (ref 8.8–10.2)
CALCIUM SERPL-MCNC: 11.5 MG/DL (ref 8.8–10.2)
CALCIUM SERPL-MCNC: 11.8 MG/DL (ref 8.8–10.2)
CHLORIDE SERPL-SCNC: 100 MMOL/L (ref 98–111)
CHLORIDE SERPL-SCNC: 102 MMOL/L (ref 98–111)
CHLORIDE SERPL-SCNC: 103 MMOL/L (ref 98–111)
CO2 SERPL-SCNC: 26 MMOL/L (ref 22–29)
CO2 SERPL-SCNC: 29 MMOL/L (ref 22–29)
CO2 SERPL-SCNC: 30 MMOL/L (ref 22–29)
CREAT SERPL-MCNC: 1.5 MG/DL (ref 0.5–0.9)
CREAT SERPL-MCNC: 1.6 MG/DL (ref 0.5–0.9)
CREAT SERPL-MCNC: 1.7 MG/DL (ref 0.5–0.9)
EOSINOPHIL # BLD: 0.1 K/UL (ref 0–0.6)
EOSINOPHIL NFR BLD: 1 % (ref 0–5)
ERYTHROCYTE [DISTWIDTH] IN BLOOD BY AUTOMATED COUNT: 13.8 % (ref 11.5–14.5)
GLUCOSE SERPL-MCNC: 108 MG/DL (ref 74–109)
GLUCOSE SERPL-MCNC: 110 MG/DL (ref 74–109)
GLUCOSE SERPL-MCNC: 96 MG/DL (ref 74–109)
HCT VFR BLD AUTO: 34.3 % (ref 37–47)
HGB BLD-MCNC: 11.2 G/DL (ref 12–16)
IMM GRANULOCYTES # BLD: 0 K/UL
LYMPHOCYTES # BLD: 2.2 K/UL (ref 1.1–4.5)
LYMPHOCYTES NFR BLD: 24.9 % (ref 20–40)
MAGNESIUM SERPL-MCNC: 1.4 MG/DL (ref 1.6–2.4)
MAGNESIUM SERPL-MCNC: 1.5 MG/DL (ref 1.6–2.4)
MAGNESIUM SERPL-MCNC: 2.5 MG/DL (ref 1.6–2.4)
MCH RBC QN AUTO: 30.9 PG (ref 27–31)
MCHC RBC AUTO-ENTMCNC: 32.7 G/DL (ref 33–37)
MCV RBC AUTO: 94.5 FL (ref 81–99)
MONOCYTES # BLD: 0.9 K/UL (ref 0–0.9)
MONOCYTES NFR BLD: 9.8 % (ref 0–10)
NEUTROPHILS # BLD: 5.5 K/UL (ref 1.5–7.5)
NEUTS SEG NFR BLD: 63.5 % (ref 50–65)
PLATELET # BLD AUTO: 188 K/UL (ref 130–400)
PMV BLD AUTO: 8.6 FL (ref 9.4–12.3)
POTASSIUM SERPL-SCNC: 3.1 MMOL/L (ref 3.5–5)
POTASSIUM SERPL-SCNC: 3.4 MMOL/L (ref 3.5–5)
POTASSIUM SERPL-SCNC: 3.5 MMOL/L (ref 3.5–5)
PROT SERPL-MCNC: 5.9 G/DL (ref 6.6–8.7)
RBC # BLD AUTO: 3.63 M/UL (ref 4.2–5.4)
SODIUM SERPL-SCNC: 140 MMOL/L (ref 136–145)
SODIUM SERPL-SCNC: 140 MMOL/L (ref 136–145)
SODIUM SERPL-SCNC: 141 MMOL/L (ref 136–145)
WBC # BLD AUTO: 8.7 K/UL (ref 4.8–10.8)

## 2024-07-28 PROCEDURE — 82330 ASSAY OF CALCIUM: CPT

## 2024-07-28 PROCEDURE — 83735 ASSAY OF MAGNESIUM: CPT

## 2024-07-28 PROCEDURE — 6360000002 HC RX W HCPCS: Performed by: INTERNAL MEDICINE

## 2024-07-28 PROCEDURE — 6370000000 HC RX 637 (ALT 250 FOR IP): Performed by: NURSE PRACTITIONER

## 2024-07-28 PROCEDURE — 1200000000 HC SEMI PRIVATE

## 2024-07-28 PROCEDURE — 80053 COMPREHEN METABOLIC PANEL: CPT

## 2024-07-28 PROCEDURE — 94760 N-INVAS EAR/PLS OXIMETRY 1: CPT

## 2024-07-28 PROCEDURE — 36415 COLL VENOUS BLD VENIPUNCTURE: CPT

## 2024-07-28 PROCEDURE — 85025 COMPLETE CBC W/AUTO DIFF WBC: CPT

## 2024-07-28 PROCEDURE — 6360000002 HC RX W HCPCS: Performed by: NURSE PRACTITIONER

## 2024-07-28 PROCEDURE — 6370000000 HC RX 637 (ALT 250 FOR IP)

## 2024-07-28 PROCEDURE — 99232 SBSQ HOSP IP/OBS MODERATE 35: CPT | Performed by: INTERNAL MEDICINE

## 2024-07-28 PROCEDURE — 2580000003 HC RX 258

## 2024-07-28 PROCEDURE — 6360000002 HC RX W HCPCS

## 2024-07-28 PROCEDURE — 6370000000 HC RX 637 (ALT 250 FOR IP): Performed by: STUDENT IN AN ORGANIZED HEALTH CARE EDUCATION/TRAINING PROGRAM

## 2024-07-28 RX ORDER — ENOXAPARIN SODIUM 100 MG/ML
30 INJECTION SUBCUTANEOUS DAILY
Status: DISCONTINUED | OUTPATIENT
Start: 2024-07-29 | End: 2024-07-31

## 2024-07-28 RX ORDER — POTASSIUM CHLORIDE 20 MEQ/1
40 TABLET, EXTENDED RELEASE ORAL ONCE
Status: COMPLETED | OUTPATIENT
Start: 2024-07-28 | End: 2024-07-28

## 2024-07-28 RX ORDER — ACETAMINOPHEN 325 MG/1
325 TABLET ORAL EVERY 4 HOURS PRN
Status: DISCONTINUED | OUTPATIENT
Start: 2024-07-28 | End: 2024-08-01 | Stop reason: HOSPADM

## 2024-07-28 RX ORDER — MAGNESIUM SULFATE IN WATER 40 MG/ML
2000 INJECTION, SOLUTION INTRAVENOUS ONCE
Status: COMPLETED | OUTPATIENT
Start: 2024-07-28 | End: 2024-07-28

## 2024-07-28 RX ORDER — SODIUM CHLORIDE AND POTASSIUM CHLORIDE 150; 450 MG/100ML; MG/100ML
INJECTION, SOLUTION INTRAVENOUS CONTINUOUS
Status: DISCONTINUED | OUTPATIENT
Start: 2024-07-28 | End: 2024-07-29

## 2024-07-28 RX ADMIN — SODIUM CHLORIDE, PRESERVATIVE FREE 10 ML: 5 INJECTION INTRAVENOUS at 10:14

## 2024-07-28 RX ADMIN — POTASSIUM CHLORIDE 40 MEQ: 1500 TABLET, EXTENDED RELEASE ORAL at 10:01

## 2024-07-28 RX ADMIN — POTASSIUM CHLORIDE AND SODIUM CHLORIDE: 450; 150 INJECTION, SOLUTION INTRAVENOUS at 14:33

## 2024-07-28 RX ADMIN — ASPIRIN 81 MG: 81 TABLET, COATED ORAL at 10:01

## 2024-07-28 RX ADMIN — MAGNESIUM SULFATE HEPTAHYDRATE 2000 MG: 40 INJECTION, SOLUTION INTRAVENOUS at 10:11

## 2024-07-28 RX ADMIN — ENOXAPARIN SODIUM 40 MG: 100 INJECTION SUBCUTANEOUS at 10:01

## 2024-07-28 RX ADMIN — ACETAMINOPHEN 325 MG: 325 TABLET ORAL at 22:17

## 2024-07-28 RX ADMIN — METOPROLOL SUCCINATE 50 MG: 50 TABLET, EXTENDED RELEASE ORAL at 10:01

## 2024-07-28 RX ADMIN — SODIUM CHLORIDE, PRESERVATIVE FREE 10 ML: 5 INJECTION INTRAVENOUS at 20:48

## 2024-07-28 RX ADMIN — METOPROLOL SUCCINATE 50 MG: 50 TABLET, EXTENDED RELEASE ORAL at 20:48

## 2024-07-28 ASSESSMENT — PAIN DESCRIPTION - ORIENTATION: ORIENTATION: MID

## 2024-07-28 ASSESSMENT — PAIN DESCRIPTION - LOCATION: LOCATION: HEAD

## 2024-07-28 ASSESSMENT — PAIN - FUNCTIONAL ASSESSMENT: PAIN_FUNCTIONAL_ASSESSMENT: ACTIVITIES ARE NOT PREVENTED

## 2024-07-28 ASSESSMENT — PAIN DESCRIPTION - DESCRIPTORS: DESCRIPTORS: ACHING

## 2024-07-28 ASSESSMENT — PAIN SCALES - GENERAL: PAINLEVEL_OUTOF10: 4

## 2024-07-28 NOTE — PLAN OF CARE
Problem: Discharge Planning  Goal: Discharge to home or other facility with appropriate resources  Outcome: Progressing     Problem: Safety - Adult  Goal: Free from fall injury  Outcome: Progressing      Sotyktu Pregnancy And Lactation Text: There is insufficient data to evaluate whether or not Sotyktu is safe to use during pregnancy.? ?It is not known if Sotyktu passes into breast milk and whether or not it is safe to use when breastfeeding.??

## 2024-07-28 NOTE — PROGRESS NOTES
[facility]      [facility]    Patient:  Stephanie Coleman  YOB: 1945  Date of Service: 7/28/2024  MRN: [id number]   Acct: [id number]   Primary Care Physician: Rosalino Mratínez PA  Advance Directive: Full Code  Admit Date: 7/26/2024       Hospital Day: 2    Portions of this note have been copied forward, however, updated to reflect the most current clinical status of this patient.     CHIEF COMPLAINT Generalized weakness and fall     SUBJECTIVE:  Ms. Coleman was resting in bed this morning. Patient was noted to have some confusion last night per grandson at bedside. Per family has noticed increased in the evenings at home as well. Currently A&O x 3. Denies nausea or vomiting today.        CUMULATIVE HOSPITAL COURSE:   The patient is a 79 y.o. female with PMH of CAD s/p CABG, CKD 3a, chronic venous insufficiency, fibromyalgia, gout, goiter, hypertension, hyperlipidemia, and osteoarthritis who presented to [facility] ED for evaluation of unsteadiness of gait, frequent falls and generalized weakness. Reported having had frequent falls over the last few weeks. Reportedly fell on day of admission, denied head injury or loss of consciousness. Has had decreased oral intake. Reported nausea, vomiting, constipation, and increasing difficulty with her memory. Of note, was seen in ED on 7/14/2024 for a fall, imaging obtained which were unremarkable at that time and ultimately patient was sent home. No labs were obtained at that time. Workup in ED revealed Na 138, K+ 3.3, CO2 31, BUN 53, Cr 1.4, Calcium ionized 2.14, eGFR 38, Ca+ 16.7, troponin 30 followed by 32, PTH 10.2, Hgb 13.6, urinalysis unremarkable, CT Head unremarkable. Patient was admitted to hospital medicine for further evaluation with nephrology and oncology consultations. IVFs were initiated. Zometa given after allergies verified. Calcitonin x 2 doses given. Oncology suggested CT scans of soft tissues of the neck, chest, abdomen and pelvis to rule

## 2024-07-28 NOTE — PROGRESS NOTES
Renal Progress Note    Thank you to requesting provider: Dr Palafox, for asking us to see Stephanie Coleman    Reason for consultation:  Severe hypercalcemia    Chief Complaint:  Weakness.     History of Presenting Illness    Patient is an 79-year-old a pleasant woman with a past medical history of hypertension, coronary artery disease, varicose veins of the lower extremities, fibromyalgia, rheumatoid arthritis, gout, hypercalcemia and a chronic kidney disease stage IIIb.  She has some chronic dyspnea.  Lately she was found to be increasingly weak with falls.  She lives with her  who is providing some help.  She also admitted to some nausea, fatigue headache tremor and decreased appetite.  She has lost between 10 to 15 pounds.  She has not changed her urine habits.  She currently does not follow with nephrology.  On initial assessment her serum calcium was 16.7 and her serum creatinine was 1.4.  Patient takes Pepto-Bismol at home along with vitamin D and multivitamin.  She admits consuming dairy products on a regular basis but denied abusing calcium based antacids.  According to the  she has been forgetful as well.  She was started on IV fluids.  Was also given Zometa.  Her serum calcium started improving and is now down to 12.9.  Patient noticed some improvement in her symptoms.  Renal service was consulted to manage her CKD and hypercalcemia.  Today, patient noticed she is stronger. Her appetite has also improved.       Past Medical/Surgical History      Active Ambulatory Problems     Diagnosis Date Noted    Coronary artery disease involving native coronary artery of native heart with angina pectoris with documented spasm (HCC)     Essential hypertension     Carotid artery stenosis 11/08/2012    Leg pain 11/08/2012    Leg swelling 11/08/2012    Chronic venous insufficiency 11/08/2012    Varicose veins 11/08/2012    Spider veins 03/04/2013    Gastroesophageal reflux disease without esophagitis 05/20/2014  07/28/24  0211 07/28/24  0749   NA  --    < > 137   < > 139 140 140 141   K  --    < > 3.3*   < > 3.0* 3.7 3.1* 3.4*   CL  --    < > 98   < > 110 100 100 103   CO2  --    < > 26   < > 21* 28 30* 26   GLUCOSE  --    < > 152*   < > 90 120* 110* 96   PHOS 3.4  --  3.0  --   --   --   --   --    MG  --    < > 1.8   < > 1.2*  --  1.4* 1.5*   BUN  --    < > 47*   < > 32* 37* 35* 35*   CREATININE  --    < > 1.4*   < > 0.9 1.4* 1.5* 1.7*   LABGLOM  --    < > 38*   < > 64 38* 35* 30*    < > = values in this interval not displayed.         Assessment:  Chronic kidney disease stage IIIb  Severe hypercalcemia  Hypokalemia  Hypertension  Generalized weakness      Plan:  Continue  saline infusion.  Replace potassium losses.  Her PTH is appropriate. Her extensive imaging workup is negative for malignancies.  Patient is also seen by hematology.  For her CKD, she would benefit from follow-up at the renal clinic as outpatient.  Avoid thiazide diuretics in the future as well as calcium based antacids.  Hold all vitamin D derivatives. Follow up labs.

## 2024-07-28 NOTE — PROGRESS NOTES
Automatic Dose Adjustment of                Subcutaneous Anticoagulant for Prophylaxis    Stephanie Coleman is a 79 y.o. female.     Recent Labs     07/28/24  0749 07/28/24  1351   CREATININE 1.7* 1.6*       Estimated Creatinine Clearance: 27 mL/min (A) (based on SCr of 1.6 mg/dL (H)).    Weight:  Wt Readings from Last 1 Encounters:   07/26/24 71.2 kg (157 lb)           Pharmacy has adjusted subcutaneous anticoagulant for prophylaxis to Enoxaparin 30 mg SC daily based on the patient's weight and estimated CrCl per [facility] policy.               Electronically signed by Otilia Ledesma RP on 7/28/2024 at 3:35 PM

## 2024-07-28 NOTE — PROGRESS NOTES
Patient woke up very confused and not following commands. Patient stated she wanted to call the police because her cell phone messages had been deleted. The grandson is at bedside and helped patient to bathroom, patient locked herself into the bathroom. Called clinical house for help. Patient assisted by apurva back to bed. Apurva made this nurse aware that he unhooked the IV because the patient tried multiple times to pull it out. She also is refusing her telemetry.  Will continue to monitor.

## 2024-07-28 NOTE — PLAN OF CARE
Problem: Safety - Adult  Goal: Free from fall injury  7/28/2024 1231 by Sadie Powers RN  Outcome: Progressing  7/28/2024 0511 by Maryjane Sales RN  Outcome: Progressing  7/28/2024 0510 by Maryjane Sales, RN  Outcome: Progressing

## 2024-07-28 NOTE — PROGRESS NOTES
Patient has finally settled in and is fast asleep. Grandson at bedside and would rather let patient get some sleep before waking. Will pass in report to let patient sleep for now. Will continue to monitor.

## 2024-07-28 NOTE — PROGRESS NOTES
ONCOLOGY PROGRESS NOTE     Patient:  Stephanie Coleman  YOB: 1945  Date of Service: 7/28/2024  MRN: [id number]   Primary Care Physician: Rosalino Martínez PA  Advance Directive: Full Code      CHIEF COMPLAINT:    Chief Complaint   Patient presents with    Fall     History Obtained From: Patient, her , daughter Olena and EMR    SUBJECTIVE:   She had a very large BM, feeling much better from that standpoint.  Her son is present with her this morning.  She states that she is feeling more like herself today.  Calcium down to 11.5 today, 7/28/2024    HISTORY OF PRESENT ILLNESS:      Stephanie Coleman is an 79-year-old  female who presents to the ED at [facility] for evaluation regarding increasing fatigue and weakness requiring assistance, unsteadiness of gait, frequent falls over the previous few weeks.  Additionally she has had decreased oral intake, constipation and increasing difficulty with her memory.  She was evaluated in the ED 12 days ago after a fall.  CT brain was unremarkable for acute traumatic injury of the brain.  Chemistry was not done at that time.  The  states that she has continued to progressively get worse since that time.    CT scans of the cervical, thoracic, lumbar spine and pelvis 7/14/2024 did not reveal evidence of bony metastatic disease    Chemistry serology on 7/26/2024  Calcium 16.7  Potassium 3.3  Creatinine 1.4  Alkaline phosphatase 61  Total protein 6.7  Albumin 3.9  PTH 10.2 (15-65)  PTH related peptide-pending  Phosphorus 3.4  Magnesium on 7/26/2024 was 2.0> 1.7> 1.2> 1.4 today, 7/28/2024    CBC on 7/26/2024 a WBC of 7.2, hemoglobin 13.6 and a platelet count of 213,000 with slight increase in monocytes of 10.1, but otherwise normal differential    Hematology consultation requested due to PTH independent hypercalcemia        Past Medical History:    Past Medical History:   Diagnosis Date

## 2024-07-28 NOTE — PLAN OF CARE
Problem: Discharge Planning  Goal: Discharge to home or other facility with appropriate resources  7/28/2024 0511 by Maryjane Sales, RN  Outcome: Progressing  7/28/2024 0510 by Maryjane Sales RN  Outcome: Progressing     Problem: Safety - Adult  Goal: Free from fall injury  7/28/2024 0511 by Maryjane Sales, RN  Outcome: Progressing  7/28/2024 0510 by Maryjane Sales, RN  Outcome: Progressing

## 2024-07-29 LAB
1,25(OH)2D3 SERPL-MCNC: 92.1 PG/ML (ref 19.9–79.3)
ALBUMIN SERPL-MCNC: 3.3 G/DL (ref 3.5–5.2)
ALP SERPL-CCNC: 52 U/L (ref 35–104)
ALT SERPL-CCNC: 14 U/L (ref 5–33)
ANION GAP SERPL CALCULATED.3IONS-SCNC: 10 MMOL/L (ref 7–19)
ANION GAP SERPL CALCULATED.3IONS-SCNC: 11 MMOL/L (ref 7–19)
ANION GAP SERPL CALCULATED.3IONS-SCNC: 12 MMOL/L (ref 7–19)
AST SERPL-CCNC: 19 U/L (ref 5–32)
B2 MICROGLOB SERPL-MCNC: 5.5 MG/L
BASOPHILS # BLD: 0 K/UL (ref 0–0.2)
BASOPHILS NFR BLD: 0.5 % (ref 0–1)
BILIRUB SERPL-MCNC: 0.3 MG/DL (ref 0.2–1.2)
BUN SERPL-MCNC: 28 MG/DL (ref 8–23)
BUN SERPL-MCNC: 30 MG/DL (ref 8–23)
BUN SERPL-MCNC: 30 MG/DL (ref 8–23)
CA-I BLD-SCNC: 1.39 MMOL/L (ref 1.12–1.32)
CALCIUM SERPL-MCNC: 10.2 MG/DL (ref 8.8–10.2)
CALCIUM SERPL-MCNC: 10.4 MG/DL (ref 8.8–10.2)
CALCIUM SERPL-MCNC: 10.5 MG/DL (ref 8.8–10.2)
CANCER AG125 SERPL-ACNC: 19 U/ML (ref 0–38)
CANCER AG27-29 SERPL-ACNC: 16.5 U/ML
CHLORIDE SERPL-SCNC: 103 MMOL/L (ref 98–111)
CHLORIDE SERPL-SCNC: 104 MMOL/L (ref 98–111)
CHLORIDE SERPL-SCNC: 104 MMOL/L (ref 98–111)
CO2 SERPL-SCNC: 23 MMOL/L (ref 22–29)
CO2 SERPL-SCNC: 26 MMOL/L (ref 22–29)
CO2 SERPL-SCNC: 26 MMOL/L (ref 22–29)
CREAT SERPL-MCNC: 1.5 MG/DL (ref 0.5–0.9)
CREAT SERPL-MCNC: 1.7 MG/DL (ref 0.5–0.9)
CREAT SERPL-MCNC: 1.7 MG/DL (ref 0.5–0.9)
EKG P AXIS: NORMAL DEGREES
EKG P-R INTERVAL: NORMAL MS
EKG Q-T INTERVAL: 374 MS
EKG QRS DURATION: 90 MS
EKG QTC CALCULATION (BAZETT): 424 MS
EKG T AXIS: -162 DEGREES
EOSINOPHIL # BLD: 0.3 K/UL (ref 0–0.6)
EOSINOPHIL NFR BLD: 3.1 % (ref 0–5)
ERYTHROCYTE [DISTWIDTH] IN BLOOD BY AUTOMATED COUNT: 14 % (ref 11.5–14.5)
GLUCOSE SERPL-MCNC: 101 MG/DL (ref 74–109)
GLUCOSE SERPL-MCNC: 91 MG/DL (ref 74–109)
GLUCOSE SERPL-MCNC: 91 MG/DL (ref 74–109)
HCT VFR BLD AUTO: 33.5 % (ref 37–47)
HGB BLD-MCNC: 10.8 G/DL (ref 12–16)
IMM GRANULOCYTES # BLD: 0 K/UL
LYMPHOCYTES # BLD: 3 K/UL (ref 1.1–4.5)
LYMPHOCYTES NFR BLD: 35.5 % (ref 20–40)
MAGNESIUM SERPL-MCNC: 1.7 MG/DL (ref 1.6–2.4)
MCH RBC QN AUTO: 30.9 PG (ref 27–31)
MCHC RBC AUTO-ENTMCNC: 32.2 G/DL (ref 33–37)
MCV RBC AUTO: 96 FL (ref 81–99)
MONOCYTES # BLD: 0.8 K/UL (ref 0–0.9)
MONOCYTES NFR BLD: 9.4 % (ref 0–10)
NEUTROPHILS # BLD: 4.3 K/UL (ref 1.5–7.5)
NEUTS SEG NFR BLD: 51.1 % (ref 50–65)
PLATELET # BLD AUTO: 180 K/UL (ref 130–400)
PMV BLD AUTO: 8.6 FL (ref 9.4–12.3)
POTASSIUM SERPL-SCNC: 3.9 MMOL/L (ref 3.5–5)
POTASSIUM SERPL-SCNC: 3.9 MMOL/L (ref 3.5–5)
POTASSIUM SERPL-SCNC: 4 MMOL/L (ref 3.5–5)
POTASSIUM SERPL-SCNC: 4.2 MMOL/L (ref 3.5–5)
PROT SERPL-MCNC: 5.4 G/DL (ref 6.6–8.7)
RBC # BLD AUTO: 3.49 M/UL (ref 4.2–5.4)
SODIUM SERPL-SCNC: 138 MMOL/L (ref 136–145)
SODIUM SERPL-SCNC: 140 MMOL/L (ref 136–145)
SODIUM SERPL-SCNC: 141 MMOL/L (ref 136–145)
WBC # BLD AUTO: 8.3 K/UL (ref 4.8–10.8)

## 2024-07-29 PROCEDURE — 36415 COLL VENOUS BLD VENIPUNCTURE: CPT

## 2024-07-29 PROCEDURE — 97116 GAIT TRAINING THERAPY: CPT

## 2024-07-29 PROCEDURE — 2709999900 HC NON-CHARGEABLE SUPPLY

## 2024-07-29 PROCEDURE — 99232 SBSQ HOSP IP/OBS MODERATE 35: CPT | Performed by: INTERNAL MEDICINE

## 2024-07-29 PROCEDURE — 6370000000 HC RX 637 (ALT 250 FOR IP)

## 2024-07-29 PROCEDURE — 76937 US GUIDE VASCULAR ACCESS: CPT

## 2024-07-29 PROCEDURE — 6360000002 HC RX W HCPCS: Performed by: INTERNAL MEDICINE

## 2024-07-29 PROCEDURE — 97165 OT EVAL LOW COMPLEX 30 MIN: CPT

## 2024-07-29 PROCEDURE — 93010 ELECTROCARDIOGRAM REPORT: CPT | Performed by: INTERNAL MEDICINE

## 2024-07-29 PROCEDURE — 05HY33Z INSERTION OF INFUSION DEVICE INTO UPPER VEIN, PERCUTANEOUS APPROACH: ICD-10-PCS | Performed by: INTERNAL MEDICINE

## 2024-07-29 PROCEDURE — 6370000000 HC RX 637 (ALT 250 FOR IP): Performed by: INTERNAL MEDICINE

## 2024-07-29 PROCEDURE — 80053 COMPREHEN METABOLIC PANEL: CPT

## 2024-07-29 PROCEDURE — 97535 SELF CARE MNGMENT TRAINING: CPT

## 2024-07-29 PROCEDURE — 83735 ASSAY OF MAGNESIUM: CPT

## 2024-07-29 PROCEDURE — 94760 N-INVAS EAR/PLS OXIMETRY 1: CPT

## 2024-07-29 PROCEDURE — 82330 ASSAY OF CALCIUM: CPT

## 2024-07-29 PROCEDURE — 6370000000 HC RX 637 (ALT 250 FOR IP): Performed by: HOSPITALIST

## 2024-07-29 PROCEDURE — 2580000003 HC RX 258: Performed by: NURSE PRACTITIONER

## 2024-07-29 PROCEDURE — 97161 PT EVAL LOW COMPLEX 20 MIN: CPT

## 2024-07-29 PROCEDURE — 85025 COMPLETE CBC W/AUTO DIFF WBC: CPT

## 2024-07-29 PROCEDURE — 6360000002 HC RX W HCPCS

## 2024-07-29 PROCEDURE — 2580000003 HC RX 258

## 2024-07-29 PROCEDURE — 1200000000 HC SEMI PRIVATE

## 2024-07-29 RX ORDER — SPIRONOLACTONE 25 MG/1
25 TABLET ORAL DAILY
Status: DISCONTINUED | OUTPATIENT
Start: 2024-07-29 | End: 2024-07-30

## 2024-07-29 RX ORDER — PANTOPRAZOLE SODIUM 40 MG/1
40 TABLET, DELAYED RELEASE ORAL
Status: DISCONTINUED | OUTPATIENT
Start: 2024-07-30 | End: 2024-08-01 | Stop reason: HOSPADM

## 2024-07-29 RX ORDER — CALCITONIN SALMON 200 [USP'U]/ML
1 INJECTION, SOLUTION INTRAMUSCULAR; SUBCUTANEOUS EVERY 12 HOURS
Status: COMPLETED | OUTPATIENT
Start: 2024-07-29 | End: 2024-07-30

## 2024-07-29 RX ORDER — SODIUM CHLORIDE 450 MG/100ML
INJECTION, SOLUTION INTRAVENOUS CONTINUOUS
Status: DISCONTINUED | OUTPATIENT
Start: 2024-07-29 | End: 2024-08-01

## 2024-07-29 RX ADMIN — Medication 5 MG: at 20:37

## 2024-07-29 RX ADMIN — SODIUM CHLORIDE: 4.5 INJECTION, SOLUTION INTRAVENOUS at 08:03

## 2024-07-29 RX ADMIN — POTASSIUM CHLORIDE AND SODIUM CHLORIDE: 450; 150 INJECTION, SOLUTION INTRAVENOUS at 00:54

## 2024-07-29 RX ADMIN — SODIUM CHLORIDE, PRESERVATIVE FREE 10 ML: 5 INJECTION INTRAVENOUS at 08:04

## 2024-07-29 RX ADMIN — ENOXAPARIN SODIUM 30 MG: 100 INJECTION SUBCUTANEOUS at 08:02

## 2024-07-29 RX ADMIN — SPIRONOLACTONE 25 MG: 25 TABLET ORAL at 16:29

## 2024-07-29 RX ADMIN — CALCITONIN SALMON 78 UNITS: 200 INJECTION, SOLUTION INTRAMUSCULAR; SUBCUTANEOUS at 16:29

## 2024-07-29 RX ADMIN — ASPIRIN 81 MG: 81 TABLET, COATED ORAL at 08:02

## 2024-07-29 RX ADMIN — METOPROLOL SUCCINATE 50 MG: 50 TABLET, EXTENDED RELEASE ORAL at 08:02

## 2024-07-29 RX ADMIN — METOPROLOL SUCCINATE 50 MG: 50 TABLET, EXTENDED RELEASE ORAL at 20:37

## 2024-07-29 NOTE — PROGRESS NOTES
Nephrology ([facility]) Progress Note    Patient:  Stephanie Coleman  YOB: 1945  Date of Service: 7/29/2024  MRN: [id number]   Acct: [id number]   Primary Care Physician: Rosalino Martínez PA  Advance Directive: Full Code  Admit Date: 7/26/2024       Hospital Day: 3  Referring Provider: Chris Palafox MD    Patient independently seen and examined, Chart, Consults, Notes, Operative notes, Labs, Cardiology, and Radiology studies reviewed as available.    Chief complaint: Abnormal labs.    Subjective:  Stephanie Coleman is a 79 y.o. female for whom we were consulted for evaluation and treatment of severe hypercalcemia.  Patient also has previous history of mild to moderate hypercalcemia, hypertension, coronary disease, varicose veins of lower extremities, fibromyalgia and chronic kidney disease stage IIIb.  She presented to the emergency room with profound weakness, lack of energy and tiredness.  She does not follow nephrology on regular basis.  Routine lab data is indicated her serum calcium was 16.7 mg.  Her serum creatinine was 1.4 mg.  She takes Pepto-Bismol and vitamin D supplement.  She denies any use of Tums or Rolaids.  Hospital course remarkable for treatment with IV fluid/bisphosphonate and has improvement of serum calcium level.  Her serum PTH intact was low.    This morning she feels well but still has some fatigue, lack of energy.    Allergies:  Demerol, Fenofibrate, Nitroglycerin, Reclast [zoledronic acid], Abatacept, Ipratropium bromide hfa, Levofloxacin, Acetaminophen, Buspirone hcl, Colchicine, Hydrocodone, Maltose, Other, Robaxin [methocarbamol], Buspirone, Celexa [citalopram hydrobromide], Cozaar [losartan potassium], Dye [barium-containing compounds], Dye [iodides], Esomeprazole magnesium, Evolocumab, Hydrocodone-acetaminophen, Lasix [furosemide], Lisinopril, Losartan, Methotrexate, Mirtazapine, Neurontin [gabapentin], Plaquenil [hydroxychloroquine sulfate],  hours.    Urine Culture Recent : No results for input(s): \"LABURIN\" in the last 720 hours.    Radiology reports as per the Radiologist  Radiology: CT ABDOMEN PELVIS WO CONTRAST Additional Contrast? Oral    Result Date: 7/27/2024  EXAM:  NONCONTRAST CT OF THE ABDOMEN AND PELVIS.  HISTORY:  Rule out focal lymphadenopathy.  COMPARISON:  04/04/2019  TECHNIQUE:  Contiguous axial images at five mm intervals were obtained from lung bases through the pelvis.  Oral contrast was given.  Coronal reformats were reviewed.  FINDINGS:  The study is limited without contrast.  CHEST:  LUNG BASES: The lung bases show no lobar consolidation or effusion.  HEART: The heart size is within normal limits.  ABDOMEN:  Evaluation of the soft tissue organs is limited without contrast.  LIVER:  Noncontrast images of the liver show no solid mass lesion or intrahepatic ductal dilatation.  BILIARY:  The gallbladder is absent.  The common bile duct is normal.  SPLEEN:  The spleen is unremarkable.  PANCREAS:  The pancreas shows no mass lesion or peripancreatic inflammation.  ADRENAL GLANDS:  The adrenal glands are normal.  RENAL:  The kidneys show no hydronephrosis or nephrolithiasis.  There are no obstructing ureteral stones.  No solid mass lesions are identified.  AORTA:  Moderate aortic calcifications are seen.  No aneurysm is identified.  RETROPERITONEUM:   There is no retroperitoneal or mesenteric adenopathy.  BOWEL:  The stomach and small bowel is opacified.  The bowel is unopacified.  There is no obstruction or inflammatory change.  There is no free fluid or free air.   No significant inflammatory changes are seen.    The appendix is not seen on the study.  No fluid or inflammation in the right lower quadrant.  No significant diverticulosis.  PELVIS:  BLADDER:  The bladder is not well distended which limits evaluation..  GENITOURINARY STRUCTURES:  The uterus and ovaries are not seen.  OSSEOUS STRUCTURES:  The osseous structures are normal for

## 2024-07-29 NOTE — PROGRESS NOTES
Physical Therapy  Facility/Department: [facility] 3 MATHEUS/VAS/MED  Physical Therapy Initial Assessment    Name: Stephanie Coleman  : 1945  MRN: [id number]  Date of Service: 2024    Discharge Recommendations:  Continue to assess pending progress, Patient would benefit from continued therapy after discharge (pt'S  STATES THEY ARE SEEKING SNF FOR REHAB)          Patient Diagnosis(es): The primary encounter diagnosis was RAFFY (acute kidney injury) (HCC). Diagnoses of Hypercalcemia and Ambulatory dysfunction were also pertinent to this visit.  Past Medical History:  has a past medical history of Acid reflux, Anxiety, Arthritis, Back pain, CAD (coronary artery disease), CAD (coronary artery disease), Carotid artery occlusion, Chronic venous insufficiency, Cough, Fibromyalgia, Goiter, Gout, Head ache, Heart murmur, History of colon polyps, History of colon polyps, Hoarseness, Hypercholesterolemia, Hyperlipidemia, Hypertension, Insomnia, Irregular heart beat, Itching, Muscle cramp, Neck pain, Osteoarthritis, Osteoporosis, Palpitations, Peripheral vascular disease (HCC), RA (rheumatoid arthritis) (HCC), Rash, Rectal bleeding, SOB (shortness of breath), Sore throat, Stage 2 chronic kidney disease, Tympanic membrane perforation, and Varicose veins.  Past Surgical History:  has a past surgical history that includes Appendectomy; Hysterectomy (); cervical fusion ();  section (); Dilation and curettage of uterus; Tubal ligation; Cholecystectomy (); knee surgery (); Varicose vein surgery ( 2013 [facility]); Varicose vein surgery (3-  [facility]); cyst removal; Breast surgery (); Percutaneous Transluminal Coronary Angio; Coronary angioplasty with stent (); Cardiac catheterization (9/10/15  JDT); Colonoscopy (); Colonoscopy (01/10/2012); Colonoscopy (2014); Upper gastrointestinal endoscopy (?); Upper gastrointestinal endoscopy (01/10/2012); Upper gastrointestinal endoscopy

## 2024-07-29 NOTE — CARE COORDINATION
Spoke with pts daughter Mame and spouse at bedside about dc planning. They  want referral t [facility].  Referral called to [facility]uri.  Electronically signed by Serina Ventura RN on 7/29/2024 at 1:47 PM

## 2024-07-29 NOTE — CARE COORDINATION
[facility]  Notification of Admission Decision      [] Patient has been accepted for admit to [facility] on :       Please write discharge orders and summary prior to discharge.    [] Patient acceptance to Rehab pending the following :    [] Eval in progress       [x] Patient determined to be ineligible for services at [facility] because : No qualifying diagnosis for acute Rehab.       We recommend you consider : SNF       Thank you for your referral, we appreciate you. If you have any questions, please feel   free to contact me at [phone].  Electronically Signed by Tressa Kennedy, Admissions Coordinator 7/29/2024 1:29 PM

## 2024-07-29 NOTE — PROGRESS NOTES
Father     High Blood Pressure Father     Esophageal Cancer Father     Colon Polyps Father     Crohn's Disease Brother     Colon Cancer Neg Hx     Liver Cancer Neg Hx     Liver Disease Neg Hx     Rectal Cancer Neg Hx     Stomach Cancer Neg Hx        Physical Exam -BP (!) 135/46   Pulse 78   Temp 97.9 °F (36.6 °C)   Resp 20   Ht 1.702 m (5' 7\")   Wt 71.2 kg (157 lb)   SpO2 96%   BMI 24.59 kg/m²   Constitutional: Oriented to person, place, and time. No acute distress.    Cardiovascular: Normal rate, regular rhythm, normal heart sounds and intact distal pulses. Exam reveals no gallop, murmurs or friction rub.   Pulmonary/Chest: Effort normal and breath sounds normal. No respiratory distress. No wheezes.   Abdominal: Soft. Bowel sounds are normal. No organomegally or masses. No tenderness. There is no rebound and no guarding.   Musculoskeletal: Normal range of motion. No edema or tenderness.   Lymphadenopathy: No cervical, axillary or inguinal lymphadenopathy.   Neurological: Alert and oriented to person, place, and time. Cranial nerves are intact. Neurological exam is nonfocal  Skin: Skin is warm and dry. No rash noted. No erythema. No pallor.       DATA:    Labs:  General Labs:  CBC:   Lab Results   Component Value Date    WBC 8.3 07/29/2024    HGB 10.8 (L) 07/29/2024    HCT 33.5 (L) 07/29/2024    MCV 96.0 07/29/2024     07/29/2024       CMP:    Lab Results   Component Value Date/Time     07/29/2024 02:11 AM     07/29/2024 02:11 AM    K 4.0 07/29/2024 02:11 AM    K 3.9 07/29/2024 02:11 AM    K 3.9 07/29/2024 02:11 AM     07/29/2024 02:11 AM     07/29/2024 02:11 AM    CO2 26 07/29/2024 02:11 AM    CO2 26 07/29/2024 02:11 AM    BUN 30 07/29/2024 02:11 AM    BUN 30 07/29/2024 02:11 AM    CREATININE 1.7 07/29/2024 02:11 AM    CREATININE 1.7 07/29/2024 02:11 AM    GLUCOSE 91 07/29/2024 02:11 AM    GLUCOSE 91 07/29/2024 02:11 AM    CALCIUM 10.5 07/29/2024 02:11 AM    CALCIUM 10.4  7/14/2024  EXAMINATION:  SINGLE VIEW CHEST.  HISTORY:  Fall.  COMPARISON:  09/15/2020.  FINDINGS:  Lines/Devices:  Cervical fusion hardware.  Median sternotomy and bypass changes.  Cardiomediastinal silhouette:  Normal in size.  Lungs:  No edema.  No infiltrate or consolidation.  Pleural Effusion:  None.  Osseous structures:  No significant abnormality.       No acute cardiopulmonary abnormality.     ______________________________________ Electronically signed by: TOSHA WATSON M.D. Date:     07/14/2024 Time:    18:53     CT PELVIS WO CONTRAST Additional Contrast? None    Result Date: 7/14/2024  EXAM:  CT OF THE ABDOMEN PELVIS WITHOUT CONTRAST  History:  Pelvic trauma and right hip pain.  Technique:  Multiplanar CT images through the pelvis were obtained without the administration of IV contrast  FINDINGS:  No dilated loops of bowel.  No bladder wall thickening.  No perirectal inflammation.  Uterus is absent.  No acute fracture or dislocation.  No suspicious lytic or blastic osseous lesions.  Chondrocalcinosis is seen within the pubic symphysis and bilateral hip joints.  Mild narrowing of bilateral hip joints.  Degenerative changes within the lower lumbar spine.      Impression:  No acute osseous abnormalities  All CT scans are performed using dose optimization techniques as appropriate to the performed exam and include at least one of the following: Automated exposure control, adjustment of the mA and/or kV according to size, and the use of iterative reconstruction technique.  ______________________________________ Electronically signed by: CRISYS CHAU M.D. Date:     07/14/2024 Time:    18:46     CT LUMBAR SPINE WO CONTRAST    Result Date: 7/14/2024  EXAM:  CT OF THE LUMBAR SPINE WITHOUT CONTRAST  History:  Lower back trauma.  Technique:  Multiplanar CT images through the lumbar spine were obtained without the administration of IV contrast  FINDINGS:  Atherosclerotic vascular calcifications.  Status post

## 2024-07-29 NOTE — PROGRESS NOTES
[facility]      [facility] Note    Patient:  Stephanie Coleman  YOB: 1945  Date of Service: 7/29/2024  MRN: [id number]   Acct: [id number]   Primary Care Physician: Rosalino Martínez PA  Advance Directive: Full Code  Admit Date: 7/26/2024       Hospital Day: 3    Portions of this note have been copied forward, however, updated to reflect the most current clinical status of this patient.     CHIEF COMPLAINT generalized weakness    SUBJECTIVE: Reports feeling stronger today      CUMULATIVE HOSPITAL COURSE:   Patient is a 79-year-old with past medical history of hyperlipidemia, hypertension, gout, osteoarthritis, chronic venous insufficiency, fibromyalgia, history of coronary artery disease with CABG and CKD stage IIIA.  Patient presented to the emergency room with generalized weakness falls and unsteady gait.  This has been going on for about 3 weeks.  She has not had any injury or loss of consciousness.  Also reported decreased intake due to nausea vomiting and constipation.  Her family reports increased difficulty more so in the evening.  She was seen in the ED on 7/14/2024 for a fall.  No lab was done and she was discharged home.  ER eval showed a sodium 138 potassium 3.3 BUN 53 creatinine 1.4.  Serum calcium 16.7 ionized 2.14 glucose 116 liver panel normal, PTH 10.2 and vitamin D 92.1, CBC unremarkable urinalysis negative.  Patient was admitted to hospitalist service and oncology and nephrology were both consulted.  IV fluids were initiated and she was given Zometa for her hypercalcemia.  She has also had CT scans of the neck chest abdomen and pelvis to rule out occult lymphadenopathy which will have all been negative .  She was given IV Bumex her enhance results on lowering the calcium however her renal function continued to worsen.  This was held and saline continues.          Objective:   VITALS:  /72   Pulse 74   Temp 97 °F (36.1 °C) (Temporal)   Resp 18   Ht 1.702 m (5' 7\")   Wt   141   K 3.4* 3.5 3.9  3.9  4.0    102 104  104   CO2 26 29 26  26   BUN 35* 33* 30*  30*   CREATININE 1.7* 1.6* 1.7*  1.7*   GLUCOSE 96 108 91  91     Recent Labs     07/27/24  0146 07/28/24  0211 07/29/24  0211   AST 20 22 19   ALT 15 14 14   BILITOT 0.3 0.2 0.3   ALKPHOS 57 51 52     Troponin T: No results for input(s): \"TROPONINI\" in the last 72 hours.  Pro-BNP: No results for input(s): \"BNP\" in the last 72 hours.  INR: No results for input(s): \"INR\" in the last 72 hours.  UA:  Recent Labs     07/26/24  1252   COLORU YELLOW   PHUR 5.5   CLARITYU Clear   LEUKOCYTESUR Negative   UROBILINOGEN 0.2   BILIRUBINUR Negative   BLOODU Negative   GLUCOSEU Negative     A1C: No results for input(s): \"LABA1C\" in the last 72 hours.  ABG:No results for input(s): \"PHART\", \"AGB3KTW\", \"PO2ART\", \"IUT6GUI\", \"BEART\", \"HGBAE\", \"U5HFYVXL\", \"CARBOXHGBART\" in the last 72 hours.    RAD:   CT ABDOMEN PELVIS WO CONTRAST Additional Contrast? Oral    Result Date: 7/27/2024  EXAM:  NONCONTRAST CT OF THE ABDOMEN AND PELVIS.  HISTORY:  Rule out focal lymphadenopathy.  COMPARISON:  04/04/2019  TECHNIQUE:  Contiguous axial images at five mm intervals were obtained from lung bases through the pelvis.  Oral contrast was given.  Coronal reformats were reviewed.  FINDINGS:  The study is limited without contrast.  CHEST:  LUNG BASES: The lung bases show no lobar consolidation or effusion.  HEART: The heart size is within normal limits.  ABDOMEN:  Evaluation of the soft tissue organs is limited without contrast.  LIVER:  Noncontrast images of the liver show no solid mass lesion or intrahepatic ductal dilatation.  BILIARY:  The gallbladder is absent.  The common bile duct is normal.  SPLEEN:  The spleen is unremarkable.  PANCREAS:  The pancreas shows no mass lesion or peripancreatic inflammation.  ADRENAL GLANDS:  The adrenal glands are normal.  RENAL:  The kidneys show no hydronephrosis or nephrolithiasis.  There are no obstructing

## 2024-07-29 NOTE — PROGRESS NOTES
Occupational Therapy Initial Assessment  Date: 2024   Patient Name: Stephanie Coleman  MRN: [id number]     : 1945    Date of Service: 2024    Discharge Recommendations:  Patient would benefit from continued therapy after discharge       Assessment   Treatment Diagnosis: Hypercalcemia, fatigue, weakness, unsteady gait, falls in the last few weeks  REQUIRES OT FOLLOW-UP: Yes  Activity Tolerance  Activity Tolerance: Patient Tolerated treatment well           Patient Diagnosis(es): The primary encounter diagnosis was RAFFY (acute kidney injury) (HCC). Diagnoses of Hypercalcemia and Ambulatory dysfunction were also pertinent to this visit.   has a past medical history of Acid reflux, Anxiety, Arthritis, Back pain, CAD (coronary artery disease), CAD (coronary artery disease), Carotid artery occlusion, Chronic venous insufficiency, Cough, Fibromyalgia, Goiter, Gout, Head ache, Heart murmur, History of colon polyps, History of colon polyps, Hoarseness, Hypercholesterolemia, Hyperlipidemia, Hypertension, Insomnia, Irregular heart beat, Itching, Muscle cramp, Neck pain, Osteoarthritis, Osteoporosis, Palpitations, Peripheral vascular disease (HCC), RA (rheumatoid arthritis) (HCC), Rash, Rectal bleeding, SOB (shortness of breath), Sore throat, Stage 2 chronic kidney disease, Tympanic membrane perforation, and Varicose veins.   has a past surgical history that includes Appendectomy; Hysterectomy (); cervical fusion ();  section (); Dilation and curettage of uterus; Tubal ligation; Cholecystectomy (); knee surgery (); Varicose vein surgery ( 2013 [facility]); Varicose vein surgery (3-  [facility]); cyst removal; Breast surgery (); Percutaneous Transluminal Coronary Angio; Coronary angioplasty with stent (); Cardiac catheterization (9/10/15  JDT); Colonoscopy (); Colonoscopy (01/10/2012); Colonoscopy (2014); Upper gastrointestinal endoscopy (?); Upper gastrointestinal

## 2024-07-29 NOTE — CONSULTS
[facility] Vascular Access Team:  Consult Note    Patient: Stephanie Coleman  YOB: 1945   MRN: [id number]  Room: [address] [place] [address]     Attending Physician: Chris Palafox MD  Ordering Physician: Chris Palafox MD    Diagnosis:   Hypercalcemia [E83.52]  RAFFY (acute kidney injury) (HCC) [N17.9]  Ambulatory dysfunction [R26.2]    Active LDAs:  Peripheral IV Left;Anterior Forearm (Active)   Number of days:        Peripheral IV 07/29/24 Proximal;Right;Anterior Forearm (Active)   Number of days: 0       Reason for Consult:  [facility] vascular access team consulted for placement of Ultrasound Guided Peripheral IV.    Indication(s):  Stephanie Coleman is a 79 y.o. female admitted to [address] [place] [address] for Hypercalcemia. Stephanie Coleman currently has no IV access following the failure of a previously inserted ultrasound guided IV in her left forearm.     Findings:  Successfully placed a 22 gauge ultrasound guided peripheral IV in the patient's right forearm with two total attempts and no complications. Patient tolerated well. Initial attempt was unsuccessful in the patient's left forearm.     Notified primary RN.     Impression/Plan:  1. Ultrasound-Guided Peripheral IV is ready to be used    Thank you for your time and consult.     Electronically Signed By: Caleb Lemons RN on 7/29/2024 at 2:18 PM

## 2024-07-30 LAB
ALBUMIN SERPL-MCNC: 3.5 G/DL (ref 3.5–5.2)
ALP SERPL-CCNC: 60 U/L (ref 35–104)
ALT SERPL-CCNC: 15 U/L (ref 5–33)
ANION GAP SERPL CALCULATED.3IONS-SCNC: 11 MMOL/L (ref 7–19)
ANION GAP SERPL CALCULATED.3IONS-SCNC: 12 MMOL/L (ref 7–19)
ANION GAP SERPL CALCULATED.3IONS-SCNC: 13 MMOL/L (ref 7–19)
AST SERPL-CCNC: 19 U/L (ref 5–32)
BASOPHILS # BLD: 0 K/UL (ref 0–0.2)
BASOPHILS NFR BLD: 0.3 % (ref 0–1)
BILIRUB SERPL-MCNC: 0.3 MG/DL (ref 0.2–1.2)
BUN SERPL-MCNC: 26 MG/DL (ref 8–23)
BUN SERPL-MCNC: 27 MG/DL (ref 8–23)
BUN SERPL-MCNC: 27 MG/DL (ref 8–23)
CA-I BLD-SCNC: 1.31 MMOL/L (ref 1.12–1.32)
CALCIUM SERPL-MCNC: 9.7 MG/DL (ref 8.8–10.2)
CALCIUM SERPL-MCNC: 9.7 MG/DL (ref 8.8–10.2)
CALCIUM SERPL-MCNC: 9.8 MG/DL (ref 8.8–10.2)
CHLORIDE SERPL-SCNC: 103 MMOL/L (ref 98–111)
CHLORIDE SERPL-SCNC: 105 MMOL/L (ref 98–111)
CHLORIDE SERPL-SCNC: 105 MMOL/L (ref 98–111)
CO2 SERPL-SCNC: 23 MMOL/L (ref 22–29)
CO2 SERPL-SCNC: 23 MMOL/L (ref 22–29)
CO2 SERPL-SCNC: 24 MMOL/L (ref 22–29)
CREAT SERPL-MCNC: 1.4 MG/DL (ref 0.5–0.9)
CREAT SERPL-MCNC: 1.5 MG/DL (ref 0.5–0.9)
CREAT SERPL-MCNC: 1.5 MG/DL (ref 0.5–0.9)
EOSINOPHIL # BLD: 0.3 K/UL (ref 0–0.6)
EOSINOPHIL NFR BLD: 3.8 % (ref 0–5)
ERYTHROCYTE [DISTWIDTH] IN BLOOD BY AUTOMATED COUNT: 14.1 % (ref 11.5–14.5)
GLUCOSE SERPL-MCNC: 105 MG/DL (ref 74–109)
GLUCOSE SERPL-MCNC: 106 MG/DL (ref 74–109)
GLUCOSE SERPL-MCNC: 129 MG/DL (ref 74–109)
HCT VFR BLD AUTO: 33.7 % (ref 37–47)
HGB BLD-MCNC: 11.2 G/DL (ref 12–16)
IMM GRANULOCYTES # BLD: 0.1 K/UL
LYMPHOCYTES # BLD: 3.1 K/UL (ref 1.1–4.5)
LYMPHOCYTES NFR BLD: 34.1 % (ref 20–40)
MCH RBC QN AUTO: 32.1 PG (ref 27–31)
MCHC RBC AUTO-ENTMCNC: 33.2 G/DL (ref 33–37)
MCV RBC AUTO: 96.6 FL (ref 81–99)
MONOCYTES # BLD: 0.9 K/UL (ref 0–0.9)
MONOCYTES NFR BLD: 9.7 % (ref 0–10)
NEUTROPHILS # BLD: 4.7 K/UL (ref 1.5–7.5)
NEUTS SEG NFR BLD: 51.5 % (ref 50–65)
PLATELET # BLD AUTO: 207 K/UL (ref 130–400)
PMV BLD AUTO: 9.1 FL (ref 9.4–12.3)
POTASSIUM SERPL-SCNC: 3.6 MMOL/L (ref 3.5–5)
POTASSIUM SERPL-SCNC: 3.7 MMOL/L (ref 3.5–5)
POTASSIUM SERPL-SCNC: 3.9 MMOL/L (ref 3.5–5)
POTASSIUM SERPL-SCNC: 3.9 MMOL/L (ref 3.5–5)
PROT SERPL-MCNC: 5.3 G/DL (ref 6.6–8.7)
RBC # BLD AUTO: 3.49 M/UL (ref 4.2–5.4)
SODIUM SERPL-SCNC: 139 MMOL/L (ref 136–145)
SODIUM SERPL-SCNC: 140 MMOL/L (ref 136–145)
SODIUM SERPL-SCNC: 140 MMOL/L (ref 136–145)
WBC # BLD AUTO: 9 K/UL (ref 4.8–10.8)

## 2024-07-30 PROCEDURE — 97116 GAIT TRAINING THERAPY: CPT

## 2024-07-30 PROCEDURE — 36415 COLL VENOUS BLD VENIPUNCTURE: CPT

## 2024-07-30 PROCEDURE — 2580000003 HC RX 258

## 2024-07-30 PROCEDURE — 6370000000 HC RX 637 (ALT 250 FOR IP): Performed by: HOSPITALIST

## 2024-07-30 PROCEDURE — 99232 SBSQ HOSP IP/OBS MODERATE 35: CPT | Performed by: INTERNAL MEDICINE

## 2024-07-30 PROCEDURE — 82330 ASSAY OF CALCIUM: CPT

## 2024-07-30 PROCEDURE — 6360000002 HC RX W HCPCS

## 2024-07-30 PROCEDURE — 1200000000 HC SEMI PRIVATE

## 2024-07-30 PROCEDURE — 6370000000 HC RX 637 (ALT 250 FOR IP): Performed by: NURSE PRACTITIONER

## 2024-07-30 PROCEDURE — 97110 THERAPEUTIC EXERCISES: CPT

## 2024-07-30 PROCEDURE — 94760 N-INVAS EAR/PLS OXIMETRY 1: CPT

## 2024-07-30 PROCEDURE — 6370000000 HC RX 637 (ALT 250 FOR IP): Performed by: INTERNAL MEDICINE

## 2024-07-30 PROCEDURE — 6360000002 HC RX W HCPCS: Performed by: INTERNAL MEDICINE

## 2024-07-30 PROCEDURE — 80053 COMPREHEN METABOLIC PANEL: CPT

## 2024-07-30 PROCEDURE — 85025 COMPLETE CBC W/AUTO DIFF WBC: CPT

## 2024-07-30 PROCEDURE — 2580000003 HC RX 258: Performed by: NURSE PRACTITIONER

## 2024-07-30 PROCEDURE — 6370000000 HC RX 637 (ALT 250 FOR IP)

## 2024-07-30 RX ADMIN — METOPROLOL SUCCINATE 50 MG: 50 TABLET, EXTENDED RELEASE ORAL at 08:37

## 2024-07-30 RX ADMIN — CALCITONIN SALMON 78 UNITS: 200 INJECTION, SOLUTION INTRAMUSCULAR; SUBCUTANEOUS at 01:44

## 2024-07-30 RX ADMIN — SODIUM CHLORIDE: 4.5 INJECTION, SOLUTION INTRAVENOUS at 10:45

## 2024-07-30 RX ADMIN — METOPROLOL SUCCINATE 50 MG: 50 TABLET, EXTENDED RELEASE ORAL at 20:35

## 2024-07-30 RX ADMIN — SPIRONOLACTONE 25 MG: 25 TABLET ORAL at 08:36

## 2024-07-30 RX ADMIN — SODIUM CHLORIDE, PRESERVATIVE FREE 10 ML: 5 INJECTION INTRAVENOUS at 08:37

## 2024-07-30 RX ADMIN — Medication 5 MG: at 22:59

## 2024-07-30 RX ADMIN — SODIUM CHLORIDE, PRESERVATIVE FREE 10 ML: 5 INJECTION INTRAVENOUS at 20:44

## 2024-07-30 RX ADMIN — ASPIRIN 81 MG: 81 TABLET, COATED ORAL at 08:36

## 2024-07-30 RX ADMIN — PANTOPRAZOLE SODIUM 40 MG: 40 TABLET, DELAYED RELEASE ORAL at 06:26

## 2024-07-30 RX ADMIN — ENOXAPARIN SODIUM 30 MG: 100 INJECTION SUBCUTANEOUS at 08:38

## 2024-07-30 NOTE — CARE COORDINATION
Per Zuri with [facility], they will not likely have a bed until Friday, if they are able to offer.  Will continue to follow.  Electronically signed by Serina Ventura RN on 7/30/2024 at 3:01 PM

## 2024-07-30 NOTE — PROGRESS NOTES
07/30/24 1400   Subjective   Subjective I feel better today.   Pain Assessment   Pain Assessment None - Denies Pain   Cognition   Overall Cognitive Status Exceptions   Following Commands Follows one step commands with repetition   Safety Judgement Decreased awareness of need for safety   Problem Solving Assistance required to generate solutions;Assistance required to implement solutions   Cognition Comment Great difficulty following commands   Orientation   Overall Orientation Status WFL   Vitals   O2 Device None (Room air)   Bed Mobility Training   Bed Mobility Training Yes   Supine to Sit Stand-by assistance   Transfer Training   Transfer Training Yes   Sit to Stand Stand-by assistance   Stand to Sit Minimum assistance  (With constant cues patient does not reach back for chair.)   Gait Training   Gait Training Yes   Gait   Gait Training Yes   Overall Level of Assistance Contact-guard assistance   Distance (ft) 75 Feet   Assistive Device Walker, rolling   PT Exercises   A/AROM Exercises BL LE EX X 10 reps   Patient Education   Education Given To Patient   Education Provided Role of Therapy;Plan of Care;Fall Prevention Strategies   Education Provided Comments Use of call light & staff assist.   Education Method Verbal;Demonstration   Barriers to Learning Cognition   Education Outcome Verbalized understanding;Demonstrated understanding;Continued education needed   Other Specialty Interventions   Other Treatments/Modalities In chair personal alarm attached call light all needs in reach.   Assessment   Activity Tolerance Patient tolerated treatment well;Treatment limited secondary to decreased cognition       Electronically signed by Subhash Powers PTA on 7/30/2024 at 3:05 PM

## 2024-07-30 NOTE — PROGRESS NOTES
[facility]      Progress Note    Patient:  Stephanie Coleman  YOB: 1945  Date of Service: 7/30/2024  MRN: [id number]   Acct: [id number]   Primary Care Physician: Rosalino Martínez PA  Advance Directive: Full Code  Admit Date: 7/26/2024       Hospital Day: 4    Portions of this note have been copied forward, however, updated to reflect the most current clinical status of this patient.     CHIEF COMPLAINT generalized weakness    SUBJECTIVE: Reports feeling stronger today, labs improved.        CUMULATIVE HOSPITAL COURSE:   Patient is a 79-year-old with past medical history of hyperlipidemia, hypertension, gout, osteoarthritis, chronic venous insufficiency, fibromyalgia, history of coronary artery disease with CABG and CKD stage IIIA.  Patient presented to the emergency room with generalized weakness falls and unsteady gait.  This has been going on for about 3 weeks.  She has not had any injury or loss of consciousness.  Also reported decreased intake due to nausea vomiting and constipation.  Her family reports increased difficulty more so in the evening.  She was seen in the ED on 7/14/2024 for a fall.  No lab was done and she was discharged home.  ER eval showed a sodium 138 potassium 3.3 BUN 53 creatinine 1.4.  Serum calcium 16.7 ionized 2.14 glucose 116 liver panel normal, PTH 10.2 and vitamin D 92.1, CBC unremarkable urinalysis negative.  Patient was admitted to hospitalist service and oncology and nephrology were both consulted.  IV fluids were initiated and she was given Zometa for her hypercalcemia.  She has also had CT scans of the neck chest abdomen and pelvis to rule out occult lymphadenopathy which will have all been negative .  She was given IV Bumex her enhance results on lowering the calcium however her renal function continued to worsen.  This was held and saline continues.          Objective:   VITALS:  BP (!) 118/45   Pulse 80   Temp 98.6 °F (37 °C) (Oral)   Resp 18   Ht 1.702  m (5' 7\")   Wt 78.2 kg (172 lb 8 oz)   SpO2 95%   BMI 27.02 kg/m²   24HR INTAKE/OUTPUT:    Intake/Output Summary (Last 24 hours) at 7/30/2024 0742  Last data filed at 7/29/2024 0838  Gross per 24 hour   Intake 240 ml   Output --   Net 240 ml           Physical Exam  Vitals and nursing note reviewed.   Constitutional:       Appearance: Normal appearance.   HENT:      Head: Normocephalic and atraumatic.      Nose: Nose normal.      Mouth/Throat:      Mouth: Mucous membranes are moist.   Eyes:      Extraocular Movements: Extraocular movements intact.   Cardiovascular:      Rate and Rhythm: Normal rate and regular rhythm.      Pulses: Normal pulses.      Heart sounds: Normal heart sounds.   Pulmonary:      Effort: Pulmonary effort is normal.      Breath sounds: Normal breath sounds.   Abdominal:      Palpations: Abdomen is soft.   Musculoskeletal:         General: Normal range of motion.      Cervical back: Normal range of motion.   Skin:     General: Skin is warm and dry.      Coloration: Skin is pale.   Neurological:      General: No focal deficit present.      Mental Status: She is alert.            Medications:      sodium chloride 100 mL/hr at 07/29/24 1256    sodium chloride        spironolactone  25 mg Oral Daily    pantoprazole  40 mg Oral QAM AC    enoxaparin  30 mg SubCUTAneous Daily    [Held by provider] bumetanide  1 mg IntraVENous BID    [Held by provider] olmesartan  10 mg Oral BID    sodium chloride  1,000 mL IntraVENous Once    aspirin  81 mg Oral Daily    metoprolol succinate  50 mg Oral BID    sodium chloride flush  5-40 mL IntraVENous 2 times per day     acetaminophen, melatonin, sodium chloride flush, sodium chloride, potassium chloride **OR** potassium alternative oral replacement **OR** potassium chloride, magnesium sulfate, ondansetron **OR** ondansetron, polyethylene glycol  ADULT DIET; Regular; Low Phosphorus (Less than 1000 mg)     Lab and other Data:     Recent Labs     07/28/24  0211

## 2024-07-30 NOTE — PROGRESS NOTES
ONCOLOGY PROGRESS NOTE     Patient:  Stephanie Coleman  YOB: 1945  Date of Service: 7/30/2024  MRN: [id number]   Primary Care Physician: Rosalino Martínez PA  Advance Directive: Full Code      SUBJECTIVE: Awaiting placement nursing home.  Patient denies new complaint this morning.  Feeling overall much better    HISTORY OF PRESENT ILLNESS:      Stephanie Coleman is an 79-year-old  female who presents to the ED at [facility] for evaluation regarding increasing fatigue and weakness requiring assistance, unsteadiness of gait, frequent falls over the previous few weeks.  Additionally she has had decreased oral intake, constipation and increasing difficulty with her memory.  She was evaluated in the ED 12 days ago after a fall.  CT brain was unremarkable for acute traumatic injury of the brain.  Chemistry was not done at that time.  The  states that she has continued to progressively get worse since that time.    CT scans of the cervical, thoracic, lumbar spine and pelvis 7/14/2024 did not reveal evidence of bony metastatic disease    Chemistry serology on 7/26/2024  Calcium 16.7  Potassium 3.3  Creatinine 1.4  Alkaline phosphatase 61  Total protein 6.7  Albumin 3.9  PTH 10.2 (15-65)  PTH related peptide-pending  Phosphorus 3.4  Magnesium on 7/26/2024 was 2.0> 1.7> 1.2> 1.4 today, 7/28/2024    CBC on 7/26/2024 a WBC of 7.2, hemoglobin 13.6 and a platelet count of 213,000 with slight increase in monocytes of 10.1, but otherwise normal differential    Hematology consultation requested due to PTH independent hypercalcemia        Past Medical History:    Past Medical History:   Diagnosis Date    Acid reflux     Anxiety     Arthritis     Back pain     CAD (coronary artery disease)     CAD (coronary artery disease)     Carotid artery occlusion     Chronic venous insufficiency 11/8/2012    Cough     Fibromyalgia     Goiter     Gout     Head

## 2024-07-30 NOTE — PLAN OF CARE
Problem: Discharge Planning  Goal: Discharge to home or other facility with appropriate resources  7/30/2024 0255 by Jason Omalley, RN  Outcome: Progressing  7/29/2024 1704 by Ludy Wallace, RN  Outcome: Progressing     Problem: Safety - Adult  Goal: Free from fall injury  7/30/2024 0255 by Jason Omalley, RN  Outcome: Progressing  7/29/2024 1704 by Ludy Wallace, RN  Outcome: Progressing

## 2024-07-30 NOTE — PROGRESS NOTES
the last 72 hours.  Sed Rate:  No results for input(s): \"SEDRATE\" in the last 72 hours.    Culture Results:   Blood Culture Recent: No results for input(s): \"BC\" in the last 720 hours.    Urine Culture Recent : No results for input(s): \"LABURIN\" in the last 720 hours.    Radiology reports as per the Radiologist  Radiology: CT ABDOMEN PELVIS WO CONTRAST Additional Contrast? Oral    Result Date: 7/27/2024  EXAM:  NONCONTRAST CT OF THE ABDOMEN AND PELVIS.  HISTORY:  Rule out focal lymphadenopathy.  COMPARISON:  04/04/2019  TECHNIQUE:  Contiguous axial images at five mm intervals were obtained from lung bases through the pelvis.  Oral contrast was given.  Coronal reformats were reviewed.  FINDINGS:  The study is limited without contrast.  CHEST:  LUNG BASES: The lung bases show no lobar consolidation or effusion.  HEART: The heart size is within normal limits.  ABDOMEN:  Evaluation of the soft tissue organs is limited without contrast.  LIVER:  Noncontrast images of the liver show no solid mass lesion or intrahepatic ductal dilatation.  BILIARY:  The gallbladder is absent.  The common bile duct is normal.  SPLEEN:  The spleen is unremarkable.  PANCREAS:  The pancreas shows no mass lesion or peripancreatic inflammation.  ADRENAL GLANDS:  The adrenal glands are normal.  RENAL:  The kidneys show no hydronephrosis or nephrolithiasis.  There are no obstructing ureteral stones.  No solid mass lesions are identified.  AORTA:  Moderate aortic calcifications are seen.  No aneurysm is identified.  RETROPERITONEUM:   There is no retroperitoneal or mesenteric adenopathy.  BOWEL:  The stomach and small bowel is opacified.  The bowel is unopacified.  There is no obstruction or inflammatory change.  There is no free fluid or free air.   No significant inflammatory changes are seen.    The appendix is not seen on the study.  No fluid or inflammation in the right lower quadrant.  No significant diverticulosis.  PELVIS:  BLADDER:  The  tissue finding.   ______________________________________ Electronically signed by: PIA GARCIA M.D. Date:     07/14/2024 Time:    18:53     XR CHEST PORTABLE    Result Date: 7/14/2024  EXAMINATION:  SINGLE VIEW CHEST.  HISTORY:  Fall.  COMPARISON:  09/15/2020.  FINDINGS:  Lines/Devices:  Cervical fusion hardware.  Median sternotomy and bypass changes.  Cardiomediastinal silhouette:  Normal in size.  Lungs:  No edema.  No infiltrate or consolidation.  Pleural Effusion:  None.  Osseous structures:  No significant abnormality.       No acute cardiopulmonary abnormality.     ______________________________________ Electronically signed by: TOSHA WATSON M.D. Date:     07/14/2024 Time:    18:53     CT PELVIS WO CONTRAST Additional Contrast? None    Result Date: 7/14/2024  EXAM:  CT OF THE ABDOMEN PELVIS WITHOUT CONTRAST  History:  Pelvic trauma and right hip pain.  Technique:  Multiplanar CT images through the pelvis were obtained without the administration of IV contrast  FINDINGS:  No dilated loops of bowel.  No bladder wall thickening.  No perirectal inflammation.  Uterus is absent.  No acute fracture or dislocation.  No suspicious lytic or blastic osseous lesions.  Chondrocalcinosis is seen within the pubic symphysis and bilateral hip joints.  Mild narrowing of bilateral hip joints.  Degenerative changes within the lower lumbar spine.      Impression:  No acute osseous abnormalities  All CT scans are performed using dose optimization techniques as appropriate to the performed exam and include at least one of the following: Automated exposure control, adjustment of the mA and/or kV according to size, and the use of iterative reconstruction technique.  ______________________________________ Electronically signed by: CRISSY CHAU M.D. Date:     07/14/2024 Time:    18:46     CT LUMBAR SPINE WO CONTRAST    Result Date: 7/14/2024  EXAM:  CT OF THE LUMBAR SPINE WITHOUT CONTRAST  History:  Lower back trauma.

## 2024-07-31 LAB
ALBUMIN SERPL-MCNC: 3.3 G/DL (ref 3.5–5.2)
ALP SERPL-CCNC: 56 U/L (ref 35–104)
ALT SERPL-CCNC: 19 U/L (ref 5–33)
ANION GAP SERPL CALCULATED.3IONS-SCNC: 11 MMOL/L (ref 7–19)
ANION GAP SERPL CALCULATED.3IONS-SCNC: 14 MMOL/L (ref 7–19)
AST SERPL-CCNC: 21 U/L (ref 5–32)
BASOPHILS # BLD: 0 K/UL (ref 0–0.2)
BASOPHILS NFR BLD: 0.4 % (ref 0–1)
BILIRUB SERPL-MCNC: 0.2 MG/DL (ref 0.2–1.2)
BUN SERPL-MCNC: 20 MG/DL (ref 8–23)
BUN SERPL-MCNC: 22 MG/DL (ref 8–23)
CA-I BLD-SCNC: 1.19 MMOL/L (ref 1.12–1.32)
CALCIUM SERPL-MCNC: 9.3 MG/DL (ref 8.8–10.2)
CALCIUM SERPL-MCNC: 9.4 MG/DL (ref 8.8–10.2)
CHLORIDE SERPL-SCNC: 106 MMOL/L (ref 98–111)
CHLORIDE SERPL-SCNC: 109 MMOL/L (ref 98–111)
CO2 SERPL-SCNC: 21 MMOL/L (ref 22–29)
CO2 SERPL-SCNC: 23 MMOL/L (ref 22–29)
CREAT SERPL-MCNC: 1.4 MG/DL (ref 0.5–0.9)
CREAT SERPL-MCNC: 1.5 MG/DL (ref 0.5–0.9)
EOSINOPHIL # BLD: 0.3 K/UL (ref 0–0.6)
EOSINOPHIL NFR BLD: 3.4 % (ref 0–5)
ERYTHROCYTE [DISTWIDTH] IN BLOOD BY AUTOMATED COUNT: 14.1 % (ref 11.5–14.5)
GLUCOSE SERPL-MCNC: 107 MG/DL (ref 74–109)
GLUCOSE SERPL-MCNC: 109 MG/DL (ref 74–109)
HCT VFR BLD AUTO: 33.7 % (ref 37–47)
HGB BLD-MCNC: 10.9 G/DL (ref 12–16)
IMM GRANULOCYTES # BLD: 0 K/UL
LYMPHOCYTES # BLD: 3.2 K/UL (ref 1.1–4.5)
LYMPHOCYTES NFR BLD: 35.3 % (ref 20–40)
MCH RBC QN AUTO: 30.9 PG (ref 27–31)
MCHC RBC AUTO-ENTMCNC: 32.3 G/DL (ref 33–37)
MCV RBC AUTO: 95.5 FL (ref 81–99)
MONOCYTES # BLD: 0.9 K/UL (ref 0–0.9)
MONOCYTES NFR BLD: 9.8 % (ref 0–10)
NEUTROPHILS # BLD: 4.5 K/UL (ref 1.5–7.5)
NEUTS SEG NFR BLD: 50.8 % (ref 50–65)
PLATELET # BLD AUTO: 205 K/UL (ref 130–400)
PMV BLD AUTO: 8.8 FL (ref 9.4–12.3)
POTASSIUM SERPL-SCNC: 3.4 MMOL/L (ref 3.5–5)
POTASSIUM SERPL-SCNC: 4.1 MMOL/L (ref 3.5–5)
PROT SERPL-MCNC: 5.5 G/DL (ref 6.6–8.7)
PTH RELATED PROT SERPL-SCNC: 3 PMOL/L (ref 0–3.4)
RBC # BLD AUTO: 3.53 M/UL (ref 4.2–5.4)
SODIUM SERPL-SCNC: 140 MMOL/L (ref 136–145)
SODIUM SERPL-SCNC: 144 MMOL/L (ref 136–145)
WBC # BLD AUTO: 9 K/UL (ref 4.8–10.8)

## 2024-07-31 PROCEDURE — 6370000000 HC RX 637 (ALT 250 FOR IP): Performed by: HOSPITALIST

## 2024-07-31 PROCEDURE — 05HC33Z INSERTION OF INFUSION DEVICE INTO LEFT BASILIC VEIN, PERCUTANEOUS APPROACH: ICD-10-PCS | Performed by: INTERNAL MEDICINE

## 2024-07-31 PROCEDURE — 36410 VNPNXR 3YR/> PHY/QHP DX/THER: CPT

## 2024-07-31 PROCEDURE — 6360000002 HC RX W HCPCS

## 2024-07-31 PROCEDURE — 6370000000 HC RX 637 (ALT 250 FOR IP)

## 2024-07-31 PROCEDURE — 76937 US GUIDE VASCULAR ACCESS: CPT

## 2024-07-31 PROCEDURE — 82330 ASSAY OF CALCIUM: CPT

## 2024-07-31 PROCEDURE — 85025 COMPLETE CBC W/AUTO DIFF WBC: CPT

## 2024-07-31 PROCEDURE — 80053 COMPREHEN METABOLIC PANEL: CPT

## 2024-07-31 PROCEDURE — 1200000000 HC SEMI PRIVATE

## 2024-07-31 PROCEDURE — 6370000000 HC RX 637 (ALT 250 FOR IP): Performed by: NURSE PRACTITIONER

## 2024-07-31 PROCEDURE — 36415 COLL VENOUS BLD VENIPUNCTURE: CPT

## 2024-07-31 PROCEDURE — 2580000003 HC RX 258: Performed by: NURSE PRACTITIONER

## 2024-07-31 PROCEDURE — C1751 CATH, INF, PER/CENT/MIDLINE: HCPCS

## 2024-07-31 PROCEDURE — 99232 SBSQ HOSP IP/OBS MODERATE 35: CPT | Performed by: INTERNAL MEDICINE

## 2024-07-31 RX ORDER — SODIUM CHLORIDE 9 MG/ML
INJECTION, SOLUTION INTRAVENOUS PRN
Status: DISCONTINUED | OUTPATIENT
Start: 2024-07-31 | End: 2024-08-01 | Stop reason: HOSPADM

## 2024-07-31 RX ORDER — ENOXAPARIN SODIUM 100 MG/ML
40 INJECTION SUBCUTANEOUS DAILY
Status: DISCONTINUED | OUTPATIENT
Start: 2024-08-01 | End: 2024-08-01 | Stop reason: HOSPADM

## 2024-07-31 RX ORDER — SODIUM CHLORIDE 0.9 % (FLUSH) 0.9 %
5-40 SYRINGE (ML) INJECTION EVERY 12 HOURS SCHEDULED
Status: DISCONTINUED | OUTPATIENT
Start: 2024-07-31 | End: 2024-08-01 | Stop reason: HOSPADM

## 2024-07-31 RX ORDER — SODIUM CHLORIDE 0.9 % (FLUSH) 0.9 %
5-40 SYRINGE (ML) INJECTION PRN
Status: DISCONTINUED | OUTPATIENT
Start: 2024-07-31 | End: 2024-08-01 | Stop reason: HOSPADM

## 2024-07-31 RX ADMIN — Medication 5 MG: at 23:04

## 2024-07-31 RX ADMIN — METOPROLOL SUCCINATE 50 MG: 50 TABLET, EXTENDED RELEASE ORAL at 08:07

## 2024-07-31 RX ADMIN — ASPIRIN 81 MG: 81 TABLET, COATED ORAL at 08:07

## 2024-07-31 RX ADMIN — PANTOPRAZOLE SODIUM 40 MG: 40 TABLET, DELAYED RELEASE ORAL at 05:07

## 2024-07-31 RX ADMIN — POTASSIUM CHLORIDE 40 MEQ: 1500 TABLET, EXTENDED RELEASE ORAL at 05:36

## 2024-07-31 RX ADMIN — SODIUM CHLORIDE: 4.5 INJECTION, SOLUTION INTRAVENOUS at 12:53

## 2024-07-31 RX ADMIN — ENOXAPARIN SODIUM 30 MG: 100 INJECTION SUBCUTANEOUS at 08:09

## 2024-07-31 RX ADMIN — SODIUM CHLORIDE: 4.5 INJECTION, SOLUTION INTRAVENOUS at 18:49

## 2024-07-31 RX ADMIN — METOPROLOL SUCCINATE 50 MG: 50 TABLET, EXTENDED RELEASE ORAL at 20:18

## 2024-07-31 NOTE — PLAN OF CARE
Problem: Discharge Planning  Goal: Discharge to home or other facility with appropriate resources  Outcome: Progressing     Problem: Safety - Adult  Goal: Free from fall injury  Outcome: Progressing     Problem: Pain  Goal: Verbalizes/displays adequate comfort level or baseline comfort level  Outcome: Progressing     Problem: Skin/Tissue Integrity  Goal: Absence of new skin breakdown  Description: 1.  Monitor for areas of redness and/or skin breakdown  2.  Assess vascular access sites hourly  3.  Every 4-6 hours minimum:  Change oxygen saturation probe site  4.  Every 4-6 hours:  If on nasal continuous positive airway pressure, respiratory therapy assess nares and determine need for appliance change or resting period.  Outcome: Progressing     Problem: Confusion  Goal: Confusion, delirium, dementia, or psychosis is improved or at baseline  Description: INTERVENTIONS:  1. Assess for possible contributors to thought disturbance, including medications, impaired vision or hearing, underlying metabolic abnormalities, dehydration, psychiatric diagnoses, and notify attending LIP  2. [place] high risk fall precautions, as indicated  3. Provide frequent short contacts to provide reality reorientation, refocusing and direction  4. Decrease environmental stimuli, including noise as appropriate  5. Monitor and intervene to maintain adequate nutrition, hydration, elimination, sleep and activity  6. If unable to ensure safety without constant attention obtain sitter and review sitter guidelines with assigned personnel  7. Initiate Psychosocial CNS and Spiritual Care consult, as indicated  Outcome: Progressing

## 2024-07-31 NOTE — PLAN OF CARE
Problem: Discharge Planning  Goal: Discharge to home or other facility with appropriate resources  7/30/2024 2139 by Blanka Garibay, RN  Outcome: Progressing  Flowsheets (Taken 7/30/2024 2119)  Discharge to home or other facility with appropriate resources:   Identify barriers to discharge with patient and caregiver   Arrange for needed discharge resources and transportation as appropriate   Identify discharge learning needs (meds, wound care, etc)   Arrange for interpreters to assist at discharge as needed   Refer to discharge planning if patient needs post-hospital services based on physician order or complex needs related to functional status, cognitive ability or social support system  7/30/2024 0950 by Ludy Wallace, RN  Outcome: Progressing  Flowsheets (Taken 7/30/2024 0948)  Discharge to home or other facility with appropriate resources:   Identify barriers to discharge with patient and caregiver   Arrange for needed discharge resources and transportation as appropriate     Problem: Safety - Adult  Goal: Free from fall injury  7/30/2024 2139 by Blanka Garibay RN  Outcome: Progressing  7/30/2024 0950 by Ludy Wallace, RN  Outcome: Progressing     Problem: Pain  Goal: Verbalizes/displays adequate comfort level or baseline comfort level  Outcome: Progressing     Problem: Skin/Tissue Integrity  Goal: Absence of new skin breakdown  Description: 1.  Monitor for areas of redness and/or skin breakdown  2.  Assess vascular access sites hourly  3.  Every 4-6 hours minimum:  Change oxygen saturation probe site  4.  Every 4-6 hours:  If on nasal continuous positive airway pressure, respiratory therapy assess nares and determine need for appliance change or resting period.  Outcome: Progressing     Problem: Confusion  Goal: Confusion, delirium, dementia, or psychosis is improved or at baseline  Description: INTERVENTIONS:  1. Assess for possible contributors to thought disturbance, including medications, impaired

## 2024-07-31 NOTE — PROGRESS NOTES
Per daughter Vinayak patient has had increased Paranoia at night. Patient told daughter to \"watch out\" and that people were trying to enter the room. Patient has increased paranoia towards family.    Nurse asked patient if she feels safe here at this time and she stated yes. Nurse practitioner informed, social work informed 1115.

## 2024-07-31 NOTE — CARE COORDINATION
Have spoken with pt and spouse several times today.  Also spoke with Zuri at [facility].  They will have a bed available on Thursday.  Spouse really wants pt to go to [facility] for short term rehab.  Spouse also concerned about pt and nancy intermittent confusion.  Will follow.  Can dc Thursday.  [facility]   P  [phone]  Electronically signed by Serina Ventura RN on 7/31/2024 at 12:20 PM

## 2024-07-31 NOTE — PROGRESS NOTES
Fibromyalgia     Goiter     Gout     Head ache     headaches    Heart murmur     History of colon polyps     History of colon polyps     Hoarseness     Hypercholesterolemia     Hyperlipidemia     Hypertension     Insomnia     Irregular heart beat     Itching     Muscle cramp     Neck pain     Osteoarthritis     Osteoporosis     Palpitations     Peripheral vascular disease (HCC) 2023    RA (rheumatoid arthritis) (HCC)     Rash     Rectal bleeding     SOB (shortness of breath)     Sore throat     Stage 2 chronic kidney disease 2023    Tympanic membrane perforation     Varicose veins 2012         Past Surgical History:    Past Surgical History:   Procedure Laterality Date    APPENDECTOMY      BREAST SURGERY      CARDIAC CATHETERIZATION  9/10/15  JDT    EF 50%    CARDIAC CATHETERIZATION  09/10/2020    LM disease- sent for bypass     CERVICAL FUSION       SECTION  1968    x 1    CHOLECYSTECTOMY  2012    [place]OS[place]  2005    [place]    COLONOSCOPY  01/10/2012    Dr LUJAN    COLONOSCOPY  2014    Dr. Hooper    COLONOSCOPY  2018    Dr. Zamora in [place], [place]- Polyps-benign,internal Hemorrhoids, per patient.    COLONOSCOPY N/A 2021    Dr Lowry, Int hemorrhoids Grade 2 otherwise a NORMAL exam, no recall due to age    CORONARY ANGIOPLASTY WITH STENT PLACEMENT      Dr LUJAN    CORONARY ARTERY BYPASS GRAFT N/A 2020    CORONARY ARTERY BYPASS GRAFT X2 WITH LEFT INTERNAL MAMMARY ARTERY WITH OPEN VEIN HARVESTING WITH PERFUSION TRANSESOPHAGEAL ECHOCARDIOGRAM performed by Ramon [facility] MD at [facility] OR    CYST REMOVAL      DILATION AND CURETTAGE OF UTERUS      HYSTERECTOMY (CERVIX STATUS UNKNOWN)      partial    KNEE SURGERY      left knee surgery    PTCA      stent    TUBAL LIGATION      UPPER GASTROINTESTINAL ENDOSCOPY  ?    UPPER GASTROINTESTINAL ENDOSCOPY  01/10/2012    Dr LUJAN    UPPER GASTROINTESTINAL ENDOSCOPY  2014    Dr. Hooper    UPPER GASTROINTESTINAL  [Losartan Potassium] Palpitations    Dye [Barium-Containing Compounds] Nausea Only    Dye [Iodides] Itching     CONTRAST DYE, CT DYE, IV CONTRAST     Esomeprazole Magnesium Nausea And Vomiting    Evolocumab Other (See Comments)      Irritability  Repatha SureClick    Hydrocodone-Acetaminophen Other (See Comments)     Shaking, attacks nervous system    Lasix [Furosemide] Nausea Only    Lisinopril Other (See Comments)     Muscle cramps in legs    Losartan Nausea Only and Other (See Comments)     Nausea and dizziness    Methotrexate Other (See Comments)     Dizzy and fainted    Mirtazapine Nausea Only and Other (See Comments)     Tremor, nausea, headache    Neurontin [Gabapentin] Nausea Only and Other (See Comments)     Dizziness      Plaquenil [Hydroxychloroquine Sulfate] Other (See Comments)     nervousness    Prilosec [Omeprazole] Nausea And Vomiting    Trazodone      Muscle stiffness         Social History:    Social History     Socioeconomic History    Marital status:      Spouse name: None    Number of children: None    Years of education: None    Highest education level: None   Tobacco Use    Smoking status: Never    Smokeless tobacco: Never   Vaping Use    Vaping Use: Never used   Substance and Sexual Activity    Alcohol use: No    Drug use: No     Social Determinants of Health     Food Insecurity: No Food Insecurity (7/26/2024)    Hunger Vital Sign     Worried About Running Out of Food in the Last Year: Never true     Ran Out of Food in the Last Year: Never true   Transportation Needs: No Transportation Needs (7/26/2024)    PRAPARE - Transportation     Lack of Transportation (Medical): No     Lack of Transportation (Non-Medical): No   Housing Stability: Low Risk  (7/26/2024)    Housing Stability Vital Sign     Unable to Pay for Housing in the Last Year: No     Number of Places Lived in the Last Year: 1     Unstable Housing in the Last Year: No         Family History:   Family History   Problem Relation

## 2024-07-31 NOTE — PROGRESS NOTES
ists      Progress Note    Patient:  Stephanie Coleman  YOB: 1945  Date of Service: 7/31/2024  MRN: [id number]   Acct: [id number]   Primary Care Physician: Rosalino Martínez PA  Advance Directive: Full Code  Admit Date: 7/26/2024       Hospital Day: 5    Portions of this note have been copied forward, however, updated to reflect the most current clinical status of this patient.     CHIEF COMPLAINT generalized weakness    SUBJECTIVE: Reports feeling stronger today, labs improved.        CUMULATIVE HOSPITAL COURSE:   Patient is a 79-year-old with past medical history of hyperlipidemia, hypertension, gout, osteoarthritis, chronic venous insufficiency, fibromyalgia, history of coronary artery disease with CABG and CKD stage IIIA.  Patient presented to the emergency room with generalized weakness falls and unsteady gait.  This has been going on for about 3 weeks.  She has not had any injury or loss of consciousness.  Also reported decreased intake due to nausea vomiting and constipation.  Her family reports increased difficulty more so in the evening.  She was seen in the ED on 7/14/2024 for a fall.  No lab was done and she was discharged home.  ER eval showed a sodium 138 potassium 3.3 BUN 53 creatinine 1.4.  Serum calcium 16.7 ionized 2.14 glucose 116 liver panel normal, PTH 10.2 and vitamin D 92.1, CBC unremarkable urinalysis negative.  Patient was admitted to hospitalist service and oncology and nephrology were both consulted.  IV fluids were initiated and she was given Zometa for her hypercalcemia.  She has also had CT scans of the neck chest abdomen and pelvis to rule out occult lymphadenopathy which will have all been negative .  She was given IV Bumex her enhance results on lowering the calcium however her renal function continued to worsen.  This was held and saline continues.  Family has reported some late day memory issues and some paranoia at times.  She is established with a

## 2024-07-31 NOTE — CONSULTS
[facility] Vascular Access Team:  Midline Insertion Procedure Note      Patient: Stephanie Coleman  YOB: 1945   MRN: [id number]  Room: [address] [place] [address]     Attending Physician - Chris Palafox MD  Ordering Physician - Munira Llanos APRN    Diagnosis:   Hypercalcemia [E83.52]  RAFFY (acute kidney injury) (HCC) [N17.9]  Ambulatory dysfunction [R26.2]       Procedure(s):   Insertion of a 4.5 [place]occan Single Lumen Power Midline    Indication(s):   No IV access (multiple failed ultrasound guided IVs)    Date of Procedure: 7/31/2024   Start Time: 1200  End Time: 1230    Performed by: Caleb Lemons RN    Anesthesia: Local Injection3 mL 1% Lidocaine without Epinephrine    Estimated Blood Loss (mL): Minimal    Complications: None       Findings:   1. Successful insertion of Midline.  2. Midline is ready to be used. Please change tubing prior to using the new Midline. Make sure to label, date and use alcohol caps on new tubing and alcohol caps on unused ports.   3. Labs may be drawn from Midline. Please make patient a unit draw.       Detailed Description of Procedure:   Informed consent was obtained for the procedure. Risks including infection, thrombosis, nerve injury, arterial puncture, bleeding, and adverse drug reaction were discussed.     The Left Basilic vein was visualized using the ultrasound and deemed a suitable vessel. The area was prepped with Chlorhexidine and draped in sterile fashion using full max barrier draping. The area was anesthetized with 3 cc's of 1% Lidocaine. The vein was accessed using US guidance. The guidewire was advanced through the needle to secure the vein. The needle was removed and a peel-a-way Sheath was placed over the guidewire. Guidewire was removed with tip intact. The 4.5 [place] Single Lumen Power Midline was trimmed at 11 cm and inserted into the Sheath. The peel-a-way sheath was removed. Approximately 0 cm exposed. All lumens had brisk blood return and was flushed with 10 cc  of NS. The Midline was secured using an adhesive securement device. 2x2 gauze and a Tegaderm was placed over the securement device and insertion site. Dressing was dated and initialed with external measurement marked.     Patient did tolerate procedure well.        Electronically signed by Caleb Lemons RN on 7/31/2024 at 12:40 PM

## 2024-07-31 NOTE — PROGRESS NOTES
Automatic Dose Adjustment of                Subcutaneous Anticoagulant for Prophylaxis    Stephanie Coleman is a 79 y.o. female.     Recent Labs     07/30/24  1401 07/31/24  0301   CREATININE 1.4* 1.4*       Estimated Creatinine Clearance: 34 mL/min (A) (based on SCr of 1.4 mg/dL (H)).    Weight:  Wt Readings from Last 1 Encounters:   07/30/24 78.2 kg (172 lb 8 oz)           Pharmacy has adjusted subcutaneous anticoagulant for prophylaxis to Enoxaparin 40 mg SC daily based on the patient's weight and estimated CrCl per [facility] policy.               Electronically signed by Lamont Andujar RPH on 7/31/2024 at 1:26 PM

## 2024-07-31 NOTE — PROGRESS NOTES
Nephrology ([facility]) Progress Note    Patient:  Stephanie Coleman  YOB: 1945  Date of Service: 7/31/2024  MRN: [id number]   Acct: [id number]   Primary Care Physician: Rosalino Martínez PA  Advance Directive: Full Code  Admit Date: 7/26/2024       Hospital Day: 5  Referring Provider: Chris Palafox MD    Patient independently seen and examined, Chart, Consults, Notes, Operative notes, Labs, Cardiology, and Radiology studies reviewed as available.    Chief complaint: Abnormal labs.    Subjective:  Stephanie Coleman is a 79 y.o. female for whom we were consulted for evaluation and treatment of severe hypercalcemia.  Patient also has previous history of mild to moderate hypercalcemia, hypertension, coronary disease, varicose veins of lower extremities, fibromyalgia and chronic kidney disease stage IIIb.  She presented to the emergency room with profound weakness, lack of energy and tiredness.  She does not follow nephrology on regular basis.  Routine lab data is indicated her serum calcium was 16.7 mg.  Her serum creatinine was 1.4 mg.  She takes Pepto-Bismol and vitamin D supplement.  She denies any use of Tums or Rolaids.  Hospital course remarkable for treatment with IV fluid/bisphosphonate and has improvement of serum calcium level.  Her serum PTH intact was low.    This morning patient feels well.  She denies any shortness of breath.  She is hoping to go to SNF for long-term rehabilitation, pending acceptance at the SNF    Allergies:  Demerol, Fenofibrate, Nitroglycerin, Reclast [zoledronic acid], Abatacept, Ipratropium bromide hfa, Levofloxacin, Acetaminophen, Buspirone hcl, Colchicine, Hydrocodone, Maltose, Other, Robaxin [methocarbamol], Buspirone, Celexa [citalopram hydrobromide], Cozaar [losartan potassium], Dye [barium-containing compounds], Dye [iodides], Esomeprazole magnesium, Evolocumab, Hydrocodone-acetaminophen, Lasix [furosemide], Lisinopril, Losartan,  ______________________________________ Electronically signed by: CRISSY CHAU M.D. Date:     07/14/2024 Time:    18:38     CT HEAD WO CONTRAST    Result Date: 7/14/2024  EXAMINATION:  CT OF THE HEAD WITHOUT CONTRAST.  HISTORY:Fall, head injury.  COMPARISON:06/23/2023.  TECHNIQUE:  CT acquisition images were obtained from skull base through vertex.  Multiplanar reformats were obtained.  No intravenous contrast.  FINDINGS: Acute intracranial hemorrhage, mass effect, or midline shift:  None.  Ventricles and sulci:  Congruent in size and configuration with atrophy.  Gray white differentiation:  Maintained.  Periventricular white matter low density.  Basilar cisterns patent.  The posterior fossa is without significant abnormality.  No acute extra-axial collection.  The extracalvarial soft tissues are without significant abnormalities.  Mastoids and paranasal sinuses: Aerated.  Carotid atherosclerosis:  Present.  Osseous structures:  Intact without acute fracture.      No acute intracranial abnormality.  Mild age-related atrophy atrophy with trace chronic microvascular ischemic change and intracranial atherosclerosis.  All CT scans are performed using dose optimization techniques as appropriate to the performed exam and include at least one of the following: Automated exposure control, adjustment of the mA and/or kV according to size, and the use of iterative reconstruction technique.  ______________________________________ Electronically signed by: TOSHA WATSON M.D. Date:     07/14/2024 Time:    18:35     CT CERVICAL SPINE WO CONTRAST    Result Date: 7/14/2024  EXAM:  CT OF THE CERVICAL SPINE WITHOUT CONTRAST  History:  Neck trauma.  Technique:  Multiplanar CT images through the cervical spine were obtained without the administration of IV contrast  FINDINGS:  The visualized upper lungs are clear.  Visualized airway remains patent.  Mild diffuse thyroid enlargement.  No acute fracture or subluxation of the

## 2024-07-31 NOTE — CARE COORDINATION
07/31/24 1503   IMM Letter   IMM Letter given to Patient/Family/Significant other/Guardian/POA/by: letter explained to pt and spouse and signed by pt. Cesar MILESCM   IMM Letter date given: 07/31/24   IMM Letter time given: 1250     Important Message from Medicare letter explained and provided to patient by Serina Ventura RN CM. All questions answered. Patient voiced understanding. Copy placed in soft chart.

## 2024-08-01 ENCOUNTER — TELEPHONE (OUTPATIENT)
Dept: NEUROLOGY | Age: 79
End: 2024-08-01

## 2024-08-01 VITALS
HEIGHT: 67 IN | WEIGHT: 172.5 LBS | RESPIRATION RATE: 16 BRPM | OXYGEN SATURATION: 99 % | DIASTOLIC BLOOD PRESSURE: 55 MMHG | TEMPERATURE: 97.9 F | BODY MASS INDEX: 27.07 KG/M2 | HEART RATE: 69 BPM | SYSTOLIC BLOOD PRESSURE: 100 MMHG

## 2024-08-01 DIAGNOSIS — K21.9 GASTROESOPHAGEAL REFLUX DISEASE, UNSPECIFIED WHETHER ESOPHAGITIS PRESENT: Primary | ICD-10-CM

## 2024-08-01 LAB
ALBUMIN SERPL-MCNC: 3.4 G/DL (ref 3.5–5.2)
ALP SERPL-CCNC: 52 U/L (ref 35–104)
ALT SERPL-CCNC: 20 U/L (ref 5–33)
ANION GAP SERPL CALCULATED.3IONS-SCNC: 11 MMOL/L (ref 7–19)
ANION GAP SERPL CALCULATED.3IONS-SCNC: 12 MMOL/L (ref 7–19)
AST SERPL-CCNC: 21 U/L (ref 5–32)
BASOPHILS # BLD: 0.1 K/UL (ref 0–0.2)
BASOPHILS NFR BLD: 0.6 % (ref 0–1)
BILIRUB SERPL-MCNC: 0.3 MG/DL (ref 0.2–1.2)
BUN SERPL-MCNC: 21 MG/DL (ref 8–23)
BUN SERPL-MCNC: 21 MG/DL (ref 8–23)
CA-I BLD-SCNC: 1.2 MMOL/L (ref 1.12–1.32)
CALCIUM SERPL-MCNC: 8.7 MG/DL (ref 8.8–10.2)
CALCIUM SERPL-MCNC: 8.8 MG/DL (ref 8.8–10.2)
CHLORIDE SERPL-SCNC: 106 MMOL/L (ref 98–111)
CHLORIDE SERPL-SCNC: 107 MMOL/L (ref 98–111)
CO2 SERPL-SCNC: 22 MMOL/L (ref 22–29)
CO2 SERPL-SCNC: 23 MMOL/L (ref 22–29)
CREAT SERPL-MCNC: 1.4 MG/DL (ref 0.5–0.9)
CREAT SERPL-MCNC: 1.5 MG/DL (ref 0.5–0.9)
DEPRECATED KAPPA LC FREE/LAMBDA SER: 1.05 {RATIO} (ref 0.26–1.65)
EOSINOPHIL # BLD: 0.2 K/UL (ref 0–0.6)
EOSINOPHIL NFR BLD: 2.6 % (ref 0–5)
ERYTHROCYTE [DISTWIDTH] IN BLOOD BY AUTOMATED COUNT: 14.3 % (ref 11.5–14.5)
GLUCOSE SERPL-MCNC: 111 MG/DL (ref 74–109)
GLUCOSE SERPL-MCNC: 94 MG/DL (ref 74–109)
HCT VFR BLD AUTO: 31 % (ref 37–47)
HGB BLD-MCNC: 10.2 G/DL (ref 12–16)
IMM GRANULOCYTES # BLD: 0 K/UL
KAPPA LC FREE SER-MCNC: 12.49 MG/L (ref 3.3–19.4)
LAMBDA LC FREE SERPL-MCNC: 11.87 MG/L (ref 5.71–26.3)
LYMPHOCYTES # BLD: 3 K/UL (ref 1.1–4.5)
LYMPHOCYTES NFR BLD: 33.5 % (ref 20–40)
MAGNESIUM SERPL-MCNC: 1.3 MG/DL (ref 1.6–2.4)
MAGNESIUM SERPL-MCNC: 2 MG/DL (ref 1.6–2.4)
MCH RBC QN AUTO: 30.9 PG (ref 27–31)
MCHC RBC AUTO-ENTMCNC: 32.9 G/DL (ref 33–37)
MCV RBC AUTO: 93.9 FL (ref 81–99)
MONOCYTES # BLD: 0.8 K/UL (ref 0–0.9)
MONOCYTES NFR BLD: 8.6 % (ref 0–10)
NEUTROPHILS # BLD: 4.9 K/UL (ref 1.5–7.5)
NEUTS SEG NFR BLD: 54.3 % (ref 50–65)
PLATELET # BLD AUTO: 191 K/UL (ref 130–400)
PMV BLD AUTO: 8.7 FL (ref 9.4–12.3)
POTASSIUM SERPL-SCNC: 3.5 MMOL/L (ref 3.5–5)
POTASSIUM SERPL-SCNC: 3.7 MMOL/L (ref 3.5–5)
PROT SERPL-MCNC: 5.4 G/DL (ref 6.6–8.7)
RBC # BLD AUTO: 3.3 M/UL (ref 4.2–5.4)
SODIUM SERPL-SCNC: 140 MMOL/L (ref 136–145)
SODIUM SERPL-SCNC: 141 MMOL/L (ref 136–145)
WBC # BLD AUTO: 9.1 K/UL (ref 4.8–10.8)

## 2024-08-01 PROCEDURE — 85025 COMPLETE CBC W/AUTO DIFF WBC: CPT

## 2024-08-01 PROCEDURE — 83735 ASSAY OF MAGNESIUM: CPT

## 2024-08-01 PROCEDURE — 92523 SPEECH SOUND LANG COMPREHEN: CPT

## 2024-08-01 PROCEDURE — 82330 ASSAY OF CALCIUM: CPT

## 2024-08-01 PROCEDURE — 6370000000 HC RX 637 (ALT 250 FOR IP): Performed by: NURSE PRACTITIONER

## 2024-08-01 PROCEDURE — 2580000003 HC RX 258: Performed by: NURSE PRACTITIONER

## 2024-08-01 PROCEDURE — 6360000002 HC RX W HCPCS

## 2024-08-01 PROCEDURE — 6370000000 HC RX 637 (ALT 250 FOR IP)

## 2024-08-01 PROCEDURE — 94760 N-INVAS EAR/PLS OXIMETRY 1: CPT

## 2024-08-01 PROCEDURE — 82164 ANGIOTENSIN I ENZYME TEST: CPT

## 2024-08-01 PROCEDURE — 99232 SBSQ HOSP IP/OBS MODERATE 35: CPT | Performed by: INTERNAL MEDICINE

## 2024-08-01 PROCEDURE — 80053 COMPREHEN METABOLIC PANEL: CPT

## 2024-08-01 RX ORDER — ASPIRIN 81 MG/1
81 TABLET ORAL DAILY
Qty: 30 TABLET | Refills: 0 | Status: SHIPPED | OUTPATIENT
Start: 2024-08-01 | End: 2024-08-31

## 2024-08-01 RX ORDER — METOPROLOL SUCCINATE 50 MG/1
50 TABLET, EXTENDED RELEASE ORAL 2 TIMES DAILY
Qty: 60 TABLET | Refills: 0 | Status: SHIPPED | OUTPATIENT
Start: 2024-08-01 | End: 2024-08-31

## 2024-08-01 RX ORDER — MAGNESIUM SULFATE IN WATER 40 MG/ML
2000 INJECTION, SOLUTION INTRAVENOUS PRN
Status: DISCONTINUED | OUTPATIENT
Start: 2024-08-01 | End: 2024-08-01 | Stop reason: HOSPADM

## 2024-08-01 RX ORDER — ALLOPURINOL 300 MG/1
300 TABLET ORAL DAILY
Qty: 30 TABLET | Refills: 0 | Status: SHIPPED | OUTPATIENT
Start: 2024-08-01 | End: 2024-08-31

## 2024-08-01 RX ORDER — PANTOPRAZOLE SODIUM 40 MG/1
40 TABLET, DELAYED RELEASE ORAL
Qty: 30 TABLET | Refills: 0 | Status: SHIPPED | OUTPATIENT
Start: 2024-08-02 | End: 2024-09-01

## 2024-08-01 RX ORDER — MECOBALAMIN 5000 MCG
5 TABLET,DISINTEGRATING ORAL NIGHTLY PRN
Qty: 30 TABLET | Refills: 0 | Status: SHIPPED | OUTPATIENT
Start: 2024-08-01 | End: 2024-08-31

## 2024-08-01 RX ADMIN — MAGNESIUM SULFATE HEPTAHYDRATE 2000 MG: 40 INJECTION, SOLUTION INTRAVENOUS at 06:04

## 2024-08-01 RX ADMIN — PANTOPRAZOLE SODIUM 40 MG: 40 TABLET, DELAYED RELEASE ORAL at 05:13

## 2024-08-01 RX ADMIN — ASPIRIN 81 MG: 81 TABLET, COATED ORAL at 09:06

## 2024-08-01 RX ADMIN — METOPROLOL SUCCINATE 50 MG: 50 TABLET, EXTENDED RELEASE ORAL at 09:05

## 2024-08-01 RX ADMIN — SODIUM CHLORIDE: 4.5 INJECTION, SOLUTION INTRAVENOUS at 05:14

## 2024-08-01 RX ADMIN — ENOXAPARIN SODIUM 40 MG: 100 INJECTION SUBCUTANEOUS at 09:06

## 2024-08-01 RX ADMIN — SODIUM CHLORIDE, PRESERVATIVE FREE 10 ML: 5 INJECTION INTRAVENOUS at 09:06

## 2024-08-01 NOTE — PROGRESS NOTES
multilevel bilateral neural foraminal narrowing secondary to uncovertebral and facet hypertrophy.      Impression:  No acute osseous abnormality of the cervical spine  All CT scans are performed using dose optimization techniques as appropriate to the performed exam and include at least one of the following: Automated exposure control, adjustment of the mA and/or kV according to size, and the use of iterative reconstruction technique.  ______________________________________ Electronically signed by: CRISSY CHAU M.D. Date:     07/14/2024 Time:    18:36        IMPRESSION/RECOMMENDATIONS:      Non-PTH related hypercalcemia    Stephanie Coleman is an 79-year-old  female who presents to the ED at [facility] for evaluation regarding increasing fatigue and weakness requiring assistance, unsteadiness of gait, frequent falls over the previous few weeks.  Additionally she has had decreased oral intake and increasing difficulty with her memory.  She was evaluated in the ED 12 days ago after a fall.  CT brain was unremarkable for acute traumatic injury of the brain.  Chemistry was not done at that time.  The  states that she has continued to progressively get worse since that time.    CT scans of the cervical, thoracic, lumbar spine and pelvis 7/14/2024 did not reveal evidence of bony metastatic disease    Chemistry serology on 7/26/2024   Calcium 16.7  Potassium 3.3  Creatinine 1.4  Alkaline phosphatase 61  Total protein 6.7  Albumin 3.9  PTH 10.2 (15-65)  1, 25-hydroxy vitamin D = 92 (H)  25 hydroxyvitamin D = 50  PTH related peptide-3.0  Phosphorus 3.4  Magnesium 2.0  IgA 136, IgG 508, IgM 25  Beta-2 microglobulin-5.5  SPEP-in process  Kappa 12.54, lambda 11.87, K/L ratio 1.05  CEA 0.7,   CA 27-29 = 16  -23  CA 19-9 = 9    TSH 0.83    CBC on 7/26/2024 a WBC of 7.2, hemoglobin 13.6 and a platelet count of 213,000 with slight increase in monocytes of 10.1, but otherwise normal differential    She had a  Lalit HOFFMAN PA-C [facility] HEM ONC [id number] [place]       Elvira Molina MD    08/01/24  6:26 AM

## 2024-08-01 NOTE — PLAN OF CARE
Problem: Discharge Planning  Goal: Discharge to home or other facility with appropriate resources  7/31/2024 2233 by Tra Diaz RN  Outcome: Progressing  7/31/2024 1751 by Janny Palomares RN  Outcome: Progressing     Problem: Safety - Adult  Goal: Free from fall injury  7/31/2024 2233 by Tra Diaz RN  Outcome: Progressing  7/31/2024 1751 by Janny Palomares RN  Outcome: Progressing     Problem: Pain  Goal: Verbalizes/displays adequate comfort level or baseline comfort level  7/31/2024 2233 by Tra Diaz RN  Outcome: Progressing  7/31/2024 1751 by Janny Palomares RN  Outcome: Progressing     Problem: Skin/Tissue Integrity  Goal: Absence of new skin breakdown  Description: 1.  Monitor for areas of redness and/or skin breakdown  2.  Assess vascular access sites hourly  3.  Every 4-6 hours minimum:  Change oxygen saturation probe site  4.  Every 4-6 hours:  If on nasal continuous positive airway pressure, respiratory therapy assess nares and determine need for appliance change or resting period.  7/31/2024 2233 by Tra Diaz RN  Outcome: Progressing  7/31/2024 1751 by Janny Palomares RN  Outcome: Progressing     Problem: Confusion  Goal: Confusion, delirium, dementia, or psychosis is improved or at baseline  Description: INTERVENTIONS:  1. Assess for possible contributors to thought disturbance, including medications, impaired vision or hearing, underlying metabolic abnormalities, dehydration, psychiatric diagnoses, and notify attending LIP  2. [place] high risk fall precautions, as indicated  3. Provide frequent short contacts to provide reality reorientation, refocusing and direction  4. Decrease environmental stimuli, including noise as appropriate  5. Monitor and intervene to maintain adequate nutrition, hydration, elimination, sleep and activity  6. If unable to ensure safety without constant attention obtain sitter and review sitter guidelines with assigned personnel  7. Initiate

## 2024-08-01 NOTE — PROGRESS NOTES
Spoke with [facility] Neurologist Mili who advised that they will call the patient with an appointment date and time.  Mili stated that she could not see the discharge summary dated for today that states the appointment is needed due to the chart being marked for merge    Advised Munira MONTGOMERY of the same.

## 2024-08-01 NOTE — PROGRESS NOTES
Mirtazapine, Neurontin [gabapentin], Plaquenil [hydroxychloroquine sulfate], Prilosec [omeprazole], and Trazodone    Medicines:  Current Facility-Administered Medications   Medication Dose Route Frequency Provider Last Rate Last Admin    magnesium sulfate 2000 mg in 50 mL IVPB premix  2,000 mg IntraVENous PRN Munira Llanos APRN - CNP        sodium chloride flush 0.9 % injection 5-40 mL  5-40 mL IntraVENous 2 times per day Munira Llanos APRN - CNP   10 mL at 08/01/24 0906    sodium chloride flush 0.9 % injection 5-40 mL  5-40 mL IntraVENous PRN Munira Llanos APRN - CNP        0.9 % sodium chloride infusion   IntraVENous PRN Munira Llanos APRN - CNP        enoxaparin (LOVENOX) injection 40 mg  40 mg SubCUTAneous Daily Kacie Cifuentes APRN - CNP   40 mg at 08/01/24 0906    pantoprazole (PROTONIX) tablet 40 mg  40 mg Oral QAM AC Munira Llanos APRN - CNP   40 mg at 08/01/24 0513    acetaminophen (TYLENOL) tablet 325 mg  325 mg Oral Q4H PRN Herbert Reynolds MD   325 mg at 07/28/24 2217    [Held by provider] bumetanide (BUMEX) injection 1 mg  1 mg IntraVENous BID Kevon Pruitt MD   1 mg at 07/27/24 1824    [Held by provider] olmesartan (BENICAR) tablet 10 mg (Patient Supplied)  10 mg Oral BID Keerthi Gavin APRN - CNP        melatonin disintegrating tablet 5 mg  5 mg Oral Nightly PRN Tommie Presley MD   5 mg at 07/31/24 2304    sodium chloride 0.9 % bolus 1,000 mL  1,000 mL IntraVENous Once Kacie Cifuentes APRN - CNP        aspirin EC tablet 81 mg  81 mg Oral Daily Kacie Cifuentes APRN - CNP   81 mg at 08/01/24 0906    metoprolol succinate (TOPROL XL) extended release tablet 50 mg  50 mg Oral BID Kacie Cifuentes APRN - CNP   50 mg at 08/01/24 0905    potassium chloride (KLOR-CON M) extended release tablet 40 mEq  40 mEq Oral PRN Kacie Cifuentes APRN - CNP   40 mEq at 07/31/24 0536    Or    potassium bicarb-citric acid (EFFER-K) effervescent tablet 40 mEq  40 mEq Oral Kacie Haro,  Grade 2 otherwise a NORMAL exam, no recall due to age    CORONARY ANGIOPLASTY WITH STENT PLACEMENT  2012    Dr LUJAN    CORONARY ARTERY BYPASS GRAFT N/A 09/11/2020    CORONARY ARTERY BYPASS GRAFT X2 WITH LEFT INTERNAL MAMMARY ARTERY WITH OPEN VEIN HARVESTING WITH PERFUSION TRANSESOPHAGEAL ECHOCARDIOGRAM performed by Ramon Curry MD at [facility] OR    CYST REMOVAL      DILATION AND CURETTAGE OF UTERUS      HYSTERECTOMY (CERVIX STATUS UNKNOWN)  1976    partial    KNEE SURGERY  2011    left knee surgery    PTCA      stent    TUBAL LIGATION      UPPER GASTROINTESTINAL ENDOSCOPY  2009?    UPPER GASTROINTESTINAL ENDOSCOPY  01/10/2012    Dr LUJAN    UPPER GASTROINTESTINAL ENDOSCOPY  05/06/2014    Dr. Hooper    UPPER GASTROINTESTINAL ENDOSCOPY  2018    Dr. Zamora in [facility] per patient.    VARICOSE VEIN SURGERY   01- [facility]    Left Leg Ablation    VARICOSE VEIN SURGERY  3-  [facility]    right leg ablation       Family History  Family History   Problem Relation Age of Onset    Diabetes Mother     Heart Disease Mother     Heart Disease Father     Diabetes Father     High Blood Pressure Father     Esophageal Cancer Father     Colon Polyps Father     Crohn's Disease Brother     Colon Cancer Neg Hx     Liver Cancer Neg Hx     Liver Disease Neg Hx     Rectal Cancer Neg Hx     Stomach Cancer Neg Hx        Social History  Social History     Socioeconomic History    Marital status:      Spouse name: Not on file    Number of children: Not on file    Years of education: Not on file    Highest education level: Not on file   Occupational History    Not on file   Tobacco Use    Smoking status: Never    Smokeless tobacco: Never   Vaping Use    Vaping Use: Never used   Substance and Sexual Activity    Alcohol use: No    Drug use: No    Sexual activity: Not on file   Other Topics Concern    Not on file   Social History Narrative    Not on file     Social Determinants of Health     Financial Resource Strain: Not on file

## 2024-08-01 NOTE — PROGRESS NOTES
Facility/Department: [facility] 3 MATHEUS/VAS/MED  Initial Speech/Language/Cognitive Assessment    NAME: Stephanie Coleman  : 1945   MRN: [id number]  ADMISSION DATE: 2024  ADMITTING DIAGNOSIS: has Coronary artery disease involving native coronary artery of native heart with angina pectoris with documented spasm (HCC); Essential hypertension; Carotid artery stenosis; Leg pain; Leg swelling; Chronic venous insufficiency; Varicose veins; Spider veins; Gastroesophageal reflux disease without esophagitis; Internal hemorrhoids without complication; Bleeding internal hemorrhoids; Precordial pain; Irritable bowel syndrome with diarrhea; Internal hemorrhoids; Periumbilical abdominal pain; Mixed hyperlipidemia; H/O right coronary artery stent placement; Overactive bladder; Urinary tract infection with hematuria; Allergic reaction; Gout; Myalgia due to statin; Bloating; Abdominal pain; Change in bowel habits; Constipation; BRBPR (bright red blood per rectum); Cervical radiculopathy; Fibromyalgia; Osteoporosis; Rheumatoid arthritis ([facility]); Stage 2 chronic kidney disease; Primary generalized (osteo)arthritis; Peripheral vascular disease (HCC); Idiopathic chronic gout of multiple sites without tophus; Hypercalcemia; and RAFFY (acute kidney injury) ([facility]) on their problem list.    Date of Eval: 2024   Evaluating Therapist: SILVIO Shane    Pain:  Pain Assessment  Pain Assessment: None - Denies Pain  Pain Level: 4  Patient's Stated Pain Goal: 0 - No pain  Pain Location: Head  Pain Orientation: Mid  Pain Descriptors: Aching  Functional Pain Assessment: Activities are not prevented  Non-Pharmaceutical Pain Intervention(s): Repositioned, Rest    Assessment:  Completed MINI MENTAL STATE EXAMINATION and patient obtained 20 out of 30 possible points, indicative of mild cognitive-linguistic impairment (patient did not verbalize date, SUSI, month, county, or floor of hospital; patient was 3/5 attention, 0/1 repeat phrase, 2/3 step commands,

## 2024-08-01 NOTE — DISCHARGE SUMMARY
Discharge Summary    NAME: Stephanie Coleman  :  1945  MRN:  [id number]    Admit date:  2024  Discharge date:  2024    Admitting Physician:  Chris Palafox MD    Advance Directive: Full Code    Consults: nephrology and hematology/oncology    Primary Care Physician:  Rosalino Martínez PA    Discharge Diagnoses:  Principal Problem:    Hypercalcemia  Active Problems:    Essential hypertension    Gastroesophageal reflux disease without esophagitis    Mixed hyperlipidemia    RAFFY (acute kidney injury) (HCC)  Resolved Problems:    * No resolved hospital problems. *      Significant Diagnostic Studies:   CT ABDOMEN PELVIS WO CONTRAST Additional Contrast? Oral    Result Date: 2024  EXAM:  NONCONTRAST CT OF THE ABDOMEN AND PELVIS.  HISTORY:  Rule out focal lymphadenopathy.  COMPARISON:  2019  TECHNIQUE:  Contiguous axial images at five mm intervals were obtained from lung bases through the pelvis.  Oral contrast was given.  Coronal reformats were reviewed.  FINDINGS:  The study is limited without contrast.  CHEST:  LUNG BASES: The lung bases show no lobar consolidation or effusion.  HEART: The heart size is within normal limits.  ABDOMEN:  Evaluation of the soft tissue organs is limited without contrast.  LIVER:  Noncontrast images of the liver show no solid mass lesion or intrahepatic ductal dilatation.  BILIARY:  The gallbladder is absent.  The common bile duct is normal.  SPLEEN:  The spleen is unremarkable.  PANCREAS:  The pancreas shows no mass lesion or peripancreatic inflammation.  ADRENAL GLANDS:  The adrenal glands are normal.  RENAL:  The kidneys show no hydronephrosis or nephrolithiasis.  There are no obstructing ureteral stones.  No solid mass lesions are identified.  AORTA:  Moderate aortic calcifications are seen.  No aneurysm is identified.  RETROPERITONEUM:   There is no retroperitoneal or mesenteric adenopathy.  BOWEL:  The stomach and small bowel is opacified.  The bowel is  She was given IV Bumex her enhance results on lowering the calcium however her renal function continued to worsen.  This was held and saline continues.  Family has reported some late day memory issues and some paranoia at times.  She is established with a neurologist in [facility].  Will do cog eval .  Pt may need a more thorough neuro work up for dementia .  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  Pt is ready for discharge and will go to SNF for more rehab.     Physical Exam:  Vital Signs: BP (!) 120/58   Pulse (!) 101   Temp 97.2 °F (36.2 °C)   Resp 16   Ht 1.702 m (5' 7\")   Wt 78.2 kg (172 lb 8 oz)   SpO2 95%   BMI 27.02 kg/m²   General appearance:.Alert and Cooperative   HEENT: Normocephalic.  Chest: clear to auscultation bilaterally without wheezes or rhonchi.  Cardiac: Normal heart tones with regular rate and rhythm, S1, S2 normal. No murmurs, gallops, or rubs auscultated.   Abdomen:soft, non-tender; normal bowel sounds, no masses, no organomegaly.  Extremities: No clubbing or cyanosis. No peripheral edema. Peripheral pulses palpable.  Neurologic: Grossly intact.    Discharge Medications:         Medication List        ASK your doctor about these medications      allopurinol 300 MG tablet  Commonly known as: ZYLOPRIM     aspirin 81 MG EC tablet     Fish Oil 1360 MG Caps     metoprolol succinate 50 MG extended release tablet  Commonly known as: TOPROL XL  TAKE 1 TABLET TWICE DAILY     MULTIVITAMIN PO     olmesartan 20 MG tablet  Commonly known as: BENICAR  Take 1 tablet by mouth nightly     turmeric 500 MG Caps     vitamin D 1000 UNIT Tabs tablet  Commonly known as: CHOLECALCIFEROL     ZINC PO              Discharge Instructions:   Follow up with Rosalino Martínez PA in 3-7 days.  Take medications as directed.  Resume activity as tolerated.    Diet: ADULT DIET; Regular; Low Phosphorus (Less than 1000 mg)     Disposition: Patient is medically stable and will be discharged to SNF.    Time spent on

## 2024-08-01 NOTE — PROGRESS NOTES
Report given to Mily at [facility]. Patient is being transported by family. AVS and Discharge summary faxed. No LDA's at discharged. Followups reviewed with family and [facility] Nurse Mily

## 2024-08-01 NOTE — PLAN OF CARE
Problem: Discharge Planning  Goal: Discharge to home or other facility with appropriate resources  Outcome: Adequate for Discharge     Problem: Safety - Adult  Goal: Free from fall injury  Outcome: Adequate for Discharge     Problem: Pain  Goal: Verbalizes/displays adequate comfort level or baseline comfort level  Outcome: Adequate for Discharge     Problem: Skin/Tissue Integrity  Goal: Absence of new skin breakdown  Description: 1.  Monitor for areas of redness and/or skin breakdown  2.  Assess vascular access sites hourly  3.  Every 4-6 hours minimum:  Change oxygen saturation probe site  4.  Every 4-6 hours:  If on nasal continuous positive airway pressure, respiratory therapy assess nares and determine need for appliance change or resting period.  Outcome: Adequate for Discharge     Problem: Confusion  Goal: Confusion, delirium, dementia, or psychosis is improved or at baseline  Description: INTERVENTIONS:  1. Assess for possible contributors to thought disturbance, including medications, impaired vision or hearing, underlying metabolic abnormalities, dehydration, psychiatric diagnoses, and notify attending LIP  2. [place] high risk fall precautions, as indicated  3. Provide frequent short contacts to provide reality reorientation, refocusing and direction  4. Decrease environmental stimuli, including noise as appropriate  5. Monitor and intervene to maintain adequate nutrition, hydration, elimination, sleep and activity  6. If unable to ensure safety without constant attention obtain sitter and review sitter guidelines with assigned personnel  7. Initiate Psychosocial CNS and Spiritual Care consult, as indicated  Outcome: Adequate for Discharge

## 2024-08-01 NOTE — DISCHARGE INSTRUCTIONS
Fu with neurologist outpt  Outpatient upper EGD to rule out esophageal process or esophageal malignancy. Lalit Matson to arrange referral to Dr. Bridges follow-up EGD.

## 2024-08-02 LAB
ACE SERPL-CCNC: 80 U/L (ref 16–85)
ALBUMIN SERPL-MCNC: 3.07 G/DL (ref 3.75–5.01)
ALPHA1 GLOB SERPL ELPH-MCNC: 0.31 G/DL (ref 0.19–0.46)
ALPHA2 GLOB SERPL ELPH-MCNC: 0.74 G/DL (ref 0.48–1.05)
B-GLOBULIN SERPL ELPH-MCNC: 0.69 G/DL (ref 0.48–1.1)
GAMMA GLOB SERPL ELPH-MCNC: 0.48 G/DL (ref 0.62–1.51)
INTERPRETATION SERPL IFE-IMP: ABNORMAL
PROT SERPL-MCNC: 5.3 G/DL (ref 6.3–8.2)
PROTEIN ELECTROPHORESIS, SERUM: ABNORMAL

## 2024-08-02 NOTE — PROGRESS NOTES
Physician Progress Note      PATIENT:               BELKIS NATARAJAN  [facility] #:                  [id number]  :                       1945  ADMIT DATE:       2024 11:17 AM  DISCH DATE:        2024 3:13 PM  RESPONDING  PROVIDER #:        ALICE NIETO          QUERY TEXT:    Patient admitted with hypercalcemia.  Noted documentation of Acute Kidney   Injury in   [facility] & P and discharge summary.  In order to support the diagnosis of   RAFFY, please include additional clinical indicators in your documentation.? Or   please document if the diagnosis of RAFFY has been ruled out after further   study.  The medical record reflects the following:    Risk Factors: HTN, hypercalcemia  Clinical Indicators: Creatinine  1.5 to 1.4, GFR 35 - 38  Nephrology CKD III  Treatment:  0.45  ml/hr., labs    Thank you  Marina Conrad RN, BSN, [facility]  [phone]    Defined by Kidney Disease Improving Global Outcomes (KDIGO) clinical practice   guideline for acute kidney injury:  -Increase in SCr by greater than or equal to 0.3 mg/dl within 48 hours; or  -Increase or decrease in SCr to greater than or equal to 1.5 times baseline,   which is known or presumed to have occurred within the prior 7 days; or  -Urine volume < 0.5ml/kg/h for 6 hours.  Options provided:  -- Acute kidney injury evidenced by, Please document evidence as well as a   numerical baseline creatinine, if known.  -- Currently resolved acute kidney injury was evidenced by, Please document   evidence as well as a numerical baseline creatinine, if known.  -- Acute kidney injury ruled out after study  -- Other - I will add my own diagnosis  -- Disagree - Not applicable / Not valid  -- Disagree - Clinically unable to determine / Unknown  -- Refer to Clinical Documentation Reviewer    PROVIDER RESPONSE TEXT:    This patient has acute kidney injury as evidenced by acute creatinine bump    Query created by: Marina Conrad on 2024 12:07 PM      Electronically signed by:  ALICE

## 2024-08-05 NOTE — TELEPHONE ENCOUNTER
[facility] called to schedule an appointment for 2 wk Hospital Discharge/ new pt. [facility] was unable to accommodate in the time frame needed. Please be advised that the best time to call Anyt[facility]. Please call yadira at [phone]    Thank you.

## 2024-08-14 NOTE — TELEPHONE ENCOUNTER
Tried calling facility back at the phone number given. The phone number is wrong. Patient had not been seen by one of our providers when patient was in the hospital. Patient would need a new patient referral from her pcp.

## 2024-08-16 ENCOUNTER — TELEPHONE (OUTPATIENT)
Dept: HEMATOLOGY | Age: 79
End: 2024-08-16

## 2024-08-16 NOTE — TELEPHONE ENCOUNTER
Spoke with [facility] for the reminder: patient confirmed they would be here. Reminded patient to just come at appointment time, and to not come at the lab appointment time. Reminded patient that we will not check them in any more than 30 minutes before appointment time.  We have now moved to the [facility] cancer center that is located between our old office and the ER at the [facility]

## 2024-08-18 NOTE — PROGRESS NOTES
by Dr. Molina was to proceed with GI workup to at least have endoscopy to rule out upper gastrointestinal malignancy.  She may also need to go ahead and have repeat colonoscopy.    PLAN  GI workup-endoscopy to rule out upper gastrointestinal malignancy, may need to proceed with colonoscopy as well  Trend calcium since she is off of Tums, no longer taking exorbitant vitamin D    3.  Macrocytic anemia -not at goal        Hgb 10.4 with MCV 96.6.  She may have a component of stage IIIa chronic renal insufficiency associated anemia.  Hgb will be followed        Orders Placed This Encounter   Procedures    CBC with Auto Differential    Comprehensive Metabolic Panel    External Referral To Gastroenterology      Discussion with the patient and her , reviewing the hospital records etc. was 30+ minutes today    Return in about 1 month (around 9/19/2024) for With Lalit.  CBC, reevaluation    Lalit Matson PA-C  5:23 PM  8/19/2024

## 2024-08-19 ENCOUNTER — HOSPITAL ENCOUNTER (OUTPATIENT)
Dept: INFUSION THERAPY | Age: 79
Discharge: HOME | End: 2024-08-19
Payer: MEDICARE

## 2024-08-19 ENCOUNTER — OFFICE VISIT (OUTPATIENT)
Dept: HEMATOLOGY | Age: 79
End: 2024-08-19
Payer: MEDICARE

## 2024-08-19 VITALS
HEIGHT: 67 IN | BODY MASS INDEX: 27.75 KG/M2 | WEIGHT: 176.8 LBS | HEART RATE: 83 BPM | SYSTOLIC BLOOD PRESSURE: 130 MMHG | OXYGEN SATURATION: 98 % | DIASTOLIC BLOOD PRESSURE: 78 MMHG | TEMPERATURE: 98 F

## 2024-08-19 DIAGNOSIS — R79.89 ELEVATED PTHRP LEVEL: ICD-10-CM

## 2024-08-19 DIAGNOSIS — N17.9 AKI (ACUTE KIDNEY INJURY) (HCC): ICD-10-CM

## 2024-08-19 DIAGNOSIS — E83.52 HYPERCALCEMIA: Primary | ICD-10-CM

## 2024-08-19 DIAGNOSIS — E83.52 HYPERCALCEMIA: ICD-10-CM

## 2024-08-19 LAB
ALBUMIN SERPL-MCNC: 3.8 G/DL (ref 3.5–5.2)
ALP SERPL-CCNC: 61 U/L (ref 35–104)
ALT SERPL-CCNC: 14 U/L (ref 5–33)
ANION GAP SERPL CALCULATED.3IONS-SCNC: 9 MMOL/L (ref 7–19)
AST SERPL-CCNC: 19 U/L (ref 5–32)
BASOPHILS # BLD: 0.02 K/UL (ref 0.01–0.08)
BASOPHILS NFR BLD: 0.3 % (ref 0.1–1.2)
BILIRUB SERPL-MCNC: <0.2 MG/DL (ref 0–1.2)
BUN SERPL-MCNC: 13 MG/DL (ref 8–23)
CALCIUM SERPL-MCNC: 9.6 MG/DL (ref 8.8–10.2)
CHLORIDE SERPL-SCNC: 105 MMOL/L (ref 98–107)
CO2 SERPL-SCNC: 26 MMOL/L (ref 22–29)
CREAT SERPL-MCNC: 1.3 MG/DL (ref 0.5–0.9)
EOSINOPHIL # BLD: 0.21 K/UL (ref 0.04–0.54)
EOSINOPHIL NFR BLD: 3.6 % (ref 0.7–7)
ERYTHROCYTE [DISTWIDTH] IN BLOOD BY AUTOMATED COUNT: 14.6 % (ref 11.7–14.4)
GLUCOSE SERPL-MCNC: 112 MG/DL (ref 70–99)
HCT VFR BLD AUTO: 31.4 % (ref 34.1–44.9)
HGB BLD-MCNC: 10.4 G/DL (ref 11.2–15.7)
LYMPHOCYTES # BLD: 2.32 K/UL (ref 1.18–3.74)
LYMPHOCYTES NFR BLD: 39.9 % (ref 19.3–53.1)
MCH RBC QN AUTO: 32 PG (ref 25.6–32.2)
MCHC RBC AUTO-ENTMCNC: 33.1 G/DL (ref 32.3–35.5)
MCV RBC AUTO: 96.6 FL (ref 79.4–94.8)
MONOCYTES # BLD: 0.45 K/UL (ref 0.24–0.82)
MONOCYTES NFR BLD: 7.7 % (ref 4.7–12.5)
NEUTROPHILS # BLD: 2.79 K/UL (ref 1.56–6.13)
NEUTS SEG NFR BLD: 48.2 % (ref 34–71.1)
PLATELET # BLD AUTO: 195 K/UL (ref 182–369)
PMV BLD AUTO: 8.5 FL (ref 7.4–10.4)
POTASSIUM SERPL-SCNC: 3.9 MMOL/L (ref 3.5–5.1)
PROT SERPL-MCNC: 6.2 G/DL (ref 6.4–8.3)
RBC # BLD AUTO: 3.25 M/UL (ref 3.93–5.22)
SODIUM SERPL-SCNC: 140 MMOL/L (ref 136–145)
WBC # BLD AUTO: 5.81 K/UL (ref 3.98–10.04)

## 2024-08-19 PROCEDURE — 36415 COLL VENOUS BLD VENIPUNCTURE: CPT

## 2024-08-19 PROCEDURE — 1123F ACP DISCUSS/DSCN MKR DOCD: CPT | Performed by: PHYSICIAN ASSISTANT

## 2024-08-19 PROCEDURE — G8417 CALC BMI ABV UP PARAM F/U: HCPCS | Performed by: PHYSICIAN ASSISTANT

## 2024-08-19 PROCEDURE — 3075F SYST BP GE 130 - 139MM HG: CPT | Performed by: PHYSICIAN ASSISTANT

## 2024-08-19 PROCEDURE — 85025 COMPLETE CBC W/AUTO DIFF WBC: CPT

## 2024-08-19 PROCEDURE — 3078F DIAST BP <80 MM HG: CPT | Performed by: PHYSICIAN ASSISTANT

## 2024-08-19 PROCEDURE — 80053 COMPREHEN METABOLIC PANEL: CPT

## 2024-08-19 PROCEDURE — 1111F DSCHRG MED/CURRENT MED MERGE: CPT | Performed by: PHYSICIAN ASSISTANT

## 2024-08-19 PROCEDURE — 1036F TOBACCO NON-USER: CPT | Performed by: PHYSICIAN ASSISTANT

## 2024-08-19 PROCEDURE — 99213 OFFICE O/P EST LOW 20 MIN: CPT

## 2024-08-19 PROCEDURE — G8399 PT W/DXA RESULTS DOCUMENT: HCPCS | Performed by: PHYSICIAN ASSISTANT

## 2024-08-19 PROCEDURE — G8427 DOCREV CUR MEDS BY ELIG CLIN: HCPCS | Performed by: PHYSICIAN ASSISTANT

## 2024-08-19 PROCEDURE — 1090F PRES/ABSN URINE INCON ASSESS: CPT | Performed by: PHYSICIAN ASSISTANT

## 2024-08-19 PROCEDURE — 99214 OFFICE O/P EST MOD 30 MIN: CPT | Performed by: PHYSICIAN ASSISTANT

## 2024-08-19 ASSESSMENT — ENCOUNTER SYMPTOMS
BACK PAIN: 0
CONSTIPATION: 0
SORE THROAT: 0
SHORTNESS OF BREATH: 0
COLOR CHANGE: 0
TROUBLE SWALLOWING: 0
DIARRHEA: 0
PHOTOPHOBIA: 0
EYE ITCHING: 0
BLOOD IN STOOL: 0
EYE DISCHARGE: 0
ABDOMINAL PAIN: 0
COUGH: 0
VOICE CHANGE: 0
VOMITING: 0
ABDOMINAL DISTENTION: 0
WHEEZING: 0
NAUSEA: 0

## 2024-08-23 ENCOUNTER — HOSPITAL ENCOUNTER (EMERGENCY)
Age: 79
Discharge: HOME | End: 2024-08-23
Attending: EMERGENCY MEDICINE
Payer: MEDICARE

## 2024-08-23 ENCOUNTER — APPOINTMENT (OUTPATIENT)
Dept: GENERAL RADIOLOGY | Age: 79
End: 2024-08-23
Payer: MEDICARE

## 2024-08-23 VITALS
WEIGHT: 170 LBS | HEIGHT: 67 IN | HEART RATE: 74 BPM | RESPIRATION RATE: 17 BRPM | DIASTOLIC BLOOD PRESSURE: 87 MMHG | SYSTOLIC BLOOD PRESSURE: 122 MMHG | OXYGEN SATURATION: 95 % | BODY MASS INDEX: 26.68 KG/M2 | TEMPERATURE: 98.2 F

## 2024-08-23 DIAGNOSIS — N18.32 STAGE 3B CHRONIC KIDNEY DISEASE (HCC): ICD-10-CM

## 2024-08-23 DIAGNOSIS — U07.1 COVID-19 VIRUS INFECTION: ICD-10-CM

## 2024-08-23 DIAGNOSIS — R55 SYNCOPE AND COLLAPSE: Primary | ICD-10-CM

## 2024-08-23 LAB
ALBUMIN SERPL-MCNC: 3.7 G/DL (ref 3.5–5.2)
ALP SERPL-CCNC: 67 U/L (ref 35–104)
ALT SERPL-CCNC: 17 U/L (ref 5–33)
ANION GAP SERPL CALCULATED.3IONS-SCNC: 13 MMOL/L (ref 7–19)
AST SERPL-CCNC: 21 U/L (ref 5–32)
BASOPHILS # BLD: 0 K/UL (ref 0–0.2)
BASOPHILS NFR BLD: 0.4 % (ref 0–1)
BILIRUB SERPL-MCNC: 0.4 MG/DL (ref 0.2–1.2)
BUN SERPL-MCNC: 16 MG/DL (ref 8–23)
CALCIUM SERPL-MCNC: 9.9 MG/DL (ref 8.8–10.2)
CHLORIDE SERPL-SCNC: 95 MMOL/L (ref 98–111)
CO2 SERPL-SCNC: 25 MMOL/L (ref 22–29)
CREAT SERPL-MCNC: 1.3 MG/DL (ref 0.5–0.9)
EOSINOPHIL # BLD: 0 K/UL (ref 0–0.6)
EOSINOPHIL NFR BLD: 0.3 % (ref 0–5)
ERYTHROCYTE [DISTWIDTH] IN BLOOD BY AUTOMATED COUNT: 14.3 % (ref 11.5–14.5)
GLUCOSE SERPL-MCNC: 113 MG/DL (ref 70–99)
HCT VFR BLD AUTO: 35.2 % (ref 37–47)
HGB BLD-MCNC: 11.7 G/DL (ref 12–16)
IMM GRANULOCYTES # BLD: 0 K/UL
LYMPHOCYTES # BLD: 1.5 K/UL (ref 1.1–4.5)
LYMPHOCYTES NFR BLD: 20.8 % (ref 20–40)
MAGNESIUM SERPL-MCNC: 1.8 MG/DL (ref 1.6–2.4)
MCH RBC QN AUTO: 32 PG (ref 27–31)
MCHC RBC AUTO-ENTMCNC: 33.2 G/DL (ref 33–37)
MCV RBC AUTO: 96.2 FL (ref 81–99)
MONOCYTES # BLD: 1 K/UL (ref 0–0.9)
MONOCYTES NFR BLD: 13.8 % (ref 0–10)
NEUTROPHILS # BLD: 4.6 K/UL (ref 1.5–7.5)
NEUTS SEG NFR BLD: 64.4 % (ref 50–65)
PLATELET # BLD AUTO: 182 K/UL (ref 130–400)
PMV BLD AUTO: 9 FL (ref 9.4–12.3)
POTASSIUM SERPL-SCNC: 3.7 MMOL/L (ref 3.5–5)
PROT SERPL-MCNC: 6.7 G/DL (ref 6.6–8.7)
RBC # BLD AUTO: 3.66 M/UL (ref 4.2–5.4)
REASON FOR REJECTION: NORMAL
REJECTED TEST: NORMAL
SODIUM SERPL-SCNC: 133 MMOL/L (ref 136–145)
WBC # BLD AUTO: 7.2 K/UL (ref 4.8–10.8)

## 2024-08-23 PROCEDURE — 2580000003 HC RX 258: Performed by: EMERGENCY MEDICINE

## 2024-08-23 PROCEDURE — 99285 EMERGENCY DEPT VISIT HI MDM: CPT

## 2024-08-23 PROCEDURE — 96361 HYDRATE IV INFUSION ADD-ON: CPT

## 2024-08-23 PROCEDURE — 80053 COMPREHEN METABOLIC PANEL: CPT

## 2024-08-23 PROCEDURE — 83735 ASSAY OF MAGNESIUM: CPT

## 2024-08-23 PROCEDURE — 96360 HYDRATION IV INFUSION INIT: CPT

## 2024-08-23 PROCEDURE — 71045 X-RAY EXAM CHEST 1 VIEW: CPT

## 2024-08-23 PROCEDURE — 36415 COLL VENOUS BLD VENIPUNCTURE: CPT

## 2024-08-23 PROCEDURE — 85025 COMPLETE CBC W/AUTO DIFF WBC: CPT

## 2024-08-23 RX ORDER — ONDANSETRON 4 MG/1
4 TABLET, ORALLY DISINTEGRATING ORAL 3 TIMES DAILY PRN
Qty: 21 TABLET | Refills: 0 | Status: SHIPPED | OUTPATIENT
Start: 2024-08-23

## 2024-08-23 RX ORDER — SODIUM CHLORIDE, SODIUM LACTATE, POTASSIUM CHLORIDE, AND CALCIUM CHLORIDE .6; .31; .03; .02 G/100ML; G/100ML; G/100ML; G/100ML
1000 INJECTION, SOLUTION INTRAVENOUS ONCE
Status: COMPLETED | OUTPATIENT
Start: 2024-08-23 | End: 2024-08-23

## 2024-08-23 RX ADMIN — SODIUM CHLORIDE, SODIUM LACTATE, POTASSIUM CHLORIDE, AND CALCIUM CHLORIDE 1000 ML: 600; 310; 30; 20 INJECTION, SOLUTION INTRAVENOUS at 16:00

## 2024-08-23 ASSESSMENT — ENCOUNTER SYMPTOMS
RESPIRATORY NEGATIVE: 1
NAUSEA: 1
EYES NEGATIVE: 1

## 2024-08-23 NOTE — ED PROVIDER NOTES
[facility] EMERGENCY DEPT  EMERGENCY DEPARTMENT ENCOUNTER      Pt Name: Stephanie Coleman  MRN: [id number]  Birthdate 1945  Date of evaluation: 8/23/2024  Provider: Carlos Manuel Knight Jr, MD    CHIEF COMPLAINT       Chief Complaint   Patient presents with    Altered Mental Status     Patient positive for COVID, tested yesterday. Per family patient had a syncopal episode and was altered. Patient is able to answer all LOC questions correctly at this time.     Nausea     Patient c/o N/V and unable to eat    Dizziness         HISTORY OF PRESENT ILLNESS   (Location/Symptom, Timing/Onset,Context/Setting, Quality, Duration, Modifying Factors, Severity)  Note limiting factors.   Stephanie Coleman is a 79 y.o. female who presents to the emergency department for evaluation after an episode of loss of consciousness.  Patient tested positive for COVID-19 yesterday.  Started having mild headache several days ago and then over the last couple days started having congestion.  Has not had any significant cough, fever, shortness of breath, or other respiratory symptoms.  Has had decreased appetite with nausea but no vomiting.  This morning, her daughter brought her some food.  Patient was eating bread and started feeling lightheaded and dizzy along with nausea while she was eating.   witnessed her slumped over in the chair.  Daughter says patient did not wake up and then seemed to pass out again.  Symptoms have resolved by time of evaluation here.  She was just recently discharged from a nursing facility for rehab after hospitalization here.  Went home earlier this week but believes she had contracted COVID while she was in the nursing facility but was asymptomatic at the time of discharge    HPI    NursingNotes were reviewed.    REVIEW OF SYSTEMS    (2-9 systems for level 4, 10 or more for level 5)     Review of Systems   Constitutional:  Positive for appetite change and fatigue.   HENT:  Positive for congestion.    Eyes: Negative.

## 2024-08-23 NOTE — DISCHARGE INSTR - COC
Continuity of Care Form    Patient Name: Stephanie Coleman   :  1945  MRN:  [id number]    Admit date:  2024  Discharge date:  ***    Code Status Order: Prior   Advance Directives:   Advance Care Flowsheet Documentation             Admitting Physician:  No admitting provider for patient encounter.  PCP: Rosalino Martínez PA    Discharging Nurse: ***  Discharging Hospital Unit/Room#:   Discharging Unit Phone Number: ***    Emergency Contact:   Extended Emergency Contact Information  Primary Emergency Contact: JaredLyle  Address: [address]  Home Phone: [phone]  Relation: Spouse  Secondary Emergency Contact: Tika Lee  [facility]  Home Phone: [phone]  Relation: Child    Past Surgical History:  Past Surgical History:   Procedure Laterality Date    APPENDECTOMY      BREAST SURGERY      CARDIAC CATHETERIZATION  9/10/15  JDT    EF 50%    CARDIAC CATHETERIZATION  09/10/2020    LM disease- sent for bypass     CERVICAL FUSION       SECTION  1968    x 1    CHOLECYSTECTOMY  2012    [place]    COLONOSCOPY  01/10/2012    Dr LUJAN    COLONOSCOPY  2014    Dr. Hooper    COLONOSCOPY  2018    Dr. Zamora in [place], [place]- Polyps-benign,internal Hemorrhoids, per patient.    COLONOSCOPY N/A 2021    Dr Lowry, Int hemorrhoids Grade 2 otherwise a NORMAL exam, no recall due to age    CORONARY ANGIOPLASTY WITH STENT PLACEMENT      Dr LUJAN    CORONARY ARTERY BYPASS GRAFT N/A 2020    CORONARY ARTERY BYPASS GRAFT X2 WITH LEFT INTERNAL MAMMARY ARTERY WITH OPEN VEIN HARVESTING WITH PERFUSION TRANSESOPHAGEAL ECHOCARDIOGRAM performed by Ramon Curry MD at [facility] OR    CYST REMOVAL      DILATION AND CURETTAGE OF UTERUS      HYSTERECTOMY (CERVIX STATUS UNKNOWN)      partial    KNEE SURGERY      left knee surgery    PTCA      stent    TUBAL LIGATION      UPPER GASTROINTESTINAL ENDOSCOPY

## 2024-08-28 LAB
EKG P AXIS: 60 DEGREES
EKG P-R INTERVAL: 198 MS
EKG Q-T INTERVAL: 428 MS
EKG QRS DURATION: 80 MS
EKG QTC CALCULATION (BAZETT): 452 MS
EKG T AXIS: 57 DEGREES

## 2024-09-02 ENCOUNTER — NURSE TRIAGE (OUTPATIENT)
Dept: CALL CENTER | Facility: HOSPITAL | Age: 79
End: 2024-09-02
Payer: MEDICARE

## 2024-09-02 NOTE — TELEPHONE ENCOUNTER
"Reason for Disposition   Patient sounds very sick or weak to the triager    Additional Information   Negative: SEVERE difficulty breathing (e.g., struggling for each breath, speaks in single words)   Negative: Shock suspected (e.g., cold/pale/clammy skin, too weak to stand, low BP, rapid pulse)   Negative: Difficult to awaken or acting confused (e.g., disoriented, slurred speech)   Negative: [1] Fainted > 15 minutes ago AND [2] still feels too weak or dizzy to stand   Negative: [1] SEVERE weakness (i.e., unable to walk or barely able to walk, requires support) AND [2] new-onset or worsening   Negative: Sounds like a life-threatening emergency to the triager   Negative: Weakness of the face, arm or leg on one side of the body   Negative: [1] Diabetes mellitus AND [2] weakness from low blood sugar (i.e., < 60 mg/dl or 3.5 mmol/l)   Negative: Heat exhaustion suspected (i.e., dehydration from heat exposure)   Negative: Chest pain   Negative: Vomiting is main symptom   Negative: Diarrhea is main symptom   Negative: Difficulty breathing   Negative: Heart beating < 50 beats per minute OR > 140 beats per minute   Negative: Extra heartbeats, irregular heart beating, or heart is beating very fast  (i.e., \"palpitations\")   Negative: Follows large amount of bleeding (e.g., from vomiting, rectum, vagina  (Exception: Small brief weakness from sight of a small amount blood.)   Negative: Black or tarry bowel movements   Negative: [1] Drinking very little AND [2] dehydration suspected (e.g., no urine > 12 hours, very dry mouth, very lightheaded)    Answer Assessment - Initial Assessment Questions  1. DESCRIPTION: \"Describe how you are feeling.\"      She can barely dress herself or walk  2. SEVERITY: \"How bad is it?\"  \"Can you stand and walk?\"    - MILD (0-3): Feels weak or tired, but does not interfere with work, school or normal activities.    - MODERATE (4-7): Able to stand and walk; weakness interferes with work, school, or " "normal activities.    - SEVERE (8-10): Unable to stand or walk; unable to do usual activities.      severe  3. ONSET: \"When did these symptoms begin?\" (e.g., hours, days, weeks, months)      6 weeks  4. CAUSE: \"What do you think is causing the weakness or fatigue?\" (e.g., not drinking enough fluids, medical problem, trouble sleeping)      Post covid, dehydration last week ended up in ER  5. NEW MEDICINES:  \"Have you started on any new medicines recently?\" (e.g., opioid pain medicines, benzodiazepines, muscle relaxants, antidepressants, antihistamines, neuroleptics, beta blockers)      none  6. OTHER SYMPTOMS: \"Do you have any other symptoms?\" (e.g., chest pain, fever, cough, SOB, vomiting, diarrhea, bleeding, other areas of pain)      weakness  7. PREGNANCY: \"Is there any chance you are pregnant?\" \"When was your last menstrual period?\"      na    Protocols used: Weakness (Generalized) and Fatigue-ADULT-AH    "

## 2024-09-02 NOTE — TELEPHONE ENCOUNTER
Advised caller that is wife should come back to ER. He wishes to see Isai tomorrow. Advised if she is that weak then she needs to go today to ER.  She is taking a nap and he said she has  been drinking and has voided several times today. If she is still dizzy and having blurred vision when she wakes up he will take her in.  Again, advised she should go now.

## 2024-09-17 ENCOUNTER — TELEPHONE (OUTPATIENT)
Dept: HEMATOLOGY | Age: 79
End: 2024-09-17

## 2024-09-19 ENCOUNTER — HOSPITAL ENCOUNTER (OUTPATIENT)
Dept: INFUSION THERAPY | Age: 79
Discharge: HOME | End: 2024-09-19
Payer: MEDICARE

## 2024-09-19 ENCOUNTER — OFFICE VISIT (OUTPATIENT)
Dept: HEMATOLOGY | Age: 79
End: 2024-09-19
Payer: MEDICARE

## 2024-09-19 VITALS
SYSTOLIC BLOOD PRESSURE: 118 MMHG | HEIGHT: 67 IN | DIASTOLIC BLOOD PRESSURE: 60 MMHG | HEART RATE: 58 BPM | WEIGHT: 160.4 LBS | TEMPERATURE: 98 F | OXYGEN SATURATION: 100 % | BODY MASS INDEX: 25.18 KG/M2

## 2024-09-19 DIAGNOSIS — N18.32 CHRONIC RENAL FAILURE (CRF), STAGE 3B (HCC): ICD-10-CM

## 2024-09-19 DIAGNOSIS — E83.52 HYPERCALCEMIA: ICD-10-CM

## 2024-09-19 DIAGNOSIS — D53.9 MACROCYTIC ANEMIA: ICD-10-CM

## 2024-09-19 DIAGNOSIS — E83.52 HYPERCALCEMIA: Primary | ICD-10-CM

## 2024-09-19 DIAGNOSIS — R79.89 ELEVATED PTHRP LEVEL: ICD-10-CM

## 2024-09-19 PROBLEM — N17.9 AKI (ACUTE KIDNEY INJURY) (HCC): Status: RESOLVED | Noted: 2024-07-26 | Resolved: 2024-09-19

## 2024-09-19 PROBLEM — K62.5 BRBPR (BRIGHT RED BLOOD PER RECTUM): Status: RESOLVED | Noted: 2021-05-12 | Resolved: 2024-09-19

## 2024-09-19 PROBLEM — R10.9 ABDOMINAL PAIN: Status: RESOLVED | Noted: 2021-05-12 | Resolved: 2024-09-19

## 2024-09-19 PROBLEM — N18.2 STAGE 2 CHRONIC KIDNEY DISEASE: Status: RESOLVED | Noted: 2023-05-22 | Resolved: 2024-09-19

## 2024-09-19 PROBLEM — R14.0 BLOATING: Status: RESOLVED | Noted: 2020-03-11 | Resolved: 2024-09-19

## 2024-09-19 LAB
ALBUMIN SERPL-MCNC: 4.1 G/DL (ref 3.5–5.2)
ALP SERPL-CCNC: 54 U/L (ref 35–104)
ALT SERPL-CCNC: 12 U/L (ref 5–33)
ANION GAP SERPL CALCULATED.3IONS-SCNC: 13 MMOL/L (ref 7–19)
AST SERPL-CCNC: 20 U/L (ref 5–32)
BASOPHILS # BLD: 0.04 K/UL (ref 0.01–0.08)
BASOPHILS NFR BLD: 0.6 % (ref 0.1–1.2)
BILIRUB SERPL-MCNC: 0.3 MG/DL (ref 0–1.2)
BUN SERPL-MCNC: 25 MG/DL (ref 8–23)
CALCIUM SERPL-MCNC: 10.3 MG/DL (ref 8.8–10.2)
CHLORIDE SERPL-SCNC: 100 MMOL/L (ref 98–107)
CO2 SERPL-SCNC: 25 MMOL/L (ref 22–29)
CREAT SERPL-MCNC: 1.3 MG/DL (ref 0.5–0.9)
EOSINOPHIL # BLD: 0.22 K/UL (ref 0.04–0.54)
EOSINOPHIL NFR BLD: 3.4 % (ref 0.7–7)
ERYTHROCYTE [DISTWIDTH] IN BLOOD BY AUTOMATED COUNT: 14.2 % (ref 11.7–14.4)
GLUCOSE SERPL-MCNC: 101 MG/DL (ref 70–99)
HCT VFR BLD AUTO: 33.7 % (ref 34.1–44.9)
HGB BLD-MCNC: 11.6 G/DL (ref 11.2–15.7)
LYMPHOCYTES # BLD: 2.08 K/UL (ref 1.18–3.74)
LYMPHOCYTES NFR BLD: 32.4 % (ref 19.3–53.1)
MCH RBC QN AUTO: 32.1 PG (ref 25.6–32.2)
MCHC RBC AUTO-ENTMCNC: 34.4 G/DL (ref 32.3–35.5)
MCV RBC AUTO: 93.4 FL (ref 79.4–94.8)
MONOCYTES # BLD: 0.55 K/UL (ref 0.24–0.82)
MONOCYTES NFR BLD: 8.6 % (ref 4.7–12.5)
NEUTROPHILS # BLD: 3.49 K/UL (ref 1.56–6.13)
NEUTS SEG NFR BLD: 54.5 % (ref 34–71.1)
PLATELET # BLD AUTO: 239 K/UL (ref 182–369)
PMV BLD AUTO: 8.1 FL (ref 7.4–10.4)
POTASSIUM SERPL-SCNC: 4 MMOL/L (ref 3.5–5.1)
PROT SERPL-MCNC: 6.9 G/DL (ref 6.4–8.3)
RBC # BLD AUTO: 3.61 M/UL (ref 3.93–5.22)
SODIUM SERPL-SCNC: 138 MMOL/L (ref 136–145)
WBC # BLD AUTO: 6.41 K/UL (ref 3.98–10.04)

## 2024-09-19 PROCEDURE — 1036F TOBACCO NON-USER: CPT | Performed by: PHYSICIAN ASSISTANT

## 2024-09-19 PROCEDURE — 3078F DIAST BP <80 MM HG: CPT | Performed by: PHYSICIAN ASSISTANT

## 2024-09-19 PROCEDURE — 85025 COMPLETE CBC W/AUTO DIFF WBC: CPT

## 2024-09-19 PROCEDURE — 36415 COLL VENOUS BLD VENIPUNCTURE: CPT

## 2024-09-19 PROCEDURE — 1123F ACP DISCUSS/DSCN MKR DOCD: CPT | Performed by: PHYSICIAN ASSISTANT

## 2024-09-19 PROCEDURE — 3074F SYST BP LT 130 MM HG: CPT | Performed by: PHYSICIAN ASSISTANT

## 2024-09-19 PROCEDURE — G8417 CALC BMI ABV UP PARAM F/U: HCPCS | Performed by: PHYSICIAN ASSISTANT

## 2024-09-19 PROCEDURE — 1090F PRES/ABSN URINE INCON ASSESS: CPT | Performed by: PHYSICIAN ASSISTANT

## 2024-09-19 PROCEDURE — 80053 COMPREHEN METABOLIC PANEL: CPT

## 2024-09-19 PROCEDURE — G8399 PT W/DXA RESULTS DOCUMENT: HCPCS | Performed by: PHYSICIAN ASSISTANT

## 2024-09-19 PROCEDURE — 99212 OFFICE O/P EST SF 10 MIN: CPT

## 2024-09-19 PROCEDURE — G8427 DOCREV CUR MEDS BY ELIG CLIN: HCPCS | Performed by: PHYSICIAN ASSISTANT

## 2024-09-19 PROCEDURE — 99214 OFFICE O/P EST MOD 30 MIN: CPT | Performed by: PHYSICIAN ASSISTANT

## 2024-09-19 RX ORDER — PANTOPRAZOLE SODIUM 40 MG/1
40 TABLET, DELAYED RELEASE ORAL
Qty: 30 TABLET | Refills: 3 | Status: SHIPPED | OUTPATIENT
Start: 2024-09-19

## 2024-09-19 ASSESSMENT — ENCOUNTER SYMPTOMS
DIARRHEA: 0
BACK PAIN: 0
VOMITING: 0
SHORTNESS OF BREATH: 0
BLOOD IN STOOL: 0
ABDOMINAL DISTENTION: 0
EYE DISCHARGE: 0
VOICE CHANGE: 0
PHOTOPHOBIA: 0
SORE THROAT: 0
COLOR CHANGE: 0
CONSTIPATION: 0
TROUBLE SWALLOWING: 0
WHEEZING: 0
COUGH: 0
ABDOMINAL PAIN: 0
EYE ITCHING: 0
NAUSEA: 0

## 2024-09-21 LAB — EPO SERPL-ACNC: 15 MU/ML (ref 4–27)

## 2024-10-03 ENCOUNTER — HOSPITAL ENCOUNTER (OUTPATIENT)
Dept: INFUSION THERAPY | Age: 79
Discharge: HOME | End: 2024-10-03
Payer: MEDICARE

## 2024-10-03 DIAGNOSIS — N18.32 CHRONIC RENAL FAILURE (CRF), STAGE 3B (HCC): ICD-10-CM

## 2024-10-03 DIAGNOSIS — N18.32 CHRONIC RENAL FAILURE (CRF), STAGE 3B (HCC): Primary | ICD-10-CM

## 2024-10-03 LAB
ALBUMIN SERPL-MCNC: 4 G/DL (ref 3.5–5.2)
ALP SERPL-CCNC: 45 U/L (ref 35–104)
ALT SERPL-CCNC: 11 U/L (ref 5–33)
ANION GAP SERPL CALCULATED.3IONS-SCNC: 12 MMOL/L (ref 7–19)
AST SERPL-CCNC: 19 U/L (ref 5–32)
BILIRUB SERPL-MCNC: 0.3 MG/DL (ref 0–1.2)
BUN SERPL-MCNC: 25 MG/DL (ref 8–23)
CALCIUM SERPL-MCNC: 10.2 MG/DL (ref 8.8–10.2)
CHLORIDE SERPL-SCNC: 103 MMOL/L (ref 98–107)
CO2 SERPL-SCNC: 26 MMOL/L (ref 22–29)
CREAT SERPL-MCNC: 1.1 MG/DL (ref 0.5–0.9)
GLUCOSE SERPL-MCNC: 108 MG/DL (ref 70–99)
POTASSIUM SERPL-SCNC: 3.8 MMOL/L (ref 3.5–5.1)
PROT SERPL-MCNC: 6.4 G/DL (ref 6.4–8.3)
SODIUM SERPL-SCNC: 141 MMOL/L (ref 136–145)

## 2024-10-03 PROCEDURE — 36415 COLL VENOUS BLD VENIPUNCTURE: CPT

## 2024-10-03 PROCEDURE — 80053 COMPREHEN METABOLIC PANEL: CPT

## 2024-10-08 ENCOUNTER — NURSE TRIAGE (OUTPATIENT)
Dept: CALL CENTER | Facility: HOSPITAL | Age: 79
End: 2024-10-08
Payer: MEDICARE

## 2024-10-08 NOTE — TELEPHONE ENCOUNTER
Medication for a UTI was called to the [facility] but it was now closed. Dr. Patiño called and requested to call medications to the [facility] on [address]. He states he will. Pt called back and notified the RX will be at [facility]  Reason for Disposition   [1] Prescription not at pharmacy AND [2] was prescribed by PCP recently (Exception: Triager has access to EMR and prescription is recorded there. Go to Home Care and confirm for pharmacy.)    Additional Information   Negative: [1] Intentional drug overdose AND [2] suicidal thoughts or ideas   Negative: Drug overdose and triager unable to answer question   Negative: Caller requesting a renewal or refill of a medicine patient is currently taking   Negative: Caller requesting information unrelated to medicine   Negative: Caller requesting information about COVID-19 Vaccine   Negative: Caller requesting information about Emergency Contraception   Negative: Caller requesting information about Combined Birth Control Pills   Negative: Caller requesting information about Progestin Birth Control Pills   Negative: Caller requesting information about Post-Op pain or medicines   Negative: Caller requesting a prescription antibiotic (such as Penicillin) for Strep throat and has a positive culture result   Negative: Caller requesting a prescription anti-viral med (such as Tamiflu) and has influenza (flu) symptoms   Negative: Immunization reaction suspected   Negative: Rash while taking a medicine or within 3 days of stopping it   Negative: [1] Asthma and [2] having symptoms of asthma (cough, wheezing, etc.)   Negative: [1] Symptom of illness (e.g., headache, abdominal pain, earache, vomiting) AND [2] more than mild   Negative: Breastfeeding questions about mother's medicines and diet   Negative: MORE THAN A DOUBLE DOSE of a prescription or over-the-counter (OTC) drug   Negative: [1] DOUBLE DOSE (an extra dose or lesser amount) of prescription drug AND [2] any symptoms  "(e.g., dizziness, nausea, pain, sleepiness)   Negative: [1] DOUBLE DOSE (an extra dose or lesser amount) of over-the-counter (OTC) drug AND [2] any symptoms (e.g., dizziness, nausea, pain, sleepiness)   Negative: Took another person's prescription drug   Negative: [1] DOUBLE DOSE (an extra dose or lesser amount) of prescription drug AND [2] NO symptoms  (Exception: A double dose of antibiotics.)   Negative: Diabetes drug error or overdose (e.g., took wrong type of insulin or took extra dose)    Answer Assessment - Initial Assessment Questions  1. NAME of MEDICINE: \"What medicine(s) are you calling about?\"      Antibiotic  2. QUESTION: \"What is your question?\" (e.g., double dose of medicine, side effect)      It was not at the [facility]  3. PRESCRIBER: \"Who prescribed the medicine?\" Reason: if prescribed by specialist, call should be referred to that group.      Dr. Patiño  4. SYMPTOMS: \"Do you have any symptoms?\" If Yes, ask: \"What symptoms are you having?\"  \"How bad are the symptoms (e.g., mild, moderate, severe)      UTI  5. PREGNANCY:  \"Is there any chance that you are pregnant?\" \"When was your last menstrual period?\"      NO    Protocols used: Medication Question Call-ADULT-    "

## 2024-10-15 ENCOUNTER — TELEPHONE (OUTPATIENT)
Dept: HEMATOLOGY | Age: 79
End: 2024-10-15

## 2024-10-17 ENCOUNTER — OFFICE VISIT (OUTPATIENT)
Dept: GASTROENTEROLOGY | Age: 79
End: 2024-10-17
Payer: MEDICARE

## 2024-10-17 VITALS
SYSTOLIC BLOOD PRESSURE: 120 MMHG | HEIGHT: 67 IN | DIASTOLIC BLOOD PRESSURE: 60 MMHG | OXYGEN SATURATION: 96 % | WEIGHT: 156 LBS | HEART RATE: 84 BPM | BODY MASS INDEX: 24.48 KG/M2

## 2024-10-17 DIAGNOSIS — D64.9 ANEMIA, UNSPECIFIED TYPE: ICD-10-CM

## 2024-10-17 DIAGNOSIS — K21.9 GASTROESOPHAGEAL REFLUX DISEASE, UNSPECIFIED WHETHER ESOPHAGITIS PRESENT: Primary | ICD-10-CM

## 2024-10-17 DIAGNOSIS — R19.4 CHANGE IN BOWEL HABITS: ICD-10-CM

## 2024-10-17 DIAGNOSIS — R11.2 NAUSEA AND VOMITING, UNSPECIFIED VOMITING TYPE: ICD-10-CM

## 2024-10-17 DIAGNOSIS — Z83.79 FAMILY HISTORY OF CROHN'S DISEASE: ICD-10-CM

## 2024-10-17 DIAGNOSIS — Z80.0 FAMILY HISTORY OF STOMACH CANCER: ICD-10-CM

## 2024-10-17 PROCEDURE — G8484 FLU IMMUNIZE NO ADMIN: HCPCS | Performed by: NURSE PRACTITIONER

## 2024-10-17 PROCEDURE — 99204 OFFICE O/P NEW MOD 45 MIN: CPT | Performed by: NURSE PRACTITIONER

## 2024-10-17 PROCEDURE — 3074F SYST BP LT 130 MM HG: CPT | Performed by: NURSE PRACTITIONER

## 2024-10-17 PROCEDURE — 1036F TOBACCO NON-USER: CPT | Performed by: NURSE PRACTITIONER

## 2024-10-17 PROCEDURE — G8399 PT W/DXA RESULTS DOCUMENT: HCPCS | Performed by: NURSE PRACTITIONER

## 2024-10-17 PROCEDURE — 3078F DIAST BP <80 MM HG: CPT | Performed by: NURSE PRACTITIONER

## 2024-10-17 PROCEDURE — G8427 DOCREV CUR MEDS BY ELIG CLIN: HCPCS | Performed by: NURSE PRACTITIONER

## 2024-10-17 PROCEDURE — 1090F PRES/ABSN URINE INCON ASSESS: CPT | Performed by: NURSE PRACTITIONER

## 2024-10-17 PROCEDURE — G8420 CALC BMI NORM PARAMETERS: HCPCS | Performed by: NURSE PRACTITIONER

## 2024-10-17 PROCEDURE — 1123F ACP DISCUSS/DSCN MKR DOCD: CPT | Performed by: NURSE PRACTITIONER

## 2024-10-17 RX ORDER — CEFDINIR 300 MG/1
300 CAPSULE ORAL 2 TIMES DAILY
COMMUNITY
Start: 2024-09-09

## 2024-10-17 NOTE — TELEPHONE ENCOUNTER
I called and left patient a detailed voicemail with their appointment date and time for 10/18/24 and to come at the follow up appointment time and not the lab appointment time. I also made them aware of what that time was.  I made patient aware that we are in the [facility] and where it is located and gave the address in case, they use GPS. Left message for patient to call our office if they could not keep this appointment or if they did not know where our new building is located. AM

## 2024-10-17 NOTE — PROGRESS NOTES
(Non-Medical): No    Received from [facility], [facility]    Family and Community Support   Intimate Partner Violence: Low Risk  (2/20/2024)    Received from [facility] Historical Interpersonal Safety     Does anyone neglect, hurt, or threaten the patient?: No   Housing Stability: Low Risk  (7/26/2024)    Housing Stability Vital Sign     Unable to Pay for Housing in the Last Year: No     Number of Places Lived in the Last Year: 1     Unstable Housing in the Last Year: No       Current Outpatient Medications   Medication Sig Dispense Refill    cefdinir (OMNICEF) 300 MG capsule Take 1 capsule by mouth 2 times daily (Patient not taking: Reported on 10/18/2024)      pantoprazole (PROTONIX) 40 MG tablet Take 1 tablet by mouth every morning (before breakfast) 30 tablet 3    ondansetron (ZOFRAN-ODT) 4 MG disintegrating tablet Take 1 tablet by mouth 3 times daily as needed for Nausea or Vomiting 21 tablet 0    melatonin 5 MG TBDP disintegrating tablet Take 1 tablet by mouth nightly as needed (Insomnia) 30 tablet 0    allopurinol (ZYLOPRIM) 300 MG tablet Take 1 tablet by mouth daily (Patient taking differently: Take 1 tablet by mouth at bedtime) 30 tablet 0    aspirin 81 MG EC tablet Take 1 tablet by mouth daily 30 tablet 0    metoprolol succinate (TOPROL XL) 50 MG extended release tablet Take 1 tablet by mouth 2 times daily 60 tablet 0    olmesartan (BENICAR) 20 MG tablet Take 1 tablet by mouth nightly (Patient taking differently: Take 1 tablet by mouth nightly Half in AM and half in HS) 90 tablet 3    brimonidine (ALPHAGAN P) 0.15 % ophthalmic solution Place 1 drop into both eyes in the morning and at bedtime      latanoprost (XALATAN) 0.005 % ophthalmic solution Place 1 drop into both eyes daily       No current facility-administered medications for this visit.       Allergies   Allergen Reactions    Demerol Nausea And Vomiting    Fenofibrate Other (See

## 2024-10-18 ENCOUNTER — OFFICE VISIT (OUTPATIENT)
Dept: HEMATOLOGY | Age: 79
End: 2024-10-18
Payer: MEDICARE

## 2024-10-18 ENCOUNTER — HOSPITAL ENCOUNTER (OUTPATIENT)
Dept: INFUSION THERAPY | Age: 79
Discharge: HOME | End: 2024-10-18
Payer: MEDICARE

## 2024-10-18 VITALS
HEART RATE: 73 BPM | SYSTOLIC BLOOD PRESSURE: 102 MMHG | DIASTOLIC BLOOD PRESSURE: 56 MMHG | OXYGEN SATURATION: 99 % | WEIGHT: 157.3 LBS | TEMPERATURE: 97.9 F | HEIGHT: 67 IN | BODY MASS INDEX: 24.69 KG/M2

## 2024-10-18 DIAGNOSIS — E83.52 HYPERCALCEMIA: ICD-10-CM

## 2024-10-18 DIAGNOSIS — D53.9 MACROCYTIC ANEMIA: ICD-10-CM

## 2024-10-18 DIAGNOSIS — N18.32 CHRONIC RENAL FAILURE (CRF), STAGE 3B (HCC): ICD-10-CM

## 2024-10-18 DIAGNOSIS — R79.89 ELEVATED PTHRP LEVEL: ICD-10-CM

## 2024-10-18 DIAGNOSIS — E83.52 HYPERCALCEMIA: Primary | ICD-10-CM

## 2024-10-18 LAB
ALBUMIN SERPL-MCNC: 3.8 G/DL (ref 3.5–5.2)
ALP SERPL-CCNC: 68 U/L (ref 35–104)
ALT SERPL-CCNC: 8 U/L (ref 5–33)
ANION GAP SERPL CALCULATED.3IONS-SCNC: 12 MMOL/L (ref 7–19)
AST SERPL-CCNC: 15 U/L (ref 5–32)
BASOPHILS # BLD: 0.03 K/UL (ref 0.01–0.08)
BASOPHILS NFR BLD: 0.3 % (ref 0.1–1.2)
BILIRUB SERPL-MCNC: <0.2 MG/DL (ref 0–1.2)
BUN SERPL-MCNC: 37 MG/DL (ref 8–23)
CALCIUM SERPL-MCNC: 10.5 MG/DL (ref 8.8–10.2)
CHLORIDE SERPL-SCNC: 101 MMOL/L (ref 98–107)
CO2 SERPL-SCNC: 28 MMOL/L (ref 22–29)
CREAT SERPL-MCNC: 1.2 MG/DL (ref 0.5–0.9)
EOSINOPHIL # BLD: 0.5 K/UL (ref 0.04–0.54)
EOSINOPHIL NFR BLD: 4.4 % (ref 0.7–7)
ERYTHROCYTE [DISTWIDTH] IN BLOOD BY AUTOMATED COUNT: 14.8 % (ref 11.7–14.4)
GLUCOSE SERPL-MCNC: 102 MG/DL (ref 70–99)
HCT VFR BLD AUTO: 32.1 % (ref 34.1–44.9)
HGB BLD-MCNC: 10.6 G/DL (ref 11.2–15.7)
LYMPHOCYTES # BLD: 1.97 K/UL (ref 1.18–3.74)
LYMPHOCYTES NFR BLD: 17.5 % (ref 19.3–53.1)
MCH RBC QN AUTO: 32.2 PG (ref 25.6–32.2)
MCHC RBC AUTO-ENTMCNC: 33 G/DL (ref 32.3–35.5)
MCV RBC AUTO: 97.6 FL (ref 79.4–94.8)
MONOCYTES # BLD: 0.67 K/UL (ref 0.24–0.82)
MONOCYTES NFR BLD: 5.9 % (ref 4.7–12.5)
NEUTROPHILS # BLD: 8.03 K/UL (ref 1.56–6.13)
NEUTS SEG NFR BLD: 71.2 % (ref 34–71.1)
PLATELET # BLD AUTO: 337 K/UL (ref 182–369)
PMV BLD AUTO: 8.3 FL (ref 7.4–10.4)
POTASSIUM SERPL-SCNC: 4 MMOL/L (ref 3.5–5.1)
PROT SERPL-MCNC: 6.7 G/DL (ref 6.4–8.3)
RBC # BLD AUTO: 3.29 M/UL (ref 3.93–5.22)
SODIUM SERPL-SCNC: 141 MMOL/L (ref 136–145)
WBC # BLD AUTO: 11.28 K/UL (ref 3.98–10.04)

## 2024-10-18 PROCEDURE — 1090F PRES/ABSN URINE INCON ASSESS: CPT | Performed by: PHYSICIAN ASSISTANT

## 2024-10-18 PROCEDURE — 80053 COMPREHEN METABOLIC PANEL: CPT

## 2024-10-18 PROCEDURE — 1123F ACP DISCUSS/DSCN MKR DOCD: CPT | Performed by: PHYSICIAN ASSISTANT

## 2024-10-18 PROCEDURE — 36415 COLL VENOUS BLD VENIPUNCTURE: CPT

## 2024-10-18 PROCEDURE — G8399 PT W/DXA RESULTS DOCUMENT: HCPCS | Performed by: PHYSICIAN ASSISTANT

## 2024-10-18 PROCEDURE — 3074F SYST BP LT 130 MM HG: CPT | Performed by: PHYSICIAN ASSISTANT

## 2024-10-18 PROCEDURE — G8420 CALC BMI NORM PARAMETERS: HCPCS | Performed by: PHYSICIAN ASSISTANT

## 2024-10-18 PROCEDURE — G8427 DOCREV CUR MEDS BY ELIG CLIN: HCPCS | Performed by: PHYSICIAN ASSISTANT

## 2024-10-18 PROCEDURE — G8484 FLU IMMUNIZE NO ADMIN: HCPCS | Performed by: PHYSICIAN ASSISTANT

## 2024-10-18 PROCEDURE — 99212 OFFICE O/P EST SF 10 MIN: CPT

## 2024-10-18 PROCEDURE — 99214 OFFICE O/P EST MOD 30 MIN: CPT | Performed by: PHYSICIAN ASSISTANT

## 2024-10-18 PROCEDURE — 3078F DIAST BP <80 MM HG: CPT | Performed by: PHYSICIAN ASSISTANT

## 2024-10-18 PROCEDURE — 1036F TOBACCO NON-USER: CPT | Performed by: PHYSICIAN ASSISTANT

## 2024-10-18 PROCEDURE — 85025 COMPLETE CBC W/AUTO DIFF WBC: CPT

## 2024-10-18 RX ORDER — LATANOPROST 50 UG/ML
1 SOLUTION/ DROPS OPHTHALMIC DAILY
COMMUNITY
Start: 2024-09-30

## 2024-10-18 RX ORDER — BRIMONIDINE TARTRATE 1.5 MG/ML
1 SOLUTION/ DROPS OPHTHALMIC 2 TIMES DAILY
COMMUNITY

## 2024-10-18 ASSESSMENT — ENCOUNTER SYMPTOMS
DIARRHEA: 0
EYE ITCHING: 0
TROUBLE SWALLOWING: 0
SHORTNESS OF BREATH: 0
WHEEZING: 0
SORE THROAT: 0
COLOR CHANGE: 0
PHOTOPHOBIA: 0
VOICE CHANGE: 0
NAUSEA: 0
VOMITING: 0
BACK PAIN: 0
COUGH: 0
CONSTIPATION: 0
BLOOD IN STOOL: 0
EYE DISCHARGE: 0
ABDOMINAL DISTENTION: 0
ABDOMINAL PAIN: 0

## 2024-10-18 NOTE — PROGRESS NOTES
at present.  I reviewed her medications further, she is on allopurinol 300 daily.  There is a potential of hypercalcemia related to this medication.  She will discontinue the allopurinol.    I am going to check a calcium every 2 weeks.  If it gets elevated to the point over 11 I will start her on Calcimar (nasal).  I am going to await GI workup with endoscopy and colonoscopy.  If they are unrevealing the next step would be to proceed with a bone marrow if she has persistent hypercalcemia.  Serology when she was in the hospital did not show any significant abnormality however she could have a nonsecretory multiple myeloma.  She does have mild renal insufficiency.    PLAN  Continue OFF VITAMIN D   Stop allopurinol  Repeat calcium level CMP every 2 weeks  Recheck 1, 25-dihydroxy,   25-hydroxy vitamin D  Vitamin A level  Await GI workup-endoscopy and colonoscopy     Potential bone marrow if Endo and colon negative      3.  Macrocytic anemia -not at goal        Hgb today stable at 10.6.        HEALTH MAINTENANCE    Colon cancer - Colonoscopy on 6/2/2021 per Dr. Byron Lowry Grade 2 internal hemorrhoids without any bleeding stigmata at this time but most likely source of her recent bright red blood per rectum. Otherwise normal.     Breast cancer screening- Bilateral screening mammogram on 3/7/2024 at [facility] was a BI-RADS 1.      Orders Placed This Encounter   Procedures    Vitamin D 25 Hydroxy    CBC with Auto Differential    Vitamin D 1,25 Dihydroxy    Vitamin A    Comprehensive Metabolic Panel    Comprehensive Metabolic Panel        Return in about 2 months (around 12/18/2024) for With Lalit.  CBC, reevaluation    Lalit Matson PA-C  1:28 PM  10/18/2024

## 2024-10-23 ASSESSMENT — ENCOUNTER SYMPTOMS
COUGH: 0
ABDOMINAL DISTENTION: 0
RECTAL PAIN: 0
NAUSEA: 1
CONSTIPATION: 0
SHORTNESS OF BREATH: 0
ANAL BLEEDING: 0
CHOKING: 0
TROUBLE SWALLOWING: 0
ABDOMINAL PAIN: 0
DIARRHEA: 0
BLOOD IN STOOL: 0
VOMITING: 1

## 2024-10-30 ENCOUNTER — HOSPITAL ENCOUNTER (OUTPATIENT)
Dept: INFUSION THERAPY | Age: 79
Discharge: HOME | End: 2024-10-30
Payer: MEDICARE

## 2024-10-30 DIAGNOSIS — E83.52 HYPERCALCEMIA: ICD-10-CM

## 2024-10-30 LAB
ALBUMIN SERPL-MCNC: 3.8 G/DL (ref 3.5–5.2)
ALP SERPL-CCNC: 51 U/L (ref 35–104)
ALT SERPL-CCNC: 10 U/L (ref 5–33)
ANION GAP SERPL CALCULATED.3IONS-SCNC: 9 MMOL/L (ref 7–19)
AST SERPL-CCNC: 18 U/L (ref 5–32)
BILIRUB SERPL-MCNC: <0.2 MG/DL (ref 0–1.2)
BUN SERPL-MCNC: 25 MG/DL (ref 8–23)
CALCIUM SERPL-MCNC: 9.9 MG/DL (ref 8.8–10.2)
CHLORIDE SERPL-SCNC: 104 MMOL/L (ref 98–107)
CO2 SERPL-SCNC: 26 MMOL/L (ref 22–29)
CREAT SERPL-MCNC: 0.9 MG/DL (ref 0.5–0.9)
GLUCOSE SERPL-MCNC: 104 MG/DL (ref 70–99)
POTASSIUM SERPL-SCNC: 4.1 MMOL/L (ref 3.5–5.1)
PROT SERPL-MCNC: 6.1 G/DL (ref 6.4–8.3)
SODIUM SERPL-SCNC: 139 MMOL/L (ref 136–145)

## 2024-10-30 PROCEDURE — 80053 COMPREHEN METABOLIC PANEL: CPT

## 2024-10-30 PROCEDURE — 36415 COLL VENOUS BLD VENIPUNCTURE: CPT

## 2024-11-05 ENCOUNTER — APPOINTMENT (OUTPATIENT)
Dept: OPERATING ROOM | Age: 79
End: 2024-11-05
Attending: INTERNAL MEDICINE

## 2024-11-05 ENCOUNTER — HOSPITAL ENCOUNTER (OUTPATIENT)
Age: 79
Setting detail: OUTPATIENT SURGERY
Discharge: HOME | End: 2024-11-05
Attending: INTERNAL MEDICINE | Admitting: INTERNAL MEDICINE
Payer: MEDICARE

## 2024-11-05 ENCOUNTER — ANESTHESIA (OUTPATIENT)
Dept: OPERATING ROOM | Age: 79
End: 2024-11-05

## 2024-11-05 ENCOUNTER — ANESTHESIA EVENT (OUTPATIENT)
Dept: OPERATING ROOM | Age: 79
End: 2024-11-05

## 2024-11-05 VITALS
WEIGHT: 155 LBS | TEMPERATURE: 97.6 F | OXYGEN SATURATION: 97 % | DIASTOLIC BLOOD PRESSURE: 58 MMHG | SYSTOLIC BLOOD PRESSURE: 102 MMHG | RESPIRATION RATE: 16 BRPM | HEIGHT: 67 IN | HEART RATE: 73 BPM | BODY MASS INDEX: 24.33 KG/M2

## 2024-11-05 PROCEDURE — 43239 EGD BIOPSY SINGLE/MULTIPLE: CPT | Performed by: INTERNAL MEDICINE

## 2024-11-05 PROCEDURE — 45385 COLONOSCOPY W/LESION REMOVAL: CPT | Performed by: INTERNAL MEDICINE

## 2024-11-05 PROCEDURE — 43239 EGD BIOPSY SINGLE/MULTIPLE: CPT

## 2024-11-05 PROCEDURE — 45385 COLONOSCOPY W/LESION REMOVAL: CPT

## 2024-11-05 RX ORDER — SODIUM CHLORIDE, SODIUM LACTATE, POTASSIUM CHLORIDE, CALCIUM CHLORIDE 600; 310; 30; 20 MG/100ML; MG/100ML; MG/100ML; MG/100ML
INJECTION, SOLUTION INTRAVENOUS CONTINUOUS
Status: DISCONTINUED | OUTPATIENT
Start: 2024-11-05 | End: 2024-11-05 | Stop reason: HOSPADM

## 2024-11-05 RX ORDER — LIDOCAINE HYDROCHLORIDE 10 MG/ML
INJECTION, SOLUTION EPIDURAL; INFILTRATION; INTRACAUDAL; PERINEURAL
Status: DISCONTINUED | OUTPATIENT
Start: 2024-11-05 | End: 2024-11-05 | Stop reason: SDUPTHER

## 2024-11-05 RX ORDER — PROPOFOL 10 MG/ML
INJECTION, EMULSION INTRAVENOUS
Status: DISCONTINUED | OUTPATIENT
Start: 2024-11-05 | End: 2024-11-05 | Stop reason: SDUPTHER

## 2024-11-05 RX ADMIN — LIDOCAINE HYDROCHLORIDE 30 MG: 10 INJECTION, SOLUTION EPIDURAL; INFILTRATION; INTRACAUDAL; PERINEURAL at 15:20

## 2024-11-05 RX ADMIN — PROPOFOL 400 MG: 10 INJECTION, EMULSION INTRAVENOUS at 15:20

## 2024-11-05 RX ADMIN — SODIUM CHLORIDE, SODIUM LACTATE, POTASSIUM CHLORIDE, CALCIUM CHLORIDE: 600; 310; 30; 20 INJECTION, SOLUTION INTRAVENOUS at 13:50

## 2024-11-05 ASSESSMENT — PAIN - FUNCTIONAL ASSESSMENT: PAIN_FUNCTIONAL_ASSESSMENT: NONE - DENIES PAIN

## 2024-11-05 ASSESSMENT — ENCOUNTER SYMPTOMS: SHORTNESS OF BREATH: 1

## 2024-11-05 NOTE — H&P
Patient Name: Stephanie Coleman  : 1945  MRN: [id number]  DATE: 24    Allergies:   Allergies   Allergen Reactions    Demerol Nausea And Vomiting    Fenofibrate Other (See Comments)     Chest pain    Nitroglycerin Other (See Comments)     Passed out    Reclast [Zoledronic Acid] Other (See Comments)     Patient has a intolerance to this medication . It makes her feel \"out of it\"     Abatacept Other (See Comments)     Heart palpitations    Ipratropium Bromide Hfa Palpitations    Levofloxacin Other (See Comments)     palpitations    Buspirone Hcl     Colchicine     Hydrocodone     Maltose     Other     Robaxin [Methocarbamol]      Nauseated    Buspirone Other (See Comments)     Pt doesn't remember    Celexa [Citalopram Hydrobromide] Nausea Only and Other (See Comments)     Nausea and headache    Cozaar [Losartan Potassium] Palpitations    Dye [Barium-Containing Compounds] Nausea Only    Dye [Iodides] Itching     CONTRAST DYE, CT DYE, IV CONTRAST     Esomeprazole Magnesium Nausea And Vomiting    Evolocumab Other (See Comments)      Irritability  Repatha SureClick    Hydrocodone-Acetaminophen Other (See Comments)     Shaking, attacks nervous system    Lasix [Furosemide] Nausea Only    Lisinopril Other (See Comments)     Muscle cramps in legs    Losartan Nausea Only and Other (See Comments)     Nausea and dizziness    Methotrexate Other (See Comments)     Dizzy and fainted    Mirtazapine Nausea Only and Other (See Comments)     Tremor, nausea, headache    Neurontin [Gabapentin] Nausea Only and Other (See Comments)     Dizziness      Plaquenil [Hydroxychloroquine Sulfate] Other (See Comments)     nervousness    Prilosec [Omeprazole] Nausea And Vomiting    Trazodone      Muscle stiffness        ENDOSCOPY  History and Physical    Procedure:    [x] Diagnostic Colonoscopy       [] Screening Colonoscopy  [x] EGD      [] ERCP      [] EUS       [] Other    [x] Previous office notes/History and Physical reviewed from  representative.    Assessment:  Patient examined and appropriate for planned sedation and procedure.     Plan:  Proceed with planned sedation and procedure as above.         Byron Lowry MD

## 2024-11-05 NOTE — DISCHARGE INSTRUCTIONS
1.  Await path results, the patient will be contacted in 7-10 days with biopsy results.   2.  Continue anti-GERD measures along with medications for GERD  3.  Monitor CBC periodically.    4.  Repeat colonoscopy : pending pathology -if no evidence of dysplasia or malignancy, patient will no longer need any routine colon cancer surveillance exams due to her age.  A diagnostic colonoscopy can be considered if she were to manifest lower GI symptoms such as bleeding, abdominal pain, change in bowel habits or stool caliber or if the patient has anemia or unexplained weight loss in the future.     - Resume previous meds and diet  - GI clinic f/u 6-8 weeks with Ms. Shepard    - Keep scheduled f/u appts with other MDs     - NO NSAIDs x 2 weeks     POST-OP ORDERS: ENDOSCOPY & COLONOSCOPY:    1. Rest today.    2. DO NOT eat or drink until wide awake; eat your usual diet today in moderate amount only.    3. DO NOT drive today.    4. Call physician if you have severe pain, vomiting, fever, rectal bleeding or black bowel movements.    5.  If a biopsy was taken or a polyp removed, you should expect to hear results in about 7-10 days.  If you have heard nothing from your physician by then, call the office for results.    6.  Discharge home when patient awake, vitals signs stable and tolerating liquids.    7. Call with questions or concerns [phone].    NSAIDS (Non-steroidal Anti-Inflammatory)    You have been directed by your physician to avoid any NSAID's; the following medications are a list of those to avoid. If you think that you are taking any NSAID's notify your physician.     Over The Counter    Advil                      Motrin  Nuprin                   Ibuprofen  Midol                     Aleve  Naproxen              Orudis  Aspirin                   Cele-McDonough    Prescriptions and Generics    Cataflam              Relafen  Voltaren               Clinoril  Indocin                 Naproxen  Arthrotec

## 2024-11-05 NOTE — OP NOTE
Patient: Stephanie Coleman : 1945  Med Rec#: [id number] Acc#: [id number]   Primary Care Provider Rosalino Martínez PA    Date of Procedure:  2024    Endoscopist: Byron oLwry MD, MD    Referring Provider: Rosalino Martínez PA,     Operation Performed: Colonoscopy up to the distal terminal ileum with    Hot snare resection of a proximal rectal polyp    Indications:   For both EGD and colonoscopy exams today:    1. Gastroesophageal reflux disease, unspecified whether esophagitis present  2. Nausea and vomiting, unspecified vomiting type  3. Family history of stomach cancer  4. Change in bowel habits  5.  Macrocytic anemia, unspecified type  6. Family history of Crohn's disease    Anesthesia:  Sedation was administered by anesthesia who monitored the patient during the procedure.    I met with Stephanie Coleman prior to procedure. We discussed the procedure itself, and I have discussed the risks of endoscopy (including-- but not limited to-- pain, discomfort, bleeding potentially requiring second endoscopic procedure and/or blood transfusion, organ perforation requiring operative repair, damage to organs near the colon, infection, aspiration, cardiopulmonary/allergic reaction), benefits, indications to endoscopy. Additionally, we discussed options other than colonoscopy. The patient expressed understanding. All questions answered. The patient decided to proceed with the procedure.  Signed informed consent was placed on the chart.    Blood Loss: minimal    Withdrawal time: More than 9 minutes  Bowel Prep: Fair  with small amounts of thick semisolid stool and a moderate amount of thick, opaque liquid scattered in patchy segments throughout the colon obscuring the underlying mucosa.Lesions including polyps may have been missed.     Complications: no immediate complications    DESCRIPTION OF PROCEDURE:     A time out was performed. After written informed consent was obtained, the patient was placed

## 2024-11-05 NOTE — ANESTHESIA POSTPROCEDURE EVALUATION
Department of Anesthesiology  Postprocedure Note    Patient: Stephanie Coleman  MRN: [id number]  YOB: 1945  Date of evaluation: 11/5/2024    Procedure Summary       Date: 11/05/24 Room / Location: [place] [address] / [facility]    Anesthesia Start: 1512 Anesthesia Stop: 1550    Procedures:       ESOPHAGOGASTRODUODENOSCOPY BIOPSY (Esophagus)      COLONOSCOPY DIAGNOSTIC (Abdomen)      COLONOSCOPY POLYPECTOMY SNARE/BIOPSY (Abdomen) Diagnosis:       Gastroesophageal reflux disease, unspecified whether esophagitis present      Family history of Crohn's disease      Family history of stomach cancer      Change in bowel habits      Anemia, unspecified type      (Gastroesophageal reflux disease, unspecified whether esophagitis present [K21.9])      (Family history of Crohn's disease [Z83.79])      (Family history of stomach cancer [Z80.0])      (Change in bowel habits [R19.4])      (Anemia, unspecified type [D64.9])    Surgeons: Byron Lowry MD Responsible Provider: Birdie Hopkins APRN - CRNA    Anesthesia Type: general, TIVA ASA Status: 3            Anesthesia Type: No value filed.    Sarah Phase I:      Sarah Phase II:      Anesthesia Post Evaluation    Patient location during evaluation: bedside  Patient participation: complete - patient participated  Level of consciousness: sleepy but conscious  Pain score: 0  Airway patency: patent  Nausea & Vomiting: no nausea and no vomiting  Cardiovascular status: blood pressure returned to baseline  Respiratory status: acceptable, room air and spontaneous ventilation  Hydration status: euvolemic  Pain management: adequate    No notable events documented.

## 2024-11-05 NOTE — OP NOTE
Endoscopic Procedure Note    Patient: Stephanie Coleman : 1945  [facility] Rec#: [id number] Acc#: [id number]     Primary Care Provider Rosalino Martínez PA    Endoscopist: Byron Lowry MD, MD    Date of Procedure:  2024    Procedure:   EGD with    Cold biopsies    Indications:     For both EGD and colonoscopy exams today:    1. Gastroesophageal reflux disease, unspecified whether esophagitis present  2. Nausea and vomiting, unspecified vomiting type  3. Family history of stomach cancer  4. Change in bowel habits  5.  Macrocytic anemia with suspected GI blood loss, unspecified type  6. Family history of Crohn's disease    Anesthesia:  Sedation was administered by anesthesia who monitored the patient during the procedure.    Estimated Blood Loss: minimal    Procedure:   After reviewing the patient's chart and obtaining informed consent, the patient was placed in the left lateral decubitus position.  A forward-viewing Olympus endoscope was lubricated and inserted through the mouth into the oropharynx. Under direct visualization, the upper esophagus was intubated. The scope was advanced to the level of the third portion of duodenum. Scope was slowly withdrawn with careful inspection of the mucosal surfaces. The scope was retroflexed for inspection of the gastric fundus and incisura. Findings and maneuvers are listed in impression below. The patient tolerated the procedure well. The scope was removed. There were no immediate complications.    Findings/IMPRESSION:  Esophagus: normal and a normal EG junction at 38 cm.    NO erosions or ulcers or nodules or strictures or webs or rings or mass lesions or extrinsic compression or diverticula noted.      There is a small 2 to 3 cm in size sliding hiatal hernia present.      Stomach:  abnormal: Patchy mucosal changes with few small 1 to 2 mm erosions in the angularis and antrum suggestive of mild gastritis noted -  Gastric biopsies were taken from the antrum

## 2024-11-05 NOTE — ANESTHESIA PRE PROCEDURE
11/05/24 1324   BP: (!) 119/41   Pulse: 69   Resp: 20   Temp: 98.6 °F (37 °C)   TempSrc: Tympanic   Weight: 70.3 kg (155 lb)   Height: 1.702 m (5' 7\")                                              BP Readings from Last 3 Encounters:   11/05/24 (!) 119/41   10/18/24 (!) 102/56   10/17/24 120/60       NPO Status: Time of last liquid consumption: 0830                        Time of last solid consumption: 1700                        Date of last liquid consumption: 11/05/24                        Date of last solid food consumption: 11/03/24    BMI:   Wt Readings from Last 3 Encounters:   11/05/24 70.3 kg (155 lb)   10/18/24 71.4 kg (157 lb 4.8 oz)   10/17/24 70.8 kg (156 lb)     Body mass index is 24.28 kg/m².    CBC:   Lab Results   Component Value Date/Time    WBC 11.28 10/18/2024 11:19 AM    RBC 3.29 10/18/2024 11:19 AM    HGB 10.6 10/18/2024 11:19 AM    HCT 32.1 10/18/2024 11:19 AM    MCV 97.6 10/18/2024 11:19 AM    RDW 14.8 10/18/2024 11:19 AM     10/18/2024 11:19 AM       CMP:   Lab Results   Component Value Date/Time     10/30/2024 03:03 PM    K 4.1 10/30/2024 03:03 PM    K 3.7 08/01/2024 12:07 PM     10/30/2024 03:03 PM    CO2 26 10/30/2024 03:03 PM    BUN 25 10/30/2024 03:03 PM    CREATININE 0.9 10/30/2024 03:03 PM    GFRAA >59 07/04/2022 02:30 PM    LABGLOM 64 10/30/2024 03:03 PM    LABGLOM >60 06/23/2023 06:10 PM    GLUCOSE 104 10/30/2024 03:03 PM    CALCIUM 9.9 10/30/2024 03:03 PM    BILITOT <0.2 10/30/2024 03:03 PM    ALKPHOS 51 10/30/2024 03:03 PM    AST 18 10/30/2024 03:03 PM    ALT 10 10/30/2024 03:03 PM       POC Tests: No results for input(s): \"POCGLU\", \"POCNA\", \"POCK\", \"POCCL\", \"POCBUN\", \"POCHEMO\", \"POCHCT\" in the last 72 hours.    Coags:   Lab Results   Component Value Date/Time    PROTIME 18.1 09/11/2020 11:30 AM    INR 1.49 09/11/2020 11:30 AM    APTT 28.9 09/11/2020 11:30 AM       HCG (If Applicable): No results found for: \"PREGTESTUR\", \"PREGSERUM\", \"HCG\", \"HCGQUANT\"     ABGs:

## 2024-11-06 ENCOUNTER — HOSPITAL ENCOUNTER (OUTPATIENT)
Age: 79
Setting detail: SPECIMEN
Discharge: HOME | End: 2024-11-06
Payer: MEDICARE

## 2024-11-06 DIAGNOSIS — Z80.0 FAMILY HISTORY OF ESOPHAGEAL CANCER: ICD-10-CM

## 2024-11-06 DIAGNOSIS — Z80.0 FAMILY HISTORY OF STOMACH CANCER: ICD-10-CM

## 2024-11-06 PROCEDURE — 88342 IMHCHEM/IMCYTCHM 1ST ANTB: CPT

## 2024-11-06 PROCEDURE — 88305 TISSUE EXAM BY PATHOLOGIST: CPT

## 2024-11-13 ENCOUNTER — HOSPITAL ENCOUNTER (OUTPATIENT)
Dept: INFUSION THERAPY | Age: 79
Discharge: HOME | End: 2024-11-13

## 2024-11-13 DIAGNOSIS — E83.52 HYPERCALCEMIA: ICD-10-CM

## 2024-11-14 ENCOUNTER — HOSPITAL ENCOUNTER (OUTPATIENT)
Dept: INFUSION THERAPY | Age: 79
Discharge: HOME | End: 2024-11-14
Payer: MEDICARE

## 2024-11-14 DIAGNOSIS — E83.52 HYPERCALCEMIA: Primary | ICD-10-CM

## 2024-11-14 LAB
ALBUMIN SERPL-MCNC: 4.1 G/DL (ref 3.5–5.2)
ALP SERPL-CCNC: 53 U/L (ref 35–104)
ALT SERPL-CCNC: 11 U/L (ref 5–33)
ANION GAP SERPL CALCULATED.3IONS-SCNC: 9 MMOL/L (ref 7–19)
AST SERPL-CCNC: 19 U/L (ref 5–32)
BILIRUB SERPL-MCNC: <0.2 MG/DL (ref 0–1.2)
BUN SERPL-MCNC: 26 MG/DL (ref 8–23)
CALCIUM SERPL-MCNC: 9.9 MG/DL (ref 8.8–10.2)
CHLORIDE SERPL-SCNC: 102 MMOL/L (ref 98–107)
CO2 SERPL-SCNC: 28 MMOL/L (ref 22–29)
CREAT SERPL-MCNC: 1 MG/DL (ref 0.5–0.9)
GLUCOSE SERPL-MCNC: 94 MG/DL (ref 70–99)
POTASSIUM SERPL-SCNC: 3.9 MMOL/L (ref 3.5–5.1)
PROT SERPL-MCNC: 6.4 G/DL (ref 6.4–8.3)
SODIUM SERPL-SCNC: 139 MMOL/L (ref 136–145)

## 2024-11-14 PROCEDURE — 80053 COMPREHEN METABOLIC PANEL: CPT

## 2024-11-14 PROCEDURE — 36415 COLL VENOUS BLD VENIPUNCTURE: CPT

## 2024-12-04 ENCOUNTER — HOSPITAL ENCOUNTER (OUTPATIENT)
Dept: INFUSION THERAPY | Age: 79
Discharge: HOME | End: 2024-12-04
Payer: MEDICARE

## 2024-12-04 DIAGNOSIS — E83.52 HYPERCALCEMIA: ICD-10-CM

## 2024-12-04 LAB
ALBUMIN SERPL-MCNC: 4.2 G/DL (ref 3.5–5.2)
ALP SERPL-CCNC: 54 U/L (ref 35–104)
ALT SERPL-CCNC: 12 U/L (ref 5–33)
ANION GAP SERPL CALCULATED.3IONS-SCNC: 9 MMOL/L (ref 7–19)
AST SERPL-CCNC: 19 U/L (ref 5–32)
BILIRUB SERPL-MCNC: <0.2 MG/DL (ref 0–1.2)
BUN SERPL-MCNC: 30 MG/DL (ref 8–23)
CALCIUM SERPL-MCNC: 9.5 MG/DL (ref 8.8–10.2)
CHLORIDE SERPL-SCNC: 104 MMOL/L (ref 98–107)
CO2 SERPL-SCNC: 26 MMOL/L (ref 22–29)
CREAT SERPL-MCNC: 1 MG/DL (ref 0.5–0.9)
GLUCOSE SERPL-MCNC: 97 MG/DL (ref 70–99)
POTASSIUM SERPL-SCNC: 4 MMOL/L (ref 3.5–5.1)
PROT SERPL-MCNC: 6.7 G/DL (ref 6.4–8.3)
SODIUM SERPL-SCNC: 139 MMOL/L (ref 136–145)

## 2024-12-04 PROCEDURE — 80053 COMPREHEN METABOLIC PANEL: CPT

## 2024-12-04 PROCEDURE — 36415 COLL VENOUS BLD VENIPUNCTURE: CPT

## 2024-12-17 ENCOUNTER — TELEPHONE (OUTPATIENT)
Dept: HEMATOLOGY | Age: 79
End: 2024-12-17

## 2024-12-17 NOTE — TELEPHONE ENCOUNTER
Called Patient and reminded patient of their appointment on 12/19/2024 and patient confirmed they would be here. Reminded patient to just come at appointment time, and to not come at the lab appointment time. Reminded patient that we will not check them in any more than 30 minutes before appointment time.  We have now moved to the [facility] cancer [place] that is located between our old office and the ER at the [facility]. Letting the Pt know that our front entrance faces the  [place]. Reminded pt to eat well and be well hydrated for their labs.

## 2024-12-18 DIAGNOSIS — N18.32 CHRONIC RENAL FAILURE (CRF), STAGE 3B (HCC): ICD-10-CM

## 2024-12-18 DIAGNOSIS — D53.9 MACROCYTIC ANEMIA: ICD-10-CM

## 2024-12-18 DIAGNOSIS — E83.52 HYPERCALCEMIA: Primary | ICD-10-CM

## 2024-12-18 NOTE — PROGRESS NOTES
sessile proximal rectal polyp (PATH tubular adenoma), Moderate diverticulosis in the left colon Internal hemorrhoids-Grade 1 without any bleeding stigmata.    There is a potential of hypercalcemia related to this medication.  She will discontinue the allopurinol.    Serology 10/18/2024  Vitamin D, 1, 25 dihydroxy = 53.2-WNL  Vitamin A (retinol) = 0.83-WNL  Vitamin A (Retinyl palmitate) = 0.07-WNL  Vitamin A, serum = normal      Vitamin D level was normal vitamin A normal.  Calcium level has remained within the normal range since 10/18/2024.      CBC today reveals a WBC of 6.16, Hgb 12.6, ,000-completely normal.        Calcium has now been normal for the past 2 months.  It is not 9.6 today.  We will monitor it monthly and reevaluate her in 3 months.  If she were to develop recurrent hypercalcemia we may proceed with bone marrow at that time.  Discussed today with Stephanie and her .        PLAN  Continue OFF VITAMIN D   Continue off allopurinol  CMP monthly  Potential bone marrow if recurrence of hypercalcemia      3.  Macrocytic anemia -resolved          Hgb normal at 12.6 today        HEALTH MAINTENANCE    Colon cancer - Colonoscopy on 11/5/2024 per Dr. Byron Lowry revealed 8 mm in diameter sessile proximal rectal polyp (PATH tubular adenoma), Moderate diverticulosis in the left colon Internal hemorrhoids-Grade 1 without any bleeding stigmata    Breast cancer screening- Bilateral screening mammogram on 3/7/2024 at [facility] was a BI-RADS 1.          Orders this visit  CBC with differential  CMP      Return in about 3 months (around 3/19/2025) for With Lalit.  CBC, reevaluation    Lalit Matson PA-C  12:04 PM  12/19/2024

## 2024-12-19 ENCOUNTER — HOSPITAL ENCOUNTER (OUTPATIENT)
Dept: INFUSION THERAPY | Age: 79
Discharge: HOME | End: 2024-12-19
Payer: MEDICARE

## 2024-12-19 ENCOUNTER — OFFICE VISIT (OUTPATIENT)
Dept: HEMATOLOGY | Age: 79
End: 2024-12-19
Payer: MEDICARE

## 2024-12-19 VITALS
OXYGEN SATURATION: 95 % | WEIGHT: 156.9 LBS | BODY MASS INDEX: 24.63 KG/M2 | TEMPERATURE: 97.5 F | HEIGHT: 67 IN | HEART RATE: 65 BPM | DIASTOLIC BLOOD PRESSURE: 68 MMHG | SYSTOLIC BLOOD PRESSURE: 118 MMHG

## 2024-12-19 DIAGNOSIS — N18.32 CHRONIC RENAL FAILURE (CRF), STAGE 3B (HCC): ICD-10-CM

## 2024-12-19 DIAGNOSIS — D53.9 MACROCYTIC ANEMIA: ICD-10-CM

## 2024-12-19 DIAGNOSIS — E83.52 HYPERCALCEMIA: Primary | ICD-10-CM

## 2024-12-19 DIAGNOSIS — E83.52 HYPERCALCEMIA: ICD-10-CM

## 2024-12-19 DIAGNOSIS — N17.9 AKI (ACUTE KIDNEY INJURY) (HCC): ICD-10-CM

## 2024-12-19 DIAGNOSIS — R79.89 ELEVATED PTHRP LEVEL: ICD-10-CM

## 2024-12-19 LAB
ALBUMIN SERPL-MCNC: 4.1 G/DL (ref 3.5–5.2)
ALP SERPL-CCNC: 51 U/L (ref 35–104)
ALT SERPL-CCNC: 12 U/L (ref 5–33)
ANION GAP SERPL CALCULATED.3IONS-SCNC: 9 MMOL/L (ref 7–19)
AST SERPL-CCNC: 19 U/L (ref 5–32)
BASOPHILS # BLD: 0.03 K/UL (ref 0.01–0.08)
BASOPHILS NFR BLD: 0.5 % (ref 0.1–1.2)
BILIRUB SERPL-MCNC: <0.2 MG/DL (ref 0–1.2)
BUN SERPL-MCNC: 29 MG/DL (ref 8–23)
CALCIUM SERPL-MCNC: 9.6 MG/DL (ref 8.8–10.2)
CHLORIDE SERPL-SCNC: 105 MMOL/L (ref 98–107)
CO2 SERPL-SCNC: 27 MMOL/L (ref 22–29)
CREAT SERPL-MCNC: 1.1 MG/DL (ref 0.5–0.9)
EOSINOPHIL # BLD: 0.21 K/UL (ref 0.04–0.54)
EOSINOPHIL NFR BLD: 3.4 % (ref 0.7–7)
ERYTHROCYTE [DISTWIDTH] IN BLOOD BY AUTOMATED COUNT: 12.9 % (ref 11.7–14.4)
GLUCOSE SERPL-MCNC: 87 MG/DL (ref 70–99)
HCT VFR BLD AUTO: 37.1 % (ref 34.1–44.9)
HGB BLD-MCNC: 12.6 G/DL (ref 11.2–15.7)
LYMPHOCYTES # BLD: 2.01 K/UL (ref 1.18–3.74)
LYMPHOCYTES NFR BLD: 32.6 % (ref 19.3–53.1)
MCH RBC QN AUTO: 32.1 PG (ref 25.6–32.2)
MCHC RBC AUTO-ENTMCNC: 34 G/DL (ref 32.3–35.5)
MCV RBC AUTO: 94.4 FL (ref 79.4–94.8)
MONOCYTES # BLD: 0.46 K/UL (ref 0.24–0.82)
MONOCYTES NFR BLD: 7.5 % (ref 4.7–12.5)
NEUTROPHILS # BLD: 3.44 K/UL (ref 1.56–6.13)
NEUTS SEG NFR BLD: 55.8 % (ref 34–71.1)
PLATELET # BLD AUTO: 211 K/UL (ref 182–369)
PMV BLD AUTO: 8.6 FL (ref 7.4–10.4)
POTASSIUM SERPL-SCNC: 4.1 MMOL/L (ref 3.5–5.1)
PROT SERPL-MCNC: 6.5 G/DL (ref 6.4–8.3)
RBC # BLD AUTO: 3.93 M/UL (ref 3.93–5.22)
SODIUM SERPL-SCNC: 141 MMOL/L (ref 136–145)
WBC # BLD AUTO: 6.16 K/UL (ref 3.98–10.04)

## 2024-12-19 PROCEDURE — 99213 OFFICE O/P EST LOW 20 MIN: CPT | Performed by: PHYSICIAN ASSISTANT

## 2024-12-19 PROCEDURE — G8484 FLU IMMUNIZE NO ADMIN: HCPCS | Performed by: PHYSICIAN ASSISTANT

## 2024-12-19 PROCEDURE — 3074F SYST BP LT 130 MM HG: CPT | Performed by: PHYSICIAN ASSISTANT

## 2024-12-19 PROCEDURE — 3078F DIAST BP <80 MM HG: CPT | Performed by: PHYSICIAN ASSISTANT

## 2024-12-19 PROCEDURE — 1123F ACP DISCUSS/DSCN MKR DOCD: CPT | Performed by: PHYSICIAN ASSISTANT

## 2024-12-19 PROCEDURE — 1126F AMNT PAIN NOTED NONE PRSNT: CPT | Performed by: PHYSICIAN ASSISTANT

## 2024-12-19 PROCEDURE — 36415 COLL VENOUS BLD VENIPUNCTURE: CPT

## 2024-12-19 PROCEDURE — G8399 PT W/DXA RESULTS DOCUMENT: HCPCS | Performed by: PHYSICIAN ASSISTANT

## 2024-12-19 PROCEDURE — 85025 COMPLETE CBC W/AUTO DIFF WBC: CPT

## 2024-12-19 PROCEDURE — 1159F MED LIST DOCD IN RCRD: CPT | Performed by: PHYSICIAN ASSISTANT

## 2024-12-19 PROCEDURE — G8420 CALC BMI NORM PARAMETERS: HCPCS | Performed by: PHYSICIAN ASSISTANT

## 2024-12-19 PROCEDURE — 80053 COMPREHEN METABOLIC PANEL: CPT

## 2024-12-19 PROCEDURE — G8427 DOCREV CUR MEDS BY ELIG CLIN: HCPCS | Performed by: PHYSICIAN ASSISTANT

## 2024-12-19 PROCEDURE — 1036F TOBACCO NON-USER: CPT | Performed by: PHYSICIAN ASSISTANT

## 2024-12-19 PROCEDURE — 1090F PRES/ABSN URINE INCON ASSESS: CPT | Performed by: PHYSICIAN ASSISTANT

## 2024-12-19 RX ORDER — FEXOFENADINE HCL 180 MG/1
180 TABLET ORAL 2 TIMES DAILY
COMMUNITY
Start: 2024-12-13

## 2024-12-19 ASSESSMENT — ENCOUNTER SYMPTOMS
ABDOMINAL DISTENTION: 0
EYE DISCHARGE: 0
VOMITING: 0
SHORTNESS OF BREATH: 0
NAUSEA: 0
PHOTOPHOBIA: 0
TROUBLE SWALLOWING: 0
SORE THROAT: 0
COLOR CHANGE: 0
BACK PAIN: 0
WHEEZING: 0
BLOOD IN STOOL: 0
EYE ITCHING: 0
COUGH: 0
VOICE CHANGE: 0
CONSTIPATION: 0
DIARRHEA: 0
ABDOMINAL PAIN: 0

## 2024-12-20 ENCOUNTER — OFFICE VISIT (OUTPATIENT)
Dept: CARDIOLOGY CLINIC | Age: 79
End: 2024-12-20
Payer: COMMERCIAL

## 2024-12-20 VITALS
HEART RATE: 62 BPM | BODY MASS INDEX: 24.64 KG/M2 | HEIGHT: 67 IN | WEIGHT: 157 LBS | DIASTOLIC BLOOD PRESSURE: 68 MMHG | SYSTOLIC BLOOD PRESSURE: 110 MMHG

## 2024-12-20 DIAGNOSIS — R00.2 PALPITATIONS: Primary | ICD-10-CM

## 2024-12-20 PROCEDURE — 99214 OFFICE O/P EST MOD 30 MIN: CPT | Performed by: INTERNAL MEDICINE

## 2024-12-20 PROCEDURE — 1159F MED LIST DOCD IN RCRD: CPT | Performed by: INTERNAL MEDICINE

## 2024-12-20 PROCEDURE — 1123F ACP DISCUSS/DSCN MKR DOCD: CPT | Performed by: INTERNAL MEDICINE

## 2024-12-20 PROCEDURE — 93246 EXT ECG>7D<15D RECORDING: CPT | Performed by: INTERNAL MEDICINE

## 2024-12-20 PROCEDURE — 3074F SYST BP LT 130 MM HG: CPT | Performed by: INTERNAL MEDICINE

## 2024-12-20 PROCEDURE — 3078F DIAST BP <80 MM HG: CPT | Performed by: INTERNAL MEDICINE

## 2024-12-20 ASSESSMENT — ENCOUNTER SYMPTOMS
BACK PAIN: 0
WHEEZING: 0
CONSTIPATION: 0
SHORTNESS OF BREATH: 0
BLOOD IN STOOL: 0
COUGH: 0
ABDOMINAL DISTENTION: 0
EYE DISCHARGE: 0
DIARRHEA: 0
VOMITING: 0

## 2024-12-20 NOTE — PROGRESS NOTES
[facility]  Cardiology Office Note  [address] [facility] [place], [place]  [address]  Phone: [phone]  Fax: [phone]                            Date:  12/20/2024  Patient: Stephanie Coleman  Age:  79 y.o., 1945    Referral: No ref. provider found      PROBLEM LIST:    Patient Active Problem List    Diagnosis Date Noted    Primary generalized (osteo)arthritis 07/26/2024     Priority: Low    Hypercalcemia 07/26/2024     Priority: Low    Cervical radiculopathy 04/09/2024     Priority: Low    Idiopathic chronic gout of multiple sites without tophus 02/20/2024     Priority: Low    Fibromyalgia 05/22/2023     Priority: Low    Osteoporosis 05/22/2023     Priority: Low    Rheumatoid arthritis (HCC) 05/22/2023     Priority: Low    Peripheral vascular disease (HCC) 05/22/2023     Priority: Low    Change in bowel habits 05/12/2021     Priority: Low    Constipation 05/12/2021     Priority: Low    Myalgia due to statin 11/07/2019     Priority: Low    Urinary tract infection with hematuria 05/16/2019     Priority: Low    Overactive bladder 07/24/2017     Priority: Low    Mixed hyperlipidemia 04/18/2017     Priority: Low    H/O right coronary artery stent placement 04/18/2017     Priority: Low     Overview Note:     Stent placed in 2009 by Dr. Broadbent at       Periumbilical abdominal pain 07/21/2016     Priority: Low    Irritable bowel syndrome with diarrhea 06/10/2016     Priority: Low    Internal hemorrhoids 06/10/2016     Priority: Low    Allergic reaction 09/18/2015     Priority: Low    Precordial pain 09/08/2015     Priority: Low    Bleeding internal hemorrhoids 07/15/2014     Priority: Low    Gastroesophageal reflux disease without esophagitis 05/20/2014     Priority: Low    Spider veins 03/04/2013     Priority: Low    Carotid artery stenosis 11/08/2012     Priority: Low    Leg pain 11/08/2012     Priority: Low    Leg swelling 11/08/2012     Priority: Low    Chronic venous

## 2025-01-20 ENCOUNTER — TELEPHONE (OUTPATIENT)
Dept: CARDIOLOGY CLINIC | Age: 80
End: 2025-01-20

## 2025-01-20 NOTE — TELEPHONE ENCOUNTER
Returned call to patient's spouse, advised that we have not yet received the monitor results but will call as soon as we get them and they are reviewed.

## 2025-01-20 NOTE — TELEPHONE ENCOUNTER
Patient  called asking for called back to go over results . Patient wore a ZIO monitor and he said no one called with the results.Please called [phone]       Thank you

## 2025-01-23 PROCEDURE — 93248 EXT ECG>7D<15D REV&INTERPJ: CPT | Performed by: INTERNAL MEDICINE

## 2025-01-27 DIAGNOSIS — R00.2 PALPITATIONS: Primary | ICD-10-CM

## 2025-01-27 DIAGNOSIS — R00.2 PALPITATIONS: ICD-10-CM

## 2025-01-29 ENCOUNTER — OFFICE VISIT (OUTPATIENT)
Dept: CARDIOLOGY CLINIC | Age: 80
End: 2025-01-29
Payer: MEDICARE

## 2025-01-29 VITALS
HEART RATE: 65 BPM | OXYGEN SATURATION: 98 % | SYSTOLIC BLOOD PRESSURE: 112 MMHG | WEIGHT: 156 LBS | BODY MASS INDEX: 24.48 KG/M2 | DIASTOLIC BLOOD PRESSURE: 48 MMHG | HEIGHT: 67 IN

## 2025-01-29 DIAGNOSIS — I25.111 CORONARY ARTERY DISEASE INVOLVING NATIVE CORONARY ARTERY OF NATIVE HEART WITH ANGINA PECTORIS WITH DOCUMENTED SPASM (HCC): Primary | ICD-10-CM

## 2025-01-29 DIAGNOSIS — I10 PRIMARY HYPERTENSION: ICD-10-CM

## 2025-01-29 DIAGNOSIS — R00.2 PALPITATIONS: ICD-10-CM

## 2025-01-29 DIAGNOSIS — R42 POSTURAL DIZZINESS: ICD-10-CM

## 2025-01-29 PROCEDURE — 1090F PRES/ABSN URINE INCON ASSESS: CPT | Performed by: CLINICAL NURSE SPECIALIST

## 2025-01-29 PROCEDURE — 1036F TOBACCO NON-USER: CPT | Performed by: CLINICAL NURSE SPECIALIST

## 2025-01-29 PROCEDURE — 99214 OFFICE O/P EST MOD 30 MIN: CPT | Performed by: CLINICAL NURSE SPECIALIST

## 2025-01-29 PROCEDURE — G8399 PT W/DXA RESULTS DOCUMENT: HCPCS | Performed by: CLINICAL NURSE SPECIALIST

## 2025-01-29 PROCEDURE — 3074F SYST BP LT 130 MM HG: CPT | Performed by: CLINICAL NURSE SPECIALIST

## 2025-01-29 PROCEDURE — G8420 CALC BMI NORM PARAMETERS: HCPCS | Performed by: CLINICAL NURSE SPECIALIST

## 2025-01-29 PROCEDURE — 3078F DIAST BP <80 MM HG: CPT | Performed by: CLINICAL NURSE SPECIALIST

## 2025-01-29 PROCEDURE — G8427 DOCREV CUR MEDS BY ELIG CLIN: HCPCS | Performed by: CLINICAL NURSE SPECIALIST

## 2025-01-29 PROCEDURE — 1159F MED LIST DOCD IN RCRD: CPT | Performed by: CLINICAL NURSE SPECIALIST

## 2025-01-29 PROCEDURE — 1123F ACP DISCUSS/DSCN MKR DOCD: CPT | Performed by: CLINICAL NURSE SPECIALIST

## 2025-01-29 RX ORDER — OLMESARTAN MEDOXOMIL 5 MG/1
5 TABLET ORAL NIGHTLY
Qty: 90 TABLET | Refills: 3 | Status: SHIPPED | OUTPATIENT
Start: 2025-01-29

## 2025-01-29 ASSESSMENT — ENCOUNTER SYMPTOMS
ABDOMINAL PAIN: 0
COUGH: 0
VOMITING: 0
SHORTNESS OF BREATH: 1
CHEST TIGHTNESS: 0
FACIAL SWELLING: 0
WHEEZING: 0
NAUSEA: 0
EYE REDNESS: 0

## 2025-01-29 NOTE — PATIENT INSTRUCTIONS
Return for APRN, as scheduled.   Decrease Olmesartan 5mg at bedtime only  Stay hydrated- 64 oz daily

## 2025-01-29 NOTE — PROGRESS NOTES
[facility], [place] [address] [place] [address], [place] [address]  Phone: [phone]  Fax: [phone]    OFFICE VISIT:  2025    Stephanie Coleman - : 1945    Reason For Visit:  Stephanie is a 79 y.o. female who is here for Follow-up (Patient feels dizzy when BP is low.) and Primary hypertension       Diagnosis Orders   1. Coronary artery disease involving native coronary artery of native heart with angina pectoris with documented spasm (HCC)        2. Postural dizziness        3. Palpitations        4. Primary hypertension                    HPI  1. Coronary artery disease, prior ostial RCA stent with obstructive left main disease by catheterization 9/10/2020, status post 2 vessel CABG 2020 with LIMA to LAD and SVG to OM, normal LV ejection fraction.  Negative DSE   2. Hypertension.  3. Rheumatoid arthritis.  4.  Hyperlipidemia  5.  History of Covid in 2021  6.  Carotid Stenosis  7.  Polymyalgia Rheumatica    Patient requested a follow-up appointment to review recent heart monitor results.  She has been experiencing some brief palpitations.    She was hospitalized in August with severe hypercalcemia and spent several weeks in the nursing home for rehabilitation.  She lost a total of 40 pounds, but has gained some of it back.      She complains of some positional dizziness.  Sometimes she will experience some associated brief palpitations when she changes positions.  Blood pressure running a little on the lower side.    No cardiac symptoms such as chest pain, unusual dyspnea, orthopnea, PND, syncope    Martínez, CORINA Goetz is PCP.  Stephanie Coleman has the following history as recorded in [facility]:    Patient Active Problem List    Diagnosis Date Noted    Primary generalized (osteo)arthritis 2024    Hypercalcemia 2024    Cervical radiculopathy 2024    Idiopathic chronic gout of multiple sites without tophus 2024    Fibromyalgia 2023

## 2025-02-06 ENCOUNTER — HOSPITAL ENCOUNTER (OUTPATIENT)
Dept: INFUSION THERAPY | Age: 80
Discharge: HOME | End: 2025-02-06
Payer: MEDICARE

## 2025-02-06 DIAGNOSIS — E83.52 HYPERCALCEMIA: Primary | ICD-10-CM

## 2025-02-06 DIAGNOSIS — E83.52 HYPERCALCEMIA: ICD-10-CM

## 2025-02-06 LAB
ALBUMIN SERPL-MCNC: 3.7 G/DL (ref 3.5–5.2)
ALP SERPL-CCNC: 51 U/L (ref 35–104)
ALT SERPL-CCNC: 13 U/L (ref 5–33)
ANION GAP SERPL CALCULATED.3IONS-SCNC: 8 MMOL/L (ref 7–19)
AST SERPL-CCNC: 17 U/L (ref 5–32)
BILIRUB SERPL-MCNC: <0.2 MG/DL (ref 0–1.2)
BUN SERPL-MCNC: 20 MG/DL (ref 8–23)
CALCIUM SERPL-MCNC: 9.1 MG/DL (ref 8.8–10.2)
CHLORIDE SERPL-SCNC: 108 MMOL/L (ref 98–107)
CO2 SERPL-SCNC: 26 MMOL/L (ref 22–29)
CREAT SERPL-MCNC: 0.9 MG/DL (ref 0.5–0.9)
GLUCOSE SERPL-MCNC: 105 MG/DL (ref 70–99)
POTASSIUM SERPL-SCNC: 4.2 MMOL/L (ref 3.5–5.1)
PROT SERPL-MCNC: 6 G/DL (ref 6.4–8.3)
SODIUM SERPL-SCNC: 142 MMOL/L (ref 136–145)

## 2025-02-06 PROCEDURE — 80053 COMPREHEN METABOLIC PANEL: CPT

## 2025-02-06 PROCEDURE — 36415 COLL VENOUS BLD VENIPUNCTURE: CPT

## 2025-02-27 ENCOUNTER — TRANSCRIBE ORDERS (OUTPATIENT)
Dept: ADMINISTRATIVE | Facility: HOSPITAL | Age: 80
End: 2025-02-27
Payer: MEDICARE

## 2025-02-27 DIAGNOSIS — Z12.31 ENCOUNTER FOR SCREENING MAMMOGRAM FOR MALIGNANT NEOPLASM OF BREAST: Primary | ICD-10-CM

## 2025-02-28 ENCOUNTER — TRANSCRIBE ORDERS (OUTPATIENT)
Dept: ADMINISTRATIVE | Facility: HOSPITAL | Age: 80
End: 2025-02-28
Payer: MEDICARE

## 2025-02-28 DIAGNOSIS — Z78.0 ASYMPTOMATIC MENOPAUSAL STATE: Primary | ICD-10-CM

## 2025-03-06 ENCOUNTER — APPOINTMENT (OUTPATIENT)
Dept: INFUSION THERAPY | Age: 80
End: 2025-03-06
Payer: MEDICARE

## 2025-03-11 ENCOUNTER — HOSPITAL ENCOUNTER (OUTPATIENT)
Dept: MAMMOGRAPHY | Facility: HOSPITAL | Age: 80
Discharge: HOME | End: 2025-03-11
Payer: MEDICARE

## 2025-03-11 ENCOUNTER — HOSPITAL ENCOUNTER (OUTPATIENT)
Dept: BONE DENSITY | Facility: HOSPITAL | Age: 80
Discharge: HOME | End: 2025-03-11
Payer: MEDICARE

## 2025-03-11 DIAGNOSIS — Z12.31 ENCOUNTER FOR SCREENING MAMMOGRAM FOR MALIGNANT NEOPLASM OF BREAST: ICD-10-CM

## 2025-03-11 DIAGNOSIS — Z78.0 ASYMPTOMATIC MENOPAUSAL STATE: ICD-10-CM

## 2025-03-11 PROCEDURE — 77067 SCR MAMMO BI INCL CAD: CPT

## 2025-03-11 PROCEDURE — 77063 BREAST TOMOSYNTHESIS BI: CPT

## 2025-03-11 PROCEDURE — 77080 DXA BONE DENSITY AXIAL: CPT

## 2025-03-13 ENCOUNTER — APPOINTMENT (OUTPATIENT)
Dept: INFUSION THERAPY | Age: 80
End: 2025-03-13
Payer: MEDICARE

## 2025-03-18 ENCOUNTER — TELEPHONE (OUTPATIENT)
Dept: HEMATOLOGY | Age: 80
End: 2025-03-18

## 2025-03-18 NOTE — TELEPHONE ENCOUNTER
Called Patient and reminded patient of their appointment on 03/20/2025 and patient confirmed they would be here. Reminded patient to just come at appointment time, and to not come at the lab appointment time. Reminded patient that we will not check them in any more than 30 minutes before appointment time.  We have now moved to the [facility] cancer [place] that is located between our old office and the ER at the [facility]. Letting the Pt know that our front entrance faces the  [place]. Reminded pt to eat well and be well hydrated for their labs.

## 2025-03-19 DIAGNOSIS — E83.52 HYPERCALCEMIA: Primary | ICD-10-CM

## 2025-03-19 DIAGNOSIS — N18.32 CHRONIC RENAL FAILURE (CRF), STAGE 3B (HCC): ICD-10-CM

## 2025-03-19 DIAGNOSIS — D53.9 MACROCYTIC ANEMIA: ICD-10-CM

## 2025-03-19 NOTE — PROGRESS NOTES
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  female seen initially at [facility] as an IP on 7/27/2024 for hypercalcemia.  She was treated with zoledronic acid 4 mg IV for significant hypercalcemia of 16.7, calcitonin, IV fluids with calcium normalizing, 8.1 at discharge.  Her significant confusion resolved and she was back at baseline at time of her discharge.    She had been taking a significant amount of vitamin D with significant elevated level during her hospitalization.  Her vitamin D has been discontinued altogether.  Her allopurinol was also discontinued due to potential hypercalcemia that can be seen with its administration.    She has an elevated PTH rp.  Workup for neoplasm has been negative thus far.  She did have her EGD/colonoscopy on 11/5/2024 by Dr. Lowry which was unrevealing.    She reports a general sense of well-being, with no recent episodes of confusion that were previously associated with elevated calcium levels. She has discontinued the use of vitamin D and allopurinol.    She experiences headaches when running, which she believes may be sinus-related. She is currently on an antibiotic regimen and takes fexofenadine twice daily.    Her dietary intake is primarily composed of vegetables, with a noted decrease in overall food consumption. She has not been consuming Ensure recently.    Supplemental Information  She is on metoprolol 50 mg twice a day and olmesartan 5 mg a day for blood pressure.    MEDICATIONS  Current: Metoprolol, olmesartan,

## 2025-03-20 ENCOUNTER — OFFICE VISIT (OUTPATIENT)
Dept: HEMATOLOGY | Age: 80
End: 2025-03-20
Payer: MEDICARE

## 2025-03-20 ENCOUNTER — HOSPITAL ENCOUNTER (OUTPATIENT)
Dept: INFUSION THERAPY | Age: 80
Discharge: HOME | End: 2025-03-20
Payer: MEDICARE

## 2025-03-20 VITALS
BODY MASS INDEX: 24.83 KG/M2 | WEIGHT: 158.2 LBS | TEMPERATURE: 98.2 F | HEIGHT: 67 IN | HEART RATE: 68 BPM | OXYGEN SATURATION: 98 % | DIASTOLIC BLOOD PRESSURE: 70 MMHG | SYSTOLIC BLOOD PRESSURE: 124 MMHG

## 2025-03-20 DIAGNOSIS — N18.32 CHRONIC RENAL FAILURE (CRF), STAGE 3B (HCC): ICD-10-CM

## 2025-03-20 DIAGNOSIS — D53.9 MACROCYTIC ANEMIA: ICD-10-CM

## 2025-03-20 DIAGNOSIS — E83.52 HYPERCALCEMIA: Primary | ICD-10-CM

## 2025-03-20 DIAGNOSIS — E83.52 HYPERCALCEMIA: ICD-10-CM

## 2025-03-20 LAB
ALBUMIN SERPL-MCNC: 4 G/DL (ref 3.5–5.2)
ALP SERPL-CCNC: 47 U/L (ref 35–104)
ALT SERPL-CCNC: 16 U/L (ref 5–33)
ANION GAP SERPL CALCULATED.3IONS-SCNC: 10 MMOL/L (ref 7–19)
AST SERPL-CCNC: 18 U/L (ref 5–32)
BASOPHILS # BLD: 0.03 K/UL (ref 0–0.2)
BASOPHILS NFR BLD: 0.5 % (ref 0–1)
BILIRUB SERPL-MCNC: 0.4 MG/DL (ref 0–1.2)
BUN SERPL-MCNC: 15 MG/DL (ref 8–23)
CALCIUM SERPL-MCNC: 9.6 MG/DL (ref 8.8–10.2)
CHLORIDE SERPL-SCNC: 107 MMOL/L (ref 98–107)
CO2 SERPL-SCNC: 27 MMOL/L (ref 22–29)
CREAT SERPL-MCNC: 0.8 MG/DL (ref 0.5–0.9)
EOSINOPHIL # BLD: 0.26 K/UL (ref 0–0.6)
EOSINOPHIL NFR BLD: 4 % (ref 0–5)
ERYTHROCYTE [DISTWIDTH] IN BLOOD BY AUTOMATED COUNT: 13.5 % (ref 11.5–14.5)
GLUCOSE SERPL-MCNC: 94 MG/DL (ref 70–99)
HCT VFR BLD AUTO: 37.7 % (ref 37–47)
HGB BLD-MCNC: 12.9 G/DL (ref 12–16)
LYMPHOCYTES # BLD: 1.61 K/UL (ref 1.1–4.5)
LYMPHOCYTES NFR BLD: 25.1 % (ref 20–40)
MCH RBC QN AUTO: 32.1 PG (ref 27–31)
MCHC RBC AUTO-ENTMCNC: 34.2 G/DL (ref 33–37)
MCV RBC AUTO: 93.8 FL (ref 81–99)
MONOCYTES # BLD: 0.46 K/UL (ref 0–0.9)
MONOCYTES NFR BLD: 7.2 % (ref 1–10)
NEUTROPHILS # BLD: 4.03 K/UL (ref 1.5–7.5)
NEUTS SEG NFR BLD: 62.7 % (ref 50–65)
PLATELET # BLD AUTO: 180 K/UL (ref 130–400)
PMV BLD AUTO: 8 FL (ref 9.4–12.3)
POTASSIUM SERPL-SCNC: 3.9 MMOL/L (ref 3.5–5.1)
PROT SERPL-MCNC: 6.2 G/DL (ref 6.4–8.3)
RBC # BLD AUTO: 4.02 M/UL (ref 4.2–5.4)
SODIUM SERPL-SCNC: 144 MMOL/L (ref 136–145)
WBC # BLD AUTO: 6.42 K/UL (ref 4.8–10.8)

## 2025-03-20 PROCEDURE — 3074F SYST BP LT 130 MM HG: CPT | Performed by: PHYSICIAN ASSISTANT

## 2025-03-20 PROCEDURE — G8420 CALC BMI NORM PARAMETERS: HCPCS | Performed by: PHYSICIAN ASSISTANT

## 2025-03-20 PROCEDURE — 85025 COMPLETE CBC W/AUTO DIFF WBC: CPT

## 2025-03-20 PROCEDURE — 1036F TOBACCO NON-USER: CPT | Performed by: PHYSICIAN ASSISTANT

## 2025-03-20 PROCEDURE — 99213 OFFICE O/P EST LOW 20 MIN: CPT | Performed by: PHYSICIAN ASSISTANT

## 2025-03-20 PROCEDURE — 1090F PRES/ABSN URINE INCON ASSESS: CPT | Performed by: PHYSICIAN ASSISTANT

## 2025-03-20 PROCEDURE — 1123F ACP DISCUSS/DSCN MKR DOCD: CPT | Performed by: PHYSICIAN ASSISTANT

## 2025-03-20 PROCEDURE — 36415 COLL VENOUS BLD VENIPUNCTURE: CPT

## 2025-03-20 PROCEDURE — 99212 OFFICE O/P EST SF 10 MIN: CPT

## 2025-03-20 PROCEDURE — G8399 PT W/DXA RESULTS DOCUMENT: HCPCS | Performed by: PHYSICIAN ASSISTANT

## 2025-03-20 PROCEDURE — 80053 COMPREHEN METABOLIC PANEL: CPT

## 2025-03-20 PROCEDURE — 1126F AMNT PAIN NOTED NONE PRSNT: CPT | Performed by: PHYSICIAN ASSISTANT

## 2025-03-20 PROCEDURE — G8427 DOCREV CUR MEDS BY ELIG CLIN: HCPCS | Performed by: PHYSICIAN ASSISTANT

## 2025-03-20 PROCEDURE — 3078F DIAST BP <80 MM HG: CPT | Performed by: PHYSICIAN ASSISTANT

## 2025-03-20 PROCEDURE — 1159F MED LIST DOCD IN RCRD: CPT | Performed by: PHYSICIAN ASSISTANT

## 2025-03-20 ASSESSMENT — ENCOUNTER SYMPTOMS
TROUBLE SWALLOWING: 0
BLOOD IN STOOL: 0
NAUSEA: 0
VOMITING: 0
CONSTIPATION: 0
SORE THROAT: 0
DIARRHEA: 0
BACK PAIN: 0
COUGH: 0
ABDOMINAL PAIN: 0
EYE DISCHARGE: 0
COLOR CHANGE: 0
EYE ITCHING: 0
ABDOMINAL DISTENTION: 0
VOICE CHANGE: 0
SHORTNESS OF BREATH: 0
WHEEZING: 0
PHOTOPHOBIA: 0

## 2025-04-13 ENCOUNTER — HOSPITAL ENCOUNTER (EMERGENCY)
Age: 80
Discharge: HOME | End: 2025-04-13
Payer: MEDICARE

## 2025-04-13 VITALS
BODY MASS INDEX: 22.71 KG/M2 | WEIGHT: 145 LBS | DIASTOLIC BLOOD PRESSURE: 60 MMHG | RESPIRATION RATE: 18 BRPM | TEMPERATURE: 98.7 F | SYSTOLIC BLOOD PRESSURE: 149 MMHG | HEART RATE: 70 BPM | OXYGEN SATURATION: 97 %

## 2025-04-13 DIAGNOSIS — T14.8XXA BLISTER: Primary | ICD-10-CM

## 2025-04-13 PROCEDURE — 99282 EMERGENCY DEPT VISIT SF MDM: CPT

## 2025-04-13 NOTE — ED PROVIDER NOTES
tenderness. Normal range of motion.      Comments: Patient has full range of motion of right lower extremity.   Skin:     General: Skin is warm and dry.      Coloration: Skin is not pale.      Findings: No petechiae or rash.      Comments: Patient presents with a ruptured posterior located on the distal aspect of the right great toe, it does appear blister has torn exposing the underlying dermis there is mild erythema around the site with no sign of erythema on the surrounding skin or tracking up the toe or foot. no sign of drainage or foul odor noted.  Cap refill on toe is less than 2 seconds, patient's right lower extremity is warm and pink, dorsal pedal pulse is strong.   Neurological:      Mental Status: She is alert and oriented to person, place, and time.      GCS: GCS eye subscore is 4. GCS verbal subscore is 5. GCS motor subscore is 6.      Comments: Mental Status Exam:   Alert and oriented times three, follows commands, speech intact    Cranial Ixmcmd-SN-AIE     Cranial nerve II  Visual acuity: normal  Cranial nerve III  Pupils: equal, round, reactive to light  Cranial nerves III, IV, VI  Extraocular Movements: intact  Cranial nerve V  Facial sensation: intact  Cranial nerve VII  Facial strength: intact  Cranial nerve VIII  Hearing: intact  Cranial nerve IX  Palate: intact  Cranial nerve XI  Shoulder shrug: intact  Cranial nerve XII  Tongue movement: normal    Motor:   Pronator Drift: absent  Motor exam is symmetrical 5 out of 5 in UE and LE bilaterally  Tone: normal  Abnormal Movements: Absent     Sensory:  Light Touch  Right Upper Extremity: normal  Left Upper Extremity: normal  Right Lower Extremity: normal  Left Lower Extremity: normal         Psychiatric:         Mood and Affect: Mood normal.         Speech: Speech normal.         Behavior: Behavior normal. Behavior is cooperative.         Thought Content: Thought content normal.         Judgment: Judgment normal.         DIAGNOSTIC RESULTS     EKG:

## 2025-04-13 NOTE — DISCHARGE INSTRUCTIONS
Today you were seen for a blister on your toe, you should avoid wearing the shoes that caused it.  Take Tylenol and Aleve as needed follow package dosage and directions, keep area clean and dry.  Elevate the affected extremity today and use ice as needed for pain do not apply ice pack directly on the skin.

## 2025-04-21 ENCOUNTER — APPOINTMENT (OUTPATIENT)
Age: 80
End: 2025-04-21
Payer: MEDICARE

## 2025-04-21 ENCOUNTER — HOSPITAL ENCOUNTER (EMERGENCY)
Age: 80
Discharge: HOME | End: 2025-04-21
Attending: EMERGENCY MEDICINE
Payer: MEDICARE

## 2025-04-21 ENCOUNTER — APPOINTMENT (OUTPATIENT)
Dept: GENERAL RADIOLOGY | Age: 80
End: 2025-04-21
Payer: MEDICARE

## 2025-04-21 VITALS
BODY MASS INDEX: 23.49 KG/M2 | OXYGEN SATURATION: 99 % | WEIGHT: 150 LBS | SYSTOLIC BLOOD PRESSURE: 154 MMHG | RESPIRATION RATE: 13 BRPM | TEMPERATURE: 97.8 F | DIASTOLIC BLOOD PRESSURE: 52 MMHG | HEART RATE: 65 BPM

## 2025-04-21 DIAGNOSIS — R00.2 PALPITATIONS: Primary | ICD-10-CM

## 2025-04-21 LAB
ALBUMIN SERPL-MCNC: 4.1 G/DL (ref 3.5–5.2)
ALP SERPL-CCNC: 48 U/L (ref 35–104)
ALT SERPL-CCNC: 20 U/L (ref 10–35)
ANION GAP SERPL CALCULATED.3IONS-SCNC: 10 MMOL/L (ref 8–16)
AST SERPL-CCNC: 20 U/L (ref 10–35)
BASOPHILS # BLD: 0 K/UL (ref 0–0.2)
BASOPHILS NFR BLD: 0.6 % (ref 0–1)
BILIRUB SERPL-MCNC: 0.3 MG/DL (ref 0.2–1.2)
BUN SERPL-MCNC: 19 MG/DL (ref 8–23)
CALCIUM SERPL-MCNC: 9.5 MG/DL (ref 8.8–10.2)
CHLORIDE SERPL-SCNC: 109 MMOL/L (ref 98–107)
CO2 SERPL-SCNC: 26 MMOL/L (ref 22–29)
CREAT SERPL-MCNC: 0.8 MG/DL (ref 0.5–0.9)
EKG P AXIS: 76 DEGREES
EKG P-R INTERVAL: 192 MS
EKG Q-T INTERVAL: 436 MS
EKG QRS DURATION: 92 MS
EKG QTC CALCULATION (BAZETT): 439 MS
EKG T AXIS: 16 DEGREES
EOSINOPHIL # BLD: 0.2 K/UL (ref 0–0.6)
EOSINOPHIL NFR BLD: 3.6 % (ref 0–5)
ERYTHROCYTE [DISTWIDTH] IN BLOOD BY AUTOMATED COUNT: 12.6 % (ref 11.5–14.5)
GLUCOSE SERPL-MCNC: 98 MG/DL (ref 70–99)
HCT VFR BLD AUTO: 38.9 % (ref 37–47)
HGB BLD-MCNC: 13 G/DL (ref 12–16)
IMM GRANULOCYTES # BLD: 0 K/UL
LYMPHOCYTES # BLD: 1.7 K/UL (ref 1.1–4.5)
LYMPHOCYTES NFR BLD: 31.6 % (ref 20–40)
MAGNESIUM SERPL-MCNC: 1.7 MG/DL (ref 1.6–2.4)
MCH RBC QN AUTO: 31.4 PG (ref 27–31)
MCHC RBC AUTO-ENTMCNC: 33.4 G/DL (ref 33–37)
MCV RBC AUTO: 94 FL (ref 81–99)
MONOCYTES # BLD: 0.5 K/UL (ref 0–0.9)
MONOCYTES NFR BLD: 8.6 % (ref 0–10)
NEUTROPHILS # BLD: 2.9 K/UL (ref 1.5–7.5)
NEUTS SEG NFR BLD: 55.2 % (ref 50–65)
PLATELET # BLD AUTO: 180 K/UL (ref 130–400)
PMV BLD AUTO: 8.4 FL (ref 9.4–12.3)
POTASSIUM SERPL-SCNC: 3.6 MMOL/L (ref 3.5–5.1)
PROT SERPL-MCNC: 6 G/DL (ref 6.4–8.3)
RBC # BLD AUTO: 4.14 M/UL (ref 4.2–5.4)
SODIUM SERPL-SCNC: 145 MMOL/L (ref 136–145)
TROPONIN, HIGH SENSITIVITY: 11 NG/L (ref 0–14)
TROPONIN, HIGH SENSITIVITY: 15 NG/L (ref 0–14)
WBC # BLD AUTO: 5.3 K/UL (ref 4.8–10.8)

## 2025-04-21 PROCEDURE — 93005 ELECTROCARDIOGRAM TRACING: CPT | Performed by: EMERGENCY MEDICINE

## 2025-04-21 PROCEDURE — 93010 ELECTROCARDIOGRAM REPORT: CPT | Performed by: INTERNAL MEDICINE

## 2025-04-21 PROCEDURE — 71045 X-RAY EXAM CHEST 1 VIEW: CPT

## 2025-04-21 PROCEDURE — 99285 EMERGENCY DEPT VISIT HI MDM: CPT

## 2025-04-21 PROCEDURE — 36415 COLL VENOUS BLD VENIPUNCTURE: CPT

## 2025-04-21 PROCEDURE — 84484 ASSAY OF TROPONIN QUANT: CPT

## 2025-04-21 PROCEDURE — 80053 COMPREHEN METABOLIC PANEL: CPT

## 2025-04-21 PROCEDURE — 85025 COMPLETE CBC W/AUTO DIFF WBC: CPT

## 2025-04-21 PROCEDURE — 93246 EXT ECG>7D<15D RECORDING: CPT

## 2025-04-21 PROCEDURE — 83735 ASSAY OF MAGNESIUM: CPT

## 2025-04-21 ASSESSMENT — PAIN - FUNCTIONAL ASSESSMENT: PAIN_FUNCTIONAL_ASSESSMENT: NONE - DENIES PAIN

## 2025-04-22 ENCOUNTER — OFFICE VISIT (OUTPATIENT)
Dept: CARDIOLOGY CLINIC | Age: 80
End: 2025-04-22
Payer: MEDICARE

## 2025-04-22 VITALS
BODY MASS INDEX: 24.96 KG/M2 | DIASTOLIC BLOOD PRESSURE: 90 MMHG | OXYGEN SATURATION: 99 % | WEIGHT: 159 LBS | SYSTOLIC BLOOD PRESSURE: 160 MMHG | HEIGHT: 67 IN | HEART RATE: 68 BPM

## 2025-04-22 DIAGNOSIS — I10 ESSENTIAL HYPERTENSION: ICD-10-CM

## 2025-04-22 DIAGNOSIS — I25.10 CORONARY ARTERY DISEASE INVOLVING NATIVE CORONARY ARTERY OF NATIVE HEART WITHOUT ANGINA PECTORIS: Primary | ICD-10-CM

## 2025-04-22 DIAGNOSIS — R00.2 PALPITATIONS: ICD-10-CM

## 2025-04-22 LAB
EKG P AXIS: 26 DEGREES
EKG P-R INTERVAL: 196 MS
EKG Q-T INTERVAL: 396 MS
EKG QRS DURATION: 92 MS
EKG QTC CALCULATION (BAZETT): 422 MS
EKG T AXIS: 23 DEGREES

## 2025-04-22 PROCEDURE — G8399 PT W/DXA RESULTS DOCUMENT: HCPCS | Performed by: NURSE PRACTITIONER

## 2025-04-22 PROCEDURE — 1036F TOBACCO NON-USER: CPT | Performed by: NURSE PRACTITIONER

## 2025-04-22 PROCEDURE — 1090F PRES/ABSN URINE INCON ASSESS: CPT | Performed by: NURSE PRACTITIONER

## 2025-04-22 PROCEDURE — 93010 ELECTROCARDIOGRAM REPORT: CPT | Performed by: INTERNAL MEDICINE

## 2025-04-22 PROCEDURE — 1123F ACP DISCUSS/DSCN MKR DOCD: CPT | Performed by: NURSE PRACTITIONER

## 2025-04-22 PROCEDURE — 99214 OFFICE O/P EST MOD 30 MIN: CPT | Performed by: NURSE PRACTITIONER

## 2025-04-22 PROCEDURE — G8420 CALC BMI NORM PARAMETERS: HCPCS | Performed by: NURSE PRACTITIONER

## 2025-04-22 PROCEDURE — 3077F SYST BP >= 140 MM HG: CPT | Performed by: NURSE PRACTITIONER

## 2025-04-22 PROCEDURE — G8428 CUR MEDS NOT DOCUMENT: HCPCS | Performed by: NURSE PRACTITIONER

## 2025-04-22 PROCEDURE — 3080F DIAST BP >= 90 MM HG: CPT | Performed by: NURSE PRACTITIONER

## 2025-04-22 RX ORDER — OLMESARTAN MEDOXOMIL 20 MG/1
20 TABLET ORAL DAILY
Qty: 90 TABLET | Refills: 1 | Status: SHIPPED | OUTPATIENT
Start: 2025-04-22

## 2025-04-22 ASSESSMENT — ENCOUNTER SYMPTOMS
SORE THROAT: 0
SHORTNESS OF BREATH: 0
COUGH: 0
CHEST TIGHTNESS: 0
WHEEZING: 0

## 2025-04-22 NOTE — PROGRESS NOTES
otherwise a NORMAL exam, no recall due to age    COLONOSCOPY N/A 11/05/2024    Duplicate    COLONOSCOPY N/A 11/05/2024    Dr Lowry, (-) TA (-) dysplasia, Mod diverticulosis left colon, int hem Gr 1 wo bleeding, no obvious lesions to explain symptoms,    CORONARY ANGIOPLASTY WITH STENT PLACEMENT  2012    Dr LUJAN    CORONARY ARTERY BYPASS GRAFT N/A 09/11/2020    CORONARY ARTERY BYPASS GRAFT X2 WITH LEFT INTERNAL MAMMARY ARTERY WITH OPEN VEIN HARVESTING WITH PERFUSION TRANSESOPHAGEAL ECHOCARDIOGRAM performed by Ramon Curry MD at [facility] OR    CYST REMOVAL      DILATION AND CURETTAGE OF UTERUS      HYSTERECTOMY (CERVIX STATUS UNKNOWN)  1976    partial    KNEE SURGERY  2011    left knee surgery    PTCA      stent    TUBAL LIGATION      UPPER GASTROINTESTINAL ENDOSCOPY  2009?    UPPER GASTROINTESTINAL ENDOSCOPY  01/10/2012    Dr LUJAN    UPPER GASTROINTESTINAL ENDOSCOPY  05/06/2014    Dr. Hooper    UPPER GASTROINTESTINAL ENDOSCOPY  2018    Dr. Zamora in [facility]Gast[facility] per patient.    UPPER GASTROINTESTINAL ENDOSCOPY N/A 11/05/2024    Dr Lowry, (-) sprue, (-) h pylori, sm 2-3 cm sliding hh, sugg of mild gastritis, no clear cut lesions to explain symptoms,    VARICOSE VEIN SURGERY   01- [facility]    Left Leg Ablation    VARICOSE VEIN SURGERY  3-  [facility]    right leg ablation     Family History   Problem Relation Age of Onset    Diabetes Mother     Heart Disease Mother     Heart Disease Father     Diabetes Father     High Blood Pressure Father     Esophageal Cancer Father 72    Colon Polyps Father     Stomach Cancer Father 72    Crohn's Disease Brother     Unknown Maternal Grandmother     Unknown Maternal Grandfather     Unknown Paternal Grandfather     Unknown Paternal Grandmother     Colon Cancer Neg Hx     Liver Cancer Neg Hx     Liver Disease Neg Hx     Rectal Cancer Neg Hx      Social History     Tobacco Use    Smoking status: Never    Smokeless tobacco: Never   Substance Use Topics    Alcohol

## 2025-05-19 ENCOUNTER — OFFICE VISIT (OUTPATIENT)
Dept: CARDIOLOGY CLINIC | Age: 80
End: 2025-05-19
Payer: MEDICARE

## 2025-05-19 VITALS
HEIGHT: 67 IN | SYSTOLIC BLOOD PRESSURE: 130 MMHG | WEIGHT: 159 LBS | HEART RATE: 77 BPM | OXYGEN SATURATION: 97 % | BODY MASS INDEX: 24.96 KG/M2 | DIASTOLIC BLOOD PRESSURE: 70 MMHG

## 2025-05-19 DIAGNOSIS — R00.2 PALPITATIONS: ICD-10-CM

## 2025-05-19 DIAGNOSIS — I25.10 CORONARY ARTERY DISEASE INVOLVING NATIVE CORONARY ARTERY OF NATIVE HEART WITHOUT ANGINA PECTORIS: Primary | ICD-10-CM

## 2025-05-19 DIAGNOSIS — I10 ESSENTIAL HYPERTENSION: ICD-10-CM

## 2025-05-19 PROCEDURE — 1090F PRES/ABSN URINE INCON ASSESS: CPT | Performed by: NURSE PRACTITIONER

## 2025-05-19 PROCEDURE — G8427 DOCREV CUR MEDS BY ELIG CLIN: HCPCS | Performed by: NURSE PRACTITIONER

## 2025-05-19 PROCEDURE — 1159F MED LIST DOCD IN RCRD: CPT | Performed by: NURSE PRACTITIONER

## 2025-05-19 PROCEDURE — 3078F DIAST BP <80 MM HG: CPT | Performed by: NURSE PRACTITIONER

## 2025-05-19 PROCEDURE — 99214 OFFICE O/P EST MOD 30 MIN: CPT | Performed by: NURSE PRACTITIONER

## 2025-05-19 PROCEDURE — 1123F ACP DISCUSS/DSCN MKR DOCD: CPT | Performed by: NURSE PRACTITIONER

## 2025-05-19 PROCEDURE — 1036F TOBACCO NON-USER: CPT | Performed by: NURSE PRACTITIONER

## 2025-05-19 PROCEDURE — 3075F SYST BP GE 130 - 139MM HG: CPT | Performed by: NURSE PRACTITIONER

## 2025-05-19 PROCEDURE — G8420 CALC BMI NORM PARAMETERS: HCPCS | Performed by: NURSE PRACTITIONER

## 2025-05-19 PROCEDURE — G8399 PT W/DXA RESULTS DOCUMENT: HCPCS | Performed by: NURSE PRACTITIONER

## 2025-05-19 ASSESSMENT — ENCOUNTER SYMPTOMS
COUGH: 0
SORE THROAT: 0
SHORTNESS OF BREATH: 0
WHEEZING: 0
CHEST TIGHTNESS: 0

## 2025-05-19 NOTE — PROGRESS NOTES
patient.    COLONOSCOPY N/A 06/02/2021    Dr Lowry, Int hemorrhoids Grade 2 otherwise a NORMAL exam, no recall due to age    COLONOSCOPY N/A 11/05/2024    Duplicate    COLONOSCOPY N/A 11/05/2024    Dr Lowry, (-) TA (-) dysplasia, Mod diverticulosis left colon, int hem Gr 1 wo bleeding, no obvious lesions to explain symptoms,    CORONARY ANGIOPLASTY WITH STENT PLACEMENT  2012    Dr LUJAN    CORONARY ARTERY BYPASS GRAFT N/A 09/11/2020    CORONARY ARTERY BYPASS GRAFT X2 WITH LEFT INTERNAL MAMMARY ARTERY WITH OPEN VEIN HARVESTING WITH PERFUSION TRANSESOPHAGEAL ECHOCARDIOGRAM performed by Ramon Curry MD at [facility] OR    CYST REMOVAL      DILATION AND CURETTAGE OF UTERUS      HYSTERECTOMY (CERVIX STATUS UNKNOWN)  1976    partial    KNEE SURGERY  2011    left knee surgery    PTCA      stent    TUBAL LIGATION      UPPER GASTROINTESTINAL ENDOSCOPY  2009?    UPPER GASTROINTESTINAL ENDOSCOPY  01/10/2012    Dr LUJAN    UPPER GASTROINTESTINAL ENDOSCOPY  05/06/2014    Dr. Hooper    UPPER GASTROINTESTINAL ENDOSCOPY  2018    Dr. Zamora in [facility]-Gastritis per patient.    UPPER GASTROINTESTINAL ENDOSCOPY N/A 11/05/2024    Dr Lowry, (-) sprue, (-) h pylori, sm 2-3 cm sliding hh, sugg of mild gastritis, no clear cut lesions to explain symptoms,    VARICOSE VEIN SURGERY   01- [facility]    Left Leg Ablation    VARICOSE VEIN SURGERY  3-  [facility]    right leg ablation     Family History   Problem Relation Age of Onset    Diabetes Mother     Heart Disease Mother     Heart Disease Father     Diabetes Father     High Blood Pressure Father     Esophageal Cancer Father 72    Colon Polyps Father     Stomach Cancer Father 72    Crohn's Disease Brother     Unknown Maternal Grandmother     Unknown Maternal Grandfather     Unknown Paternal Grandfather     Unknown Paternal Grandmother     Colon Cancer Neg Hx     Liver Cancer Neg Hx     Liver Disease Neg Hx     Rectal Cancer Neg Hx      Social History     Tobacco Use

## 2025-06-24 ENCOUNTER — LAB (OUTPATIENT)
Dept: LAB | Facility: HOSPITAL | Age: 80
End: 2025-06-24
Payer: MEDICARE

## 2025-06-24 ENCOUNTER — TRANSCRIBE ORDERS (OUTPATIENT)
Dept: ADMINISTRATIVE | Facility: HOSPITAL | Age: 80
End: 2025-06-24
Payer: MEDICARE

## 2025-06-24 DIAGNOSIS — R29.6 REPEATED FALLS: ICD-10-CM

## 2025-06-24 DIAGNOSIS — R29.6 REPEATED FALLS: Primary | ICD-10-CM

## 2025-06-24 LAB
ANION GAP SERPL CALCULATED.3IONS-SCNC: 6 MMOL/L (ref 4–13)
BUN SERPL-MCNC: 25 MG/DL (ref 5–21)
BUN/CREAT SERPL: 31.3
CALCIUM SPEC-SCNC: 9.8 MG/DL (ref 8.6–10.5)
CHLORIDE SERPL-SCNC: 102 MMOL/L (ref 98–110)
CO2 SERPL-SCNC: 29 MMOL/L (ref 24–31)
CREAT SERPL-MCNC: 0.8 MG/DL (ref 0.5–1.4)
EGFRCR SERPLBLD CKD-EPI 2021: 73.7 ML/MIN/1.73
GLUCOSE SERPL-MCNC: 109 MG/DL (ref 65–99)
POTASSIUM SERPL-SCNC: 4 MMOL/L (ref 3.5–5.3)
SODIUM SERPL-SCNC: 137 MMOL/L (ref 135–145)

## 2025-06-24 PROCEDURE — 80048 BASIC METABOLIC PNL TOTAL CA: CPT

## 2025-06-24 PROCEDURE — 36415 COLL VENOUS BLD VENIPUNCTURE: CPT

## 2025-06-28 ENCOUNTER — APPOINTMENT (OUTPATIENT)
Dept: CT IMAGING | Facility: HOSPITAL | Age: 80
End: 2025-06-28
Payer: MEDICARE

## 2025-06-28 ENCOUNTER — HOSPITAL ENCOUNTER (EMERGENCY)
Facility: HOSPITAL | Age: 80
Discharge: HOME | End: 2025-06-28
Attending: EMERGENCY MEDICINE
Payer: MEDICARE

## 2025-06-28 ENCOUNTER — APPOINTMENT (OUTPATIENT)
Dept: GENERAL RADIOLOGY | Facility: HOSPITAL | Age: 80
End: 2025-06-28
Payer: MEDICARE

## 2025-06-28 VITALS
BODY MASS INDEX: 24.56 KG/M2 | SYSTOLIC BLOOD PRESSURE: 119 MMHG | RESPIRATION RATE: 18 BRPM | HEART RATE: 70 BPM | OXYGEN SATURATION: 98 % | WEIGHT: 156.5 LBS | TEMPERATURE: 98.1 F | DIASTOLIC BLOOD PRESSURE: 58 MMHG | HEIGHT: 67 IN

## 2025-06-28 DIAGNOSIS — K20.90 ESOPHAGITIS WITH GASTRITIS: ICD-10-CM

## 2025-06-28 DIAGNOSIS — R11.2 NAUSEA AND VOMITING, UNSPECIFIED VOMITING TYPE: ICD-10-CM

## 2025-06-28 DIAGNOSIS — K29.70 ESOPHAGITIS WITH GASTRITIS: ICD-10-CM

## 2025-06-28 DIAGNOSIS — R10.84 GENERALIZED ABDOMINAL PAIN: Primary | ICD-10-CM

## 2025-06-28 LAB
ALBUMIN SERPL-MCNC: 3.9 G/DL (ref 3.5–5.2)
ALBUMIN/GLOB SERPL: 1.6 G/DL
ALP SERPL-CCNC: 55 U/L (ref 39–117)
ALT SERPL W P-5'-P-CCNC: 15 U/L (ref 1–33)
ANION GAP SERPL CALCULATED.3IONS-SCNC: 10 MMOL/L (ref 5–15)
AST SERPL-CCNC: 18 U/L (ref 1–32)
BACTERIA UR QL AUTO: NORMAL /HPF
BASOPHILS # BLD AUTO: 0.02 10*3/MM3 (ref 0–0.2)
BASOPHILS NFR BLD AUTO: 0.3 % (ref 0–1.5)
BILIRUB SERPL-MCNC: 0.2 MG/DL (ref 0–1.2)
BILIRUB UR QL STRIP: NEGATIVE
BUN SERPL-MCNC: 16.6 MG/DL (ref 8–23)
BUN/CREAT SERPL: 24.4 (ref 7–25)
CALCIUM SPEC-SCNC: 9.3 MG/DL (ref 8.6–10.5)
CHLORIDE SERPL-SCNC: 105 MMOL/L (ref 98–107)
CLARITY UR: CLEAR
CO2 SERPL-SCNC: 25 MMOL/L (ref 22–29)
COLOR UR: YELLOW
CREAT SERPL-MCNC: 0.68 MG/DL (ref 0.57–1)
D-LACTATE SERPL-SCNC: 1.3 MMOL/L (ref 0.5–2)
DEPRECATED RDW RBC AUTO: 43.2 FL (ref 37–54)
EGFRCR SERPLBLD CKD-EPI 2021: 87.1 ML/MIN/1.73
EOSINOPHIL # BLD AUTO: 0.22 10*3/MM3 (ref 0–0.4)
EOSINOPHIL NFR BLD AUTO: 3.5 % (ref 0.3–6.2)
ERYTHROCYTE [DISTWIDTH] IN BLOOD BY AUTOMATED COUNT: 13.1 % (ref 12.3–15.4)
GEN 5 1HR TROPONIN T REFLEX: 15 NG/L
GLOBULIN UR ELPH-MCNC: 2.4 GM/DL
GLUCOSE SERPL-MCNC: 85 MG/DL (ref 65–99)
GLUCOSE UR STRIP-MCNC: NEGATIVE MG/DL
HCT VFR BLD AUTO: 39.9 % (ref 34–46.6)
HGB BLD-MCNC: 13 G/DL (ref 12–15.9)
HGB UR QL STRIP.AUTO: NEGATIVE
HOLD SPECIMEN: NORMAL
HOLD SPECIMEN: NORMAL
HYALINE CASTS UR QL AUTO: NORMAL /LPF
IMM GRANULOCYTES # BLD AUTO: 0.02 10*3/MM3 (ref 0–0.05)
IMM GRANULOCYTES NFR BLD AUTO: 0.3 % (ref 0–0.5)
KETONES UR QL STRIP: NEGATIVE
LEUKOCYTE ESTERASE UR QL STRIP.AUTO: ABNORMAL
LIPASE SERPL-CCNC: 50 U/L (ref 13–60)
LYMPHOCYTES # BLD AUTO: 1.78 10*3/MM3 (ref 0.7–3.1)
LYMPHOCYTES NFR BLD AUTO: 28.3 % (ref 19.6–45.3)
MAGNESIUM SERPL-MCNC: 2 MG/DL (ref 1.6–2.4)
MCH RBC QN AUTO: 29.5 PG (ref 26.6–33)
MCHC RBC AUTO-ENTMCNC: 32.6 G/DL (ref 31.5–35.7)
MCV RBC AUTO: 90.7 FL (ref 79–97)
MONOCYTES # BLD AUTO: 0.45 10*3/MM3 (ref 0.1–0.9)
MONOCYTES NFR BLD AUTO: 7.1 % (ref 5–12)
NEUTROPHILS NFR BLD AUTO: 3.81 10*3/MM3 (ref 1.7–7)
NEUTROPHILS NFR BLD AUTO: 60.5 % (ref 42.7–76)
NITRITE UR QL STRIP: NEGATIVE
NRBC BLD AUTO-RTO: 0 /100 WBC (ref 0–0.2)
PH UR STRIP.AUTO: 6.5 [PH] (ref 5–8)
PLATELET # BLD AUTO: 200 10*3/MM3 (ref 140–450)
PMV BLD AUTO: 8.4 FL (ref 6–12)
POTASSIUM SERPL-SCNC: 3.9 MMOL/L (ref 3.5–5.2)
PROT SERPL-MCNC: 6.3 G/DL (ref 6–8.5)
PROT UR QL STRIP: NEGATIVE
QT INTERVAL: 386 MS
QT INTERVAL: 398 MS
QTC INTERVAL: 419 MS
QTC INTERVAL: 444 MS
RBC # BLD AUTO: 4.4 10*6/MM3 (ref 3.77–5.28)
RBC # UR STRIP: NORMAL /HPF
REF LAB TEST METHOD: NORMAL
SODIUM SERPL-SCNC: 140 MMOL/L (ref 136–145)
SP GR UR STRIP: 1.01 (ref 1–1.03)
SQUAMOUS #/AREA URNS HPF: NORMAL /HPF
TROPONIN T % DELTA: -6
TROPONIN T NUMERIC DELTA: -1 NG/L
TROPONIN T SERPL HS-MCNC: 16 NG/L
UROBILINOGEN UR QL STRIP: ABNORMAL
WBC # UR STRIP: NORMAL /HPF
WBC NRBC COR # BLD AUTO: 6.3 10*3/MM3 (ref 3.4–10.8)
WHOLE BLOOD HOLD COAG: NORMAL
WHOLE BLOOD HOLD SPECIMEN: NORMAL

## 2025-06-28 PROCEDURE — 25010000002 FAMOTIDINE 10 MG/ML SOLUTION: Performed by: EMERGENCY MEDICINE

## 2025-06-28 PROCEDURE — 74176 CT ABD & PELVIS W/O CONTRAST: CPT

## 2025-06-28 PROCEDURE — 96374 THER/PROPH/DIAG INJ IV PUSH: CPT

## 2025-06-28 PROCEDURE — 25810000003 SODIUM CHLORIDE 0.9 % SOLUTION: Performed by: EMERGENCY MEDICINE

## 2025-06-28 PROCEDURE — 83735 ASSAY OF MAGNESIUM: CPT | Performed by: EMERGENCY MEDICINE

## 2025-06-28 PROCEDURE — 71045 X-RAY EXAM CHEST 1 VIEW: CPT

## 2025-06-28 PROCEDURE — 80053 COMPREHEN METABOLIC PANEL: CPT | Performed by: EMERGENCY MEDICINE

## 2025-06-28 PROCEDURE — 85025 COMPLETE CBC W/AUTO DIFF WBC: CPT | Performed by: EMERGENCY MEDICINE

## 2025-06-28 PROCEDURE — 96375 TX/PRO/DX INJ NEW DRUG ADDON: CPT

## 2025-06-28 PROCEDURE — 25010000002 ONDANSETRON PER 1 MG: Performed by: EMERGENCY MEDICINE

## 2025-06-28 PROCEDURE — 99284 EMERGENCY DEPT VISIT MOD MDM: CPT | Performed by: EMERGENCY MEDICINE

## 2025-06-28 PROCEDURE — 96376 TX/PRO/DX INJ SAME DRUG ADON: CPT

## 2025-06-28 PROCEDURE — 83605 ASSAY OF LACTIC ACID: CPT | Performed by: EMERGENCY MEDICINE

## 2025-06-28 PROCEDURE — 36415 COLL VENOUS BLD VENIPUNCTURE: CPT

## 2025-06-28 PROCEDURE — 84484 ASSAY OF TROPONIN QUANT: CPT | Performed by: EMERGENCY MEDICINE

## 2025-06-28 PROCEDURE — 25010000002 MORPHINE PER 10 MG: Performed by: EMERGENCY MEDICINE

## 2025-06-28 PROCEDURE — 83690 ASSAY OF LIPASE: CPT | Performed by: EMERGENCY MEDICINE

## 2025-06-28 PROCEDURE — 93005 ELECTROCARDIOGRAM TRACING: CPT | Performed by: EMERGENCY MEDICINE

## 2025-06-28 PROCEDURE — 81001 URINALYSIS AUTO W/SCOPE: CPT | Performed by: EMERGENCY MEDICINE

## 2025-06-28 RX ORDER — FAMOTIDINE 10 MG/ML
20 INJECTION, SOLUTION INTRAVENOUS ONCE
Status: COMPLETED | OUTPATIENT
Start: 2025-06-28 | End: 2025-06-28

## 2025-06-28 RX ORDER — FAMOTIDINE 20 MG/1
20 TABLET, FILM COATED ORAL 2 TIMES DAILY
Qty: 30 TABLET | Refills: 0 | Status: SHIPPED | OUTPATIENT
Start: 2025-06-28

## 2025-06-28 RX ORDER — ALUMINA, MAGNESIA, AND SIMETHICONE 2400; 2400; 240 MG/30ML; MG/30ML; MG/30ML
15 SUSPENSION ORAL ONCE
Status: COMPLETED | OUTPATIENT
Start: 2025-06-28 | End: 2025-06-28

## 2025-06-28 RX ORDER — ONDANSETRON 4 MG/1
4 TABLET, ORALLY DISINTEGRATING ORAL EVERY 6 HOURS PRN
Qty: 8 TABLET | Refills: 0 | Status: SHIPPED | OUTPATIENT
Start: 2025-06-28

## 2025-06-28 RX ORDER — ONDANSETRON 2 MG/ML
4 INJECTION INTRAMUSCULAR; INTRAVENOUS ONCE
Status: COMPLETED | OUTPATIENT
Start: 2025-06-28 | End: 2025-06-28

## 2025-06-28 RX ORDER — SODIUM CHLORIDE 0.9 % (FLUSH) 0.9 %
10 SYRINGE (ML) INJECTION AS NEEDED
Status: DISCONTINUED | OUTPATIENT
Start: 2025-06-28 | End: 2025-06-28 | Stop reason: HOSPADM

## 2025-06-28 RX ADMIN — ALUMINUM HYDROXIDE, MAGNESIUM HYDROXIDE, AND DIMETHICONE 15 ML: 400; 400; 40 SUSPENSION ORAL at 14:46

## 2025-06-28 RX ADMIN — SODIUM CHLORIDE 1000 ML: 9 INJECTION, SOLUTION INTRAVENOUS at 12:38

## 2025-06-28 RX ADMIN — FAMOTIDINE 20 MG: 10 INJECTION INTRAVENOUS at 12:39

## 2025-06-28 RX ADMIN — ONDANSETRON 4 MG: 2 INJECTION INTRAMUSCULAR; INTRAVENOUS at 14:47

## 2025-06-28 RX ADMIN — MORPHINE SULFATE 4 MG: 4 INJECTION, SOLUTION INTRAMUSCULAR; INTRAVENOUS at 12:39

## 2025-06-28 RX ADMIN — ONDANSETRON 4 MG: 2 INJECTION INTRAMUSCULAR; INTRAVENOUS at 12:39

## 2025-06-28 NOTE — ED PROVIDER NOTES
Subjective   History of Present Illness  Patient with history of anxiety, CAD, fibromyalgia, GERD, hyperlipidemia, hypertension, rheumatoid arthritis, CABG, hysterectomy, appendectomy presents to the emergency department with  evaluated for 2 days of intermittent upper abdominal pain, nausea, and vomiting.  Patient notes that for the past 1 day she has had burning pain from her upper abdomen which appears to be radiating to her mid chest which worsened after she began vomiting.    History provided by:  Patient and spouse      Review of Systems   Cardiovascular:  Positive for chest pain.   Gastrointestinal:  Positive for abdominal pain and nausea. Negative for vomiting.   All other systems reviewed and are negative.      Past Medical History:   Diagnosis Date    Anxiety     Ataxia     CAD (coronary artery disease)     Chronic rhinitis     Fibromyalgia     GERD (gastroesophageal reflux disease)     GERD without esophagitis     Gout     HL (hearing loss)     Hyperlipemia     Hypertension     Occlusion and stenosis of bilateral carotid arteries     Rheumatoid arthritis        Allergies   Allergen Reactions    Atrovent Hfa [Ipratropium Bromide Hfa] Palpitations    Aspirin GI Intolerance    Barium-Containing Compounds Nausea Only    Boniva [Ibandronate] Nausea And Vomiting    Buspirone Nausea And Vomiting and Unknown (See Comments)    Citalopram Hydrobromide Nausea And Vomiting and Nausea Only     Nausea and headache    Colcrys [Colchicine] Nausea And Vomiting    Contrast Dye (Echo Or Unknown Ct/Mr) Irritability     2/13/23 patient confirmed allergy to MRI dye    Evolocumab Irritability and Unknown (See Comments)     Repatha SureClick    Fenofibrate Unknown (See Comments)     Chest pain  Chest pain  Chest pain  Chest pain      Furosemide Nausea Only     swelling  swelling      Gabapentin Nausea And Vomiting and Nausea Only     And dizziness  Dizziness      Levaquin [Levofloxacin] Irritability    Lisinopril Swelling     Lortab [Hydrocodone-Acetaminophen] Irritability    Losartan Unknown (See Comments)     Nausea and dizziness  Nausea and dizziness      Mirtazapine Other (See Comments)     Tremor, nausea, headache  Tremor, nausea, headache      Nitroglycerin Irritability    Plaquenil [Hydroxychloroquine Sulfate] Irritability    Trazodone Unknown (See Comments)     Muscle stiffness  Muscle stiffness      Esomeprazole Magnesium Irritability and Nausea And Vomiting    Omeprazole Irritability and Nausea And Vomiting     nausea  nausea         Past Surgical History:   Procedure Laterality Date    APPENDECTOMY      BREAST BIOPSY      BREAST CYST EXCISION      CATARACT EXTRACTION Left     CERVICAL FUSION       SECTION      CHOLECYSTECTOMY      CORONARY ANGIOPLASTY WITH STENT PLACEMENT      CYST REMOVAL      HYSTERECTOMY      AKHIL due to prolapse.    KNEE ARTHROSCOPY Left     US GUIDED FINE NEEDLE ASPIRATION  2018       Family History   Problem Relation Age of Onset    No Known Problems Father     No Known Problems Sister     No Known Problems Brother     No Known Problems Daughter     No Known Problems Son     No Known Problems Maternal Grandmother     No Known Problems Paternal Grandmother     No Known Problems Maternal Aunt     No Known Problems Paternal Aunt     BRCA 1/2 Neg Hx     Colon cancer Neg Hx     Endometrial cancer Neg Hx     Ovarian cancer Neg Hx     Uterine cancer Neg Hx     Melanoma Neg Hx     Breast cancer Neg Hx        Social History     Socioeconomic History    Marital status:    Tobacco Use    Smoking status: Never    Smokeless tobacco: Never   Vaping Use    Vaping status: Never Used   Substance and Sexual Activity    Alcohol use: No    Drug use: No    Sexual activity: Defer           Objective   Physical Exam  Vitals and nursing note reviewed.   Constitutional:       General: She is in acute distress.      Appearance: She is well-developed. She is ill-appearing. She is not toxic-appearing.       Comments: Uncomfortable and somewhat ill-appearing   HENT:      Head: Normocephalic and atraumatic.      Mouth/Throat:      Mouth: Mucous membranes are moist.   Eyes:      Extraocular Movements: Extraocular movements intact.      Pupils: Pupils are equal, round, and reactive to light.   Cardiovascular:      Rate and Rhythm: Normal rate and regular rhythm.   Pulmonary:      Effort: Pulmonary effort is normal.      Breath sounds: Normal breath sounds. No wheezing.   Abdominal:      General: Bowel sounds are normal.      Palpations: Abdomen is soft.      Tenderness: There is abdominal tenderness (Upper abdomen moderate tenderness). There is no guarding.   Musculoskeletal:      Cervical back: Normal range of motion.   Skin:     General: Skin is warm and dry.      Capillary Refill: Capillary refill takes less than 2 seconds.   Neurological:      General: No focal deficit present.      Mental Status: She is alert and oriented to person, place, and time.      Motor: No weakness.   Psychiatric:         Mood and Affect: Mood normal.         Behavior: Behavior normal.         Procedures           ED Course  ED Course as of 06/28/25 1632   Sat Jun 28, 2025   1233 XR Chest 1 View  IMPRESSION:  1. No acute appearing infiltrate.  2. Stable bronchial wall thickening.   [JG]   1242 EKG at 1128 viewed and interpreted by me shows normal sinus rhythm with a rate of 71 bpm, left axis deviation, normal intervals, normal ST segments, nonspecific T waves in lead V2 [JG]   1513 CT Abdomen Pelvis Without Contrast  IMPRESSION:  1. Prior cholecystectomy, hysterectomy and appendectomy.  2. No definite solid organ abnormality is seen.  3. No evidence of bowel obstruction. Under distention of stomach and  colon. Diverticulosis of the colon with no definite CT evidence of  diverticulitis.  4. Trace amount of free fluid in the right pelvis versus scarring  related to the previous hysterectomy.  5. Atheromatous disease of the aortoiliac vessels  without aneurysm.  Degenerative changes of the spine and hips.      [JG]   1624 Repeat EKG at 1444 reviewed and interpreted by me shows normal sinus rhythm with a rate of 75 bpm, first-degree block with WI interval 214 ms, normal QT interval, left axis deviation, normal ST segments and T waves. [JG]   1625 Labs show troponin 16 which decreased to 15 upon recheck.  Labs otherwise unremarkable.  CT scan of abdomen pelvis is unremarkable.  After receiving Zofran, morphine, Pepcid, IV fluids, GI cocktail patient is asymptomatic.  I doubt ACS, thoracic aortic dissection, or pulmonary embolism at this patient.  Her symptoms are likely more GI related.  Patient given prescriptions for Zofran and Pepcid.  Patient stable at time of disposition.  Patient and daughter agreeable with plan. [JG]      ED Course User Index  [JG] Kaiser Dhillon,                                                        Medical Decision Making  Problems Addressed:  Esophagitis with gastritis: complicated acute illness or injury  Generalized abdominal pain: complicated acute illness or injury  Nausea and vomiting, unspecified vomiting type: complicated acute illness or injury    Amount and/or Complexity of Data Reviewed  Labs: ordered.  Radiology: ordered. Decision-making details documented in ED Course.  ECG/medicine tests: ordered.    Risk  OTC drugs.  Prescription drug management.        Final diagnoses:   Generalized abdominal pain   Nausea and vomiting, unspecified vomiting type   Esophagitis with gastritis       ED Disposition  ED Disposition       ED Disposition   Discharge    Condition   Stable    Comment   --               Alfonso Patiño MD  [address] [address]  [phone]    Schedule an appointment as soon as possible for a visit in 2 days           Medication List        New Prescriptions      ondansetron ODT 4 MG disintegrating tablet  Commonly known as: ZOFRAN-ODT  Take 1 tablet by mouth Every 6 (Six)  Hours As Needed for Nausea or Vomiting for up to 8 doses.            Changed      * famotidine 40 MG tablet  Commonly known as: PEPCID  What changed: Another medication with the same name was added. Make sure you understand how and when to take each.     * famotidine 20 MG tablet  Commonly known as: PEPCID  Take 1 tablet by mouth 2 (Two) Times a Day.  What changed: You were already taking a medication with the same name, and this prescription was added. Make sure you understand how and when to take each.           * This list has 2 medication(s) that are the same as other medications prescribed for you. Read the directions carefully, and ask your doctor or other care provider to review them with you.                   Where to Get Your Medications        These medications were sent to [facility] [address] [place] [address] [id number] [facility] [phone] [facility] [phone] [address] [address]      Phone: [phone]   famotidine 20 MG tablet  ondansetron ODT 4 MG disintegrating tablet            Kaiser Dhillon,   06/28/25 0272

## 2025-07-01 ENCOUNTER — OFFICE VISIT (OUTPATIENT)
Dept: GASTROENTEROLOGY | Age: 80
End: 2025-07-01
Payer: MEDICARE

## 2025-07-01 VITALS
HEART RATE: 70 BPM | WEIGHT: 160 LBS | BODY MASS INDEX: 25.11 KG/M2 | OXYGEN SATURATION: 96 % | SYSTOLIC BLOOD PRESSURE: 130 MMHG | HEIGHT: 67 IN | DIASTOLIC BLOOD PRESSURE: 70 MMHG

## 2025-07-01 DIAGNOSIS — K21.9 GASTROESOPHAGEAL REFLUX DISEASE, UNSPECIFIED WHETHER ESOPHAGITIS PRESENT: Primary | ICD-10-CM

## 2025-07-01 PROCEDURE — 3078F DIAST BP <80 MM HG: CPT | Performed by: NURSE PRACTITIONER

## 2025-07-01 PROCEDURE — 1036F TOBACCO NON-USER: CPT | Performed by: NURSE PRACTITIONER

## 2025-07-01 PROCEDURE — 1123F ACP DISCUSS/DSCN MKR DOCD: CPT | Performed by: NURSE PRACTITIONER

## 2025-07-01 PROCEDURE — 3075F SYST BP GE 130 - 139MM HG: CPT | Performed by: NURSE PRACTITIONER

## 2025-07-01 PROCEDURE — G8427 DOCREV CUR MEDS BY ELIG CLIN: HCPCS | Performed by: NURSE PRACTITIONER

## 2025-07-01 PROCEDURE — G8417 CALC BMI ABV UP PARAM F/U: HCPCS | Performed by: NURSE PRACTITIONER

## 2025-07-01 PROCEDURE — 1090F PRES/ABSN URINE INCON ASSESS: CPT | Performed by: NURSE PRACTITIONER

## 2025-07-01 PROCEDURE — G8399 PT W/DXA RESULTS DOCUMENT: HCPCS | Performed by: NURSE PRACTITIONER

## 2025-07-01 PROCEDURE — 1159F MED LIST DOCD IN RCRD: CPT | Performed by: NURSE PRACTITIONER

## 2025-07-01 PROCEDURE — 99214 OFFICE O/P EST MOD 30 MIN: CPT | Performed by: NURSE PRACTITIONER

## 2025-07-01 RX ORDER — ONDANSETRON 4 MG/1
4 TABLET, ORALLY DISINTEGRATING ORAL EVERY 8 HOURS PRN
COMMUNITY

## 2025-07-01 RX ORDER — FAMOTIDINE 20 MG/1
20 TABLET, FILM COATED ORAL 2 TIMES DAILY
COMMUNITY
Start: 2025-06-28

## 2025-07-01 RX ORDER — LORATADINE 10 MG/1
10 TABLET ORAL DAILY
COMMUNITY

## 2025-07-01 RX ORDER — PANTOPRAZOLE SODIUM 40 MG/1
40 TABLET, DELAYED RELEASE ORAL
Qty: 30 TABLET | Refills: 11 | Status: SHIPPED | OUTPATIENT
Start: 2025-07-01

## 2025-07-01 NOTE — PROGRESS NOTES
Subjective:     Patient ID: Stephanie Coleman is a 80 y.o. female  PCP: Rosalino Martínez PA  Referring Provider: No ref. provider found    HPI  History of Present Illness  The patient presents for evaluation of acid reflux.    She has been experiencing persistent acid reflux, which has not improved despite starting famotidine on 06/28/2025. She reports a constant sensation of acid regurgitation and bloating but does not experience nausea or vomiting. Her appetite is affected, with a feeling of fullness after eating. She does not recall a past adverse reaction to omeprazole, which caused nausea and vomiting however this is in her chart. She has Zofran available for use as needed but has not required it.    On 06/28/2025, she was taken to the emergency room due to acute chest pain, where her heart was found to be normal. However, her blood pressure was elevated due to the severity of the chest pain. She was administered morphine, which provided relief after 3 hours. A CT scan revealed inflammation in the stomach lining, indicative of gastritis. She also experienced acid regurgitation into her mouth, which was bitter in taste. She has never been prescribed a proton pump inhibitor such as Protonix or pantoprazole.    EGD last done 11/2024:  Dr Lowry, (-) renzo, (-) h pylori, sm 2-3 cm sliding hh, sugg of mild gastritis, no clear cut lesions to explain symptoms,       SOCIAL HISTORY  Alcohol: Alcohol is out.    Results  Imaging   - CT scan: Inflammation in the stomach lining      Assessment:     Assessment & Plan  1. Gastroesophageal Reflux Disease (GERD):  - Protonix 40 mg orally once daily before breakfast on an empty stomach.  - Continue Pepcid (famotidine) 20 mg twice daily as needed.  - Avoid dietary items that increase stomach acid production: red sauces, carbonated drinks, caffeine, chocolate, and alcohol.  - Contact the office if no improvement within a week for further treatment adjustments.    Follow-up:

## 2025-07-09 LAB
QT INTERVAL: 386 MS
QT INTERVAL: 398 MS
QTC INTERVAL: 419 MS
QTC INTERVAL: 444 MS

## 2025-07-29 ENCOUNTER — OFFICE VISIT (OUTPATIENT)
Dept: GASTROENTEROLOGY | Age: 80
End: 2025-07-29
Payer: MEDICARE

## 2025-07-29 VITALS
WEIGHT: 160 LBS | BODY MASS INDEX: 25.11 KG/M2 | DIASTOLIC BLOOD PRESSURE: 60 MMHG | SYSTOLIC BLOOD PRESSURE: 130 MMHG | HEIGHT: 67 IN | OXYGEN SATURATION: 96 % | HEART RATE: 79 BPM

## 2025-07-29 DIAGNOSIS — K21.9 GASTROESOPHAGEAL REFLUX DISEASE, UNSPECIFIED WHETHER ESOPHAGITIS PRESENT: Primary | ICD-10-CM

## 2025-07-29 PROCEDURE — G8399 PT W/DXA RESULTS DOCUMENT: HCPCS | Performed by: NURSE PRACTITIONER

## 2025-07-29 PROCEDURE — 1036F TOBACCO NON-USER: CPT | Performed by: NURSE PRACTITIONER

## 2025-07-29 PROCEDURE — 1123F ACP DISCUSS/DSCN MKR DOCD: CPT | Performed by: NURSE PRACTITIONER

## 2025-07-29 PROCEDURE — G8417 CALC BMI ABV UP PARAM F/U: HCPCS | Performed by: NURSE PRACTITIONER

## 2025-07-29 PROCEDURE — 3075F SYST BP GE 130 - 139MM HG: CPT | Performed by: NURSE PRACTITIONER

## 2025-07-29 PROCEDURE — 1090F PRES/ABSN URINE INCON ASSESS: CPT | Performed by: NURSE PRACTITIONER

## 2025-07-29 PROCEDURE — 1159F MED LIST DOCD IN RCRD: CPT | Performed by: NURSE PRACTITIONER

## 2025-07-29 PROCEDURE — G8427 DOCREV CUR MEDS BY ELIG CLIN: HCPCS | Performed by: NURSE PRACTITIONER

## 2025-07-29 PROCEDURE — 3078F DIAST BP <80 MM HG: CPT | Performed by: NURSE PRACTITIONER

## 2025-07-29 PROCEDURE — 99213 OFFICE O/P EST LOW 20 MIN: CPT | Performed by: NURSE PRACTITIONER

## 2025-07-29 NOTE — PROGRESS NOTES
Nose: Nose normal.   Eyes:      General: No scleral icterus.        Right eye: No discharge.         Left eye: No discharge.      Conjunctiva/sclera: Conjunctivae normal.      Pupils: Pupils are equal, round, and reactive to light.   Cardiovascular:      Rate and Rhythm: Normal rate and regular rhythm.      Heart sounds: Normal heart sounds. No murmur heard.  Pulmonary:      Effort: Pulmonary effort is normal. No respiratory distress.      Breath sounds: Normal breath sounds. No wheezing or rales.   Abdominal:      General: Bowel sounds are normal. There is no distension.      Palpations: Abdomen is soft. There is no mass.      Tenderness: There is no abdominal tenderness. There is no guarding or rebound.   Musculoskeletal:         General: Normal range of motion.      Cervical back: Normal range of motion and neck supple.   Skin:     General: Skin is warm and dry.      Coloration: Skin is not pale.   Neurological:      Mental Status: She is alert and oriented to person, place, and time.   Psychiatric:         Behavior: Behavior normal.

## 2025-07-31 ASSESSMENT — ENCOUNTER SYMPTOMS
SHORTNESS OF BREATH: 0
ABDOMINAL DISTENTION: 0
COUGH: 0
DIARRHEA: 0
CONSTIPATION: 0
CHOKING: 0
RECTAL PAIN: 0
VOMITING: 0
ABDOMINAL PAIN: 0
ANAL BLEEDING: 0
TROUBLE SWALLOWING: 0
NAUSEA: 0
BLOOD IN STOOL: 0

## 2025-08-20 RX ORDER — OLMESARTAN MEDOXOMIL 20 MG/1
20 TABLET ORAL DAILY
Qty: 90 TABLET | Refills: 3 | Status: SHIPPED | OUTPATIENT
Start: 2025-08-20

## (undated) DEVICE — SET CATH 20GA L1.75IN RAD ART POLYUR RADPQ W/ INTEGR

## (undated) DEVICE — ACCESSORY TOWEL PACK: Brand: MEDLINE INDUSTRIES, INC.

## (undated) DEVICE — TRAP,MUCUS SPECIMEN,40CC: Brand: MEDLINE

## (undated) DEVICE — SUTURE VCRL SZ 2-0 L36IN ABSRB UD L36MM CT-1 1/2 CIR J945H

## (undated) DEVICE — ENDO KIT,LOURDES HOSPITAL: Brand: MEDLINE INDUSTRIES, INC.

## (undated) DEVICE — E-Z CLEAN, NON-STICK, PTFE COATED, ELECTROSURGICAL BLADE ELECTRODE, MODIFIED EXTENDED INSULATION, 2.5 INCH (6.35 CM): Brand: MEGADYNE

## (undated) DEVICE — SINGLE PORT MANIFOLD: Brand: NEPTUNE 2

## (undated) DEVICE — 3M™ STERI-STRIP™ REINFORCED ADHESIVE SKIN CLOSURES, R1546, 1/4 IN X 4 IN (6 MM X 100 MM), 10 STRIPS/ENVELOPE: Brand: 3M™ STERI-STRIP™

## (undated) DEVICE — CATHETER THRMDIL 7.5FR L110CM PROXIMAL/DISTAL PRT L30CM STD

## (undated) DEVICE — SOLUTION IV 250ML 0.9% SOD CHL PH 5 INJ USP VIAFLX PLAS

## (undated) DEVICE — KIT INTRO 8.5FR L4IN PERC POLYUR SHTH RADPQ W/ INTEGR

## (undated) DEVICE — CONNECTOR DRNGE W3/8-0.5XH3/16XL3/16IN 2:1 SIL CARD STR

## (undated) DEVICE — CLIP INT SM TI EZ LD LIG SYS WECK HORZ

## (undated) DEVICE — DRAIN SURG SGL COLL PT TB FOR ATS BG OASIS

## (undated) DEVICE — CLEANING BRUSH - SINGLE USE: Brand: SAFEGUIDE®

## (undated) DEVICE — CLEANING SPONGE: Brand: KOALA™

## (undated) DEVICE — CANNULA NSL AD L7FT DIV O2 CO2 W/ M LUERLOCK TRMPT CONN

## (undated) DEVICE — AEGIS 1" DISK 4MM HOLE, PEEL OPEN: Brand: MEDLINE

## (undated) DEVICE — FORCEPS BX 240CM 2.4MM L NDL RAD JAW 4 M00513334

## (undated) DEVICE — ROYAL SILK SURGICAL GOWN, XXL: Brand: CONVERTORS

## (undated) DEVICE — SUTURE TEMP PACE WIRE SZ 2-0 L24IN NONABSORBABLE LIGHT/DARK

## (undated) DEVICE — JP CHANNEL DRAIN, 24FR HUBLESS: Brand: CARDINAL HEALTH

## (undated) DEVICE — WAX SURG 2.5GM HEMSTAT BNE BEESWAX PARAFFIN ISO PALMITATE

## (undated) DEVICE — TUBE ET 7.5MM NSL ORAL BASIC CUF INTMED MURPHY EYE RADPQ

## (undated) DEVICE — SYSTEM SKIN CLSR 22CM DERMBND PRINEO

## (undated) DEVICE — SNARE POLYP SM W13MMXL240CM SHTH DIA2.4MM OVL FLX DISP

## (undated) DEVICE — BITE BLOCK ENDOSCP AD 60 FR W/ ADJ STRP PLAS GRN BLOX

## (undated) DEVICE — KIT BLWR MISTER 5P 15L W/ TBNG SET IRRIG MIST TO IMPROVE

## (undated) DEVICE — LARYNGOSCOPE BLDE MAC HNDL M SZ 35 ST CURAPLEX CURAVIEW LED

## (undated) DEVICE — SUTURE N ABSRB BRAIDED 2-0 RB1 30 IN COAT WHT ETHBND EXCEL MX523

## (undated) DEVICE — GLOVE SURG SZ 8 L11.6IN THK9.8MIL STRW LTX POLYMER BEAD CUF

## (undated) DEVICE — Z DUP USE 2266075 STABILIZER SURG VAC STD BLDE ACCESSRAIL PLATFRM SUCT POD

## (undated) DEVICE — COVER,TABLE,44X90,STERILE: Brand: MEDLINE

## (undated) DEVICE — SUTURE ETHBND EXCEL SZ 2-0 L36IN NONABSORBABLE GRN L26MM SH X523H

## (undated) DEVICE — SUTURE PROL SZ 5-0 L36IN NONABSORBABLE BLU L17MM RB-1 1/2 8556H

## (undated) DEVICE — 3M™ TEGADERM™ TRANSPARENT FILM DRESSING FRAME STYLE, 1628, 6 IN X 8 IN (15 CM X 20 CM), 10/CT 8CT/CASE: Brand: 3M™ TEGADERM™

## (undated) DEVICE — SOLUTION IV IRRIG WATER 1000ML POUR BRL 2F7114

## (undated) DEVICE — SUTURE PROL SZ 4-0 L36IN NONABSORBABLE BLU L17MM RB-1 1/2 M8557

## (undated) DEVICE — SUTURE PROL SZ 7-0 L24IN NONABSORBABLE BLU L9.3MM BV-1 3/8 M8702

## (undated) DEVICE — SUPPLEMENT DIGESTIVE H2O SOL GI-EASE

## (undated) DEVICE — Z INACTIVE USE 2540311 LEAD PACE L475MM CHN A OR V MYOCARDIAL STEROID ELUT SIL

## (undated) DEVICE — SOLUTION CARDPLG H2O DIL 1000 ML CARD PERF VIAFLX

## (undated) DEVICE — VESSEL SHUNT 2.00MM TAPERED TIP, 12MM SHAFT: Brand: SOF-FLO ATRAUMATIC CORONARY ARTERY SHUNT

## (undated) DEVICE — SURGICAL PROCEDURE PACK OPN HRT LOURDES HOSP

## (undated) DEVICE — SUTURE PERMAHAND SZ 2-0 L18IN NONABSORBABLE BLK L26MM FS 685G

## (undated) DEVICE — DRAIN SURG L3/8-1/2IN DIA3/16IN SIL CARD CONN 1:1 BLAK

## (undated) DEVICE — INTENT TO BE USED WITH SUTURE MATERIAL FOR TISSUE CLOSURE: Brand: RICHARD-ALLAN®  NEEDLE 1/2 CIRCLE REVERSE CUTTING

## (undated) DEVICE — DRESSING FOAM W4XL12IN SIL RECT ADH WTRPRF FLM BK W/ BORD

## (undated) DEVICE — Device: Brand: CLEANCUT ROTATING AORTIC PUNCH

## (undated) DEVICE — SUTURE ABSORBABLE MONOFILAMENT 0 CTX 36 IN VIO PDS + PDP370T

## (undated) DEVICE — BRUSH ENDOSCP 2 END CHN HEDGEHOG

## (undated) DEVICE — SOLUTION IV 1000ML 0.9% SOD CHL PH 5 INJ USP VIAFLX PLAS

## (undated) DEVICE — SUTURE VCRL SZ 4-0 L27IN ABSRB UD L19MM PS-2 3/8 CIR PRIM J426H

## (undated) DEVICE — DRESSING FOAM 0.75IN 1.5MM H DISK CHG IMPREG AEGIS

## (undated) DEVICE — Z INACTIVE USE 2662641 SOLUTION IV 1000ML 140MEQ/L SOD 5MEQ/L K 3MEQ/L MG 27MEQ/L

## (undated) DEVICE — SS SUTURE, 3 PER SLEEVE: Brand: MYO/WIRE II

## (undated) DEVICE — LOOP VES VASCO 18 G SIL W/ BLNT NDL

## (undated) DEVICE — BLADE SAW W6.35XL32MM STRNM CUT STRNOTMY

## (undated) DEVICE — SOLUTION IV IRRIG POUR BRL 0.9% SODIUM CHL 2F7124

## (undated) DEVICE — MEDI-VAC YANK SUCT HNDL W/TPRD BULBOUS TIP: Brand: CARDINAL HEALTH

## (undated) DEVICE — ADAPTER CLEANING PORPOISE CLEANING

## (undated) DEVICE — DEVICE RESUS AD TB L40IN SELF INFL MASK TEXT BG DBL SWVL EL

## (undated) DEVICE — ELECTRODE ELECSURG 2 PLATE AD 10 FT 33 LB PT RET MEGADYNE

## (undated) DEVICE — BLANKET THER AD W24XL60IN FAB COVERING SUP SFT ULT THN LTWT

## (undated) DEVICE — DRAIN SURG 19FR SIL RND HUBLESS W/ 0.25IN BEND TRCR BLAK

## (undated) DEVICE — Z INACTIVE USE 2535480 CLIP LIG M BLU TI HRT SHP WIRE HORZ 180 PER BX

## (undated) DEVICE — SOLUTION IV 500ML 0.9% SOD CHL PH 5 INJ USP VIAFLX PLAS

## (undated) DEVICE — SUTURE PROL SZ 6-0 L30IN NONABSORBABLE BLU L13MM RB-2 1/2 8711H